# Patient Record
Sex: FEMALE | Race: WHITE | NOT HISPANIC OR LATINO | Employment: OTHER | URBAN - METROPOLITAN AREA
[De-identification: names, ages, dates, MRNs, and addresses within clinical notes are randomized per-mention and may not be internally consistent; named-entity substitution may affect disease eponyms.]

---

## 2017-01-05 ENCOUNTER — HOSPITAL ENCOUNTER (OUTPATIENT)
Dept: RADIOLOGY | Age: 77
Discharge: HOME | End: 2017-01-05
Payer: MEDICARE

## 2017-01-05 DIAGNOSIS — Z13.820 ENCOUNTER FOR SCREENING FOR OSTEOPOROSIS: ICD-10-CM

## 2017-01-05 PROCEDURE — 77080 DXA BONE DENSITY AXIAL: CPT

## 2017-01-23 ENCOUNTER — ALLSCRIPTS OFFICE VISIT (OUTPATIENT)
Dept: OTHER | Facility: OTHER | Age: 77
End: 2017-01-23

## 2017-03-23 ENCOUNTER — ALLSCRIPTS OFFICE VISIT (OUTPATIENT)
Dept: OTHER | Facility: OTHER | Age: 77
End: 2017-03-23

## 2017-08-09 ENCOUNTER — ALLSCRIPTS OFFICE VISIT (OUTPATIENT)
Dept: OTHER | Facility: OTHER | Age: 77
End: 2017-08-09

## 2017-08-09 DIAGNOSIS — Z12.31 ENCOUNTER FOR SCREENING MAMMOGRAM FOR MALIGNANT NEOPLASM OF BREAST: ICD-10-CM

## 2017-09-05 ENCOUNTER — GENERIC CONVERSION - ENCOUNTER (OUTPATIENT)
Dept: OTHER | Facility: OTHER | Age: 77
End: 2017-09-05

## 2017-09-28 ENCOUNTER — ALLSCRIPTS OFFICE VISIT (OUTPATIENT)
Dept: OTHER | Facility: OTHER | Age: 77
End: 2017-09-28

## 2017-10-27 NOTE — PROGRESS NOTES
Assessment  1  Actinic keratosis (702 0) (L57 0)   2  Screening for skin condition (V82 0) (Z13 89)   3  Seborrheic keratosis (702 19) (L82 1)   4  H/O nonmelanoma skin cancer (V10 83) (Z85 828)    Plan   · Wound care as instructed ; Status:Complete;   Done: 40EJI9913   · Use a sun block product with an SPF of 15 or more ; Status:Complete;   Done:  57NSW9489   · Follow-up visit in 6 months Evaluation and Treatment  Follow-up  Status: Complete   Done: 19Dzk4009    Discussion/Summary  Discussion Summary- [facility] Derm:   Assessment #1: Seborrheic keratosis  Care Plan:   Patient reassured these are normal growths we acquire with age no treatment needed  Assessment #2: History of skin cancer  Care Plan:   No recurrence nothing else atypical sunblock recommended followup in 6 months  Assessment #3: Screening for dermatologic disorders  Care Plan:   Nothing else of concern noted on complete exam follow-up in 6 months  Assessment #4: Actinic keratosis  Care Plan:   Patient advised lesions are precancers should resolve with cryosurgery if not to let us know sunblock recommended  Chief Complaint  Chief Complaint Free Text Note Form: 6 month check up and spot on her lower left side of her back  History of Present Illness  HPI: 59-year-old female presents for overall skin check concerns regarding skin cancer with previous history  Concern regarding a spot on lower back      Review of Systems  Complete Female Dermatology [facility] Patient:   Constitutional: Denies constitutional symptoms  Eyes: Denies eye symptoms  ENT:  denies ear symptoms, nasal symptoms, mouth or throat symptoms  Cardiovascular: Denies cardiovascular symptoms  Respiratory: Denies respiratory symptoms  Gastrointestinal: Denies gastrointestinal symptoms  Musculoskeletal: Denies musculoskeletal symptoms  Integumentary: Denies skin, hair and nail symptoms  Neurological: Denies neurologic symptoms     Psychiatric: Denies psychiatric symptoms  Endocrine: Denies endocrine symptoms  Hematologic/Lymphatic: Denies hematologic symptoms  Active Problems  1  Actinic keratosis (702 0) (L57 0)   2  Arthritis (716 90) (M19 90)   3  Basal cell carcinoma of left upper arm (173 61) (C44 619)   4  Changing skin lesion (709 9) (L98 9)   5  Colon cancer screening (V76 51) (Z12 11)   6  Encounter for screening mammogram for breast cancer (V76 12) (Z12 31)   7  Follicular cyst of skin (706 2) (L72 9)   8  Hematuria (599 70) (R31 9)   9  Hyperlipidemia (272 4) (E78 5)   10  Hypertension (401 9) (I10)   11  Milia (706 2) (L72 0)   12  Need for pneumococcal vaccine (V03 82) (Z23)   13  Obesity (278 00) (E66 9)   14  Otitis external (380 10) (H60 90)   15  Screening for breast cancer (V76 10) (Z12 31)   16  Screening for osteoporosis (V82 81) (Z13 820)   17  Screening for skin condition (V82 0) (Z13 89)   18  Seborrheic keratosis (702 19) (L82 1)   19  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)   20  Vertigo (780 4) (R42)    Past Medical History  1  History of Encounter for vitamin deficiency screening (V77 99) (Z13 21)   2  H/O nonmelanoma skin cancer (V10 83) (Z83 169)   3  History of Immunization, varicella (V05 4) (Z23)   4  History of Need for 23-polyvalent pneumococcal polysaccharide vaccine (V03 82) (Z23)   5  History of Need for lipid screening (V77 91) (Z13 220)   6  History of Screening for thyroid disorder (V77 0) (Z13 29)  Past Medical History Reviewed- Derm:   The past medical history was reviewed  Surgical History  1  History of Appendectomy   2  History of Bladder Surgery   3  History of Cholecystectomy   4  History of Colonoscopy   5  History of Dilation And Curettage Of Cervical Stump   6  History of Hernia Repair   7  History of Partial Colectomy   8  History of Tonsillectomy  Surgical History Reviewed [address] [place] [address] 14- Derm:   Surgical History reviewed      Family History  Mother    1   Family history of Arthritis   2  Family history of osteoporosis (V17 81) (Z82 62)   3  Family history of Stroke  Father    4  Family history of Breast cancer   5  Family history of Heart disease   6  Family history of Osteoporosis  Grandmother    7  Family history of Breast cancer   8  Family history of Osteoporosis  Family History Reviewed- Derm:   Family History was reviewed      Social History   · Alcohol use   · Caffeine use (V49 89) (F15 90)   · Former smoker (C88 25) (P48 930)  Social History Reviewed [facility]- Derm: The social history was reviewed      Current Meds   1  BL Calcium Citrate + D TABS; Take as directed Recorded   2  EQL Vitamin D3 1000 UNIT TABS Recorded   3  Glucosamine Chondroitin Joint Oral Tablet Recorded   4  HM Vitamin B12 TABS; Therapy: (Recorded:02Cve9508) to Recorded   5  Metoprolol Succinate ER 50 MG Oral Tablet Extended Release 24 Hour; Take 1 tablet   daily; Therapy: 40LME6778 to (Last Rx:22Onq9210)  Requested for: 32Rte6071 Ordered   6  Ocuvite-Lutein CAPS; Take as directed Recorded   7  Omega 3 CAPS; Take as directed Recorded   8  Triamcinolone Acetonide 0 1 % External Cream; APPLY BID TO AFFECTED AREA X ONE   WEEK DURING FLARE UPS; Therapy: 78HUU4521 to (Last Rx:98Aym7284)  Requested for: 55Zub9070 Ordered  Medication List Reviewed: The medication list was reviewed and updated today  Allergies  1  morphine    Physical Exam    Constitutional   General appearance: Appears healthy and well developed  Lymphatic   No visible disturbance  Musculoskeletal   Digits and nails: No clubbing, cyanosis or edema  Cutaneous and nail exam normal     Skin   Scalp skin texture and hair distribution: Normal skin texture on scalp, normal hair distribution  Head: Abnormal     Neck: Normal turgor, no rashes, no lesions  Chest: Normal turgor, no rashes, no lesions  Abdomen: Normal turgor, no rashes, no lesions  Back: Normal turgor, no rashes, no lesions      Right upper extremity: Normal turgor, no rashes, no lesions  Left upper extremity: Normal turgor, no rashes, no lesions  Right lower extremity: Normal turgor, no rashes, no lesions  Left lower extremity: Normal turgor, no rashes, no lesions  Examination for skin lesions: Abnormal   Skin Lesions: Actinic Keratosis: on area number 4 on the diagram      Neuro/Psych   Alert and oriented x 3  Displays comfort and cooperation during encounterl  Affect is normal     Finding Scaling erythematous areas noted above normal keratotic papules with greasy stuck on appearance previous sites of skin cancer well-healed without recurrence nothing else atypical noted on complete exam       Procedure    Procedure: destruction of lesion  Indications for the procedure include actinic keratosis  Risks, benefits, alternatives, infection risk, bleeding risk, risk of allergic reaction and the risk of scarring were discussed with the patient--   verbal consent was obtained prior to the procedure  Procedure Note:   The lesion location: See Map  Destruction Technique: cryotherapy with liquid nitrogen application-- and-- 16-10 seconds via cryospray--   Destruction of 4 lesions  Post-Procedure:   Patient Status: the patient tolerated the procedure well  Complications: there were no complications  Future Appointments    Date/Time Provider Specialty Site   02/12/2018 01:00 PM UMER Thomson  [address] [facility]   03/29/2018 01:20 PM UMER Mott   Dermatology [facility]     Signatures   Electronically signed by : UMER Hope ; Sep 28 2017  4:01PM EST                       (Author)

## 2018-01-01 DIAGNOSIS — E78.5 HYPERLIPIDEMIA: ICD-10-CM

## 2018-01-01 DIAGNOSIS — I10 ESSENTIAL (PRIMARY) HYPERTENSION: ICD-10-CM

## 2018-01-10 NOTE — CONSULTS
Chief Complaint  6 month check up and spot on her lower left side of her back  History of Present Illness  HPI: 79-year-old female presents for overall skin check concerns regarding skin cancer with previous history  Concern regarding a spot on lower back      Review of Systems    Constitutional: Denies constitutional symptoms  Eyes: Denies eye symptoms  ENT:  denies ear symptoms, nasal symptoms, mouth or throat symptoms  Cardiovascular: Denies cardiovascular symptoms  Respiratory: Denies respiratory symptoms  Gastrointestinal: Denies gastrointestinal symptoms  Musculoskeletal: Denies musculoskeletal symptoms  Integumentary: Denies skin, hair and nail symptoms  Neurological: Denies neurologic symptoms  Psychiatric: Denies psychiatric symptoms  Endocrine: Denies endocrine symptoms  Hematologic/Lymphatic: Denies hematologic symptoms  Active Problems    1  Actinic keratosis (702 0) (L57 0)   2  Arthritis (716 90) (M19 90)   3  Basal cell carcinoma of left upper arm (173 61) (C44 619)   4  Changing skin lesion (709 9) (L98 9)   5  Colon cancer screening (V76 51) (Z12 11)   6  Encounter for screening mammogram for breast cancer (V76 12) (Z12 31)   7  Follicular cyst of skin (706 2) (L72 9)   8  Hematuria (599 70) (R31 9)   9  Hyperlipidemia (272 4) (E78 5)   10  Hypertension (401 9) (I10)   11  Milia (706 2) (L72 0)   12  Need for pneumococcal vaccine (V03 82) (Z23)   13  Obesity (278 00) (E66 9)   14  Otitis external (380 10) (H60 90)   15  Screening for breast cancer (V76 10) (Z12 31)   16  Screening for osteoporosis (V82 81) (Z13 820)   17  Screening for skin condition (V82 0) (Z13 89)   18  Seborrheic keratosis (702 19) (L82 1)   19  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)   20   Vertigo (780 4) (R42)    Past Medical History    · History of Encounter for vitamin deficiency screening (V77 99) (Z13 21)   · H/O nonmelanoma skin cancer (V10 83) (Q71 620)   · History of Immunization, varicella (V05 4) (Z23)   · History of Need for 23-polyvalent pneumococcal polysaccharide vaccine (V03 82) (Z23)   · History of Need for lipid screening (V77 91) (Z13 220)   · History of Screening for thyroid disorder (V77 0) (Z13 29)    The past medical history was reviewed  Surgical History    · History of Appendectomy   · History of Bladder Surgery   · History of Cholecystectomy   · History of Colonoscopy   · History of Dilation And Curettage Of Cervical Stump   · History of Hernia Repair   · History of Partial Colectomy   · History of Tonsillectomy    Surgical History reviewed      Family History    · Family history of Arthritis   · Family history of osteoporosis (V17 81) (Z82 62)   · Family history of Stroke    · Family history of Breast cancer   · Family history of Heart disease   · Family history of Osteoporosis    · Family history of Breast cancer   · Family history of Osteoporosis    Family History was reviewed      Social History    · Alcohol use   · Caffeine use (V49 89) (F15 90)   · Former smoker (P00 97) (I69 245)   · [place]  The social history was reviewed      Current Meds   1  BL Calcium Citrate + D TABS; Take as directed Recorded   2  EQL Vitamin D3 1000 UNIT TABS Recorded   3  Glucosamine Chondroitin Joint Oral Tablet Recorded   4  HM Vitamin B12 TABS; Therapy: (Recorded:37Vhl5144) to Recorded   5  Metoprolol Succinate ER 50 MG Oral Tablet Extended Release 24 Hour; Take 1 tablet   daily; Therapy: 04FFB4525 to (Last Rx:50Asz2880)  Requested for: 00Oxk9177 Ordered   6  Ocuvite-Lutein CAPS; Take as directed Recorded   7  Omega 3 CAPS; Take as directed Recorded   8  Triamcinolone Acetonide 0 1 % External Cream; APPLY BID TO AFFECTED AREA X ONE   WEEK DURING FLARE UPS; Therapy: 60ZFI9359 to (Last Rx:37Deq2876)  Requested for: 08Dfc0704 Ordered    The medication list was reviewed and updated today        Allergies    1  morphine    Physical Exam    Constitutional General appearance: Appears healthy and well developed  Lymphatic   No visible disturbance  Musculoskeletal   Digits and nails: No clubbing, cyanosis or edema  Cutaneous and nail exam normal     Skin   Scalp skin texture and hair distribution: Normal skin texture on scalp, normal hair distribution  Head: Abnormal     Neck: Normal turgor, no rashes, no lesions  Chest: Normal turgor, no rashes, no lesions  Abdomen: Normal turgor, no rashes, no lesions  Back: Normal turgor, no rashes, no lesions  Right upper extremity: Normal turgor, no rashes, no lesions  Left upper extremity: Normal turgor, no rashes, no lesions  Right lower extremity: Normal turgor, no rashes, no lesions  Left lower extremity: Normal turgor, no rashes, no lesions  Examination for skin lesions: Abnormal   Skin Lesions: Actinic Keratosis: on area number 4 on the diagram      Neuro/Psych   Alert and oriented x 3  Displays comfort and cooperation during encounterl  Affect is normal     Finding Scaling erythematous areas noted above normal keratotic papules with greasy stuck on appearance previous sites of skin cancer well-healed without recurrence nothing else atypical noted on complete exam       Procedure    Procedure: destruction of lesion  Indications for the procedure include actinic keratosis  Risks, benefits, alternatives, infection risk, bleeding risk, risk of allergic reaction and the risk of scarring were discussed with the patient   verbal consent was obtained prior to the procedure  Procedure Note:   The lesion location: See Map  Destruction Technique: cryotherapy with liquid nitrogen application and 99-46 seconds via cryospray   Destruction of 4 lesions  Post-Procedure:   Patient Status: the patient tolerated the procedure well  Complications: there were no complications  Assessment    1  Actinic keratosis (702 0) (L57 0)   2  Screening for skin condition (V82 0) (Z13 89)   3   Seborrheic keratosis (702 19) (L82 1)   4  H/O nonmelanoma skin cancer (V10 83) (Z85 828)    Plan  Actinic keratosis    · Wound care as instructed ; Status:Complete;   Done: 97IOB1852  PMH: H/O nonmelanoma skin cancer    · Use a sun block product with an SPF of 15 or more ; Status:Complete;   Done:  10VXN7298   · Follow-up visit in 6 months Evaluation and Treatment  Follow-up  Status: Complete   Done: 85OMS4287    Discussion/Summary    Assessment #1: Seborrheic keratosis  Care Plan:   Patient reassured these are normal growths we acquire with age no treatment needed  Assessment #2: History of skin cancer  Care Plan:   No recurrence nothing else atypical sunblock recommended followup in 6 months  Assessment #3: Screening for dermatologic disorders  Care Plan:   Nothing else of concern noted on complete exam follow-up in 6 months  Assessment #4: Actinic keratosis  Care Plan:   Patient advised lesions are precancers should resolve with cryosurgery if not to let us know sunblock recommended        Signatures   Electronically signed by : UMER Browne ; Sep 28 2017  4:01PM EST                       (Author)

## 2018-01-11 NOTE — PROGRESS NOTES
Assessment    1  Hypertension (401 9) (I10)   2  Hyperlipemia (272 4) (E78 5)    Plan  Health Maintenance    · *VB - Fall Risk Assessment  (Dx V80 09 Screen for Neurologic Disorder);  Status:Complete;   Done: 77AVH9242 12:53AM  Hyperlipemia    · (1) LIPID PANEL, FASTING; Status:Active - Retrospective By Protocol Authorization; Requested for:91Ibn4890;      Recheck 6 months     Discussion/Summary    HM-advised Prevnar  PreHTN-needs watch; encouraged further weight loss  Hyperlipidemia-reviewed low fat diet  History of Present Illness  [place] says she lost some weight  She is down around 10 lbs  [place] says she is exercising on treadmill 4- 5 days a week  Diet she is watching portions  Says she is using olive oil  Ice cream occ  Eats red meat limited  Anabella Moncada  Review of Systems    Preventive Quality 65 and Older: Falls Risk: The patient fell 0 times in the past 12 months  Active Problems    1  Actinic keratosis (702 0) (L57 0)   2  Arthritis (716 90) (M19 90)   3  Basal cell carcinoma of left upper arm (173 61) (C44 619)   4  Changing skin lesion (709 9) (L98 9)   5  Hematuria (599 70) (R31 9)   6  Hypertension (401 9) (I10)   7  Otitis external (380 10) (H60 90)   8  Screening for skin condition (V82 0) (Z13 89)   9  Seborrheic keratosis (702 19) (L82 1)   10  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) (Q16 403)    Past Medical History    1  History of Encounter for vitamin deficiency screening (V77 99) (Z13 21)   2  H/O nonmelanoma skin cancer (V10 83) (X17 140)   3  History of Immunization, varicella (V05 4) (Z23)   4  History of Need for 23-polyvalent pneumococcal polysaccharide vaccine (V03 82) (Z23)   5  History of Need for lipid screening (V77 91) (Z13 220)   6  History of Screening for thyroid disorder (V77 0) (Z13 29)    Surgical History    1  History of Appendectomy   2  History of Bladder Surgery   3  History of Cholecystectomy   4   History of Dilation And Curettage Of Cervical Stump   5  History of Hernia Repair   6  History of Partial Colectomy   7  History of Tonsillectomy    Family History    1  Family history of Arthritis   2  Family history of osteoporosis (V17 81) (Z82 62)   3  Family history of Stroke    4  Family history of Breast cancer   5  Family history of Heart disease   6  Family history of Osteoporosis    7  Family history of Breast cancer   8  Family history of Osteoporosis    Social History    · Alcohol use   · Caffeine use (V49 89) (F15 90)   · Former smoker (E64 50) (J93 273)    Current Meds   1  BL Calcium Citrate + D TABS; Take as directed Recorded   2  EQL Vitamin D3 1000 UNIT Oral Tablet Recorded   3  Glucosamine Chondroitin Joint Oral Tablet Recorded   4  HM Vitamin B12 TABS; Therapy: (Recorded:37Izu0777) to Recorded   5  Metoprolol Succinate ER 50 MG Oral Tablet Extended Release 24 Hour; Take 1 tablet   daily; Therapy: 01MRY4484 to (Last Rx:13Wvn1765)  Requested for: 48DHN2656 Ordered   6  Ocuvite-Lutein CAPS; Take as directed Recorded   7  Omega 3 CAPS; Take as directed Recorded   8  Triamcinolone Acetonide 0 1 % External Cream; APPLY BID TO AFFECTED AREA X ONE   WEEK DURING FLARE UPS; Therapy: 88OHI2989 to (Last Rx:90Leg7607)  Requested for: 80Owb8210 Ordered   9  Zostavax 10994 UNT/0 65ML Subcutaneous Solution Reconstituted; adm  one dose as   directed; Therapy: 80JXI2376 to (Last Rx:28Oct2014) Ordered    Allergies    1  morphine    Vitals  Vital Signs [Data Includes: Current Encounter]    Recorded: 23GQS1817 03:41PM   Heart Rate 64   Respiration 18   Systolic 517   Diastolic 78   Height 5 ft 4 in   Weight 217 lb 8 0 oz   BMI Calculated 37 33   BSA Calculated 2 04     Physical Exam    Constitutional   General appearance: No acute distress, well appearing and well nourished  Neck   Neck: Supple, symmetric, trachea midline, no masses  Thyroid: Normal, no thyromegaly      Pulmonary   Respiratory effort: No increased work of breathing or signs of respiratory distress  Auscultation of lungs: Clear to auscultation  Cardiovascular   Auscultation of heart: Normal rate and rhythm, normal S1 and S2, no murmurs  Carotid pulses: 2+ bilaterally  Abdominal aorta: Normal     Femoral pulses: 2+ bilaterally  Pedal pulses: 2+ bilaterally  Peripheral vascular exam: Normal     Examination of extremities for edema and/or varicosities: Normal     Abdomen   Abdomen: Non-tender, no masses  Liver and spleen: No hepatomegaly or splenomegaly  Lymphatic   Palpation of lymph nodes in neck: No lymphadenopathy  Palpation of lymph nodes in groin: No lymphadenopathy  Future Appointments    Date/Time Provider Specialty Site   07/21/2016 02:00 PM UMER Weaver  [address]   03/15/2016 10:45 AM UMER Norton   [facility] [place]     Signatures   Electronically signed by : UMER Cortez ; Jan 21 2016 12:53AM EST                       (Author)

## 2018-01-13 NOTE — MISCELLANEOUS
Future Appointments    Signatures   Electronically signed by : Jillene Siemens, M D ; Sep 28 2017  4:01PM EST                       (Author)

## 2018-01-15 VITALS
HEART RATE: 64 BPM | SYSTOLIC BLOOD PRESSURE: 134 MMHG | WEIGHT: 218.38 LBS | BODY MASS INDEX: 37.48 KG/M2 | DIASTOLIC BLOOD PRESSURE: 82 MMHG | RESPIRATION RATE: 18 BRPM

## 2018-01-15 VITALS
SYSTOLIC BLOOD PRESSURE: 124 MMHG | WEIGHT: 217.4 LBS | BODY MASS INDEX: 37.32 KG/M2 | DIASTOLIC BLOOD PRESSURE: 68 MMHG | HEART RATE: 64 BPM | RESPIRATION RATE: 16 BRPM

## 2018-02-12 ENCOUNTER — OFFICE VISIT (OUTPATIENT)
Dept: FAMILY MEDICINE CLINIC | Facility: MEDICAL CENTER | Age: 78
End: 2018-02-12
Payer: MEDICARE

## 2018-02-12 VITALS
SYSTOLIC BLOOD PRESSURE: 148 MMHG | BODY MASS INDEX: 38 KG/M2 | WEIGHT: 221.4 LBS | DIASTOLIC BLOOD PRESSURE: 86 MMHG | RESPIRATION RATE: 18 BRPM | HEART RATE: 70 BPM

## 2018-02-12 DIAGNOSIS — E66.9 CLASS 2 OBESITY WITHOUT SERIOUS COMORBIDITY WITH BODY MASS INDEX (BMI) OF 35.0 TO 35.9 IN ADULT, UNSPECIFIED OBESITY TYPE: ICD-10-CM

## 2018-02-12 DIAGNOSIS — M75.21 TENDONITIS OF UPPER BICEPS TENDON OF RIGHT SHOULDER: Primary | ICD-10-CM

## 2018-02-12 DIAGNOSIS — R25.1 TREMOR OF LEFT HAND: ICD-10-CM

## 2018-02-12 DIAGNOSIS — I10 ESSENTIAL HYPERTENSION: ICD-10-CM

## 2018-02-12 DIAGNOSIS — Z01.419 VISIT FOR GYNECOLOGIC EXAMINATION: ICD-10-CM

## 2018-02-12 DIAGNOSIS — Z00.00 ROUTINE ADULT HEALTH MAINTENANCE: ICD-10-CM

## 2018-02-12 DIAGNOSIS — M75.22 TENDONITIS OF UPPER BICEPS TENDON OF LEFT SHOULDER: ICD-10-CM

## 2018-02-12 PROCEDURE — 99215 OFFICE O/P EST HI 40 MIN: CPT | Performed by: FAMILY MEDICINE

## 2018-02-12 RX ORDER — METOPROLOL SUCCINATE 50 MG/1
1 TABLET, EXTENDED RELEASE ORAL DAILY
COMMUNITY
Start: 2014-10-28 | End: 2018-03-14 | Stop reason: SDUPTHER

## 2018-02-12 RX ORDER — LANOLIN ALCOHOL/MO/W.PET/CERES
CREAM (GRAM) TOPICAL
COMMUNITY
End: 2018-09-06 | Stop reason: CLARIF

## 2018-02-12 RX ORDER — GLUCOSAM/MSM/CHOND/HYALURON AC 750-60-150
TABLET ORAL DAILY
COMMUNITY

## 2018-02-12 NOTE — PROGRESS NOTES
S: She is here for CPX  HH: Eats fruits and vegetables, whole grains  Protein daily  Dietary calcium 1-2 daily  Exercise does yoga  Could be better with treadmill  Occ caffeine   Nonsmoker  No chewing tobacco  Alcohol socially  FH: Mother had stroke  Father with heart attack  Breast cancer paternal GM  No  Diabetes  Positive obesity  Maternal GM with osteoporosis   SH: Lives with    Reverend ko  Gyn     Vaginal delivery  Had postmenopausal  Bleeding   D and C   No bleeding since  No vaginal symptoms  No incontinence  Does Kegel exercises  No breast problems  She complains of her left  hand shaking for a long time  Aggravated by holding cup and saucer  No known FH   She says both her arms hurt for a long time  Worse with knitting, chair yoga  Review of Systems   Constitutional: Negative for activity change  Respiratory: Negative for chest tightness and shortness of breath  Cardiovascular: Negative for chest pain and leg swelling  Genitourinary: Positive for frequency  Negative for decreased urine volume, dysuria, pelvic pain, urgency, vaginal bleeding, vaginal discharge and vaginal pain  Skin: Negative for rash  Neurological: Negative for dizziness and headaches  O: /86 (BP Location: Left arm, Patient Position: Sitting, Cuff Size: Large)   Pulse 70   Resp 18   Wt 100 kg (221 lb 6 4 oz)   BMI 38 00 kg/m²   BP by me 130/80  Neck-without adenopathy thyromegaly bruits  Chest clear  Cardiac regular rate and rhythm without murmur  Abdomen benign  Breast without masses or discharge  Pelvic external genitalia normal  Cervix normal  Uterus normal size shape and consistency  Adnexae not palpable  Rectal  no masses stool negative  Ext she shows an intention tremor of the left hand which also was at rest   No pill rolling tremors noted  Some cogwheel rigidity is noted on the right upper extremity but mostly with repetition no facial tremor    Her gait is normal     Colonoscopy 2010  Mammogram 2017  DEXA scan 2017  BW 1/29/18 reviewed CBC/CMP/LP/TSh  Vit D WNL    Assessment  1  Health maintenance-up-to-date including immunizations  2  HTN-controlled  3  Obesity -again advised weight loss  4   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

## 2018-03-14 DIAGNOSIS — I10 ESSENTIAL HYPERTENSION: Primary | ICD-10-CM

## 2018-03-16 RX ORDER — METOPROLOL SUCCINATE 50 MG/1
TABLET, EXTENDED RELEASE ORAL
Qty: 90 TABLET | Refills: 0 | Status: SHIPPED | OUTPATIENT
Start: 2018-03-16 | End: 2018-06-13 | Stop reason: SDUPTHER

## 2018-03-29 ENCOUNTER — OFFICE VISIT (OUTPATIENT)
Dept: DERMATOLOGY | Facility: CLINIC | Age: 78
End: 2018-03-29
Payer: MEDICARE

## 2018-03-29 DIAGNOSIS — Z85.828 HISTORY OF SKIN CANCER: ICD-10-CM

## 2018-03-29 DIAGNOSIS — Z13.89 SCREENING FOR SKIN CONDITION: ICD-10-CM

## 2018-03-29 DIAGNOSIS — L82.1 SEBORRHEIC KERATOSIS: Primary | ICD-10-CM

## 2018-03-29 PROCEDURE — 99213 OFFICE O/P EST LOW 20 MIN: CPT | Performed by: DERMATOLOGY

## 2018-03-29 NOTE — PATIENT INSTRUCTIONS
Seborrheic keratosis patient reassured these are normal growths we acquire with age no treatment needed  History of skin cancer in no recurrence nothing else atypical sunblock recommended follow-up in 1 year  Screening for dermatologic disorders nothing else of concern noted on complete exam follow-up in 1 year

## 2018-06-13 DIAGNOSIS — I10 ESSENTIAL HYPERTENSION: ICD-10-CM

## 2018-06-14 RX ORDER — METOPROLOL SUCCINATE 50 MG/1
TABLET, EXTENDED RELEASE ORAL
Qty: 90 TABLET | Refills: 0 | Status: SHIPPED | OUTPATIENT
Start: 2018-06-14 | End: 2018-09-12 | Stop reason: SDUPTHER

## 2018-08-15 ENCOUNTER — APPOINTMENT (OUTPATIENT)
Dept: RADIOLOGY | Facility: MEDICAL CENTER | Age: 78
End: 2018-08-15
Payer: MEDICARE

## 2018-08-15 ENCOUNTER — OFFICE VISIT (OUTPATIENT)
Dept: FAMILY MEDICINE CLINIC | Facility: MEDICAL CENTER | Age: 78
End: 2018-08-15
Payer: MEDICARE

## 2018-08-15 VITALS
BODY MASS INDEX: 37.98 KG/M2 | HEART RATE: 80 BPM | DIASTOLIC BLOOD PRESSURE: 80 MMHG | RESPIRATION RATE: 16 BRPM | WEIGHT: 221.25 LBS | SYSTOLIC BLOOD PRESSURE: 132 MMHG

## 2018-08-15 DIAGNOSIS — E66.3 OVERWEIGHT: ICD-10-CM

## 2018-08-15 DIAGNOSIS — M25.562 ACUTE PAIN OF LEFT KNEE: ICD-10-CM

## 2018-08-15 DIAGNOSIS — I10 ESSENTIAL HYPERTENSION: ICD-10-CM

## 2018-08-15 DIAGNOSIS — Z12.39 SCREENING FOR BREAST CANCER: ICD-10-CM

## 2018-08-15 DIAGNOSIS — M25.562 ACUTE PAIN OF LEFT KNEE: Primary | ICD-10-CM

## 2018-08-15 DIAGNOSIS — E78.5 HYPERLIPIDEMIA, UNSPECIFIED HYPERLIPIDEMIA TYPE: ICD-10-CM

## 2018-08-15 PROCEDURE — 99214 OFFICE O/P EST MOD 30 MIN: CPT | Performed by: FAMILY MEDICINE

## 2018-08-15 PROCEDURE — 73562 X-RAY EXAM OF KNEE 3: CPT

## 2018-08-15 RX ORDER — OMEGA-3 FATTY ACIDS CAP DELAYED RELEASE 1000 MG 1000 MG
CAPSULE DELAYED RELEASE ORAL
COMMUNITY

## 2018-08-15 NOTE — PROGRESS NOTES
[place] is here for f/u  She complains of a lump in her left knee  She gets pain in that area which goes slightly downward  Pain intermittent  Can be bad with wallking or climbing stairs prolonged sitting  Stiffnes resolves after getting up  Started about a month ago  No trauma  Using Biofreeze and an Ace wrap which helped  She also notices a nonpainful lump outside her left ankle  Se notices an itchy rash between toes of her left foot  She tried triamcinalone  She tried an antifungal cream for the other toes  She needs an order for her mammogram    She does chair and water aerobics  Shaking in her left hand  no worse  O: /80   Pulse 80   Resp 16   Wt 100 kg (221 lb 4 oz)   BMI 37 98 kg/m²    Physical Exam   Constitutional: She appears well-developed and well-nourished  No distress  Neck: Carotid bruit is not present  No thyromegaly present  Cardiovascular: Normal rate, regular rhythm, normal heart sounds and intact distal pulses  Pulmonary/Chest: Effort normal and breath sounds normal    Abdominal: Soft  Bowel sounds are normal  She exhibits no mass  There is no hepatomegaly  There is no tenderness  Musculoskeletal: She exhibits no edema  Lymphadenopathy:     She has no cervical adenopathy  Left knee no crepitus  FROM  Tenderness medial knee but not over meniscus  Ligament stable  Slight swelling noted over inner left knee soft tissue  Prominent veins  NO lower extremety tenderness  Skin-maceration and erythema betweeen first 3 toes left foot  Assessment  1  Hypertension-controlled  2  Left knee pain-will check an x-ray  However this may be a superficial phlebitis  3  Tinea pedis    Plan  Check x-ray left knee  Mammogram   Blood work and recheck 6 months

## 2018-08-16 ENCOUNTER — TELEPHONE (OUTPATIENT)
Dept: FAMILY MEDICINE CLINIC | Facility: MEDICAL CENTER | Age: 78
End: 2018-08-16

## 2018-08-16 DIAGNOSIS — B35.3 TINEA PEDIS OF RIGHT FOOT: Primary | ICD-10-CM

## 2018-08-16 NOTE — TELEPHONE ENCOUNTER
I do not see the antifungal cream in med list-or printed  Please advise  Patient called again  [facility]

## 2018-08-16 NOTE — TELEPHONE ENCOUNTER
Pt was seen yesterday an antifungal cream was supposed to be sent  Can you please send? We need to call patient when done

## 2018-08-17 RX ORDER — KETOCONAZOLE 20 MG/G
CREAM TOPICAL DAILY
Qty: 30 G | Refills: 0 | OUTPATIENT
Start: 2018-08-17 | End: 2019-08-19 | Stop reason: ALTCHOICE

## 2018-08-21 ENCOUNTER — TELEPHONE (OUTPATIENT)
Dept: FAMILY MEDICINE CLINIC | Facility: MEDICAL CENTER | Age: 78
End: 2018-08-21

## 2018-08-24 DIAGNOSIS — M19.90 ARTHRITIS: Primary | ICD-10-CM

## 2018-08-24 DIAGNOSIS — M11.20 CHONDROCALCINOSIS: ICD-10-CM

## 2018-08-24 NOTE — TELEPHONE ENCOUNTER
Pt aware- referral placed  Patient going out of town for a week   Will call and set up something herself  Several names given

## 2018-08-24 NOTE — TELEPHONE ENCOUNTER
X-ray shows some mild are degenerative are changes but also shows some chondrocalcinosis  This is a form of arthritis    If this still bothersome to her would refer to Rheumatology for this

## 2018-09-06 ENCOUNTER — OFFICE VISIT (OUTPATIENT)
Dept: RHEUMATOLOGY | Facility: CLINIC | Age: 78
End: 2018-09-06
Payer: MEDICARE

## 2018-09-06 VITALS
BODY MASS INDEX: 38.28 KG/M2 | WEIGHT: 224.2 LBS | HEART RATE: 65 BPM | HEIGHT: 64 IN | SYSTOLIC BLOOD PRESSURE: 155 MMHG | DIASTOLIC BLOOD PRESSURE: 80 MMHG

## 2018-09-06 DIAGNOSIS — E55.9 VITAMIN D DEFICIENCY: ICD-10-CM

## 2018-09-06 DIAGNOSIS — M11.20 CHONDROCALCINOSIS: ICD-10-CM

## 2018-09-06 DIAGNOSIS — M19.90 ARTHRITIS: ICD-10-CM

## 2018-09-06 DIAGNOSIS — M70.50 ANSERINE BURSITIS: Primary | ICD-10-CM

## 2018-09-06 PROCEDURE — 99204 OFFICE O/P NEW MOD 45 MIN: CPT | Performed by: INTERNAL MEDICINE

## 2018-09-06 PROCEDURE — 20610 DRAIN/INJ JOINT/BURSA W/O US: CPT | Performed by: INTERNAL MEDICINE

## 2018-09-06 RX ORDER — BUPIVACAINE HYDROCHLORIDE 2.5 MG/ML
3 INJECTION, SOLUTION INFILTRATION; PERINEURAL ONCE
Status: COMPLETED | OUTPATIENT
Start: 2018-09-06 | End: 2018-09-06

## 2018-09-06 RX ORDER — METHYLPREDNISOLONE ACETATE 40 MG/ML
40 INJECTION, SUSPENSION INTRA-ARTICULAR; INTRALESIONAL; INTRAMUSCULAR; SOFT TISSUE ONCE
Status: COMPLETED | OUTPATIENT
Start: 2018-09-06 | End: 2018-09-06

## 2018-09-06 RX ADMIN — METHYLPREDNISOLONE ACETATE 40 MG: 40 INJECTION, SUSPENSION INTRA-ARTICULAR; INTRALESIONAL; INTRAMUSCULAR; SOFT TISSUE at 15:31

## 2018-09-06 RX ADMIN — BUPIVACAINE HYDROCHLORIDE 3 ML: 2.5 INJECTION, SOLUTION INFILTRATION; PERINEURAL at 15:30

## 2018-09-06 NOTE — PROGRESS NOTES
Assessment and Plan:   Ms Camilla Laguna is a 15-year-old  female with history significant for osteoarthritis who presents for further evaluation of left knee pain and findings of chondrocalcinosis on imaging  # Left knee pain, secondary to pes anserine bursitis  - Her symptoms in addition to exam findings is likely suggestive of pes anserine bursitis as the cause of her left knee pain  I offered her multiple options including PT, NSAID's, or local steroid injection  As she has been experiencing the pain for a few weeks now, she would like to proceed with bursa injection  Risks of injection were reviewed with patient, including pain, infection, bleeding or injury to surrounding structures  Procedure was well tolerated, with improvement in pain noted immediately after the injection   - I advised her to ice the area if she experiences a flare up in pain tonight  # Chondrocalcinosis  - This is likely an incidental finding noted on imaging, and I do not think she has symptoms secondary to pseudogout  - Will obtain metabolic work up to ensure there is no secondary cause for chondrocalcinosis, but I suspect this is likely idiopathic in nature and age related  Procedure notes:  After consent was obtained, the area (left anserine bursa) was prepared in sterile fashion with chlorhexidine and ETOH pads  Ethylchloride was used for numbing purposes  The area was injected with Depo-Medrol 40 mg and 3 mL of Bupivacaine 0 25%  The patient tolerated the procedure well with no complications  Patient was informed about possible complications like swelling, pain, fever and bleeding  Patient was told to use Tylenol as needed and apply ice locally as needed  Advised to go to ER if the mentioned measures do not help  Avoid strenuous exercise for 48 hours        Plan:  Diagnoses and all orders for this visit:    Anserine bursitis  -     Small joint arthrocentesis  -     bupivacaine (MARCAINE) 0 25 % injection 3 mL; 3 mL by Infiltration route once   -     methylPREDNISolone acetate (DEPO-MEDROL) injection 40 mg; Inject 1 mL (40 mg total) into the joint once     Chondrocalcinosis  -     Ambulatory referral to Rheumatology  -     CBC and differential; Future  -     Comprehensive metabolic panel; Future  -     Cyclic citrul peptide antibody, IgG; Future  -     RF Screen w/ Reflex to Titer; Future  -     Sedimentation rate, automated; Future  -     Uric acid; Future  -     Vitamin D 25 hydroxy; Future  -     Magnesium; Future  -     Phosphorus; Future  -     PTH, intact; Future  -     C-reactive protein; Future      Activities as tolerated    Fall precautions emphasized    Diet: low carb/low fat, more greens/vegetables, adequate hydration  Exercise: try to maintain a low impact exercise regimen as much as possible  Walk for 30 minutes a day for at least 3 days a week    Encouraged to maintain good sleep hygiene  Continue other medications as prescribed by PCP and other specialists        RTC in 3 months  HPI  Ms Menard is a 70-year-old  female with history significant for osteoarthritis who presents for further evaluation of left knee pain and findings of chondrocalcinosis on imaging  She reports onset of left knee pain about 6 weeks ago and it has been constant, but varying in intensity since then  The pain is aggravated with prolonged periods of sitting and activities such as walking and climbing stairs  Relieving factors include Tylenol and local application of tiger balm  She also does chair yoga with certain movements aggravating the knee pain  Water aerobics overall helps her, but as summer is ending she does not have access to indoor pools at this time  The knee pain is associated with a lump sensation on the medial aspect of her knee  She has morning stiffness that lasts a few minutes   She denies any other significant joint pains, but does have pain at the base of her thumbs for which she sees a chiropractor and has no limitations in terms of activities  No other joint swelling noted  She denies fevers, chills, skin rash, photosensitivity or mouth/nose ulcers  She also denies any acute flare ups of joint pain, warmth, swelling or redness involving the left knee  No other complaints at this time  The following portions of the patient's history were reviewed and updated as appropriate: allergies, current medications, past family history, past medical history, past social history, past surgical history and problem list       Review of Systems  Constitutional: Negative for weight change, fevers, chills, night sweats, fatigue  ENT/Mouth: Negative for hearing changes, ear pain, nasal congestion, sinus pain, hoarseness, sore throat, rhinorrhea, swallowing difficulty  Eyes: Negative for pain, redness, discharge, vision changes  Cardiovascular: Negative for chest pain, SOB, palpitations  Respiratory: Negative for cough, sputum, wheezing, dyspnea  Gastrointestinal: Negative for nausea, vomiting, diarrhea, constipation, pain, heartburn  Genitourinary: Negative for dysuria, urinary frequency, hematuria  Musculoskeletal: As per HPI  Skin: Negative for skin rash, color changes  Neuro: Negative for weakness, numbness, tingling, loss of consciousness  Psych: Negative for anxiety, depression  Heme/Lymph: Negative for easy bruising, bleeding, lymphadenopathy          Past Medical History:   Diagnosis Date    H/O nonmelanoma skin cancer     last assessed 9/28/17       Past Surgical History:   Procedure Laterality Date    APPENDECTOMY  2010    BLADDER SURGERY  1997    CERVIX SURGERY      dilation and curettage of cervical stump 2005, 1998, [address]      partial - last assessed 10/28/14    COLONOSCOPY      last assessed 8/9/17    HERNIA REPAIR  2011    TONSILLECTOMY  1971       Social History     Social History    Marital status: /Civil Union     Spouse name: N/A    Number of children: N/A    Years of education: N/A     Occupational History    Not on file       Social History Main Topics    Smoking status: Former Smoker     Quit date: 1979    Smokeless tobacco: Never Used    Alcohol use Yes      Comment: occasionally wine    Drug use: No    Sexual activity: Not on file     Other Topics Concern    Not on file     Social History Narrative    Caffeine use       Family History   Problem Relation Age of Onset    Arthritis Mother     Osteoporosis Mother     Stroke Mother     Breast cancer Father     Heart disease Father     Osteoporosis Father     Breast cancer Family     Osteoporosis Family        Allergies   Allergen Reactions    Morphine Vomiting       Current Outpatient Prescriptions:     Calcium-Magnesium-Vitamin D (CALCIUM 500 PO), Take by mouth, Disp: , Rfl:     Cholecalciferol (EQL VITAMIN D3) 1000 units capsule, Take by mouth, Disp: , Rfl:     cyanocobalamin (CVS VITAMIN B-12) 1000 MCG tablet, Take by mouth, Disp: , Rfl:     Glucos-Chondroit-Hyaluron-MSM (GLUCOSAMINE CHONDROITIN JOINT) TABS, Take by mouth, Disp: , Rfl:     ketoconazole (NIZORAL) 2 % cream, Apply topically daily, Disp: 30 g, Rfl: 0    metoprolol succinate (TOPROL-XL) 50 mg 24 hr tablet, TAKE 1 TABLET DAILY, Disp: 90 tablet, Rfl: 0    Multiple Vitamins-Minerals (EYE VITAMINS PO), Take by mouth, Disp: , Rfl:     Omega-3 Fatty Acids (FISH OIL) 1000 MG CPDR, Take by mouth, Disp: , Rfl:     Current Facility-Administered Medications:     bupivacaine (MARCAINE) 0 25 % injection 3 mL, 3 mL, Infiltration, Once, Kwesi Carranza MD    methylPREDNISolone acetate (DEPO-MEDROL) injection 40 mg, 40 mg, Intra-articular, Once, Kwesi Carrazna MD      Objective:    Vitals:    09/06/18 1408   BP: 155/80   BP Location: Right arm   Patient Position: Sitting   Pulse: 65   Weight: 102 kg (224 lb 3 2 oz)   Height: 5' 4" (1 626 m)       Physical Exam  General: Well appearing, well nourished, in no distress  Oriented x 3, normal mood and affect   Ambulating without difficulty  Obese  Skin: Good turgor, no rash, unusual bruising or prominent lesions  Hair: Normal texture and distribution  Nails: Normal color, no deformities  HEENT:  Head: Normocephalic, atraumatic  Eyes: Conjunctiva clear, sclera non-icteric, EOM intact, PERRL  Nose: No external lesions, mucosa non-inflamed  Mouth: Mucous membranes moist, no mucosal lesions  Neck: Supple, thyroid non-enlarged and non-tender  Heart: Regular rate and rhythm, no murmur or gallop  Lungs: Clear to auscultation, no crackles or wheezing  Extremities: No amputations or deformities, cyanosis, edema  Musculoskeletal: No joint STS noted, but she does have OA changes at her PIP's, DIP's and bilateral CMC's  No other joint tenderness noted  Left knee without any tenderness, swelling, warmth or erythema  Mild crepitus appreciated, but full ROM in bilateral knees  She has significant tenderness at left pes anserine bursa  Neurologic: Alert and oriented  No focal neurological deficits appreciated  Psychiatric: Normal mood and affect  Mendel Spark, M D    Adult Rheumatology

## 2018-09-12 DIAGNOSIS — I10 ESSENTIAL HYPERTENSION: ICD-10-CM

## 2018-09-12 RX ORDER — METOPROLOL SUCCINATE 50 MG/1
TABLET, EXTENDED RELEASE ORAL
Qty: 90 TABLET | Refills: 0 | Status: SHIPPED | OUTPATIENT
Start: 2018-09-12 | End: 2018-12-11 | Stop reason: SDUPTHER

## 2018-10-04 ENCOUNTER — OFFICE VISIT (OUTPATIENT)
Dept: FAMILY MEDICINE CLINIC | Facility: MEDICAL CENTER | Age: 78
End: 2018-10-04
Payer: MEDICARE

## 2018-10-04 ENCOUNTER — TELEPHONE (OUTPATIENT)
Dept: FAMILY MEDICINE CLINIC | Facility: MEDICAL CENTER | Age: 78
End: 2018-10-04

## 2018-10-04 VITALS
OXYGEN SATURATION: 98 % | HEART RATE: 65 BPM | DIASTOLIC BLOOD PRESSURE: 80 MMHG | WEIGHT: 221 LBS | BODY MASS INDEX: 37.93 KG/M2 | TEMPERATURE: 98.3 F | SYSTOLIC BLOOD PRESSURE: 138 MMHG

## 2018-10-04 DIAGNOSIS — J45.901 ASTHMATIC BRONCHITIS WITH ACUTE EXACERBATION, UNSPECIFIED ASTHMA SEVERITY, UNSPECIFIED WHETHER PERSISTENT: Primary | ICD-10-CM

## 2018-10-04 DIAGNOSIS — R05.9 COUGH: Primary | ICD-10-CM

## 2018-10-04 PROCEDURE — 99213 OFFICE O/P EST LOW 20 MIN: CPT | Performed by: FAMILY MEDICINE

## 2018-10-04 RX ORDER — METHYLPREDNISOLONE 4 MG/1
TABLET ORAL
Qty: 21 TABLET | Refills: 0 | Status: SHIPPED | OUTPATIENT
Start: 2018-10-04 | End: 2018-10-09 | Stop reason: HOSPADM

## 2018-10-04 RX ORDER — AZITHROMYCIN 250 MG/1
TABLET, FILM COATED ORAL
Qty: 6 TABLET | Refills: 0 | Status: SHIPPED | OUTPATIENT
Start: 2018-10-04 | End: 2018-10-09 | Stop reason: HOSPADM

## 2018-10-04 RX ORDER — BENZONATATE 200 MG/1
200 CAPSULE ORAL 3 TIMES DAILY PRN
Qty: 30 CAPSULE | Refills: 0 | Status: CANCELLED | OUTPATIENT
Start: 2018-10-04

## 2018-10-04 RX ORDER — ALBUTEROL SULFATE 90 UG/1
2 AEROSOL, METERED RESPIRATORY (INHALATION) EVERY 6 HOURS PRN
Qty: 1 INHALER | Refills: 0 | Status: SHIPPED | OUTPATIENT
Start: 2018-10-04 | End: 2019-03-14 | Stop reason: ALTCHOICE

## 2018-10-04 NOTE — PROGRESS NOTES
[place] started with a cough about 10 days ago  Some nasal congestion  No sore throat or earache  Cough is bad  Productive   Hears wheezing  NOt SOB  Cough is bad at night  No fever or chills  Took Nyquil, Dayquil, Ricola  O: /80 (BP Location: Left arm, Patient Position: Sitting, Cuff Size: Adult)   Pulse 65   Temp 98 3 °F (36 8 °C)   Wt 100 kg (221 lb)   SpO2 98%   BMI 37 93 kg/m²   No respiratory distress  ENT-TM's normal  Pharynx without erythema  Neck no adenopathy  Chest-scattered rhonchi and wheezes  Assessment  Asthmatic bronchitis    Plan  Rx for albuterol inhaler, steroids, Zithromax  Hydration  Call if no better; may need chest x-ray

## 2018-10-04 NOTE — TELEPHONE ENCOUNTER
Patient stated during her visit today with Dr Michael Mae she was suppose to  a precscription for a cough? Patient states it is a yellow pill  Routed to Dr Michael Mae to advise

## 2018-10-05 ENCOUNTER — APPOINTMENT (EMERGENCY)
Dept: CT IMAGING | Facility: HOSPITAL | Age: 78
DRG: 392 | End: 2018-10-05
Payer: MEDICARE

## 2018-10-05 ENCOUNTER — HOSPITAL ENCOUNTER (INPATIENT)
Facility: HOSPITAL | Age: 78
LOS: 3 days | Discharge: HOME HEALTH | DRG: 392 | End: 2018-10-09
Attending: EMERGENCY MEDICINE | Admitting: FAMILY MEDICINE
Payer: MEDICARE

## 2018-10-05 DIAGNOSIS — E87.2 LACTIC ACIDOSIS: ICD-10-CM

## 2018-10-05 DIAGNOSIS — R11.2 INTRACTABLE NAUSEA AND VOMITING: Primary | ICD-10-CM

## 2018-10-05 DIAGNOSIS — E87.6 HYPOKALEMIA: ICD-10-CM

## 2018-10-05 DIAGNOSIS — R53.1 GENERALIZED WEAKNESS: ICD-10-CM

## 2018-10-05 DIAGNOSIS — J40 BRONCHITIS: ICD-10-CM

## 2018-10-05 PROBLEM — R42 DIZZINESSES: Status: ACTIVE | Noted: 2018-10-05

## 2018-10-05 LAB
ALBUMIN SERPL BCP-MCNC: 3.8 G/DL (ref 3.5–5)
ALP SERPL-CCNC: 65 U/L (ref 46–116)
ALT SERPL W P-5'-P-CCNC: 35 U/L (ref 12–78)
ANION GAP SERPL CALCULATED.3IONS-SCNC: 13 MMOL/L (ref 4–13)
APTT PPP: 30 SECONDS (ref 24–36)
AST SERPL W P-5'-P-CCNC: 17 U/L (ref 5–45)
BACTERIA UR QL AUTO: ABNORMAL /HPF
BASE EX.OXY STD BLDV CALC-SCNC: 84.6 % (ref 60–80)
BASE EXCESS BLDV CALC-SCNC: -7 MMOL/L
BASOPHILS # BLD AUTO: 0.03 THOUSANDS/ΜL (ref 0–0.1)
BASOPHILS NFR BLD AUTO: 0 % (ref 0–1)
BILIRUB SERPL-MCNC: 0.4 MG/DL (ref 0.2–1)
BILIRUB UR QL STRIP: NEGATIVE
BUN SERPL-MCNC: 19 MG/DL (ref 5–25)
CALCIUM SERPL-MCNC: 9.4 MG/DL (ref 8.3–10.1)
CHLORIDE SERPL-SCNC: 104 MMOL/L (ref 100–108)
CLARITY UR: CLEAR
CO2 SERPL-SCNC: 25 MMOL/L (ref 21–32)
COLOR UR: YELLOW
CREAT SERPL-MCNC: 1.16 MG/DL (ref 0.6–1.3)
EOSINOPHIL # BLD AUTO: 0.02 THOUSAND/ΜL (ref 0–0.61)
EOSINOPHIL NFR BLD AUTO: 0 % (ref 0–6)
ERYTHROCYTE [DISTWIDTH] IN BLOOD BY AUTOMATED COUNT: 12.6 % (ref 11.6–15.1)
GFR SERPL CREATININE-BSD FRML MDRD: 46 ML/MIN/1.73SQ M
GLUCOSE SERPL-MCNC: 209 MG/DL (ref 65–140)
GLUCOSE UR STRIP-MCNC: NEGATIVE MG/DL
HCO3 BLDV-SCNC: 18.2 MMOL/L (ref 24–30)
HCT VFR BLD AUTO: 48.6 % (ref 34.8–46.1)
HGB BLD-MCNC: 16.8 G/DL (ref 11.5–15.4)
HGB UR QL STRIP.AUTO: ABNORMAL
HYALINE CASTS #/AREA URNS LPF: ABNORMAL /LPF
IMM GRANULOCYTES # BLD AUTO: 0.13 THOUSAND/UL (ref 0–0.2)
IMM GRANULOCYTES NFR BLD AUTO: 1 % (ref 0–2)
INR PPP: 1.05 (ref 0.86–1.17)
KETONES UR STRIP-MCNC: ABNORMAL MG/DL
LACTATE SERPL-SCNC: 2.9 MMOL/L (ref 0.5–2)
LACTATE SERPL-SCNC: 4 MMOL/L (ref 0.5–2)
LEUKOCYTE ESTERASE UR QL STRIP: NEGATIVE
LIPASE SERPL-CCNC: 160 U/L (ref 73–393)
LYMPHOCYTES # BLD AUTO: 1.29 THOUSANDS/ΜL (ref 0.6–4.47)
LYMPHOCYTES NFR BLD AUTO: 11 % (ref 14–44)
MAGNESIUM SERPL-MCNC: 2.1 MG/DL (ref 1.6–2.6)
MCH RBC QN AUTO: 31.8 PG (ref 26.8–34.3)
MCHC RBC AUTO-ENTMCNC: 34.6 G/DL (ref 31.4–37.4)
MCV RBC AUTO: 92 FL (ref 82–98)
MONOCYTES # BLD AUTO: 0.83 THOUSAND/ΜL (ref 0.17–1.22)
MONOCYTES NFR BLD AUTO: 7 % (ref 4–12)
NEUTROPHILS # BLD AUTO: 9.07 THOUSANDS/ΜL (ref 1.85–7.62)
NEUTS SEG NFR BLD AUTO: 81 % (ref 43–75)
NITRITE UR QL STRIP: NEGATIVE
NON-SQ EPI CELLS URNS QL MICRO: ABNORMAL /HPF
NRBC BLD AUTO-RTO: 0 /100 WBCS
O2 CT BLDV-SCNC: 18.4 ML/DL
PCO2 BLDV: 36.2 MM HG (ref 42–50)
PH BLDV: 7.32 [PH] (ref 7.3–7.4)
PH UR STRIP.AUTO: 5.5 [PH] (ref 4.5–8)
PLATELET # BLD AUTO: 183 THOUSANDS/UL (ref 149–390)
PLATELET # BLD AUTO: 247 THOUSANDS/UL (ref 149–390)
PMV BLD AUTO: 8.3 FL (ref 8.9–12.7)
PMV BLD AUTO: 8.4 FL (ref 8.9–12.7)
PO2 BLDV: 53.2 MM HG (ref 35–45)
POTASSIUM SERPL-SCNC: 3.2 MMOL/L (ref 3.5–5.3)
PROT SERPL-MCNC: 7.5 G/DL (ref 6.4–8.2)
PROT UR STRIP-MCNC: NEGATIVE MG/DL
PROTHROMBIN TIME: 13.6 SECONDS (ref 11.8–14.2)
RBC # BLD AUTO: 5.29 MILLION/UL (ref 3.81–5.12)
RBC #/AREA URNS AUTO: ABNORMAL /HPF
SODIUM SERPL-SCNC: 142 MMOL/L (ref 136–145)
SP GR UR STRIP.AUTO: 1.02 (ref 1–1.03)
TROPONIN I SERPL-MCNC: <0.02 NG/ML
UROBILINOGEN UR QL STRIP.AUTO: 0.2 E.U./DL
WBC # BLD AUTO: 11.37 THOUSAND/UL (ref 4.31–10.16)
WBC #/AREA URNS AUTO: ABNORMAL /HPF

## 2018-10-05 PROCEDURE — 83735 ASSAY OF MAGNESIUM: CPT | Performed by: EMERGENCY MEDICINE

## 2018-10-05 PROCEDURE — 83690 ASSAY OF LIPASE: CPT | Performed by: EMERGENCY MEDICINE

## 2018-10-05 PROCEDURE — 85049 AUTOMATED PLATELET COUNT: CPT | Performed by: HOSPITALIST

## 2018-10-05 PROCEDURE — 87040 BLOOD CULTURE FOR BACTERIA: CPT | Performed by: EMERGENCY MEDICINE

## 2018-10-05 PROCEDURE — 96361 HYDRATE IV INFUSION ADD-ON: CPT

## 2018-10-05 PROCEDURE — 99285 EMERGENCY DEPT VISIT HI MDM: CPT

## 2018-10-05 PROCEDURE — 84484 ASSAY OF TROPONIN QUANT: CPT | Performed by: EMERGENCY MEDICINE

## 2018-10-05 PROCEDURE — 83605 ASSAY OF LACTIC ACID: CPT | Performed by: EMERGENCY MEDICINE

## 2018-10-05 PROCEDURE — 96365 THER/PROPH/DIAG IV INF INIT: CPT

## 2018-10-05 PROCEDURE — 83605 ASSAY OF LACTIC ACID: CPT | Performed by: HOSPITALIST

## 2018-10-05 PROCEDURE — 85610 PROTHROMBIN TIME: CPT | Performed by: EMERGENCY MEDICINE

## 2018-10-05 PROCEDURE — 96367 TX/PROPH/DG ADDL SEQ IV INF: CPT

## 2018-10-05 PROCEDURE — 82805 BLOOD GASES W/O2 SATURATION: CPT | Performed by: EMERGENCY MEDICINE

## 2018-10-05 PROCEDURE — 99220 PR INITIAL OBSERVATION CARE/DAY 70 MINUTES: CPT | Performed by: HOSPITALIST

## 2018-10-05 PROCEDURE — 85025 COMPLETE CBC W/AUTO DIFF WBC: CPT | Performed by: EMERGENCY MEDICINE

## 2018-10-05 PROCEDURE — 80053 COMPREHEN METABOLIC PANEL: CPT | Performed by: EMERGENCY MEDICINE

## 2018-10-05 PROCEDURE — 36415 COLL VENOUS BLD VENIPUNCTURE: CPT | Performed by: EMERGENCY MEDICINE

## 2018-10-05 PROCEDURE — 96374 THER/PROPH/DIAG INJ IV PUSH: CPT

## 2018-10-05 PROCEDURE — 70450 CT HEAD/BRAIN W/O DYE: CPT

## 2018-10-05 PROCEDURE — 96375 TX/PRO/DX INJ NEW DRUG ADDON: CPT

## 2018-10-05 PROCEDURE — 74177 CT ABD & PELVIS W/CONTRAST: CPT

## 2018-10-05 PROCEDURE — 93005 ELECTROCARDIOGRAM TRACING: CPT

## 2018-10-05 PROCEDURE — 84484 ASSAY OF TROPONIN QUANT: CPT | Performed by: HOSPITALIST

## 2018-10-05 PROCEDURE — 81001 URINALYSIS AUTO W/SCOPE: CPT | Performed by: EMERGENCY MEDICINE

## 2018-10-05 PROCEDURE — 71260 CT THORAX DX C+: CPT

## 2018-10-05 PROCEDURE — 85730 THROMBOPLASTIN TIME PARTIAL: CPT | Performed by: EMERGENCY MEDICINE

## 2018-10-05 RX ORDER — ONDANSETRON 2 MG/ML
4 INJECTION INTRAMUSCULAR; INTRAVENOUS ONCE
Status: COMPLETED | OUTPATIENT
Start: 2018-10-05 | End: 2018-10-05

## 2018-10-05 RX ORDER — SODIUM CHLORIDE 9 MG/ML
125 INJECTION, SOLUTION INTRAVENOUS CONTINUOUS
Status: DISCONTINUED | OUTPATIENT
Start: 2018-10-05 | End: 2018-10-07

## 2018-10-05 RX ORDER — ALBUTEROL SULFATE 90 UG/1
2 AEROSOL, METERED RESPIRATORY (INHALATION) EVERY 6 HOURS PRN
Status: DISCONTINUED | OUTPATIENT
Start: 2018-10-05 | End: 2018-10-09 | Stop reason: HOSPADM

## 2018-10-05 RX ORDER — ONDANSETRON 2 MG/ML
4 INJECTION INTRAMUSCULAR; INTRAVENOUS EVERY 6 HOURS PRN
Status: DISCONTINUED | OUTPATIENT
Start: 2018-10-05 | End: 2018-10-09 | Stop reason: HOSPADM

## 2018-10-05 RX ORDER — METOCLOPRAMIDE HYDROCHLORIDE 5 MG/ML
10 INJECTION INTRAMUSCULAR; INTRAVENOUS ONCE
Status: COMPLETED | OUTPATIENT
Start: 2018-10-05 | End: 2018-10-05

## 2018-10-05 RX ORDER — METOPROLOL SUCCINATE 50 MG/1
50 TABLET, EXTENDED RELEASE ORAL DAILY
Status: DISCONTINUED | OUTPATIENT
Start: 2018-10-06 | End: 2018-10-09 | Stop reason: HOSPADM

## 2018-10-05 RX ORDER — LIDOCAINE 40 MG/G
CREAM TOPICAL ONCE
Status: COMPLETED | OUTPATIENT
Start: 2018-10-05 | End: 2018-10-05

## 2018-10-05 RX ORDER — DIPHENHYDRAMINE HYDROCHLORIDE 50 MG/ML
12.5 INJECTION INTRAMUSCULAR; INTRAVENOUS ONCE
Status: COMPLETED | OUTPATIENT
Start: 2018-10-05 | End: 2018-10-05

## 2018-10-05 RX ORDER — POTASSIUM CHLORIDE 20 MEQ/1
40 TABLET, EXTENDED RELEASE ORAL ONCE
Status: DISCONTINUED | OUTPATIENT
Start: 2018-10-05 | End: 2018-10-09 | Stop reason: HOSPADM

## 2018-10-05 RX ORDER — HYDRALAZINE HYDROCHLORIDE 20 MG/ML
10 INJECTION INTRAMUSCULAR; INTRAVENOUS EVERY 4 HOURS PRN
Status: DISCONTINUED | OUTPATIENT
Start: 2018-10-05 | End: 2018-10-09 | Stop reason: HOSPADM

## 2018-10-05 RX ORDER — LORAZEPAM 2 MG/ML
0.5 INJECTION INTRAMUSCULAR ONCE
Status: COMPLETED | OUTPATIENT
Start: 2018-10-05 | End: 2018-10-05

## 2018-10-05 RX ORDER — POTASSIUM CHLORIDE 14.9 MG/ML
20 INJECTION INTRAVENOUS ONCE
Status: COMPLETED | OUTPATIENT
Start: 2018-10-05 | End: 2018-10-05

## 2018-10-05 RX ADMIN — SODIUM CHLORIDE 1000 ML: 0.9 INJECTION, SOLUTION INTRAVENOUS at 18:47

## 2018-10-05 RX ADMIN — POTASSIUM CHLORIDE 20 MEQ: 200 INJECTION, SOLUTION INTRAVENOUS at 20:46

## 2018-10-05 RX ADMIN — CEFEPIME HYDROCHLORIDE 2000 MG: 2 INJECTION, POWDER, FOR SOLUTION INTRAVENOUS at 20:19

## 2018-10-05 RX ADMIN — SODIUM CHLORIDE 125 ML/HR: 0.9 INJECTION, SOLUTION INTRAVENOUS at 23:25

## 2018-10-05 RX ADMIN — IOHEXOL 100 ML: 350 INJECTION, SOLUTION INTRAVENOUS at 19:24

## 2018-10-05 RX ADMIN — METOCLOPRAMIDE 10 MG: 5 INJECTION, SOLUTION INTRAMUSCULAR; INTRAVENOUS at 20:17

## 2018-10-05 RX ADMIN — LIDOCAINE 1 APPLICATION: 40 CREAM TOPICAL at 22:44

## 2018-10-05 RX ADMIN — SODIUM CHLORIDE 1000 ML: 0.9 INJECTION, SOLUTION INTRAVENOUS at 19:54

## 2018-10-05 RX ADMIN — DIPHENHYDRAMINE HYDROCHLORIDE 12.5 MG: 50 INJECTION, SOLUTION INTRAMUSCULAR; INTRAVENOUS at 20:17

## 2018-10-05 RX ADMIN — ONDANSETRON 4 MG: 2 INJECTION INTRAMUSCULAR; INTRAVENOUS at 18:42

## 2018-10-05 RX ADMIN — LORAZEPAM 0.5 MG: 2 INJECTION INTRAMUSCULAR; INTRAVENOUS at 23:17

## 2018-10-05 NOTE — ED PROVIDER NOTES
History  No chief complaint on file  HPI    Prior to Admission Medications   Prescriptions Last Dose Informant Patient Reported? Taking? Calcium-Magnesium-Vitamin D (CALCIUM 500 PO)  Self Yes No   Sig: Take by mouth   Cholecalciferol (EQL VITAMIN D3) 1000 units capsule   Yes No   Sig: Take by mouth   Glucos-Chondroit-Hyaluron-MSM (GLUCOSAMINE CHONDROITIN JOINT) TABS   Yes No   Sig: Take by mouth   Methylprednisolone 4 MG TBPK   No No   Sig: Use as directed on package   Multiple Vitamins-Minerals (OCUVITE EXTRA PO)   Yes No   Sig: Take by mouth   Omega-3 Fatty Acids (FISH OIL) 1000 MG CPDR   Yes No   Sig: Take by mouth   albuterol (PROAIR HFA) 90 mcg/act inhaler   No No   Sig: Inhale 2 puffs every 6 (six) hours as needed for wheezing   azithromycin (ZITHROMAX) 250 mg tablet   No No   Sig: Take 2 tablets today then 1 tablet daily x 4 days   cyanocobalamin (CVS VITAMIN B-12) 1000 MCG tablet   Yes No   Sig: Take by mouth   ketoconazole (NIZORAL) 2 % cream   No No   Sig: Apply topically daily   metoprolol succinate (TOPROL-XL) 50 mg 24 hr tablet   No No   Sig: TAKE 1 TABLET DAILY      Facility-Administered Medications: None       Past Medical History:   Diagnosis Date    H/O nonmelanoma skin cancer     last assessed 9/28/17       Past Surgical History:   Procedure Laterality Date    APPENDECTOMY  2010    BLADDER SURGERY  1997    CERVIX SURGERY      dilation and curettage of cervical stump 2005, 1998, [address]      partial - last assessed 10/28/14    COLONOSCOPY      last assessed 8/9/17    HERNIA REPAIR  2011    TONSILLECTOMY  1971       Family History   Problem Relation Age of Onset    Arthritis Mother     Osteoporosis Mother     Stroke Mother     Breast cancer Father     Heart disease Father     Osteoporosis Father     Breast cancer Family     Osteoporosis Family      I have reviewed and agree with the history as documented      Social History   Substance Use Topics    Smoking status: Former Smoker     Quit date: 1979    Smokeless tobacco: Never Used    Alcohol use Yes      Comment: occasionally wine        Review of Systems    Physical Exam  Physical Exam    Vital Signs  ED Triage Vitals   Temp Pulse Resp BP SpO2   -- -- -- -- --      Temp src Heart Rate Source Patient Position - Orthostatic VS BP Location FiO2 (%)   -- -- -- -- --      Pain Score       --           There were no vitals filed for this visit  Visual Acuity      ED Medications  Medications - No data to display    Diagnostic Studies  Results Reviewed     None                 No orders to display              Procedures  Procedures       Phone Contacts  ED Phone Contact    ED Course                               [facility]  CritCare Time    Disposition  Final diagnoses:   None     ED Disposition     None      Follow-up Information    None         Patient's Medications   Discharge Prescriptions    No medications on file     No discharge procedures on file      ED Provider  Electronically Signed by

## 2018-10-05 NOTE — ED PROVIDER NOTES
History  Chief Complaint   Patient presents with    Vomiting     pt c/o URI x10 days and vomiting since today  zpack, prednisone, and albuterol for dx of bronchitis  Patient is a 26-year-old female with past medical and surgical history of hypertension, appendectomy, cholecystectomy, partial bowel resection, hernia repair, presents to the emergency department by ambulance for acute nausea and vomiting  According to EMS, patient has been having cold-like symptoms for the past 10 days  Per patient she has been coughing with runny nose and congestion for 1-2 weeks  She finally saw a physician yesterday who diagnosed her with bronchitis and started her on azithromycin (Z-tresa), albuterol inhaler and steroids  This afternoon, patient felt nauseated and started having vomiting  She reports she vomited for about 0 5 hour and now has only been dry heaving  Vomitus is nonbloody and nonbilious  EMS provided patient with Zofran pre-hospital however she continues to retch and dry heaves  Patient also complains of feeling very dizzy which she describes as a spinning sensation  She also reports having a headache since this afternoon, which came on gradually and progressively worsened  While she was vomiting at her house, she felt the urge to have a bowel movement but did not  EMS also reports when they saw her she was very diaphoretic  She denies any known fever, shaking chills, lightheadedness, change in vision or hearing, tinnitus, sore throat, neck pain or stiffness, pain, palpitations, dyspnea or wheezing, hemoptysis, abdominal pain, hematuria emesis, diarrhea, constipation, blood per rectum or melena, dysuria, change in urinary frequency, hematuria, flank pain, skin rash or color change, extremity swelling or pain, extremity weakness or paresthesia or other focal neurologic deficits  She denies any known sick contacts  Denies any recent travel outside the country          History provided by:  Patient and EMS personnel   used: No        Prior to Admission Medications   Prescriptions Last Dose Informant Patient Reported? Taking?    Calcium-Magnesium-Vitamin D (CALCIUM 500 PO)  Self Yes Yes   Sig: Take by mouth   Cholecalciferol (EQL VITAMIN D3) 1000 units capsule   Yes Yes   Sig: Take by mouth daily     Glucos-Chondroit-Hyaluron-MSM (GLUCOSAMINE CHONDROITIN JOINT) TABS   Yes Yes   Sig: Take by mouth daily     Methylprednisolone 4 MG TBPK   No Yes   Sig: Use as directed on package   Multiple Vitamins-Minerals (OCUVITE PO)   Yes Yes   Sig: Take by mouth daily   Omega-3 Fatty Acids (FISH OIL) 1000 MG CPDR   Yes Yes   Sig: Take by mouth   albuterol (PROAIR HFA) 90 mcg/act inhaler   No Yes   Sig: Inhale 2 puffs every 6 (six) hours as needed for wheezing   azithromycin (ZITHROMAX) 250 mg tablet   No Yes   Sig: Take 2 tablets today then 1 tablet daily x 4 days   cyanocobalamin ([employer] VITAMIN B-12) 1000 MCG tablet   Yes Yes   Sig: Take by mouth daily     ketoconazole (NIZORAL) 2 % cream   No Yes   Sig: Apply topically daily   metoprolol succinate (TOPROL-XL) 50 mg 24 hr tablet   No Yes   Sig: TAKE 1 TABLET DAILY      Facility-Administered Medications: None       Past Medical History:   Diagnosis Date    H/O nonmelanoma skin cancer     last assessed 9/28/17    Hypertension        Past Surgical History:   Procedure Laterality Date    APPENDECTOMY  2010    BLADDER SURGERY  1997    CERVIX SURGERY      dilation and curettage of cervical stump 2005, 1998, [facility] [address] [address]      partial - last assessed 10/28/14    COLONOSCOPY      last assessed 8/9/17    HERNIA REPAIR  2011    TONSILLECTOMY  1971       Family History   Problem Relation Age of Onset    Arthritis Mother     Osteoporosis Mother     Stroke Mother     Breast cancer Father     Heart disease Father     Osteoporosis Father     Breast cancer Family     Osteoporosis Family      I have reviewed and agree with the history as documented  Social History   Substance Use Topics    Smoking status: Former Smoker     Quit date: 1979    Smokeless tobacco: Never Used    Alcohol use Yes      Comment: occasionally wine        Review of Systems   Constitutional: Positive for diaphoresis  Negative for chills and fever  HENT: Positive for congestion and rhinorrhea  Negative for ear pain and sore throat  Eyes: Negative for photophobia, pain and visual disturbance  Respiratory: Positive for cough  Negative for chest tightness, shortness of breath and wheezing  Cardiovascular: Negative for chest pain, palpitations and leg swelling  Gastrointestinal: Positive for nausea and vomiting  Negative for abdominal distention, abdominal pain, blood in stool, constipation and diarrhea  Genitourinary: Negative for dysuria, flank pain, frequency and hematuria  Musculoskeletal: Negative for back pain, neck pain and neck stiffness  Skin: Negative for color change, pallor and rash  Allergic/Immunologic: Negative for immunocompromised state  Neurological: Positive for dizziness and headaches  Negative for syncope, facial asymmetry, speech difficulty, weakness, light-headedness and numbness  Hematological: Negative for adenopathy  Psychiatric/Behavioral: Negative for confusion and decreased concentration  All other systems reviewed and are negative  Physical Exam  Physical Exam   Constitutional: She is oriented to person, place, and time  She appears well-developed and well-nourished  She appears distressed  Patient appears to be in mild distress secondary to dry heaving  HENT:   Head: Normocephalic and atraumatic  Right Ear: External ear normal    Left Ear: External ear normal    Mouth/Throat: Oropharynx is clear and moist  No oropharyngeal exudate  Eyes: Pupils are equal, round, and reactive to light  Conjunctivae and EOM are normal    Neck: Normal range of motion  Neck supple  No JVD present     Cardiovascular: Normal rate, regular rhythm, normal heart sounds and intact distal pulses  Exam reveals no gallop and no friction rub  No murmur heard  Pulmonary/Chest: Effort normal and breath sounds normal  No respiratory distress  She has no wheezes  She has no rales  She exhibits no tenderness  Abdominal: Soft  Bowel sounds are normal  She exhibits no distension  There is tenderness  There is no rebound and no guarding  Patient dry heaving  She has epigastric and left lower quadrant abdominal tenderness  Musculoskeletal: Normal range of motion  She exhibits no edema or tenderness  Lymphadenopathy:     She has no cervical adenopathy  Neurological: She is alert and oriented to person, place, and time  No cranial nerve deficit  No gross motor or sensory deficits  Skin: Skin is warm and dry  No rash noted  She is not diaphoretic  No erythema  No pallor  Psychiatric: She has a normal mood and affect  Her behavior is normal    Nursing note and vitals reviewed        Vital Signs  ED Triage Vitals   Temperature Pulse Respirations Blood Pressure SpO2   10/05/18 1939 10/05/18 1836 10/05/18 1836 10/05/18 1836 10/05/18 1836   (!) 96 5 °F (35 8 °C) 79 22 (!) 184/87 96 %      Temp Source Heart Rate Source Patient Position - Orthostatic VS BP Location FiO2 (%)   10/05/18 1939 10/05/18 2012 10/05/18 2012 10/05/18 2012 --   Rectal Monitor Lying Right arm       Pain Score       10/05/18 2012       No Pain         Vitals:    10/05/18 2012 10/05/18 2049 10/05/18 2145 10/05/18 2329   BP: 153/73 156/71 165/77 129/59   BP Location: Right arm Right arm Left arm Left arm   Pulse: 88 89 97 89   Resp: 20 20 20 18   Temp:  (!) 96 5 °F (35 8 °C)     TempSrc:  Rectal     SpO2: 97% 95% 95% 93%     Visual Acuity      ED Medications  Medications   potassium chloride (K-DUR,KLOR-CON) CR tablet 40 mEq (40 mEq Oral Not Given 10/5/18 2009)   albuterol (PROVENTIL HFA,VENTOLIN HFA) inhaler 2 puff (not administered)   metoprolol succinate (TOPROL-XL) 24 hr tablet 50 mg (not administered)   sodium chloride 0 9 % infusion (125 mL/hr Intravenous New Bag 10/5/18 2325)   enoxaparin (LOVENOX) subcutaneous injection 40 mg (not administered)   ondansetron (ZOFRAN) injection 4 mg (not administered)   hydrALAZINE (APRESOLINE) injection 10 mg (not administered)   ondansetron (ZOFRAN) injection 4 mg (4 mg Intravenous Given 10/5/18 1842)    EMS REPLENISHMENT MED ( Does not apply Given to EMS 10/5/18 1847)   sodium chloride 0 9 % bolus 1,000 mL (0 mL Intravenous Stopped 10/5/18 2046)   sodium chloride 0 9 % bolus 1,000 mL (0 mL Intravenous Stopped 10/5/18 2210)   iohexol (OMNIPAQUE) 350 MG/ML injection (MULTI-DOSE) 100 mL (100 mL Intravenous Given 10/5/18 1924)   metoclopramide (REGLAN) injection 10 mg (10 mg Intravenous Given 10/5/18 2017)   potassium chloride 20 mEq IVPB (premix) (20 mEq Intravenous New Bag 10/5/18 2046)   diphenhydrAMINE (BENADRYL) injection 12 5 mg (12 5 mg Intravenous Given 10/5/18 2017)   cefepime (MAXIPIME) 2,000 mg in dextrose 5 % 50 mL IVPB (0 mg Intravenous Stopped 10/5/18 2045)   lidocaine (LMX) 4 % cream (1 application Topical Given 10/5/18 2244)   LORazepam (ATIVAN) 2 mg/mL injection 0 5 mg (0 5 mg Intravenous Given 10/5/18 2317)       Diagnostic Studies  Results Reviewed     Procedure Component Value Units Date/Time    Lactic acid, plasma [[id number]]     Lab Status:  No result Specimen:  Blood     Blood gas, venous [[id number]]  (Abnormal) Collected:  10/05/18 2142    Lab Status:  Final result Specimen:  Blood from Arm, Right Updated:  10/05/18 2148     pH, Zain 7 320     pCO2, Zain 36 2 (L) mm Hg      pO2, Zain 53 2 (H) mm Hg      HCO3, Zain 18 2 (L) mmol/L      Base Excess, Zain -7 0 mmol/L      O2 Content, Zain 18 4 ml/dL      O2 HGB, VENOUS 84 6 (H) %     Lactic acid, plasma [[id number]]  (Abnormal) Collected:  10/05/18 2057    Lab Status:  Final result Specimen:  Blood from Arm, Left Updated:  10/05/18 2126     LACTIC ACID 2 9 (HH) mmol/L Narrative:         Result may be elevated if tourniquet was used during collection  Urine Microscopic [[id number]]  (Abnormal) Collected:  10/05/18 2017    Lab Status:  Final result Specimen:  Urine from Urine, Clean Catch Updated:  10/05/18 2033     RBC, UA 0-1 (A) /hpf      WBC, UA 0-1 (A) /hpf      Epithelial Cells None Seen /hpf      Bacteria, UA None Seen /hpf      Hyaline Casts, UA 0-1 (A) /lpf     UA w Reflex to Microscopic [[id number]]  (Abnormal) Collected:  10/05/18 2017    Lab Status:  Final result Specimen:  Urine from Urine, Clean Catch Updated:  10/05/18 2026     Color, UA Yellow     Clarity, UA Clear     Specific Gravity, UA 1 025     pH, UA 5 5     Leukocytes, UA Negative     Nitrite, UA Negative     Protein, UA Negative mg/dl      Glucose, UA Negative mg/dl      Ketones, UA Trace (A) mg/dl      Urobilinogen, UA 0 2 E U /dl      Bilirubin, UA Negative     Blood, UA Trace-Intact (A)    Blood culture #2 [[id number]] Collected:  10/05/18 1944    Lab Status: In process Specimen:  Blood from Arm, Left Updated:  10/05/18 1959    Blood culture #1 [[id number]] Collected:  10/05/18 1954    Lab Status: In process Specimen:  Blood from Arm, Right Updated:  10/05/18 1959    Lactic acid, plasma [[id number]]  (Abnormal) Collected:  10/05/18 1843    Lab Status:  Final result Specimen:  Blood from Arm, Left Updated:  10/05/18 1918     LACTIC ACID 4 0 (HH) mmol/L     Narrative:         Result may be elevated if tourniquet was used during collection      Troponin I [[id number]]  (Normal) Collected:  10/05/18 1843    Lab Status:  Final result Specimen:  Blood from Arm, Left Updated:  10/05/18 1916     Troponin I <0 02 ng/mL     Comprehensive metabolic panel [[id number]]  (Abnormal) Collected:  10/05/18 1843    Lab Status:  Final result Specimen:  Blood from Arm, Left Updated:  10/05/18 1911     Sodium 142 mmol/L      Potassium 3 2 (L) mmol/L      Chloride 104 mmol/L      CO2 25 mmol/L      ANION GAP 13 mmol/L      BUN 19 mg/dL      Creatinine 1 16 mg/dL      Glucose 209 (H) mg/dL      Calcium 9 4 mg/dL      AST 17 U/L      ALT 35 U/L      Alkaline Phosphatase 65 U/L      Total Protein 7 5 g/dL      Albumin 3 8 g/dL      Total Bilirubin 0 40 mg/dL      eGFR 46 ml/min/1 73sq m     Narrative:         National Kidney Disease Education Program recommendations are as follows:  GFR calculation is accurate only with a steady state creatinine  Chronic Kidney disease less than 60 ml/min/1 73 sq  meters  Kidney failure less than 15 ml/min/1 73 sq  meters      Magnesium [[id number]]  (Normal) Collected:  10/05/18 1843    Lab Status:  Final result Specimen:  Blood from Arm, Left Updated:  10/05/18 1911     Magnesium 2 1 mg/dL     Lipase [[id number]]  (Normal) Collected:  10/05/18 1843    Lab Status:  Final result Specimen:  Blood from Arm, Left Updated:  10/05/18 1911     Lipase 160 u/L     Protime-INR [[id number]]  (Normal) Collected:  10/05/18 1843    Lab Status:  Final result Specimen:  Blood from Arm, Left Updated:  10/05/18 1905     Protime 13 6 seconds      INR 1 05    APTT [[id number]]  (Normal) Collected:  10/05/18 1843    Lab Status:  Final result Specimen:  Blood from Arm, Left Updated:  10/05/18 1905     PTT 30 seconds     CBC and differential [[id number]]  (Abnormal) Collected:  10/05/18 1843    Lab Status:  Final result Specimen:  Blood from Arm, Left Updated:  10/05/18 1853     WBC 11 37 (H) Thousand/uL      RBC 5 29 (H) Million/uL      Hemoglobin 16 8 (H) g/dL      Hematocrit 48 6 (H) %      MCV 92 fL      MCH 31 8 pg      MCHC 34 6 g/dL      RDW 12 6 %      MPV 8 4 (L) fL      Platelets 608 Thousands/uL      nRBC 0 /100 WBCs      Neutrophils Relative 81 (H) %      Immat GRANS % 1 %      Lymphocytes Relative 11 (L) %      Monocytes Relative 7 %      Eosinophils Relative 0 %      Basophils Relative 0 %      Neutrophils Absolute 9 07 (H) Thousands/µL      Immature Grans Absolute 0 13 Thousand/uL      Lymphocytes Absolute 1 29 Thousands/µL Monocytes Absolute 0 83 Thousand/µL      Eosinophils Absolute 0 02 Thousand/µL      Basophils Absolute 0 03 Thousands/µL                  CT chest abdomen pelvis w contrast   Final Result by Eric Guzman MD (10/05 2006)   1  No acute findings in the chest, abdomen or pelvis  2   0 5 cm right lower lobe pulmonary nodule  Based on current Fleischner Society 2017 Guidelines on incidental pulmonary nodule, no routine follow-up is needed if the patient is considered low risk for lung cancer  If the patient is considered high    risk for lung cancer, 12 month follow-up non-contrast chest CT is recommended  3   Colonic diverticulosis without diverticulitis  4   Hepatic steatosis  Workstation performed: [id number]         CT head without contrast   Final Result by Eric Guzmna MD (10/05 1947)      No acute intracranial abnormality  Workstation performed: [id number]                    Procedures  ECG 12 Lead Documentation  Date/Time: 10/5/2018 7:06 PM  Performed by: Jacob Peoria by: Maria Luisa Curry     ECG reviewed by me, the ED Provider: yes    Patient location:  ED  Previous ECG:     Previous ECG:  Compared to current    Comparison ECG info:  10-; QT prolongation new  Left axis deviation is new  Rate:     ECG rate:  88    ECG rate assessment: normal    Rhythm:     Rhythm: sinus rhythm    Ectopy:     Ectopy: none    QRS:     QRS axis:  Left    QRS intervals:  Normal  Conduction:     Conduction: normal    ST segments:     ST segments:  Normal  T waves:     T waves: normal    Other findings:     Other findings: CHELE    Comments:      Prolonged QT  Phone Contacts  ED Phone Contact    ED Course  ED Course as of Oct 06 0154   Fri Oct 05, 2018   1913 No prior history of diabetes  Glucose: (!) 209   1920 LACTIC ACID: (!!) 4 0   1921 Will add 2nd L of IV fluids  I do feel lactic acid is significantly elevated likely from dehydration    Sepsis also in differential but patient has been having significant vomiting today  Will repeat lactic acid in 2 hours  CT scans pending  Will await reads before starting antibiotics  Patient hemodynamically stable  2001 Patient continues to have nausea and dry heaving  QT mildly prolonged however will try Reglan and Benadryl for further relief  Patient on cardiac monitor so will continue to monitor her QT  Bare hugger started for mild hypothermia  2058 No obvious source of infection  Updated patient about abnormal lactic acid  She is still nauseous  Will admit for further observation  2150 Improved from 4 0  LACTIC ACID: (!!) 2 9           Identification of Seniors at Risk      Most Recent Value   (ISAR) Identification of Seniors at Risk   Before the illness or injury that brought you to the Emergency, did you need someone to help you on a regular basis? 0 Filed at: 10/05/2018 2441   In the last 24 hours, have you needed more help than usual?  0 Filed at: 10/05/2018 4266   Have you been hospitalized for one or more nights during the past 6 months? 0 Filed at: 10/05/2018 1838   In general, do you see well?  0 Filed at: 10/05/2018 1838   In general, do you have serious problems with your memory? 0 Filed at: 10/05/2018 6327   Do you take more than three different medications every day?  0 Filed at: 10/05/2018 1838   ISAR Score  0 Filed at: 10/05/2018 6623                          MDM  Number of Diagnoses or Management Options  Diagnosis management comments: 51-year-old female presents to the ED with acute nausea, vomiting, dizziness and headache  Patient recently has been having cold-like symptoms consistent with bronchitis and was justStarted on azithromycin, steroids and an inhaler  Differential includes acute gastritis or gastroenteritis, UTI/pyelo, pneumonia, vertigo, intracranial hemorrhage or cerebellar stroke, bowel obstruction    Will workup with cardiac and abdominal labs, lactic acid, urinalysis,    CritCare Time    Disposition  Final diagnoses:   Intractable nausea and vomiting   Bronchitis   Hypokalemia   Lactic acidosis     Time reflects when diagnosis was documented in both MDM as applicable and the Disposition within this note     Time User Action Codes Description Comment    10/5/2018  9:09 PM Ne KOEHLER Add [R11 2] Intractable nausea and vomiting     10/5/2018  9:09 PM Ne KOEHLER Add [J40] Bronchitis     10/5/2018  9:09 PM Ne KOEHLER Add [E87 6] Hypokalemia     10/5/2018  9:09 PM Ne KOEHLER Add [E87 2] Lactic acidosis       ED Disposition     ED Disposition Condition Comment    Admit  Case was discussed with ANAMARIA and the patient's admission status was agreed to be Admission Status: observation status to the service of Dr Dragan Pierre    None         Current Discharge Medication List      CONTINUE these medications which have NOT CHANGED    Details   albuterol (PROAIR HFA) 90 mcg/act inhaler Inhale 2 puffs every 6 (six) hours as needed for wheezing  Qty: 1 Inhaler, Refills: 0    Associated Diagnoses: Asthmatic bronchitis with acute exacerbation, unspecified asthma severity, unspecified whether persistent      azithromycin (ZITHROMAX) 250 mg tablet Take 2 tablets today then 1 tablet daily x 4 days  Qty: 6 tablet, Refills: 0    Associated Diagnoses: Asthmatic bronchitis with acute exacerbation, unspecified asthma severity, unspecified whether persistent      Calcium-Magnesium-Vitamin D (CALCIUM 500 PO) Take by mouth      Cholecalciferol (EQL VITAMIN D3) 1000 units capsule Take by mouth daily        cyanocobalamin ([employer] VITAMIN B-12) 1000 MCG tablet Take by mouth daily        Glucos-Chondroit-Hyaluron-MSM (GLUCOSAMINE CHONDROITIN JOINT) TABS Take by mouth daily        ketoconazole (NIZORAL) 2 % cream Apply topically daily  Qty: 30 g, Refills: 0    Associated Diagnoses: Tinea pedis of right foot      Methylprednisolone 4 MG TBPK Use as directed on package  Qty: 21 tablet, Refills: 0    Associated Diagnoses: Asthmatic bronchitis with acute exacerbation, unspecified asthma severity, unspecified whether persistent      metoprolol succinate (TOPROL-XL) 50 mg 24 hr tablet TAKE 1 TABLET DAILY  Qty: 90 tablet, Refills: 0    Associated Diagnoses: Essential hypertension      Multiple Vitamins-Minerals (OCUVITE PO) Take by mouth daily      Omega-3 Fatty Acids (FISH OIL) 1000 MG CPDR Take by mouth           No discharge procedures on file      ED Provider  Electronically Signed by           Jasson Payne DO  10/06/18 8702

## 2018-10-05 NOTE — Clinical Note
Case was discussed with ANAMARIA and the patient's admission status was agreed to be Admission Status: observation status to the service of Dr Devan Bass

## 2018-10-06 LAB
ANION GAP SERPL CALCULATED.3IONS-SCNC: 5 MMOL/L (ref 4–13)
BUN SERPL-MCNC: 13 MG/DL (ref 5–25)
CALCIUM SERPL-MCNC: 8 MG/DL (ref 8.3–10.1)
CHLORIDE SERPL-SCNC: 108 MMOL/L (ref 100–108)
CO2 SERPL-SCNC: 28 MMOL/L (ref 21–32)
CREAT SERPL-MCNC: 0.98 MG/DL (ref 0.6–1.3)
ERYTHROCYTE [DISTWIDTH] IN BLOOD BY AUTOMATED COUNT: 12.9 % (ref 11.6–15.1)
GFR SERPL CREATININE-BSD FRML MDRD: 56 ML/MIN/1.73SQ M
GLUCOSE P FAST SERPL-MCNC: 137 MG/DL (ref 65–99)
GLUCOSE SERPL-MCNC: 137 MG/DL (ref 65–140)
HCT VFR BLD AUTO: 42.8 % (ref 34.8–46.1)
HGB BLD-MCNC: 14.5 G/DL (ref 11.5–15.4)
LACTATE SERPL-SCNC: 1 MMOL/L (ref 0.5–2)
LACTATE SERPL-SCNC: 2.3 MMOL/L (ref 0.5–2)
LACTATE SERPL-SCNC: 2.8 MMOL/L (ref 0.5–2)
MAGNESIUM SERPL-MCNC: 1.8 MG/DL (ref 1.6–2.6)
MCH RBC QN AUTO: 31.3 PG (ref 26.8–34.3)
MCHC RBC AUTO-ENTMCNC: 33.9 G/DL (ref 31.4–37.4)
MCV RBC AUTO: 92 FL (ref 82–98)
PHOSPHATE SERPL-MCNC: 3 MG/DL (ref 2.3–4.1)
PLATELET # BLD AUTO: 200 THOUSANDS/UL (ref 149–390)
PMV BLD AUTO: 8.4 FL (ref 8.9–12.7)
POTASSIUM SERPL-SCNC: 5 MMOL/L (ref 3.5–5.3)
PROCALCITONIN SERPL-MCNC: 0.06 NG/ML
RBC # BLD AUTO: 4.63 MILLION/UL (ref 3.81–5.12)
SODIUM SERPL-SCNC: 141 MMOL/L (ref 136–145)
TROPONIN I SERPL-MCNC: 0.03 NG/ML
TROPONIN I SERPL-MCNC: <0.02 NG/ML
WBC # BLD AUTO: 11.38 THOUSAND/UL (ref 4.31–10.16)

## 2018-10-06 PROCEDURE — 80048 BASIC METABOLIC PNL TOTAL CA: CPT | Performed by: HOSPITALIST

## 2018-10-06 PROCEDURE — 84100 ASSAY OF PHOSPHORUS: CPT | Performed by: HOSPITALIST

## 2018-10-06 PROCEDURE — 84484 ASSAY OF TROPONIN QUANT: CPT | Performed by: HOSPITALIST

## 2018-10-06 PROCEDURE — 85027 COMPLETE CBC AUTOMATED: CPT | Performed by: HOSPITALIST

## 2018-10-06 PROCEDURE — 84145 PROCALCITONIN (PCT): CPT | Performed by: PHYSICIAN ASSISTANT

## 2018-10-06 PROCEDURE — 83735 ASSAY OF MAGNESIUM: CPT | Performed by: HOSPITALIST

## 2018-10-06 PROCEDURE — 83605 ASSAY OF LACTIC ACID: CPT | Performed by: HOSPITALIST

## 2018-10-06 PROCEDURE — 83605 ASSAY OF LACTIC ACID: CPT | Performed by: PHYSICIAN ASSISTANT

## 2018-10-06 PROCEDURE — 99232 SBSQ HOSP IP/OBS MODERATE 35: CPT | Performed by: PHYSICIAN ASSISTANT

## 2018-10-06 RX ORDER — MECLIZINE HYDROCHLORIDE 25 MG/1
25 TABLET ORAL EVERY 8 HOURS PRN
Status: DISCONTINUED | OUTPATIENT
Start: 2018-10-06 | End: 2018-10-09 | Stop reason: HOSPADM

## 2018-10-06 RX ORDER — GUAIFENESIN 600 MG
600 TABLET, EXTENDED RELEASE 12 HR ORAL EVERY 12 HOURS SCHEDULED
Status: DISCONTINUED | OUTPATIENT
Start: 2018-10-06 | End: 2018-10-09 | Stop reason: HOSPADM

## 2018-10-06 RX ORDER — BENZONATATE 100 MG/1
100 CAPSULE ORAL 3 TIMES DAILY PRN
Status: DISCONTINUED | OUTPATIENT
Start: 2018-10-06 | End: 2018-10-09 | Stop reason: HOSPADM

## 2018-10-06 RX ADMIN — SODIUM CHLORIDE 125 ML/HR: 0.9 INJECTION, SOLUTION INTRAVENOUS at 23:42

## 2018-10-06 RX ADMIN — MECLIZINE HYDROCHLORIDE 25 MG: 25 TABLET ORAL at 09:12

## 2018-10-06 RX ADMIN — METOPROLOL SUCCINATE 50 MG: 50 TABLET, EXTENDED RELEASE ORAL at 08:12

## 2018-10-06 RX ADMIN — MECLIZINE HYDROCHLORIDE 25 MG: 25 TABLET ORAL at 17:01

## 2018-10-06 RX ADMIN — GUAIFENESIN 600 MG: 600 TABLET, EXTENDED RELEASE ORAL at 21:44

## 2018-10-06 RX ADMIN — ENOXAPARIN SODIUM 40 MG: 40 INJECTION SUBCUTANEOUS at 08:12

## 2018-10-06 RX ADMIN — BENZONATATE 100 MG: 100 CAPSULE ORAL at 21:44

## 2018-10-06 RX ADMIN — ONDANSETRON 4 MG: 2 INJECTION INTRAMUSCULAR; INTRAVENOUS at 07:27

## 2018-10-06 NOTE — CASE MANAGEMENT
Initial Clinical Review    Admission: Date/Time/Statement   10/05/18 2110    Orders Placed This Encounter   Procedures    Place in Observation (expected length of stay for this patient is less than two midnights)     Standing Status:   Standing     Number of Occurrences:   1     Order Specific Question:   Admitting Physician     Answer:   Mukesh Bustos     Order Specific Question:   Level of Care     Answer:   Med Surg [16]     Order Specific Question:   Bed request comments     Answer:   tele         ED: Date/Time/Mode of Arrival:   ED Arrival Information     Expected Arrival [address] of Arrival Escorted By Service Admission Type    - 10/5/2018 18:33 Urgent Ambulance [facility] Reg Hospitalist Urgent    Arrival Complaint    vomiting          Chief Complaint:   Chief Complaint   Patient presents with    Vomiting     pt c/o URI x10 days and vomiting since today  zpack, prednisone, and albuterol for dx of bronchitis  History of Illness:     Russell Pederson is a 68 y o  female with past medical history of hypertension who presented to the ER with complaint of nausea/vomiting started today  Patient started have cough 10 days ago which she saw her primary care yesterday and started on Z-Wong, prednisone for bronchitis  Today the patient around 3:00 p m  Started to have nausea/vomiting associated with vertigo     Patient was on [facility] and IV hydration           ED Vital Signs:   10/05/18 1939 10/05/18 1836 10/05/18 1836 10/05/18 1836 10/05/18 1836   (!) 96 5 °F (35 8 °C) 79 22 (!) 184/87 96 %      No Pain       10/04/18 100 kg (221 lb)       Vital Signs (abnormal):     10/05/18 2049   96 5 °F (35 8 °C)  89  20  156/71  95 %  None (Room air)  Lyi           Abnormal Labs/Diagnostic Test Results:     WBC 11 38 (H)      LACTIC ACID 4 0 (HH)       Ct  Abdomen  No acute finding  Ct  Brain  No acute finding          ED Treatment:   Medication Administration from 10/05/2018 1833 to 10/05/2018 2305       Date/Time Order Dose Route     10/05/2018 1842 ondansetron (ZOFRAN) injection 4 mg 4 mg Intravenous     10/05/2018 1847 sodium chloride 0 9 % bolus 1,000 mL 1,000 mL Intravenous     10/05/2018 1954 sodium chloride 0 9 % bolus 1,000 mL 1,000 mL Intravenous     10/05/2018 2017 metoclopramide (REGLAN) injection 10 mg 10 mg Intravenous     10/05/2018 2046 potassium chloride 20 mEq IVPB (premix) 20 mEq Intravenous     10/05/2018 2017 diphenhydrAMINE (BENADRYL) injection 12 5 mg 12 5 mg Intravenous     10/05/2018 2019 cefepime (MAXIPIME) 2,000 mg in dextrose 5 % 50 mL IVPB 2,000 mg Intravenous     10/05/2018 2244 lidocaine (LMX) 4 % cream 1 application Topical          Past Medical/Surgical History:    Hyperlipidemia 01/20/2016    Obesity 01/23/2017    Essential hypertension 12/04/2013    Seborrheic keratosis 01/19/2015    History of skin cancer 03/29/2018    Screening for skin condition 03/29/2018           Admitting Diagnosis: Hypokalemia [E87 6]  Lactic acidosis [E87 2]  Vomiting [R11 10]  Bronchitis [J40]  Intractable nausea and vomiting [R11 2]    Age/Sex: 68 y o  female  Admitted with nausea and vomiting, dizziness, hypokalemia  She is being treated with antibiotic and steroid for bronchitis  spo2 is  93%  On 2lo2nc     Assessment/Plan:    Intractable vomiting with nausea   Assessment & Plan     Patient was treated for bronchitis with Zithromax and prednisone  Patient started having nausea/vomiting today around 3:00 p m  Patient also has hypothermia, elevated lactic acid which is treated with  IV hydration       Will start the patient on IV hydration with normal saline at 125 cc/hour  Will start patient on Zofran p r n   For nausea or vomiting  Repeat lactic acid  Will collect cultures  Will repeat Cardiac markers      Dizzinesses   Assessment & Plan     - Negative CT head  - Related to N/V   - IV hydration, meclizine for vertigo       Hypokalemia   Assessment & Plan     - Replace and recheck         Admission Orders:    Npo  Telemetry          Scheduled Meds:     Medication Administration - last 24 hours from 10/05/2018 1031 to 10/06/2018 1031       Date/Time Order Dose Route     10/05/2018 1842 ondansetron (ZOFRAN) injection 4 mg 4 mg Intravenous     10/05/2018 2017 metoclopramide (REGLAN) injection 10 mg 10 mg Intravenous     10/06/2018 0727 ondansetron (ZOFRAN) injection 4 mg 4 mg Intravenous       albuterol 2 puff Inhalation Q6H PRN   enoxaparin 40 mg Subcutaneous Daily   hydrALAZINE 10 mg Intravenous Q4H PRN   meclizine 25 mg Oral Q8H PRN  x1  10-6     metoprolol succinate 50 mg Oral Daily   ondansetron 4 mg Intravenous Q6H PRN  x1  10-6   potassium chloride 40 mEq Oral Once   sodium chloride 125 mL/hr Intravenous Continuous

## 2018-10-06 NOTE — H&P
H&P- [place] Day 1940, 68 y o  female MRN: [id number]    Unit/Bed#: ED 08 Encounter: [id number]    Primary Care Provider: Georges Melo MD   Date and time admitted to hospital: 10/5/2018  6:33 PM        * Intractable vomiting with nausea   Assessment & Plan    Patient was treated for bronchitis with Zithromax and prednisone  Patient started having nausea/vomiting today around 3:00 p m  Patient also has hypothermia, elevated lactic acid which responded to IV hydration  EKG and cardiac markers are negative  Patient workup including CT chest/abdomen and pelvis were negative for obstruction or infection  Urinalysis is normal  Lipase levels are normal  Lactic acid elevated at the time admission which is improved  Most patient has gastritis  Will admit patient under observation  Will start the patient on IV hydration with normal saline at 125 cc/hour  Will start patient on Zofran p r n  For nausea or vomiting  Repeat lactic acid  Will collect cultures  Will repeat Cardiac markers        Dizzinesses   Assessment & Plan    - Negative CT head  - Related to N/V   - IV hydration, meclizine for vertigo      Hypokalemia   Assessment & Plan    - Replace and recheck     Essential hypertension   Assessment & Plan    - Resume home medication once able to take p  O  Medication  - Will start the patient on hydralazine IV 10 mg q 4 hours as needed for systolic blood pressure more than 160          VTE Prophylaxis: Enoxaparin (Lovenox)     Code Status:  Full code  Discussion with family:  No family present    Anticipated Length of Stay:  Patient will be admitted on an Observation basis with an anticipated length of stay of 2 midnights  Justification for Hospital Stay:  Intractable nausea and vomiting    Total Time for Visit, including Counseling / Coordination of Care: 45 minutes  Greater than 50% of this total time spent on direct patient counseling and coordination of care      Chief Complaint:   Nausea/vomiting started today at 3:00 p m  History of Present Illness:    Yamila Gay is a 68 y o  female with past medical history of hypertension who presented to the ER with complaint of nausea/vomiting started today  Patient started have cough 10 days ago which she saw her primary care yesterday and started on Z-Wong, prednisone for bronchitis  Today the patient around 3:00 p m  Started to have nausea/vomiting associated with vertigo  Patient denied any chest pain, shortness breath, abdominal pain or diarrhea  Workup was done down in the ER which shows elevated lactic acid, and hypothermia  Patient was on [facility] and IV hydration  Hospital Medicine was consulted for admission  Review of Systems:    Review of Systems   Constitutional: Positive for appetite change and diaphoresis  Negative for chills  HENT: Negative  Eyes: Negative  Respiratory: Positive for cough  Negative for chest tightness  Cardiovascular: Negative  Gastrointestinal: Positive for nausea and vomiting  Negative for abdominal pain and diarrhea  Genitourinary: Negative  Musculoskeletal: Negative  Skin: Negative  Neurological: Positive for dizziness (Vertigo)  Negative for headaches  Psychiatric/Behavioral: Negative  Past Medical and Surgical History:     Past Medical History:   Diagnosis Date    H/O nonmelanoma skin cancer     last assessed 9/28/17    Hypertension        Past Surgical History:   Procedure Laterality Date    APPENDECTOMY  2010    BLADDER SURGERY  1997    CERVIX [facility]      [facility] [place] [facility]      [facility] - last assessed 10/28/14    COLONOSCOPY      last assessed 8/9/17    HERNIA REPAIR  2011    TONSILLECTOMY  1971       Meds/Allergies:    Prior to Admission medications    Medication Sig Start Date End Date Taking?  Authorizing Provider   albuterol (PROAIR HFA) 90 mcg/act inhaler Inhale 2 puffs every 6 (six) hours as needed for wheezing 10/4/18  Yes Kelby Rasheed MD   azithromycin [facility]) 250 mg tablet Take 2 tablets today then 1 tablet daily x 4 days 10/4/18 10/8/18 Yes Kelby Rasheed MD   Calcium-Magnesium-Vitamin D (CALCIUM 500 PO) Take by mouth   Yes Historical Provider, MD   Cholecalciferol (EQL VITAMIN D3) 1000 units capsule Take by mouth daily     Yes Historical Provider, MD   cyanocobalamin ([employer] VITAMIN B-12) 1000 MCG tablet Take by mouth daily     Yes Historical Provider, MD   Glucos-Chondroit-Hyaluron-MSM (GLUCOSAMINE CHONDROITIN JOINT) TABS Take by mouth daily     Yes Historical Provider, MD   ketoconazole (NIZORAL) 2 % cream Apply topically daily 8/17/18  Yes Kelby Rasheed MD   Methylprednisolone 4 MG TBPK Use as directed on package 10/4/18  Yes Kelby Rasheed MD   metoprolol succinate (TOPROL-XL) 50 mg 24 hr tablet TAKE 1 TABLET DAILY 9/12/18  Yes Kelby Rasheed MD   Multiple Vitamins-Minerals (OCUVITE PO) Take by mouth daily   Yes Historical Provider, MD   Omega-3 Fatty Acids (FISH OIL) 1000 MG CPDR Take by mouth   Yes Historical Provider, MD   Multiple Vitamins-Minerals (OCUVITE EXTRA PO) Take by mouth  10/5/18 Yes Historical Provider, MD     I have reviewed home medications using allscripts  Allergies:    Allergies   Allergen Reactions    Morphine Vomiting       Social History:     Marital Status: /Civil Union     Substance Use History:   History   Alcohol Use    Yes     Comment: occasionally wine     History   Smoking Status    Former Smoker    Quit date: 1979   Smokeless Tobacco    Never Used     History   Drug Use No       Family History:    Family History   Problem Relation Age of Onset    Arthritis Mother     Osteoporosis Mother     Stroke Mother     Breast cancer Father     Heart disease Father     Osteoporosis Father     Breast cancer Family     Osteoporosis Family        Physical Exam:     Vitals:   Blood Pressure: 165/77 (10/05/18 2145)  Pulse: 97 (10/05/18 2145)  Temperature: (!) 96 5 °F (35 8 °C) (10/05/18 2049)  Temp Source: Rectal (10/05/18 2049)  Respirations: 20 (10/05/18 2145)  SpO2: 95 % (10/05/18 2145)    Physical Exam   Constitutional: She is oriented to person, place, and time  She appears well-developed and well-nourished  HENT:   Head: Normocephalic and atraumatic  Eyes: Pupils are equal, round, and reactive to light  EOM are normal    Neck: Normal range of motion  Neck supple  Cardiovascular: Normal rate and regular rhythm  Pulmonary/Chest: She has no wheezes  She exhibits no tenderness  Abdominal: Soft  Bowel sounds are normal  She exhibits no distension  There is no guarding  Neurological: She is alert and oriented to person, place, and time  Skin: Skin is warm and dry  Psychiatric: She has a normal mood and affect  Additional Data:     Lab Results: I have personally reviewed pertinent reports  Results from last 7 days  Lab Units 10/05/18  1843   WBC Thousand/uL 11 37*   HEMOGLOBIN g/dL 16 8*   HEMATOCRIT % 48 6*   PLATELETS Thousands/uL 247   NEUTROS ABS Thousands/µL 9 07*   NEUTROS PCT % 81*   LYMPHS PCT % 11*   MONOS PCT % 7   EOS PCT % 0       Results from last 7 days  Lab Units 10/05/18  1843   SODIUM mmol/L 142   POTASSIUM mmol/L 3 2*   CHLORIDE mmol/L 104   CO2 mmol/L 25   BUN mg/dL 19   CREATININE mg/dL 1 16   ANION GAP mmol/L 13   CALCIUM mg/dL 9 4   ALBUMIN g/dL 3 8   TOTAL BILIRUBIN mg/dL 0 40   ALK PHOS U/L 65   ALT U/L 35   AST U/L 17       Results from last 7 days  Lab Units 10/05/18  1843   INR  1 05               Results from last 7 days  Lab Units 10/05/18  2057 10/05/18  1843   LACTIC ACID mmol/L 2 9* 4 0*       Imaging: I have personally reviewed pertinent reports  CT chest abdomen pelvis w contrast   Final Result by Desmond Carlson MD (10/05 2006)   1  No acute findings in the chest, abdomen or pelvis  2   0 5 cm right lower lobe pulmonary nodule   Based on current Fleischner Society 2017 Guidelines on incidental pulmonary nodule, no routine follow-up is needed if the patient is considered low risk for lung cancer  If the patient is considered high    risk for lung cancer, 12 month follow-up non-contrast chest CT is recommended  3   Colonic diverticulosis without diverticulitis  4   Hepatic steatosis  Workstation performed: [id number]         CT head without contrast   Final Result by Ila Castro MD (10/05 1947)      No acute intracranial abnormality  Workstation performed: [id number]            EKG: NSR     Allscripts / Epic Records Reviewed: Yes     ** Please Note: This note has been constructed using a voice recognition system   **

## 2018-10-06 NOTE — ASSESSMENT & PLAN NOTE
Patient was treated for bronchitis with Zithromax and prednisone  Patient started having nausea/vomiting today around 3:00 p m  Patient also has hypothermia, elevated lactic acid which responded to IV hydration  EKG and cardiac markers are negative  Patient workup including CT chest/abdomen and pelvis were negative for obstruction or infection  Urinalysis is normal  Lipase levels are normal  Lactic acid elevated at the time admission which is improved  Most patient has gastritis  Will admit patient under observation  Will start the patient on IV hydration with normal saline at 125 cc/hour  Will start patient on Zofran p r n   For nausea or vomiting  Repeat lactic acid  Will collect cultures  Will repeat Cardiac markers

## 2018-10-06 NOTE — ASSESSMENT & PLAN NOTE
- Resume home medication once able to take p  O  Medication  - Will start the patient on hydralazine IV 10 mg q 4 hours as needed for systolic blood pressure more than 160

## 2018-10-06 NOTE — PROGRESS NOTES
Progress Note - [place] 1940, 68 y o  female MRN: [id number]    Unit/Bed#: -01 Encounter: [id number]    Primary Care Provider: Mari Pichardo MD   Date and time admitted to hospital: 10/5/2018  6:33 PM    Incidental finding on CT chest 0 5 cm right LL nodule - will need to discuss risks with patient, consider repeat imaging in 12 months if (+) risk factors  * Intractable nausea and vomiting   Assessment & Plan    Patient was treated for bronchitis with Zithromax and prednisone - suspect GI irritation related to medications, presented with dehydration, N/V, hypokalemia, hypothermia  · Trop normal  · Lactic acidosis improving with IVF, continue to trend to normal  · Procalcitonin pending  · CT chest/abdomen and pelvis were negative for obstruction or infection  · Urinalysis is normal  · Lipase levels are normal  · Continue observation status pending further improvement of symptoms  · Continue IVF hydration  · Zofran or reglan as needed  · Meclizine for vertigo, h/o same     Hypokalemia   Assessment & Plan    Repleted, monitor     Dizzinesses   Assessment & Plan    Negative CT head, has a history of vertigo and uses meclizine at home, restart     Essential hypertension   Assessment & Plan    Monitor given inability to tolerate PO yesterday, currently stable        VTE Pharmacologic Prophylaxis:   Pharmacologic: Enoxaparin (Lovenox)  Mechanical VTE Prophylaxis in Place: No    Patient Centered Rounds: I have performed bedside rounds with nursing staff today  Discussions with Specialists or Other Care Team Provider:     Education and Discussions with Family / Patient: d/w patient     Time Spent for Care: 30 minutes  More than 50% of total time spent on counseling and coordination of care as described above      Current Length of Stay: 0 day(s)    Current Patient Status: Observation   Certification Statement: observation status, monitor today for improvement of symptoms; possible d/c later today, however if still intolerable to PO and significant vertigo, will require additional midnight stay    Discharge Plan: pending improvement    Code Status: Level 1 - Full Code      Subjective:   Patient seen lying in bed, she has a cold, wet rag on her forehead  Reports improvement of the nausea this morning, however persistent vertigo  Denies chest pain or palpitations  No abdominal pain  Objective:     Vitals:   Temp (24hrs), Av 5 °F (36 4 °C), Min:96 5 °F (35 8 °C), Max:98 6 °F (37 °C)    HR:  [79-97] 83  Resp:  [18-22] 18  BP: (129-184)/(59-87) 153/69  SpO2:  [93 %-97 %] 97 %  There is no height or weight on file to calculate BMI  Input and Output Summary (last 24 hours): Intake/Output Summary (Last 24 hours) at 10/06/18 1021  Last data filed at 10/06/18 0734   Gross per 24 hour   Intake             2050 ml   Output              300 ml   Net             1750 ml       Physical Exam:     Physical Exam   Constitutional: She is oriented to person, place, and time  She appears well-developed  She is sleeping  No distress  Cardiovascular: Normal rate, regular rhythm, S1 normal, S2 normal, normal heart sounds and intact distal pulses  No murmur heard  Pulmonary/Chest: Effort normal and breath sounds normal  No respiratory distress  She has no wheezes  She has no rales  Abdominal: Soft  Bowel sounds are normal  She exhibits no distension  There is no tenderness  Musculoskeletal: She exhibits no edema  Neurological: She is alert and oriented to person, place, and time  Nursing note and vitals reviewed      Additional Data:     Labs:      Results from last 7 days  Lab Units 10/06/18  0459  10/05/18  1843   WBC Thousand/uL 11 38*  --  11 37*   HEMOGLOBIN g/dL 14 5  --  16 8*   HEMATOCRIT % 42 8  --  48 6*   PLATELETS Thousands/uL 200  < > 247   NEUTROS PCT %  --   --  81*   LYMPHS PCT %  --   --  11*   MONOS PCT %  --   --  7   EOS PCT %  --   --  0   < > = values in this interval not displayed  Results from last 7 days  Lab Units 10/06/18  0459 10/05/18  1843   SODIUM mmol/L 141 142   POTASSIUM mmol/L 5 0 3 2*   CHLORIDE mmol/L 108 104   CO2 mmol/L 28 25   BUN mg/dL 13 19   CREATININE mg/dL 0 98 1 16   ANION GAP mmol/L 5 13   CALCIUM mg/dL 8 0* 9 4   ALBUMIN g/dL  --  3 8   TOTAL BILIRUBIN mg/dL  --  0 40   ALK PHOS U/L  --  65   ALT U/L  --  35   AST U/L  --  17       Results from last 7 days  Lab Units 10/05/18  1843   INR  1 05               Results from last 7 days  Lab Units 10/06/18  0223 10/05/18  2339 10/05/18  2057 10/05/18  1843   LACTIC ACID mmol/L 2 3* 2 8* 2 9* 4 0*           * I Have Reviewed All Lab Data Listed Above  * Additional Pertinent Lab Tests Reviewed: [facility] Admission Reviewed    Imaging:    Imaging Reports Reviewed Today Include:  CT head, CT chest/abdomen/pelvis  Imaging Personally Reviewed by Myself Includes:  None    Recent Cultures (last 7 days):           Last 24 Hours Medication List:     Current Facility-Administered Medications:  albuterol 2 puff Inhalation Q6H PRN Lindsay Pastor MD    enoxaparin 40 mg Subcutaneous Daily Lindsay Pastor MD    hydrALAZINE 10 mg Intravenous Q4H PRN Lindsay Pastor MD    meclizine 25 mg Oral Q8H PRN Marcy Bass PA-C    metoprolol succinate 50 mg Oral Daily Lindsay Pastor MD    ondansetron 4 mg Intravenous Q6H PRN Lindsay Pastor MD    potassium chloride 40 mEq Oral Once Mita Slade DO    sodium chloride 125 mL/hr Intravenous Continuous Lindsay Pastor MD Last Rate: 125 mL/hr (10/05/18 0026)        Today, Patient Was Seen By: Marcy Bass PA-C    ** Please Note: Dictation voice to text software may have been used in the creation of this document   **

## 2018-10-06 NOTE — ED NOTES
RN spoke to Dr Wes Bautista and instructed to continue bear hugger warmer for patient and do routine rechecks of temperature   Will continue to monitor         Serapio Koyanagi, RN  10/05/18 0881

## 2018-10-06 NOTE — PLAN OF CARE
CARDIOVASCULAR - ADULT     Maintains optimal cardiac output and hemodynamic stability Progressing     Absence of cardiac dysrhythmias or at baseline rhythm Progressing        DISCHARGE PLANNING     Discharge to home or other facility with appropriate resources Progressing        HEMATOLOGIC - ADULT     Maintains hematologic stability Progressing        Knowledge Deficit     Patient/family/caregiver demonstrates understanding of disease process, treatment plan, medications, and discharge instructions Progressing        METABOLIC, FLUID AND ELECTROLYTES - ADULT     Electrolytes maintained within normal limits Progressing        MUSCULOSKELETAL - ADULT     Maintain or return mobility to safest level of function Progressing     Maintain proper alignment of affected body part Progressing        PAIN - ADULT     Verbalizes/displays adequate comfort level or baseline comfort level Progressing        Potential for Falls     Patient will remain free of falls Progressing        RESPIRATORY - ADULT     Achieves optimal ventilation and oxygenation Progressing        SAFETY ADULT     Patient will remain free of falls Progressing

## 2018-10-06 NOTE — ASSESSMENT & PLAN NOTE
Patient was treated for bronchitis with Zithromax and prednisone - suspect GI irritation related to medications, presented with dehydration, N/V, hypokalemia, hypothermia  · Trop normal  · Lactic acidosis improving with IVF, continue to trend to normal  · Procalcitonin pending  · CT chest/abdomen and pelvis were negative for obstruction or infection  · Urinalysis is normal  · Lipase levels are normal  · Continue observation status pending further improvement of symptoms  · Continue IVF hydration  · Zofran or reglan as needed  · Meclizine for vertigo, h/o same

## 2018-10-06 NOTE — PLAN OF CARE
CARDIOVASCULAR - ADULT     Maintains optimal cardiac output and hemodynamic stability Progressing     Absence of cardiac dysrhythmias or at baseline rhythm Progressing        DISCHARGE PLANNING     Discharge to home or other facility with appropriate resources Progressing        HEMATOLOGIC - ADULT     Maintains hematologic stability Progressing        Knowledge Deficit     Patient/family/caregiver demonstrates understanding of disease process, treatment plan, medications, and discharge instructions Progressing        METABOLIC, FLUID AND ELECTROLYTES - ADULT     Electrolytes maintained within normal limits Progressing        MUSCULOSKELETAL - ADULT     Maintain or return mobility to safest level of function Progressing     Maintain proper alignment of affected body part Progressing        PAIN - ADULT     Verbalizes/displays adequate comfort level or baseline comfort level Progressing        Potential for Falls     Patient will remain free of falls Progressing        Prexisting or High Potential for Compromised Skin Integrity     Skin integrity is maintained or improved Progressing        RESPIRATORY - ADULT     Achieves optimal ventilation and oxygenation Progressing        SAFETY ADULT     Patient will remain free of falls Progressing

## 2018-10-07 PROBLEM — E87.6 HYPOKALEMIA: Status: RESOLVED | Noted: 2018-10-05 | Resolved: 2018-10-07

## 2018-10-07 PROBLEM — J40 BRONCHITIS: Status: ACTIVE | Noted: 2018-10-07

## 2018-10-07 LAB
ALBUMIN SERPL BCP-MCNC: 2.9 G/DL (ref 3.5–5)
ALP SERPL-CCNC: 52 U/L (ref 46–116)
ALT SERPL W P-5'-P-CCNC: 37 U/L (ref 12–78)
ANION GAP SERPL CALCULATED.3IONS-SCNC: 5 MMOL/L (ref 4–13)
AST SERPL W P-5'-P-CCNC: 21 U/L (ref 5–45)
BASOPHILS # BLD AUTO: 0.04 THOUSANDS/ΜL (ref 0–0.1)
BASOPHILS NFR BLD AUTO: 1 % (ref 0–1)
BILIRUB SERPL-MCNC: 0.6 MG/DL (ref 0.2–1)
BUN SERPL-MCNC: 9 MG/DL (ref 5–25)
CALCIUM SERPL-MCNC: 8 MG/DL (ref 8.3–10.1)
CHLORIDE SERPL-SCNC: 110 MMOL/L (ref 100–108)
CO2 SERPL-SCNC: 28 MMOL/L (ref 21–32)
CREAT SERPL-MCNC: 0.88 MG/DL (ref 0.6–1.3)
EOSINOPHIL # BLD AUTO: 0.08 THOUSAND/ΜL (ref 0–0.61)
EOSINOPHIL NFR BLD AUTO: 1 % (ref 0–6)
ERYTHROCYTE [DISTWIDTH] IN BLOOD BY AUTOMATED COUNT: 13.1 % (ref 11.6–15.1)
GFR SERPL CREATININE-BSD FRML MDRD: 64 ML/MIN/1.73SQ M
GLUCOSE SERPL-MCNC: 88 MG/DL (ref 65–140)
HCT VFR BLD AUTO: 41.8 % (ref 34.8–46.1)
HGB BLD-MCNC: 14 G/DL (ref 11.5–15.4)
IMM GRANULOCYTES # BLD AUTO: 0.04 THOUSAND/UL (ref 0–0.2)
IMM GRANULOCYTES NFR BLD AUTO: 1 % (ref 0–2)
LYMPHOCYTES # BLD AUTO: 1.15 THOUSANDS/ΜL (ref 0.6–4.47)
LYMPHOCYTES NFR BLD AUTO: 14 % (ref 14–44)
MCH RBC QN AUTO: 31.5 PG (ref 26.8–34.3)
MCHC RBC AUTO-ENTMCNC: 33.5 G/DL (ref 31.4–37.4)
MCV RBC AUTO: 94 FL (ref 82–98)
MONOCYTES # BLD AUTO: 0.77 THOUSAND/ΜL (ref 0.17–1.22)
MONOCYTES NFR BLD AUTO: 9 % (ref 4–12)
NEUTROPHILS # BLD AUTO: 6.13 THOUSANDS/ΜL (ref 1.85–7.62)
NEUTS SEG NFR BLD AUTO: 74 % (ref 43–75)
NRBC BLD AUTO-RTO: 0 /100 WBCS
PLATELET # BLD AUTO: 163 THOUSANDS/UL (ref 149–390)
PMV BLD AUTO: 8.3 FL (ref 8.9–12.7)
POTASSIUM SERPL-SCNC: 3.6 MMOL/L (ref 3.5–5.3)
PROT SERPL-MCNC: 6 G/DL (ref 6.4–8.2)
RBC # BLD AUTO: 4.45 MILLION/UL (ref 3.81–5.12)
SODIUM SERPL-SCNC: 143 MMOL/L (ref 136–145)
WBC # BLD AUTO: 8.21 THOUSAND/UL (ref 4.31–10.16)

## 2018-10-07 PROCEDURE — 85025 COMPLETE CBC W/AUTO DIFF WBC: CPT | Performed by: PHYSICIAN ASSISTANT

## 2018-10-07 PROCEDURE — 80053 COMPREHEN METABOLIC PANEL: CPT | Performed by: PHYSICIAN ASSISTANT

## 2018-10-07 PROCEDURE — 94664 DEMO&/EVAL PT USE INHALER: CPT

## 2018-10-07 PROCEDURE — 94668 MNPJ CHEST WALL SBSQ: CPT

## 2018-10-07 PROCEDURE — 99232 SBSQ HOSP IP/OBS MODERATE 35: CPT | Performed by: PHYSICIAN ASSISTANT

## 2018-10-07 PROCEDURE — 94760 N-INVAS EAR/PLS OXIMETRY 1: CPT

## 2018-10-07 RX ORDER — ACETAMINOPHEN 325 MG/1
650 TABLET ORAL EVERY 6 HOURS PRN
Status: DISCONTINUED | OUTPATIENT
Start: 2018-10-07 | End: 2018-10-09 | Stop reason: HOSPADM

## 2018-10-07 RX ADMIN — GUAIFENESIN 600 MG: 600 TABLET, EXTENDED RELEASE ORAL at 08:35

## 2018-10-07 RX ADMIN — METOPROLOL SUCCINATE 50 MG: 50 TABLET, EXTENDED RELEASE ORAL at 08:36

## 2018-10-07 RX ADMIN — BENZONATATE 100 MG: 100 CAPSULE ORAL at 06:33

## 2018-10-07 RX ADMIN — GUAIFENESIN 600 MG: 600 TABLET, EXTENDED RELEASE ORAL at 20:12

## 2018-10-07 RX ADMIN — BENZONATATE 100 MG: 100 CAPSULE ORAL at 20:14

## 2018-10-07 RX ADMIN — ACETAMINOPHEN 650 MG: 325 TABLET, FILM COATED ORAL at 13:53

## 2018-10-07 RX ADMIN — SODIUM CHLORIDE 125 ML/HR: 0.9 INJECTION, SOLUTION INTRAVENOUS at 06:33

## 2018-10-07 RX ADMIN — SODIUM CHLORIDE 125 ML/HR: 0.9 INJECTION, SOLUTION INTRAVENOUS at 14:22

## 2018-10-07 RX ADMIN — ENOXAPARIN SODIUM 40 MG: 40 INJECTION SUBCUTANEOUS at 08:35

## 2018-10-07 NOTE — RESPIRATORY THERAPY NOTE
RT Protocol Note  [place] 68 y o  female MRN: [id number]  Unit/Bed#: -01 Encounter: [id number]    Assessment    Principal Problem:    Intractable nausea and vomiting  Active Problems:    Essential hypertension    Hypokalemia    Dizzinesses      Home Pulmonary Medications:  Albuterol MDI is listed  Patient states she used twice and finds no relief  Past Medical History:   Diagnosis Date    H/O nonmelanoma skin cancer     last assessed 9/28/17    Hypertension      Social History     Social History    Marital status: /Civil Union     Spouse name: N/A    Number of children: N/A    Years of education: N/A     Social History Main Topics    Smoking status: Former Smoker     Quit date: 1979    Smokeless tobacco: Never Used    Alcohol use Yes      Comment: occasionally wine    Drug use: No    Sexual activity: Not Asked     Other Topics Concern    None     Social History Narrative    Caffeine use       Subjective         Objective    Physical Exam:   Assessment Type: Assess only  General Appearance: Alert, Awake  Respiratory Pattern: Normal  Chest Assessment: Chest expansion symmetrical  Bilateral Breath Sounds: Diminished, Coarse  Cough: Non-productive, Strong    Vitals:  Blood pressure 170/76, pulse (!) 4, temperature 98 7 °F (37 1 °C), temperature source Oral, resp  rate 18, height 5' 4" (1 626 m), weight 97 5 kg (215 lb), SpO2 96 %  Imaging and other studies: I have personally reviewed pertinent reports  Plan        Respiratory assessment performed at this time  Patient is awake and alert  Admitted for nausea, vomit and bronchitis  Breath sounds are coarse bilaterally  Has a strong cough, but is not coughing up much  She states she quit smoking in 1979 but has had a cigarette every now and again  Patient was given a alcapella and instructed in use  States she will use when she feels she needs it  No further modalities unless patient condition changes

## 2018-10-07 NOTE — ASSESSMENT & PLAN NOTE
Negative CT head, has a history of vertigo and uses meclizine at home, symptoms are currently resolved with use of meclizine as needed

## 2018-10-07 NOTE — ASSESSMENT & PLAN NOTE
Patient was being treated for bronchitis outpatient, continue with Tessalon Perles, Mucinex, albuterol as needed  Initiate respiratory protocol, continue incentive spirometer and start flutter valve    Normal procalcitonin, no oxygen requirement, no indication for antibiotics at this time    Will continue supportive care and monitor

## 2018-10-07 NOTE — PHYSICIAN ADVISOR
Current patient class: Observation  The patient is currently on Hospital Day: 2 at [address]      The patient was admitted to the hospital at N/A on N/A for the following diagnosis:  Hypokalemia [E87 6]  Lactic acidosis [E87 2]  Vomiting [R11 10]  Bronchitis [J40]  Intractable nausea and vomiting [R11 2]       There is documentation in the medical record of an expected length of stay of at least 2 midnights  The patient is therefore expected to satisfy the 2 midnight benchmark and given the 2 midnight presumption is appropriate for INPATIENT ADMISSION  Given this expectation of a satisfying stay, CMS instructs us that the patient is most often appropriate for inpatient admission under part A provided medical necessity is documented in the chart  After review of the relevant documentation, labs, vital signs and test results, the patient is appropriate for INPATIENT ADMISSION  Admission to the hospital as an inpatient is a complex decision making process which requires the practitioner to consider the patients presenting complaint, history and physical examination and all relevant testing  With this in mind, in this case, the patient was deemed appropriate for INPATIENT ADMISSION  After review of the documentation and testing available at the time of the admission I concur with this clinical determination of medical necessity  Rationale is as follows: The patient is a 68 yrs old Female who presented to the ED at 10/5/2018  6:33 PM with a chief complaint of Vomiting (pt c/o URI x10 days and vomiting since today  zpack, prednisone, and albuterol for dx of bronchitis  )     Given the need for further hospitalization, and along with the documentation of medical necessity present in the chart, the patient is appropriate for inpatient admission  The patient is expected to satisfy the 2 midnight benchmark, and will require further acute medical care   The patient does have comorbid conditions which increases the risk for significant adverse outcome  Given this the patient is appropriate for inpatient admission        The patients vitals on arrival were ED Triage Vitals   Temperature Pulse Respirations Blood Pressure SpO2   10/05/18 1939 10/05/18 1836 10/05/18 1836 10/05/18 1836 10/05/18 1836   (!) 96 5 °F (35 8 °C) 79 22 (!) 184/87 96 %      Temp Source Heart Rate Source Patient Position - Orthostatic VS BP Location FiO2 (%)   10/05/18 1939 10/05/18 2012 10/05/18 2012 10/05/18 2012 --   Rectal Monitor Lying Right arm       Pain Score       10/05/18 2012       No Pain           Past Medical History:   Diagnosis Date    H/O nonmelanoma skin cancer     last assessed 9/28/17    Hypertension      Past Surgical History:   Procedure Laterality Date    APPENDECTOMY  2010    BLADDER SURGERY  1997    CERVIX SURGERY      dilation and curettage of cervical stump 2005, 1998, [address]      partial - last assessed 10/28/14    COLONOSCOPY      last assessed 8/9/17    HERNIA REPAIR  2011    TONSILLECTOMY  1971           Consults have been placed to:   None    Vitals:    10/06/18 0700 10/06/18 1100 10/06/18 1500 10/06/18 1602   BP: 153/69 130/80 (!) 180/80 148/72   BP Location: Left arm Right arm Left arm Left arm   Pulse: 83 70 74    Resp: 18 18 18    Temp: 98 2 °F (36 8 °C) 98 2 °F (36 8 °C) 97 9 °F (36 6 °C)    TempSrc: Oral Oral Oral    SpO2: 97% 95% 96%        Most recent labs:    Recent Labs      10/05/18   1843   10/06/18   0223  10/06/18   0459   WBC  11 37*   --    --   11 38*   HGB  16 8*   --    --   14 5   HCT  48 6*   --    --   42 8   PLT  247   < >   --   200   K  3 2*   --    --   5 0   NA  142   --    --   141   CALCIUM  9 4   --    --   8 0*   BUN  19   --    --   13   CREATININE  1 16   --    --   0 98   LIPASE  160   --    --    --    INR  1 05   --    --    --    TROPONINI  <0 02   < >  0 03   --    AST  17   --    --    --    ALT  35   --    -- --    ALKPHOS  65   --    --    --     < > = values in this interval not displayed         Scheduled Meds:  Current Facility-Administered Medications:  albuterol 2 puff Inhalation Q6H PRN Giacomo Hillman MD    benzonatate 100 mg Oral TID PRN Giacomo Hillman, MD    enoxaparin 40 mg Subcutaneous Daily Giacomo Hillman, MD    guaiFENesin 600 mg Oral Q12H [address] Giacomo Hillman MD    hydrALAZINE 10 mg Intravenous Q4H PRN Giacomo Hillman, MD    meclizine 25 mg Oral Q8H PRN Reji West PA-C    metoprolol succinate 50 mg Oral Daily Giacomo Hillman MD    ondansetron 4 mg Intravenous Q6H PRN Giacomo Hillman MD    potassium chloride 40 mEq Oral Once Rosemarie Booty, DO    sodium chloride 125 mL/hr Intravenous Continuous Giacomo Hillman MD Last Rate: 125 mL/hr (10/05/18 2325)     Continuous Infusions:  sodium chloride 125 mL/hr Last Rate: 125 mL/hr (10/05/18 2325)     PRN Meds:   albuterol    benzonatate    hydrALAZINE    meclizine    ondansetron    Surgical procedures (if appropriate):

## 2018-10-07 NOTE — PROGRESS NOTES
Progress Note - [place] R Day 1940, 68 y o  female MRN: [id number]    Unit/Bed#: -01 Encounter: [id number]    Primary Care Provider: Noe George MD   Date and time admitted to hospital: 10/5/2018  6:33 PM    * Intractable nausea and vomiting   Assessment & Plan    Patient was treated for bronchitis with Zithromax and prednisone - suspect GI irritation related to medications, presented with dehydration, N/V, hypokalemia, hypothermia  · Trop normal, lactic acidosis resolved, leukocytosis resolved  · Procalcitonin NORMAL: CT chest/abdomen and pelvis were negative for obstruction or infection, Urinalysis is normal  · Lipase levels are normal  · Zofran or reglan as needed  · Meclizine for vertigo, h/o same  · Advance diet as tolerated, anticipate discharge in next 24 hours     Hypokalemia-resolved as of 10/7/2018   Assessment & Plan    Repleted, monitor     Dizzinesses   Assessment & Plan    Negative CT head, has a history of vertigo and uses meclizine at home, symptoms are currently resolved with use of meclizine as needed     Essential hypertension   Assessment & Plan    mildly elevated, will monitor     Bronchitis   Assessment & Plan    Patient was being treated for bronchitis outpatient, continue with Tessalon Perles, Mucinex, albuterol as needed  Initiate respiratory protocol, continue incentive spirometer and start flutter valve    Normal procalcitonin, no oxygen requirement, no indication for antibiotics at this time  Will continue supportive care and monitor       VTE Pharmacologic Prophylaxis:   Pharmacologic: Enoxaparin (Lovenox)  Mechanical VTE Prophylaxis in Place: Yes    Patient Centered Rounds: I have performed bedside rounds with nursing staff today  Discussions with Specialists or Other Care Team Provider:     Education and Discussions with Family / Patient: d/w patient, no family present     Time Spent for Care: 20 minutes    More than 50% of total time spent on counseling and coordination of care as described above  Current Length of Stay: 1 day(s)    Current Patient Status: Inpatient   Certification Statement: The patient will continue to require additional inpatient hospital stay due to patient feeling weak, declines PT evaluation, would like to advance diet and monitor for tolerance    Discharge Plan: anticipate d/c tomorrow     Code Status: Level 1 - Full Code      Subjective:   Patient seen examined, feeling better since admission, though still not feeling 100% well  Her main concern is that she has a headache this morning and has a strong cough  She feels coarse and a little wheezy  No further episodes of nausea or vomiting  Vertigo has resolved at present time  Objective:     Vitals:   Temp (24hrs), Av 3 °F (36 8 °C), Min:97 9 °F (36 6 °C), Max:98 7 °F (37 1 °C)    HR:  [4-74] 4  Resp:  [18] 18  BP: (148-180)/(72-84) 170/76  SpO2:  [94 %-96 %] 96 %  Body mass index is 36 9 kg/m²  Input and Output Summary (last 24 hours): Intake/Output Summary (Last 24 hours) at 10/07/18 1426  Last data filed at 10/07/18 1422   Gross per 24 hour   Intake             1000 ml   Output             1600 ml   Net             -600 ml       Physical Exam:     Physical Exam   Constitutional: She is oriented to person, place, and time  She appears well-developed and well-nourished  Non-toxic appearance  No distress  HENT:   Mouth/Throat: Oropharynx is clear and moist    Cardiovascular: Normal rate, regular rhythm, S1 normal, S2 normal, normal heart sounds and intact distal pulses  No murmur heard  Pulmonary/Chest: Effort normal and breath sounds normal  No respiratory distress  She has no wheezes  She has no rales  Abdominal: Soft  Bowel sounds are normal  She exhibits no distension  There is no tenderness  Musculoskeletal: She exhibits no edema  Neurological: She is alert and oriented to person, place, and time  No cranial nerve deficit     Nursing note and vitals reviewed  Additional Data:     Labs:      Results from last 7 days  Lab Units 10/07/18  1000   WBC Thousand/uL 8 21   HEMOGLOBIN g/dL 14 0   HEMATOCRIT % 41 8   PLATELETS Thousands/uL 163   NEUTROS PCT % 74   LYMPHS PCT % 14   MONOS PCT % 9   EOS PCT % 1       Results from last 7 days  Lab Units 10/07/18  1000   SODIUM mmol/L 143   POTASSIUM mmol/L 3 6   CHLORIDE mmol/L 110*   CO2 mmol/L 28   BUN mg/dL 9   CREATININE mg/dL 0 88   ANION GAP mmol/L 5   CALCIUM mg/dL 8 0*   ALBUMIN g/dL 2 9*   TOTAL BILIRUBIN mg/dL 0 60   ALK PHOS U/L 52   ALT U/L 37   AST U/L 21       Results from last 7 days  Lab Units 10/05/18  1843   INR  1 05               Results from last 7 days  Lab Units 10/06/18  0949 10/06/18  0948 10/06/18  0223 10/05/18  2339 10/05/18  2057   LACTIC ACID mmol/L  --  1 0 2 3* 2 8* 2 9*   PROCALCITONIN ng/ml 0 06  --   --   --   --            * I Have Reviewed All Lab Data Listed Above  * Additional Pertinent Lab Tests Reviewed: All Labs Within Last 24 Hours Reviewed    Imaging:    Imaging Reports Reviewed Today Include: none  Imaging Personally Reviewed by Myself Includes:  none    Recent Cultures (last 7 days):       Results from last 7 days  Lab Units 10/05/18  1954 10/05/18  1944   BLOOD CULTURE  No Growth at 24 hrs  No Growth at 24 hrs         Last 24 Hours Medication List:     Current Facility-Administered Medications:  acetaminophen 650 mg Oral Q6H PRN Misael Jefferson PA-C    albuterol 2 puff Inhalation Q6H PRN Cornelio Staton MD    benzonatate 100 mg Oral TID PRN Cornelio Staton MD    enoxaparin 40 mg Subcutaneous Daily Cornelio Staton MD    guaiFENesin 600 mg Oral Q12H [address] Cornelio Staton MD    hydrALAZINE 10 mg Intravenous Q4H PRN Cornelio Staton MD    meclizine 25 mg Oral Q8H PRN Laurent Huff PA-C    metoprolol succinate 50 mg Oral Daily Cornelio Staton MD    ondansetron 4 mg Intravenous Q6H PRN Cornelio Staton MD    potassium chloride 40 mEq Oral Once Shon Mathis DO    sodium chloride 125 mL/hr Intravenous Continuous Kamran Al MD Last Rate: 125 mL/hr (10/07/18 6892)        Today, Patient Was Seen By: Henna Fernández PA-C    ** Please Note: Dictation voice to text software may have been used in the creation of this document   **

## 2018-10-07 NOTE — ASSESSMENT & PLAN NOTE
Patient was treated for bronchitis with Zithromax and prednisone - suspect GI irritation related to medications, presented with dehydration, N/V, hypokalemia, hypothermia  · Trop normal, lactic acidosis resolved, leukocytosis resolved  · Procalcitonin NORMAL: CT chest/abdomen and pelvis were negative for obstruction or infection, Urinalysis is normal  · Lipase levels are normal  · Zofran or reglan as needed  · Meclizine for vertigo, h/o same  · Advance diet as tolerated, anticipate discharge in next 24 hours

## 2018-10-08 ENCOUNTER — TELEPHONE (OUTPATIENT)
Dept: FAMILY MEDICINE CLINIC | Facility: MEDICAL CENTER | Age: 78
End: 2018-10-08

## 2018-10-08 PROCEDURE — 97116 GAIT TRAINING THERAPY: CPT

## 2018-10-08 PROCEDURE — 94640 AIRWAY INHALATION TREATMENT: CPT

## 2018-10-08 PROCEDURE — 97163 PT EVAL HIGH COMPLEX 45 MIN: CPT

## 2018-10-08 PROCEDURE — 94760 N-INVAS EAR/PLS OXIMETRY 1: CPT

## 2018-10-08 PROCEDURE — G8978 MOBILITY CURRENT STATUS: HCPCS

## 2018-10-08 PROCEDURE — 99232 SBSQ HOSP IP/OBS MODERATE 35: CPT | Performed by: PHYSICIAN ASSISTANT

## 2018-10-08 PROCEDURE — G8979 MOBILITY GOAL STATUS: HCPCS

## 2018-10-08 RX ORDER — SODIUM CHLORIDE FOR INHALATION 0.9 %
3 VIAL, NEBULIZER (ML) INHALATION
Status: DISCONTINUED | OUTPATIENT
Start: 2018-10-09 | End: 2018-10-09 | Stop reason: HOSPADM

## 2018-10-08 RX ORDER — ALBUTEROL SULFATE 2.5 MG/3ML
2.5 SOLUTION RESPIRATORY (INHALATION)
Status: DISCONTINUED | OUTPATIENT
Start: 2018-10-08 | End: 2018-10-08

## 2018-10-08 RX ORDER — LEVALBUTEROL 1.25 MG/.5ML
1.25 SOLUTION, CONCENTRATE RESPIRATORY (INHALATION)
Status: DISCONTINUED | OUTPATIENT
Start: 2018-10-09 | End: 2018-10-09 | Stop reason: HOSPADM

## 2018-10-08 RX ORDER — HYDRALAZINE HYDROCHLORIDE 10 MG/1
10 TABLET, FILM COATED ORAL ONCE
Status: COMPLETED | OUTPATIENT
Start: 2018-10-08 | End: 2018-10-08

## 2018-10-08 RX ORDER — SODIUM CHLORIDE FOR INHALATION 0.9 %
3 VIAL, NEBULIZER (ML) INHALATION
Status: DISCONTINUED | OUTPATIENT
Start: 2018-10-08 | End: 2018-10-08

## 2018-10-08 RX ORDER — PREDNISONE 20 MG/1
40 TABLET ORAL DAILY
Status: DISCONTINUED | OUTPATIENT
Start: 2018-10-08 | End: 2018-10-09 | Stop reason: HOSPADM

## 2018-10-08 RX ADMIN — ALBUTEROL SULFATE 2.5 MG: 2.5 SOLUTION RESPIRATORY (INHALATION) at 13:31

## 2018-10-08 RX ADMIN — BENZONATATE 100 MG: 100 CAPSULE ORAL at 19:09

## 2018-10-08 RX ADMIN — BENZONATATE 100 MG: 100 CAPSULE ORAL at 04:45

## 2018-10-08 RX ADMIN — ENOXAPARIN SODIUM 40 MG: 40 INJECTION SUBCUTANEOUS at 08:39

## 2018-10-08 RX ADMIN — ALBUTEROL SULFATE 2.5 MG: 2.5 SOLUTION RESPIRATORY (INHALATION) at 20:09

## 2018-10-08 RX ADMIN — HYDRALAZINE HYDROCHLORIDE 10 MG: 10 TABLET, FILM COATED ORAL at 11:11

## 2018-10-08 RX ADMIN — METOPROLOL SUCCINATE 50 MG: 50 TABLET, EXTENDED RELEASE ORAL at 08:39

## 2018-10-08 RX ADMIN — GUAIFENESIN 600 MG: 600 TABLET, EXTENDED RELEASE ORAL at 08:39

## 2018-10-08 RX ADMIN — GUAIFENESIN 600 MG: 600 TABLET, EXTENDED RELEASE ORAL at 21:25

## 2018-10-08 RX ADMIN — PREDNISONE 40 MG: 20 TABLET ORAL at 10:04

## 2018-10-08 NOTE — PLAN OF CARE
Problem: PHYSICAL THERAPY ADULT  Goal: Performs mobility at highest level of function for planned discharge setting  See evaluation for individualized goals  Treatment/Interventions: Functional transfer training, LE strengthening/ROM, Therapeutic exercise, Endurance training, Patient/family training, Equipment eval/education, Bed mobility, Gait training, Spoke to nursing, Elevations, Spoke to case management, Family  Equipment Recommended:  (continue trials with RW)       See flowsheet documentation for full assessment, interventions and recommendations  Prognosis: Good  Problem List: Decreased strength, Decreased endurance, Impaired balance, Decreased mobility  Assessment: Pt is 66 y o  female seen for PT evaluation on 10/8/2018 s/p admit to [facility] on 10/5/2018 w/ Intractable nausea and vomiting  Pt presents with N/V, recently saw PCP and dx with bronchitis  PT consulted to assess pt's functional mobility and d/c needs  Order placed for PT eval and tx, w/ up and OOB as tolerated order  Performed at least 2 patient identifiers during session: Name and wristband  Comorbidities affecting pt's physical performance at time of assessment include: h/o nonmelanoma skin CA, HTN  PTA, pt was independent w/ all functional mobility w/ no AD/DME, ambulates unrestricted distances and all terrain, has 2 WHITNEY, lives w/ spouse in 2 level home (1st floor set up) and retired  Personal factors affecting pt at time of IE include: inaccessible home environment, ambulating w/ assistive device, stairs to enter home, decreased initiation and engagement and unable to perform physical activity   Please find objective findings from PT assessment regarding body systems outlined above with impairments and limitations including weakness, impaired balance, decreased endurance, gait deviations, decreased activity tolerance and fall risk, as well as mobility assessment (need for SBA assist w/ all phases of mobility when usually ambulating independently and need for cueing for mobility technique)  The following objective measures performed on IE also reveal limitations: Barthel Index: 60/100 and Modified Fantasma: 3 (moderate disability)  Pt's clinical presentation is currently unstable/unpredictable seen in pt's presentation of need for input for task focus and mobility technique and ongoing medical assessment  Pt to benefit from continued PT tx to address deficits as defined above and maximize level of functional independent mobility and consistency  From PT/mobility standpoint, recommendation at time of d/c would be TBD pending progress and further mobility assessment in order to facilitate return to PLOF  Barriers to Discharge: Inaccessible home environment     Recommendation:  (TBD pending further mobility assessment)     PT - OK to Discharge: No    See flowsheet documentation for full assessment

## 2018-10-08 NOTE — PHYSICAL THERAPY NOTE
Physical Therapy Evaluation     Patient's Name: [place] R Day    Admitting Diagnosis  Hypokalemia [E87 6]  Lactic acidosis [E87 2]  Vomiting [R11 10]  Bronchitis [J40]  Intractable nausea and vomiting [R11 2]    Problem List  Patient Active Problem List   Diagnosis    Hyperlipidemia    Obesity    Essential hypertension    Seborrheic keratosis    History of skin cancer    Screening for skin condition    Intractable nausea and vomiting    Dizzinesses    Bronchitis       Past Medical History  Past Medical History:   Diagnosis Date    H/O nonmelanoma skin cancer     last assessed 9/28/17    Hypertension        Past Surgical History  Past Surgical History:   Procedure Laterality Date    APPENDECTOMY  2010    BLADDER SURGERY  1997    CERVIX SURGERY      dilation and curettage of cervical stump 2005, 1998, 1994    CHOLECYSTECTOMY  1997    COLECTOMY      partial - last assessed 10/28/14    COLONOSCOPY      last assessed 8/9/17   [address]  2011    TONSILLECTOMY  1971        10/08/18 1020   Note Type   Note type Eval/Treat   Pain Assessment   Pain Assessment No/denies pain   Pain Score No Pain   Home Living   Type of [facility] Two level;Performs ADLs on one level; Able to live on main level with bedroom/bathroom;Stairs to enter with rails  (1st floor set up, 2+1 WHITNEY)   Bathroom Shower/Tub Walk-in shower  (with small step up)   Bathroom Toilet Standard   Bathroom Equipment (may possibly have shower chair- spouse to check)   [address]  (was using SPC 2/2 bursitis previously, wasn't using PTA)   Prior Function   Level of [place] Independent with ADLs and functional mobility   Lives With Spouse   Receives Help From Family   ADL Assistance Independent   IADLs Independent   Falls in the last 6 months 0  ((-) fall history)   Vocational Retired   Comments pt reports both her and spouse are retired  (+) driving   Pt reports she has participated in chair yoga classes 2x/week recently, hasn't been since feeling ill   Restrictions/Precautions   Weight Bearing Precautions Per Order No   Braces or Orthoses (none per pt)   Other Precautions Fall Risk   General   Family/Caregiver Present Yes  ()   Cognition   Overall Cognitive Status WFL   Arousal/Participation Alert   Orientation Level Oriented X4   Memory Within functional limits   Following Commands Follows all commands and directions without difficulty   Comments pt agreeable to PT evaluation   RUE Assessment   RUE Assessment WFL  (AROM WFL)   LUE Assessment   LUE Assessment WFL  (AROM WFL)   RLE Assessment   RLE Assessment WFL  (grossly 4/5)   LLE Assessment   LLE Assessment WFL  (grossly 4/5)   Coordination   Movements are Fluid and Coordinated 1  (B finger opposition intact)   Sensation WFL  (pt denying any numbness/tingling to B LEs and UEs)   Light Touch   RLE Light Touch Grossly intact   LLE Light Touch Grossly intact   Bed Mobility   Additional Comments pt received seated OOB in recliner upon arrival  vitals pre mobility: 72bpm, 162/90mmHg manually 169/80mmHg electronic   Transfers   Sit to Stand 5  Supervision   Additional items Assist x 1; Increased time required;Verbal cues   Stand to Sit 5  Supervision   Additional items Assist x 1; Increased time required;Verbal cues   Additional Comments pt reports she has been ambulatory to/from BR since admission   Ambulation/Elevation   Gait pattern Decreased foot clearance; Short stride; Step to   Gait Assistance 5  Supervision   Additional items Assist x 1;Verbal cues  (for RW management)   Assistive Device Rolling walker   Distance 10', further distance deferred 2/2 BP per RN request   Stair Management Assistance Not tested   Balance   Static Sitting Good   Dynamic Sitting Fair +   Static Standing Fair   Dynamic Standing Fair   Ambulatory Fair   Endurance Deficit   Endurance Deficit Yes   Activity Tolerance   Activity Tolerance Patient limited by fatigue Medical Staff Made Aware spoke to Kai Herrera regarding PT recs   Nurse Made Aware NILDA Olguin Cely verbalized pt appropriate to see, made aware of session/ outcome recs   Assessment   Prognosis Good   Problem List Decreased strength;Decreased endurance; Impaired balance;Decreased mobility   Assessment Pt is 66 y o  female seen for PT evaluation on 10/8/2018 s/p admit to [facility] on 10/5/2018 w/ Intractable nausea and vomiting  Pt presents with N/V, recently saw PCP and dx with bronchitis  PT consulted to assess pt's functional mobility and d/c needs  Order placed for PT eval and tx, w/ up and OOB as tolerated order  Performed at least 2 patient identifiers during session: Name and wristband  Comorbidities affecting pt's physical performance at time of assessment include: h/o nonmelanoma skin CA, HTN  PTA, pt was independent w/ all functional mobility w/ no AD/DME, ambulates unrestricted distances and all terrain, has 2 WHITNEY, lives w/ spouse in 2 level home (1st floor set up) and retired  Personal factors affecting pt at time of IE include: inaccessible home environment, ambulating w/ assistive device, stairs to enter home, decreased initiation and engagement and unable to perform physical activity  Please find objective findings from PT assessment regarding body systems outlined above with impairments and limitations including weakness, impaired balance, decreased endurance, gait deviations, decreased activity tolerance and fall risk, as well as mobility assessment (need for SBA assist w/ all phases of mobility when usually ambulating independently and need for cueing for mobility technique)  The following objective measures performed on IE also reveal limitations: Barthel Index: 60/100 and Modified Lizella: 3 (moderate disability)  Pt's clinical presentation is currently unstable/unpredictable seen in pt's presentation of need for input for task focus and mobility technique and ongoing medical assessment   Pt to benefit from continued PT tx to address deficits as defined above and maximize level of functional independent mobility and consistency  From PT/mobility standpoint, recommendation at time of d/c would be TBD pending progress and further mobility assessment in order to facilitate return to PLOF  Barriers to Discharge Inaccessible home environment   Goals   Patient Goals to return home   STG Expiration Date 10/18/18   Short Term Goal #1 In 7-10 days: Increase bilateral LE strength 1/2 grade to facilitate independent mobility, Perform all bed mobility tasks modified independent to decrease caregiver burden, Perform all transfers modified independent to improve independence, Ambulate > 150 ft  with least restrictive assistive device modified independent w/o LOB and w/ normalized gait pattern 100% of the time, Navigate 3 stairs modified independent with unilateral handrail to facilitate return to previous living environment, Tolerate 4 hr OOB to faciliate upright tolerance, Improve Barthel Index score to 75 or greater to facilitate independence and PT provider will perform functional balance assessment to determine fall risk   Treatment Day 0   Plan   Treatment/Interventions Functional transfer training;LE strengthening/ROM; Therapeutic exercise; Endurance training;Patient/family training;Equipment eval/education; Bed mobility;Gait training;Spoke to nursing;Elevations; Spoke to case management; Family   PT Frequency (3-5x/wk)   Recommendation   Recommendation (TBD pending further mobility assessment)   Equipment Recommended (continue trials with RW)   PT - OK to Discharge No   Modified Erie Scale   Modified Erie Scale 3   Barthel Index   Feeding 10   Bathing 5   Grooming Score 5   Dressing Score 5   Bladder Score 10   Bowels Score 10   Toilet Use Score 5   Transfers (Bed/Chair) Score 10   Mobility (Level Surface) Score 0   Stairs Score 0   Barthel Index Score 60         Diya Khan, PT, DPT

## 2018-10-08 NOTE — PROGRESS NOTES
[facility] 1940, 66 y [facility]  [facility] MRN: [id number]    Unit/Bed#: -01 Encounter: [id number]    Primary Care Provider: Jarrod Garcia MD   Date and time admitted to hospital: 10/5/2018  6:33 PM    * Intractable nausea and vomiting   Assessment & Plan    RESOLVED  Patient was treated for bronchitis with Zithromax and prednisone - suspect GI irritation related to medications (likely antibiotics)  She presented with dehydration, N/V, hypokalemia, hypothermia  · Trop normal, lactic acidosis resolved, leukocytosis resolved  · Procalcitonin NORMAL: CT chest/abdomen and pelvis were negative for obstruction or infection, Urinalysis is normal  · Lipase levels are normal  · Antiemetics as needed - hasn't utilized >24 hrs  · Meclizine for vertigo, h/o same  · Advance diet as tolerated - doing well  · Weakness - likely related to illness and decreased oral intake - she is agreeable to PT consult today, had refused yesterday      Dizzinesses   Assessment & Plan    Negative CT head, has a history of vertigo and uses meclizine at home, symptoms are currently resolved with use of meclizine as needed     Essential hypertension   Assessment & Plan    Elevated - lost IV access and will give 1x dose oral hydralazine     Bronchitis   Assessment & Plan    Patient was being treated for bronchitis outpatient - continue with Tessalon Perles, Mucinex, albuterol as needed  Respiratory protocol, continue incentive spirometer and flutter valve, start nebs today and give prednisone oral for persistent bronchospasm    Normal procalcitonin, no oxygen requirement, no indication for antibiotics at this time  Will continue supportive care and monitor       VTE Pharmacologic Prophylaxis:   Pharmacologic: Enoxaparin (Lovenox)  Mechanical VTE Prophylaxis in Place: No    Patient Centered Rounds: I have performed bedside rounds with nursing staff today      Discussions with Specialists or Other Care Team Provider: d/w bradley and pt    Education and Discussions with Family / Patient:  Discussed with patient,  present at the bedside    Time Spent for Care: 30 minutes  More than 50% of total time spent on counseling and coordination of care as described above  Current Length of Stay: 2 day(s)    Current Patient Status: Inpatient   Certification Statement: The patient will continue to require additional inpatient hospital stay due to Patient will be evaluated by physical therapy today for needs on discharge, patient is medically stable, and once discharge plans have been secured, patient is stable for discharge    Discharge Plan:  Patient is medically stable for discharge, pending safe discharge plan from a therapy standpoint    Code Status: Level 1 - Full Code      Subjective:   Patient seen examined today  She reports continued weakness, but advised that this is expected given her clinical course  She does not want to go home yet  Denies any dizziness, no further nausea or vomiting  Has been tolerating regular diet without difficulty  She feels generally weak, but not any worse  She thinks that she has improved since admission  Yesterday she was refusing physical therapy evaluation, today she is agreeable  Objective:     Vitals:   Temp (24hrs), Av 9 °F (37 2 °C), Min:98 9 °F (37 2 °C), Max:98 9 °F (37 2 °C)    HR:  [57-70] 70  Resp:  [18] 18  BP: (160-174)/(80-90) 162/90  SpO2:  [92 %-93 %] 93 %  Body mass index is 36 9 kg/m²  Input and Output Summary (last 24 hours): Intake/Output Summary (Last 24 hours) at 10/08/18 1229  Last data filed at 10/08/18 1101   Gross per 24 hour   Intake             1840 ml   Output             1850 ml   Net              -10 ml       Physical Exam:     Physical Exam   Constitutional: She is oriented to person, place, and time  She appears well-developed and well-nourished  No distress     HENT:   Mouth/Throat: Oropharynx is clear and moist    Cardiovascular: Normal rate, regular rhythm, S1 normal, S2 normal and normal heart sounds  No murmur heard  Pulmonary/Chest: Effort normal  No respiratory distress  She has no wheezes  She has rhonchi (few, scattered)  She has no rales  Dry cough, exacerbated with deep inspiration    Abdominal: Soft  Bowel sounds are normal  She exhibits no distension  There is no tenderness  Musculoskeletal: She exhibits no edema  Neurological: She is alert and oriented to person, place, and time  No cranial nerve deficit  Nursing note and vitals reviewed  Additional Data:     Labs:      Results from last 7 days  Lab Units 10/07/18  1000   WBC Thousand/uL 8 21   HEMOGLOBIN g/dL 14 0   HEMATOCRIT % 41 8   PLATELETS Thousands/uL 163   NEUTROS PCT % 74   LYMPHS PCT % 14   MONOS PCT % 9   EOS PCT % 1       Results from last 7 days  Lab Units 10/07/18  1000   SODIUM mmol/L 143   POTASSIUM mmol/L 3 6   CHLORIDE mmol/L 110*   CO2 mmol/L 28   BUN mg/dL 9   CREATININE mg/dL 0 88   ANION GAP mmol/L 5   CALCIUM mg/dL 8 0*   ALBUMIN g/dL 2 9*   TOTAL BILIRUBIN mg/dL 0 60   ALK PHOS U/L 52   ALT U/L 37   AST U/L 21       Results from last 7 days  Lab Units 10/05/18  1843   INR  1 05               Results from last 7 days  Lab Units 10/06/18  0949 10/06/18  0948 10/06/18  0223 10/05/18  2339 10/05/18  2057   LACTIC ACID mmol/L  --  1 0 2 3* 2 8* 2 9*   PROCALCITONIN ng/ml 0 06  --   --   --   --            * I Have Reviewed All Lab Data Listed Above  * Additional Pertinent Lab Tests Reviewed: No New Labs Available For Today    Imaging:    Imaging Reports Reviewed Today Include:  None  Imaging Personally Reviewed by Myself Includes:  None    Recent Cultures (last 7 days):       Results from last 7 days  Lab Units 10/05/18  1954 10/05/18  1944   BLOOD CULTURE  No Growth at 48 hrs  No Growth at 48 hrs         Last 24 Hours Medication List:     Current Facility-Administered Medications:  acetaminophen 650 mg Oral Q6H PRN Misael Jefferson PA-C   albuterol 2 puff Inhalation Q6H PRN Berry Kruger MD   albuterol 2 5 mg Nebulization TID Eva Huff PA-C   benzonatate 100 mg Oral TID PRN Berry Kruger MD   enoxaparin 40 mg Subcutaneous Daily Berry Kruger MD   guaiFENesin 600 mg Oral Q12H [address] Berry Kruger MD   hydrALAZINE 10 mg Intravenous Q4H PRN Berry Kruger MD   meclizine 25 mg Oral Q8H PRN Harsh Wheeler PA-C   metoprolol succinate 50 mg Oral Daily Berry Kruger MD   ondansetron 4 mg Intravenous Q6H PRN Berry Kruger MD   potassium chloride 40 mEq Oral Once Leopoldo Razor, DO   predniSONE 40 mg Oral Daily Eva Huff PA-C   sodium chloride 3 mL Nebulization TID Harsh Wheeler PA-C        Today, Patient Was Seen By: Harsh Wheeler PA-C    ** Please Note: Dictation voice to text software may have been used in the creation of this document   **

## 2018-10-08 NOTE — ASSESSMENT & PLAN NOTE
Patient was being treated for bronchitis outpatient - continue with Tessalon Perles, Mucinex, albuterol as needed  Respiratory protocol, continue incentive spirometer and flutter valve, start nebs today and give prednisone oral for persistent bronchospasm    Normal procalcitonin, no oxygen requirement, no indication for antibiotics at this time    Will continue supportive care and monitor

## 2018-10-08 NOTE — PLAN OF CARE
Problem: PHYSICAL THERAPY ADULT  Goal: Performs mobility at highest level of function for planned discharge setting  See evaluation for individualized goals  Treatment/Interventions: Functional transfer training, LE strengthening/ROM, Therapeutic exercise, Endurance training, Patient/family training, Equipment eval/education, Bed mobility, Gait training, Spoke to nursing, Elevations, Spoke to case management, Family  Equipment Recommended:  (continue trials with RW)       See flowsheet documentation for full assessment, interventions and recommendations  Outcome: Progressing  Prognosis: Good  Problem List: Decreased strength, Decreased endurance, Impaired balance, Decreased mobility  Assessment: Pt seen for PT treatment session this date with interventions consisting of gait training w/ emphasis on improving pt's ability to ambulate level surfaces x 40' x2 with SBA provided by therapist with RW  Pt agreeable to PT treatment session upon arrival, pt found seated OOB in recliner, in no apparent distress, A&O x 4, responsive and motivated to participate  VSS per RN Chris Horta, cleared pt for mobility  In comparison to previous session, pt with improvements in level surface gait training tolerance, vc for RW management < 25% of the time  Post session: pt returned back to recliner, chair alarm engaged, all needs in reach and RN notified of session findings/recommendations  Continue to recommend Home PT with family support at time of d/c in order to maximize pt's functional independence and safety w/ mobility  Pt continues to be functioning below baseline level, and remains limited 2* factors listed above  PT will continue to see pt while here in order to address the deficits listed above and provide interventions consistent w/ POC in effort to achieve STGs    Barriers to Discharge: Inaccessible home environment     Recommendation: Home PT, Home with family support (pending progress)     PT - OK to Discharge: No    See flowsheet documentation for full assessment

## 2018-10-08 NOTE — ASSESSMENT & PLAN NOTE
RESOLVED  Patient was treated for bronchitis with Zithromax and prednisone - suspect GI irritation related to medications (likely antibiotics)    She presented with dehydration, N/V, hypokalemia, hypothermia  · Trop normal, lactic acidosis resolved, leukocytosis resolved  · Procalcitonin NORMAL: CT chest/abdomen and pelvis were negative for obstruction or infection, Urinalysis is normal  · Lipase levels are normal  · Antiemetics as needed - hasn't utilized >24 hrs  · Meclizine for vertigo, h/o same  · Advance diet as tolerated - doing well  · Weakness - likely related to illness and decreased oral intake - she is agreeable to PT consult today, had refused yesterday

## 2018-10-08 NOTE — SOCIAL WORK
CM met with pt at bedside  Pt lives with her  Moris Hung in a 2 story house, but her bedroom is on the first floor  There are 2 WHITNEY  Pt has no problem navigating steps and is independent with ADL's  She has a cane, but does not use it  She has never been in rehab or used [facility] services  Dr Evelyn Sow is her PCP  Denies substance abuse or mental health issues  She quit smoking in 1979  She uses [employer] in [employer] and has no problem with he co-pays  She has an Advanced Directive and her POA is her   She is retired, but still drives  Her  will transport home when medically cleared  CM discussed d/c needs and pt is open to [facility] for nursing and an aide  CM will follow through hospitalization  CM reviewed discharge planning process including the following: identifying help at home, patient preference for discharge planning needs, pharmacy preference, and availability of treatment team to discuss questions or concerns patient and/or family may have regarding understanding medications and recognizing signs and symptoms once discharged  CM also encouraged patient to follow up with all recommended appointments after discharge  Patient advised of importance for patient and family to participate in managing patients medical well being  CM name and role reviewed  Discharge Checklist reviewed and CM will continue to monitor for progress toward discharge goals in nursing and provider rounds

## 2018-10-08 NOTE — PHYSICAL THERAPY NOTE
Physical Therapy Treatment Note     10/08/18 1425   Pain Assessment   Pain Assessment No/denies pain   Pain Score No Pain   Restrictions/Precautions   Weight Bearing Precautions Per Order No   Braces or Orthoses (none per pt)   Other Precautions Chair Alarm; Fall Risk   General   Chart Reviewed Yes   Additional Pertinent History time of session: 0169-6749   Response to Previous Treatment Patient with no complaints from previous session  Family/Caregiver Present No   Cognition   Overall Cognitive Status WFL   Arousal/Participation Alert; Cooperative   Attention Within functional limits   Orientation Level Oriented X4   Memory Within functional limits   Following Commands Follows all commands and directions without difficulty   Comments pt agreeable to PT session   Subjective   Subjective "I feel good, I want to walk"   Bed Mobility   Additional Comments pt received seated OOB in recliner upon arrival  VSS per RN: 105bpm, 160/63mmHg, 96% SpO2 on RA   Transfers   Sit to Stand 5  Supervision   Additional items Assist x 1; Increased time required;Verbal cues   Stand to Sit 5  Supervision   Additional items Assist x 1; Increased time required;Verbal cues   Ambulation/Elevation   Gait pattern Decreased foot clearance; Short stride; Step to   Gait Assistance 5  Supervision   Additional items Assist x 1;Verbal cues  (for RW management)   Assistive Device Rolling walker   Distance 40' x2   Balance   Static Sitting Good   Dynamic Sitting Fair +   Static Standing Fair   Dynamic Standing Fair   Ambulatory Fair   Endurance Deficit   Endurance Deficit Yes   Activity Tolerance   Activity Tolerance Patient tolerated treatment well   Nurse Made Aware RN Laurie Larsen verbalized pt appropriate to see, made aware of session/ outcome recs   Assessment   Prognosis Good   Problem List Decreased strength;Decreased endurance; Impaired balance;Decreased mobility   Assessment Pt seen for PT treatment session this date with interventions consisting of gait training w/ emphasis on improving pt's ability to ambulate level surfaces x 40' x2 with SBA provided by therapist with RW  Pt agreeable to PT treatment session upon arrival, pt found seated OOB in recliner, in no apparent distress, A&O x 4, responsive and motivated to participate  VSS per RN Ebb Sender, cleared pt for mobility  In comparison to previous session, pt with improvements in level surface gait training tolerance, vc for RW management < 25% of the time  Post session: pt returned back to recliner, chair alarm engaged, all needs in reach and RN notified of session findings/recommendations  Continue to recommend Home PT with family support at time of d/c in order to maximize pt's functional independence and safety w/ mobility  Pt continues to be functioning below baseline level, and remains limited 2* factors listed above  PT will continue to see pt while here in order to address the deficits listed above and provide interventions consistent w/ POC in effort to achieve STGs  Barriers to Discharge Inaccessible home environment   Goals   Patient Goals to return home   STG Expiration Date 10/18/18   Treatment Day 1   Plan   Treatment/Interventions Functional transfer training;LE strengthening/ROM; Therapeutic exercise; Endurance training;Patient/family training;Equipment eval/education; Bed mobility;Gait training;Spoke to nursing;Elevations; Spoke to case management; Family   Progress Progressing toward goals   PT Frequency (3-5x/wk)   Recommendation   Recommendation Home PT; Home with family support  (pending progress)   Equipment Recommended (continue trials with RW)         Rakel Murphy PT, DPT    Time of PT treatment session: [id number]

## 2018-10-08 NOTE — TELEPHONE ENCOUNTER
Pt called from [address] where she is currently in patient  Pt was taken there via ambulance on Friday due to vomiting, vertigo and her temp was 94  She just wanted you to know

## 2018-10-09 ENCOUNTER — TRANSITIONAL CARE MANAGEMENT (OUTPATIENT)
Dept: FAMILY MEDICINE CLINIC | Facility: MEDICAL CENTER | Age: 78
End: 2018-10-09

## 2018-10-09 VITALS
WEIGHT: 215 LBS | BODY MASS INDEX: 36.7 KG/M2 | SYSTOLIC BLOOD PRESSURE: 170 MMHG | HEIGHT: 64 IN | RESPIRATION RATE: 18 BRPM | HEART RATE: 63 BPM | DIASTOLIC BLOOD PRESSURE: 78 MMHG | OXYGEN SATURATION: 95 % | TEMPERATURE: 98.1 F

## 2018-10-09 PROBLEM — R42 DIZZINESSES: Status: RESOLVED | Noted: 2018-10-05 | Resolved: 2018-10-09

## 2018-10-09 PROBLEM — R11.2 INTRACTABLE NAUSEA AND VOMITING: Status: RESOLVED | Noted: 2018-10-05 | Resolved: 2018-10-09

## 2018-10-09 LAB
ATRIAL RATE: 88 BPM
P AXIS: 64 DEGREES
PR INTERVAL: 194 MS
QRS AXIS: -33 DEGREES
QRSD INTERVAL: 92 MS
QT INTERVAL: 416 MS
QTC INTERVAL: 503 MS
T WAVE AXIS: 33 DEGREES
VENTRICULAR RATE: 88 BPM

## 2018-10-09 PROCEDURE — 94760 N-INVAS EAR/PLS OXIMETRY 1: CPT

## 2018-10-09 PROCEDURE — 99239 HOSP IP/OBS DSCHRG MGMT >30: CPT | Performed by: PHYSICIAN ASSISTANT

## 2018-10-09 PROCEDURE — 97116 GAIT TRAINING THERAPY: CPT

## 2018-10-09 PROCEDURE — 94640 AIRWAY INHALATION TREATMENT: CPT

## 2018-10-09 PROCEDURE — 93010 ELECTROCARDIOGRAM REPORT: CPT | Performed by: INTERNAL MEDICINE

## 2018-10-09 RX ORDER — BENZONATATE 100 MG/1
100 CAPSULE ORAL 3 TIMES DAILY PRN
Qty: 20 CAPSULE | Refills: 0 | Status: SHIPPED | OUTPATIENT
Start: 2018-10-09 | End: 2019-03-14 | Stop reason: ALTCHOICE

## 2018-10-09 RX ORDER — GUAIFENESIN 600 MG
600 TABLET, EXTENDED RELEASE 12 HR ORAL EVERY 12 HOURS SCHEDULED
Refills: 0
Start: 2018-10-09 | End: 2019-03-14 | Stop reason: ALTCHOICE

## 2018-10-09 RX ORDER — ALBUTEROL SULFATE 2.5 MG/3ML
2.5 SOLUTION RESPIRATORY (INHALATION) EVERY 6 HOURS PRN
Qty: 30 VIAL | Refills: 0 | Status: SHIPPED | OUTPATIENT
Start: 2018-10-09 | End: 2019-03-14 | Stop reason: ALTCHOICE

## 2018-10-09 RX ORDER — PREDNISONE 20 MG/1
40 TABLET ORAL DAILY
Qty: 6 TABLET | Refills: 0 | Status: SHIPPED | OUTPATIENT
Start: 2018-10-10 | End: 2018-10-13

## 2018-10-09 RX ADMIN — ENOXAPARIN SODIUM 40 MG: 40 INJECTION SUBCUTANEOUS at 08:15

## 2018-10-09 RX ADMIN — METOPROLOL SUCCINATE 50 MG: 50 TABLET, EXTENDED RELEASE ORAL at 08:15

## 2018-10-09 RX ADMIN — GUAIFENESIN 600 MG: 600 TABLET, EXTENDED RELEASE ORAL at 08:15

## 2018-10-09 RX ADMIN — LEVALBUTEROL HYDROCHLORIDE 1.25 MG: 1.25 SOLUTION, CONCENTRATE RESPIRATORY (INHALATION) at 07:39

## 2018-10-09 RX ADMIN — BENZONATATE 100 MG: 100 CAPSULE ORAL at 01:29

## 2018-10-09 RX ADMIN — PREDNISONE 40 MG: 20 TABLET ORAL at 08:15

## 2018-10-09 RX ADMIN — ISODIUM CHLORIDE 3 ML: 0.03 SOLUTION RESPIRATORY (INHALATION) at 07:39

## 2018-10-09 NOTE — PLAN OF CARE
Problem: Potential for Falls  Goal: Patient will remain free of falls  INTERVENTIONS:  - Assess patient frequently for physical needs  -  Identify cognitive and physical deficits and behaviors that affect risk of falls  -  [place] fall precautions as indicated by assessment   - Educate patient/family on patient safety including physical limitations  - Instruct patient to call for assistance with activity based on assessment  - Modify environment to reduce risk of injury  - Consider OT/PT consult to assist with strengthening/mobility    Outcome: Progressing      Problem: PAIN - ADULT  Goal: Verbalizes/displays adequate comfort level or baseline comfort level  Interventions:  - Encourage patient to monitor pain and request assistance  - Assess pain using appropriate pain scale  - Administer analgesics based on type and severity of pain and evaluate response  - Implement non-pharmacological measures as appropriate and evaluate response  - Consider cultural and social influences on pain and pain management  - Notify physician/advanced practitioner if interventions unsuccessful or patient reports new pain   Outcome: Progressing      Problem: SAFETY ADULT  Goal: Patient will remain free of falls  INTERVENTIONS:  - Assess patient frequently for physical needs  -  Identify cognitive and physical deficits and behaviors that affect risk of falls    -  [place] fall precautions as indicated by assessment   - Educate patient/family on patient safety including physical limitations  - Instruct patient to call for assistance with activity based on assessment  - Modify environment to reduce risk of injury  - Consider OT/PT consult to assist with strengthening/mobility    Outcome: Progressing      Problem: DISCHARGE PLANNING  Goal: Discharge to home or other facility with appropriate resources  INTERVENTIONS:  - Identify barriers to discharge w/patient and caregiver  - Arrange for needed discharge resources and transportation as appropriate  - Identify discharge learning needs (meds, wound care, etc )  - Arrange for interpretive services to assist at discharge as needed  - Refer to Case Management Department for coordinating discharge planning if the patient needs post-hospital services based on physician/advanced practitioner order or complex needs related to functional status, cognitive ability, or social support system   Outcome: Progressing      Problem: Knowledge Deficit  Goal: Patient/family/caregiver demonstrates understanding of disease process, treatment plan, medications, and discharge instructions  Complete learning assessment and assess knowledge base  Interventions:  - Provide teaching at level of understanding  - Provide teaching via preferred learning methods   Outcome: Progressing      Problem: CARDIOVASCULAR - ADULT  Goal: Maintains optimal cardiac output and hemodynamic stability  INTERVENTIONS:  - Monitor I/O, vital signs and rhythm  - Monitor for S/S and trends of decreased cardiac output i e  bleeding, hypotension  - Administer and titrate ordered vasoactive medications to optimize hemodynamic stability  - Assess quality of pulses, skin color and temperature  - Assess for signs of decreased coronary artery perfusion - ex   Angina  - Instruct patient to report change in severity of symptoms   Outcome: Progressing    Goal: Absence of cardiac dysrhythmias or at baseline rhythm  INTERVENTIONS:  - Continuous cardiac monitoring, monitor vital signs, obtain 12 lead EKG if indicated  - Administer antiarrhythmic and heart rate control medications as ordered  - Monitor electrolytes and administer replacement therapy as ordered   Outcome: Progressing      Problem: RESPIRATORY - ADULT  Goal: Achieves optimal ventilation and oxygenation  INTERVENTIONS:  - Assess for changes in respiratory status  - Assess for changes in mentation and behavior  - Position to facilitate oxygenation and minimize respiratory effort  - Oxygen administration by appropriate delivery method based on oxygen saturation (per order) or ABGs  - Initiate smoking cessation education as indicated  - Encourage broncho-pulmonary hygiene including cough, deep breathe, Incentive Spirometry  - Assess the need for suctioning and aspirate as needed  - Assess and instruct to report SOB or any respiratory difficulty  - Respiratory Therapy support as indicated   Outcome: Progressing      Problem: METABOLIC, FLUID AND ELECTROLYTES - ADULT  Goal: Electrolytes maintained within normal limits  INTERVENTIONS:  - Monitor labs and assess patient for signs and symptoms of electrolyte imbalances  - Administer electrolyte replacement as ordered  - Monitor response to electrolyte replacements, including repeat lab results as appropriate  - Instruct patient on fluid and nutrition as appropriate   Outcome: Progressing      Problem: HEMATOLOGIC - ADULT  Goal: Maintains hematologic stability  INTERVENTIONS  - Assess for signs and symptoms of bleeding or hemorrhage  - Monitor labs  - Administer supportive blood products/factors as ordered and appropriate   Outcome: Progressing      Problem: MUSCULOSKELETAL - ADULT  Goal: Maintain or return mobility to safest level of function  INTERVENTIONS:  - Assess patient's ability to carry out ADLs; assess patient's baseline for ADL function and identify physical deficits which impact ability to perform ADLs (bathing, care of mouth/teeth, toileting, grooming, dressing, etc )  - Assess/evaluate cause of self-care deficits   - Assess range of motion  - Assess patient's mobility; develop plan if impaired  - Assess patient's need for assistive devices and provide as appropriate  - Encourage maximum independence but intervene and supervise when necessary  - Involve family in performance of ADLs  - Assess for home care needs following discharge   - Request OT consult to assist with ADL evaluation and planning for discharge  - Provide patient education as appropriate   Outcome: Progressing    Goal: Maintain proper alignment of affected body part  INTERVENTIONS:  - Support, maintain and protect limb and body alignment  - Provide pt/fam with appropriate education   Outcome: Progressing      Problem: Prexisting or High Potential for Compromised Skin Integrity  Goal: Skin integrity is maintained or improved  INTERVENTIONS:  - Identify patients at risk for skin breakdown  - Assess and monitor skin integrity  - Assess and monitor nutrition and hydration status  - Monitor labs (i e  albumin)  - Assess for incontinence   - Turn and reposition patient  - Assist with mobility/ambulation  - Relieve pressure over bony prominences  - Avoid friction and shearing  - Provide appropriate hygiene as needed including keeping skin clean and dry  - Evaluate need for skin moisturizer/barrier cream  - Collaborate with interdisciplinary team (i e  Nutrition, Rehabilitation, etc )   - Patient/family teaching   Outcome: Progressing

## 2018-10-09 NOTE — ASSESSMENT & PLAN NOTE
Patient was being treated for bronchitis outpatient - continue with Tessalon Perles, Mucinex, albuterol as needed  Continue incentive spirometer and flutter valve on discharge, Rx for nebulizer to use at home  Complete 3 more days of oral prednisone for bronchospasm/bronchitis     Normal procalcitonin, no oxygen requirement, no indication for antibiotics at this time    CT imaging without infiltrate

## 2018-10-09 NOTE — DISCHARGE SUMMARY
Discharge- [place] R Day 1940, 66 y o  female MRN: [id number]    Unit/Bed#: -01 Encounter: [id number]    Primary Care Provider: Zohaib Mcdaniel MD   Date and time admitted to hospital: 10/5/2018  6:33 PM        * Intractable nausea and vomiting-resolved as of 10/9/2018   Assessment & Plan    RESOLVED  Patient was treated for bronchitis with Zithromax and prednisone - suspect GI irritation related to medications (likely antibiotics)  She presented with dehydration, N/V, hypokalemia, hypothermia  · Trop normal, lactic acidosis resolved, leukocytosis resolved  · Procalcitonin NORMAL: CT chest/abdomen and pelvis were negative for obstruction or infection, Urinalysis is normal  · Lipase levels normal  · Antiemetics as needed - hasn't utilized >24 hrs  · Meclizine for vertigo, h/o same  · Advance diet as tolerated - doing well  · Weakness - likely related to illness and decreased oral intake - she is agreeable to home PT  · Face-to-face completed for nursing, aide, and PT     Dizzinesses-resolved as of 10/9/2018   Assessment & Plan    Negative CT head, has a history of vertigo and uses meclizine at home, symptoms are currently resolved with use of meclizine as needed     Essential hypertension   Assessment & Plan    Intermittently quite elevated, which she states is "normal" - recommend reassess outside of hospital and adjust medications at follow up PCP appointment      Bronchitis   Assessment & Plan    Patient was being treated for bronchitis outpatient - continue with Tessalon Perles, Mucinex, albuterol as needed  Continue incentive spirometer and flutter valve on discharge, Rx for nebulizer to use at home  Complete 3 more days of oral prednisone for bronchospasm/bronchitis     Normal procalcitonin, no oxygen requirement, no indication for antibiotics at this time    CT imaging without infiltrate         Discharging Physician / Practitioner: Brianna Rosas PA-C  PCP: Zohaib Mcdaniel MD  Admission Date:   Admission Orders     Ordered        10/06/18 2335  Inpatient Admission  Once         10/05/18 2110  Place in Observation (expected length of stay for this patient is less than two midnights)  Once             Discharge Date: 10/09/18    Resolved Problems  Date Reviewed: 10/9/2018          Resolved    * (Principal)Intractable nausea and vomiting 10/9/2018     Resolved by  Andra Lilly PA-C    Hypokalemia 10/7/2018     Resolved by  Andra Lilly PA-C    Overview Signed 10/5/2018 10:20 PM by Luh Willams MD     - Replace and recheck           Dizzinesses 10/9/2018     Resolved by  Andra Lilly PA-C    Overview Deleted 10/5/2018 10:21 PM by Luh Willams MD                  Consultations During Hospital Stay:  · PT     Procedures Performed:     · CT c/a/p: no acute chest/abdomen/pelvis findings  Diverticulosis without acute infection  Hepatic steatosis  Incidental finding of 0 5 cm RLL nodule  Significant Findings / Test Results:     · Most recent labs show resolved leukocytosis, lactic acidosis, and hypokalemia  · procalcitonin 0 6  · UA not compelling    Incidental Findings:   · RLL lung nodule, repeat imaging in 12 months recommended     Test Results Pending at Discharge (will require follow up): · Blood cultures (NGTD @ 72 hrs)     Outpatient Tests Requested:  · Follow up PCP within 3-5 days    Complications:  Weakness     Reason for Admission: intractable n/v    Hospital Course:     Padmaja Menard is a 66 y o  female patient who originally presented to the hospital on 10/5/2018 due to nausea and vomiting, hypothermia  Recently diagnosed with bronchitis outpatient and started on several medications, likely source of symptoms  Admitted for hydration and replenishment of electrolytes as well as temperature monitoring  Course complicated by patient weakness, requiring PT evaluation  Recommending home therapy and face-to-face completed for nursing, PT, and aide   She is in stable condition, recommend follow up in 3-5 days with PCP  No further antibiotics recommended at this time, but will complete course of steroids and albuterol nebulizer use  Please see above list of diagnoses and related plan for additional information  Condition at Discharge: fair     Discharge Day Visit / Exam:     Subjective:  Patient seen Beltran Castaneda, feels better overall, still weak but this is improving slightly daily  Denies further fevers, no chills  Cough is improving but still reactive with deep inspiration  Denies chest pain  Vitals: Blood Pressure: 170/78 (10/09/18 0700)  Pulse: 63 (10/09/18 0700)  Temperature: 98 1 °F (36 7 °C) (10/09/18 0700)  Temp Source: Oral (10/09/18 0700)  Respirations: 18 (10/09/18 0700)  Height: 5' 4" (162 6 cm) (10/06/18 2300)  Weight - Scale: 97 5 kg (215 lb) (10/06/18 2300)  SpO2: 95 % (10/09/18 0739)  Exam:   Physical Exam   Constitutional: She is oriented to person, place, and time  She appears well-developed and well-nourished  Non-toxic appearance  No distress  Cardiovascular: Normal rate, regular rhythm, S1 normal, S2 normal and normal heart sounds  No murmur heard  Pulmonary/Chest: Effort normal  No respiratory distress  She has decreased breath sounds  She has no wheezes  She has no rales  Dry cough, no adventitious breath sounds  No O2 requirements    Abdominal: Soft  Bowel sounds are normal    Musculoskeletal: She exhibits no edema  Neurological: She is alert and oriented to person, place, and time  Nursing note and vitals reviewed  Discussion with Family: d/w patient, no family present, but patient comfortable with discharge plan and all questions answered     Discharge instructions/Information to patient and family:   See after visit summary for information provided to patient and family  Provisions for Follow-Up Care:  See after visit summary for information related to follow-up care and any pertinent home health orders        Disposition:     Home with home care     Planned Readmission: none     Discharge Statement:  I spent approximately 40 minutes discharging the patient  This time was spent on the day of discharge  I had direct contact with the patient on the day of discharge  Greater than 50% of the total time was spent examining patient, answering all patient questions, arranging and discussing plan of care with patient as well as directly providing post-discharge instructions  Additional time then spent on discharge activities  Discharge Medications:  See after visit summary for reconciled discharge medications provided to patient and family        ** Please Note: This note has been constructed using a voice recognition system **

## 2018-10-09 NOTE — PLAN OF CARE
Problem: PHYSICAL THERAPY ADULT  Goal: Performs mobility at highest level of function for planned discharge setting  See evaluation for individualized goals  Treatment/Interventions: Functional transfer training, LE strengthening/ROM, Therapeutic exercise, Endurance training, Patient/family training, Equipment eval/education, Bed mobility, Gait training, Spoke to nursing, Elevations, Spoke to case management, Family  Equipment Recommended:  (continue trials with RW)       See flowsheet documentation for full assessment, interventions and recommendations  Outcome: Progressing  Prognosis: Good  Problem List: Decreased strength, Decreased endurance, Impaired balance, Decreased mobility  Assessment: Pt seen for PT treatment session this date with interventions consisting of gait training w/ emphasis on improving pt's ability to ambulate level surfaces x 75' x2 with SBA provided by therapist with RW  Pt agreeable to PT treatment session upon arrival, pt found seated OOB in recliner, in no apparent distress, A&O x 4, responsive and motivated to participate  VSS per NILDA Muro, cleared pt for mobility  In comparison to previous session, pt with improvements in level surface gait training tolerance, vc for RW management < 10% of the time  Post session: pt returned back to recliner, chair alarm engaged, all needs in reach and RN notified of session findings/recommendations  Continue to recommend Home PT with family support at time of d/c in order to maximize pt's functional independence and safety w/ mobility  Pt continues to be functioning below baseline level, and remains limited 2* factors listed above  PT will continue to see pt while here in order to address the deficits listed above and provide interventions consistent w/ POC in effort to achieve STGs  Pt declining stair trial, reports she does not want a RW upon return home    Barriers to Discharge: Inaccessible home environment     Recommendation: Home PT, Home with family support (pending progress)     PT - OK to Discharge: Yes (when medically cleared)    See flowsheet documentation for full assessment

## 2018-10-09 NOTE — ASSESSMENT & PLAN NOTE
Intermittently quite elevated, which she states is "normal" - recommend reassess outside of hospital and adjust medications at follow up PCP appointment

## 2018-10-09 NOTE — SOCIAL WORK
Pt needs nebulizer  Dme RX sent to [facility]  dme covered and they will deliver to pt room today  Pt agreeable  IMM provided   MD appt scheduled and details placed in AVS

## 2018-10-09 NOTE — PHYSICAL THERAPY NOTE
Physical Therapy Treatment Note       10/09/18 0923   Pain Assessment   Pain Assessment No/denies pain   Pain Score No Pain   Restrictions/Precautions   Weight Bearing Precautions Per Order No   Braces or Orthoses (none per pt)   Other Precautions Chair Alarm; Fall Risk   General   Chart Reviewed Yes   Response to Previous Treatment Patient with no complaints from previous session  Family/Caregiver Present No   Cognition   Overall Cognitive Status WFL   Arousal/Participation Alert; Cooperative   Attention Within functional limits   Orientation Level Oriented X4   Memory Within functional limits   Following Commands Follows all commands and directions without difficulty   Comments pt agreeable to PT session, motivated to participate   Subjective   Subjective "I would love to walk" "I'm going home later today"   Bed Mobility   Additional Comments pt received seated OOB in recliner upon arrival    Transfers   Sit to Stand 5  Supervision   Additional items Assist x 1; Increased time required;Verbal cues   Stand to Sit 5  Supervision   Additional items Assist x 1; Increased time required;Verbal cues   Additional Comments pt reports she has been ambulatory to/from BR since admission   Ambulation/Elevation   Gait pattern Decreased foot clearance; Short stride; Step to  (improved ryan)   Gait Assistance 5  Supervision   Additional items Assist x 1;Verbal cues  (for RW management)   Assistive Device Rolling walker   Distance 75' x2   Balance   Static Sitting Good   Dynamic Sitting Fair +   Static Standing Fair   Dynamic Standing Fair   Ambulatory Fair   Endurance Deficit   Endurance Deficit Yes   Activity Tolerance   Activity Tolerance Patient tolerated treatment well   Medical Staff Made Aware spoke to [address] regarding PT recs   Nurse Jazlyn 7 verbalized pt appropriate to see, made aware of session/ outcome recs   Assessment   Prognosis Good   Problem List Decreased strength;Decreased endurance; Impaired balance;Decreased mobility   Assessment Pt seen for PT treatment session this date with interventions consisting of gait training w/ emphasis on improving pt's ability to ambulate level surfaces x 75' x2 with SBA provided by therapist with RW  Pt agreeable to PT treatment session upon arrival, pt found seated OOB in recliner, in no apparent distress, A&O x 4, responsive and motivated to participate  VSS per RN Jina, cleared pt for mobility  In comparison to previous session, pt with improvements in level surface gait training tolerance, vc for RW management < 10% of the time  Post session: pt returned back to recliner, chair alarm engaged, all needs in reach and RN notified of session findings/recommendations  Continue to recommend Home PT with family support at time of d/c in order to maximize pt's functional independence and safety w/ mobility  Pt continues to be functioning below baseline level, and remains limited 2* factors listed above  PT will continue to see pt while here in order to address the deficits listed above and provide interventions consistent w/ POC in effort to achieve STGs  Pt declining stair trial, reports she does not want a RW upon return home  Barriers to Discharge Inaccessible home environment   Goals   Patient Goals to return home   STG Expiration Date 10/18/18   Treatment Day 2   Plan   Treatment/Interventions Functional transfer training;LE strengthening/ROM; Therapeutic exercise; Endurance training;Patient/family training;Equipment eval/education; Bed mobility;Gait training;Spoke to nursing;Elevations; Spoke to case management; Family   Progress Progressing toward goals   PT Frequency (3-5x/wk)   Recommendation   Recommendation Home PT; Home with family support  (pending progress)   Equipment Recommended Walker  (continue trials with RW)   PT - OK to Discharge Yes  (when medically cleared)   Additional Comments pt refusing stair trial at this time       Noé Ponce, PT, DPT    Time of PT treatment session: [phone]

## 2018-10-09 NOTE — PLAN OF CARE
CARDIOVASCULAR - ADULT     Maintains optimal cardiac output and hemodynamic stability Progressing     Absence of cardiac dysrhythmias or at baseline rhythm Progressing        DISCHARGE PLANNING     Discharge to home or other facility with appropriate resources Progressing        DISCHARGE PLANNING - CARE MANAGEMENT     Discharge to post-acute care or home with appropriate resources Progressing        HEMATOLOGIC - ADULT     Maintains hematologic stability Progressing        Knowledge Deficit     Patient/family/caregiver demonstrates understanding of disease process, treatment plan, medications, and discharge instructions Progressing        METABOLIC, FLUID AND ELECTROLYTES - ADULT     Electrolytes maintained within normal limits Progressing        MUSCULOSKELETAL - ADULT     Maintain or return mobility to safest level of function Progressing     Maintain proper alignment of affected body part Progressing        PAIN - ADULT     Verbalizes/displays adequate comfort level or baseline comfort level Progressing        Potential for Falls     Patient will remain free of falls Progressing        Prexisting or High Potential for Compromised Skin Integrity     Skin integrity is maintained or improved Progressing        RESPIRATORY - ADULT     Achieves optimal ventilation and oxygenation Progressing        SAFETY ADULT     Patient will remain free of falls Progressing

## 2018-10-11 ENCOUNTER — OFFICE VISIT (OUTPATIENT)
Dept: FAMILY MEDICINE CLINIC | Facility: MEDICAL CENTER | Age: 78
End: 2018-10-11
Payer: MEDICARE

## 2018-10-11 VITALS
SYSTOLIC BLOOD PRESSURE: 164 MMHG | HEART RATE: 76 BPM | WEIGHT: 220.2 LBS | BODY MASS INDEX: 37.8 KG/M2 | RESPIRATION RATE: 16 BRPM | DIASTOLIC BLOOD PRESSURE: 84 MMHG

## 2018-10-11 DIAGNOSIS — J45.901 ASTHMATIC BRONCHITIS WITH ACUTE EXACERBATION, UNSPECIFIED ASTHMA SEVERITY, UNSPECIFIED WHETHER PERSISTENT: ICD-10-CM

## 2018-10-11 DIAGNOSIS — R05.9 COUGH: Primary | ICD-10-CM

## 2018-10-11 DIAGNOSIS — R11.2 INTRACTABLE VOMITING WITH NAUSEA, UNSPECIFIED VOMITING TYPE: ICD-10-CM

## 2018-10-11 DIAGNOSIS — I10 ESSENTIAL HYPERTENSION: ICD-10-CM

## 2018-10-11 LAB
BACTERIA BLD CULT: NORMAL
BACTERIA BLD CULT: NORMAL

## 2018-10-11 PROCEDURE — 99496 TRANSJ CARE MGMT HIGH F2F 7D: CPT | Performed by: FAMILY MEDICINE

## 2018-10-11 NOTE — PROGRESS NOTES
[place] is here for WAY[place]   F/u hospitalization  She was hospitalized for vomiting  Thought to have reaction to Zithromax which was started at her visit here 10/4 for bronchitis  She also developed vertigo which she has had before was much more severe  She did well in the hospital with discontinuation of the Zithromax, IV hydration, and continued prednisone  She says her cough is much better  She is using her nebulizer which she thinks is better than the inhaler  Appetite  Is better  No shortness of breath  No further vertigo    O: /84 (Cuff Size: Large)   Pulse 76   Resp 16   Wt 99 9 kg (220 lb 3 2 oz)   BMI 37 80 kg/m²   BP by me 160/80  Looks well  No respiratory distress  Neck-no adenopathy  Chest is clear with good breath sounds  Slight cough  Cardiac regular rate rhythm without murmur    Assessment  1  Intractable nausea and vomiting with vertigo -likely secondary to Zithromax reaction  2  Bronchitis-improved  3  Elevated blood pressure-may be secondary to prednisone  She will have visiting nurses take again tomorrow if still elevated she is going to revisit for nurse visit to reassess      Plan  AS above

## 2018-12-11 DIAGNOSIS — I10 ESSENTIAL HYPERTENSION: ICD-10-CM

## 2018-12-11 RX ORDER — METOPROLOL SUCCINATE 50 MG/1
TABLET, EXTENDED RELEASE ORAL
Qty: 90 TABLET | Refills: 0 | Status: SHIPPED | OUTPATIENT
Start: 2018-12-11 | End: 2019-03-11 | Stop reason: SDUPTHER

## 2018-12-20 ENCOUNTER — TELEPHONE (OUTPATIENT)
Dept: FAMILY MEDICINE CLINIC | Facility: MEDICAL CENTER | Age: 78
End: 2018-12-20

## 2018-12-20 NOTE — TELEPHONE ENCOUNTER
Pt aware and does not want to lab testing for blood type-states it is not an urgent issue she was just curious

## 2019-01-07 ENCOUNTER — HOSPITAL ENCOUNTER (OUTPATIENT)
Dept: MAMMOGRAPHY | Facility: CLINIC | Age: 79
Discharge: HOME | End: 2019-01-07
Payer: MEDICARE

## 2019-01-07 VITALS — HEIGHT: 64 IN | BODY MASS INDEX: 36.7 KG/M2 | WEIGHT: 215 LBS

## 2019-01-07 DIAGNOSIS — E78.5 HYPERLIPIDEMIA, UNSPECIFIED HYPERLIPIDEMIA TYPE: ICD-10-CM

## 2019-01-07 DIAGNOSIS — E66.3 OVERWEIGHT: ICD-10-CM

## 2019-01-07 DIAGNOSIS — Z12.39 SCREENING FOR BREAST CANCER: ICD-10-CM

## 2019-01-07 DIAGNOSIS — I10 ESSENTIAL HYPERTENSION: ICD-10-CM

## 2019-01-07 DIAGNOSIS — M25.562 ACUTE PAIN OF LEFT KNEE: ICD-10-CM

## 2019-01-07 PROCEDURE — 77067 SCR MAMMO BI INCL CAD: CPT

## 2019-01-10 ENCOUNTER — TELEPHONE (OUTPATIENT)
Dept: FAMILY MEDICINE CLINIC | Facility: MEDICAL CENTER | Age: 79
End: 2019-01-10

## 2019-01-10 NOTE — TELEPHONE ENCOUNTER
----- Message from Nahun Zayas MD sent at 1/10/2019  9:38 AM EST -----  Notify mammogram was normal   Repeat 1 year  How is  she doing?  ( had passed away a few months ago)

## 2019-01-10 NOTE — TELEPHONE ENCOUNTER
Aware, she says she is doing OK  House is very quiet without Julio C, but she is adapting to the change and thanks you for your concern

## 2019-03-11 DIAGNOSIS — I10 ESSENTIAL HYPERTENSION: ICD-10-CM

## 2019-03-13 RX ORDER — METOPROLOL SUCCINATE 50 MG/1
TABLET, EXTENDED RELEASE ORAL
Qty: 90 TABLET | Refills: 0 | Status: SHIPPED | OUTPATIENT
Start: 2019-03-13 | End: 2019-06-09 | Stop reason: SDUPTHER

## 2019-03-14 ENCOUNTER — OFFICE VISIT (OUTPATIENT)
Dept: FAMILY MEDICINE CLINIC | Facility: MEDICAL CENTER | Age: 79
End: 2019-03-14
Payer: MEDICARE

## 2019-03-14 VITALS
HEART RATE: 70 BPM | RESPIRATION RATE: 16 BRPM | WEIGHT: 221.13 LBS | SYSTOLIC BLOOD PRESSURE: 134 MMHG | BODY MASS INDEX: 37.75 KG/M2 | HEIGHT: 64 IN | DIASTOLIC BLOOD PRESSURE: 82 MMHG

## 2019-03-14 DIAGNOSIS — Z12.11 SCREENING FOR COLON CANCER: Primary | ICD-10-CM

## 2019-03-14 DIAGNOSIS — E78.5 HYPERLIPIDEMIA, UNSPECIFIED HYPERLIPIDEMIA TYPE: ICD-10-CM

## 2019-03-14 DIAGNOSIS — I10 ESSENTIAL HYPERTENSION: ICD-10-CM

## 2019-03-14 DIAGNOSIS — E66.9 CLASS 2 OBESITY WITHOUT SERIOUS COMORBIDITY WITH BODY MASS INDEX (BMI) OF 35.0 TO 35.9 IN ADULT, UNSPECIFIED OBESITY TYPE: ICD-10-CM

## 2019-03-14 PROCEDURE — 99214 OFFICE O/P EST MOD 30 MIN: CPT | Performed by: FAMILY MEDICINE

## 2019-03-14 NOTE — PROGRESS NOTES
[place] is her for 6 month f/u  She has  been doing ok since her  passed away suddenly  She is still trying  to attend yoga and socialize with friends as well as  at [employer]  She says she has no physical complaints  Still struggles with her weight  Gets a massage   Takes Tylenol and Alleve occ  O: /82 (BP Location: Left arm, Patient Position: Sitting, Cuff Size: Large)   Pulse 70   Resp 16   Ht 5' 4" (1 626 m)   Wt 100 kg (221 lb 2 oz)   BMI 37 96 kg/m²   Physical Exam   Constitutional: She appears well-developed and well-nourished  No distress  Neck: Carotid bruit is not present  No thyromegaly present  Cardiovascular: Normal rate, regular rhythm, normal heart sounds and intact distal pulses  Pulmonary/Chest: Effort normal and breath sounds normal    Abdominal: Soft  Bowel sounds are normal  She exhibits no mass  There is no hepatomegaly  There is no tenderness  Musculoskeletal: She exhibits no edema  Lymphadenopathy:     She has no cervical adenopathy  BW 1/25/19   CBC CMP TSH lipid profile  Hemoglobin and hematocrit 16 3/47  3   cholesterol 218    Assessment  1  Hypertension-controlled  2  Hyperlipidemia-cholesterol is higher than it was  She is going to watch diet lose weight  3  Elevated hemoglobin hematocrit-relatively new  Will repeat  4  Recent -discussed  5  Health maintenance-immunizations up-to-date including Zostavax in 2017  DiscussedShingrix in the future  Due for colonoscopy  She has seen Dr Pfeiffer  Plan  Repeat CBC  Otherwise blood work and recheck in 6 months

## 2019-03-15 ENCOUNTER — TELEPHONE (OUTPATIENT)
Dept: FAMILY MEDICINE CLINIC | Facility: MEDICAL CENTER | Age: 79
End: 2019-03-15

## 2019-03-15 NOTE — TELEPHONE ENCOUNTER
Patient aware, she said she does not take any iron supplements, do you want her to start? Also, she has never had a b12 deficiency but took it because she read it can help with energy  Will stop taking it

## 2019-03-15 NOTE — TELEPHONE ENCOUNTER
----- Message from Whitley Harris MD sent at 3/14/2019 11:18 PM EDT -----  Forgot to tell patient at her visit today that her hemoglobin was higher than in the past     Questioning whether she is taking any additional iron  Also  I see that she is taking B12    Unless she is taking this for a known B12 deficiency anemia I would stop it and repeat the CBC in about 6 weeks

## 2019-03-18 DIAGNOSIS — D58.2 ELEVATED HEMOGLOBIN (HCC): Primary | ICD-10-CM

## 2019-03-18 NOTE — TELEPHONE ENCOUNTER
No   She does not need to take any iron supplements  Her hemoglobin is is high ---the opposite of anemia--and just want her to stop the B12  Repeat CBC as above

## 2019-03-22 ENCOUNTER — TELEPHONE (OUTPATIENT)
Dept: FAMILY MEDICINE CLINIC | Facility: MEDICAL CENTER | Age: 79
End: 2019-03-22

## 2019-03-22 NOTE — TELEPHONE ENCOUNTER
Patient called the office to let Dr Solomon Saldana aware she called Dr Solis Sullivan office patient completed a colonoscopy 12/2010 and is not due till 12/2020  FYI  To Maria Elena Maurer and Mariia Laurent

## 2019-04-04 ENCOUNTER — OFFICE VISIT (OUTPATIENT)
Dept: DERMATOLOGY | Facility: CLINIC | Age: 79
End: 2019-04-04
Payer: MEDICARE

## 2019-04-04 DIAGNOSIS — L57.0 ACTINIC KERATOSIS: Primary | ICD-10-CM

## 2019-04-04 DIAGNOSIS — L82.1 SEBORRHEIC KERATOSIS: ICD-10-CM

## 2019-04-04 DIAGNOSIS — Z13.89 SCREENING FOR SKIN CONDITION: ICD-10-CM

## 2019-04-04 DIAGNOSIS — Z85.828 HISTORY OF SKIN CANCER: ICD-10-CM

## 2019-04-04 PROCEDURE — 17003 DESTRUCT PREMALG LES 2-14: CPT | Performed by: DERMATOLOGY

## 2019-04-04 PROCEDURE — 99213 OFFICE O/P EST LOW 20 MIN: CPT | Performed by: DERMATOLOGY

## 2019-04-04 PROCEDURE — 17000 DESTRUCT PREMALG LESION: CPT | Performed by: DERMATOLOGY

## 2019-04-22 ENCOUNTER — TELEPHONE (OUTPATIENT)
Dept: FAMILY MEDICINE CLINIC | Facility: MEDICAL CENTER | Age: 79
End: 2019-04-22

## 2019-04-22 DIAGNOSIS — D58.2 ELEVATED HEMOGLOBIN (HCC): Primary | ICD-10-CM

## 2019-06-09 DIAGNOSIS — I10 ESSENTIAL HYPERTENSION: ICD-10-CM

## 2019-06-09 RX ORDER — METOPROLOL SUCCINATE 50 MG/1
TABLET, EXTENDED RELEASE ORAL
Qty: 90 TABLET | Refills: 0 | Status: SHIPPED | OUTPATIENT
Start: 2019-06-09 | End: 2019-09-07 | Stop reason: SDUPTHER

## 2019-06-17 ENCOUNTER — TELEPHONE (OUTPATIENT)
Dept: FAMILY MEDICINE CLINIC | Facility: MEDICAL CENTER | Age: 79
End: 2019-06-17

## 2019-06-18 ENCOUNTER — CONSULT (OUTPATIENT)
Dept: HEMATOLOGY ONCOLOGY | Facility: CLINIC | Age: 79
End: 2019-06-18
Payer: MEDICARE

## 2019-06-18 VITALS
SYSTOLIC BLOOD PRESSURE: 160 MMHG | WEIGHT: 221 LBS | HEIGHT: 64 IN | HEART RATE: 71 BPM | TEMPERATURE: 97.9 F | RESPIRATION RATE: 16 BRPM | DIASTOLIC BLOOD PRESSURE: 102 MMHG | OXYGEN SATURATION: 98 % | BODY MASS INDEX: 37.73 KG/M2

## 2019-06-18 DIAGNOSIS — D58.2 ELEVATED HEMOGLOBIN (HCC): ICD-10-CM

## 2019-06-18 DIAGNOSIS — D47.1 MYELOPROLIFERATIVE DISORDER (HCC): Primary | ICD-10-CM

## 2019-06-18 PROCEDURE — 99205 OFFICE O/P NEW HI 60 MIN: CPT | Performed by: INTERNAL MEDICINE

## 2019-07-12 ENCOUNTER — TELEPHONE (OUTPATIENT)
Dept: HEMATOLOGY ONCOLOGY | Facility: CLINIC | Age: 79
End: 2019-07-12

## 2019-07-12 NOTE — TELEPHONE ENCOUNTER
[facility] and [facility] for pt to be seen on 8/6 at 1230 with Dr Kianna Cortes

## 2019-07-12 NOTE — TELEPHONE ENCOUNTER
Patient called to rescheduled her 8/16 appt with Jonny Leon due to her being away  Being that [place] is no longer Dr Karen Natarajan PA I am unsure if she can be scheduled with Moriah Lopez being that Dr Karen Natarajan has nothing available  She stated that 8/6 at [facility] would be perfect   Best call back [phone]

## 2019-07-22 ENCOUNTER — OFFICE VISIT (OUTPATIENT)
Dept: DERMATOLOGY | Facility: CLINIC | Age: 79
End: 2019-07-22
Payer: MEDICARE

## 2019-07-22 DIAGNOSIS — B35.3 TINEA PEDIS OF BOTH FEET: ICD-10-CM

## 2019-07-22 DIAGNOSIS — L98.9 UNKNOWN SKIN LESION: Primary | ICD-10-CM

## 2019-07-22 PROCEDURE — 88305 TISSUE EXAM BY PATHOLOGIST: CPT | Performed by: PATHOLOGY

## 2019-07-22 PROCEDURE — 1124F ACP DISCUSS-NO DSCNMKR DOCD: CPT | Performed by: DERMATOLOGY

## 2019-07-22 PROCEDURE — 11102 TANGNTL BX SKIN SINGLE LES: CPT | Performed by: DERMATOLOGY

## 2019-07-22 PROCEDURE — 99213 OFFICE O/P EST LOW 20 MIN: CPT | Performed by: DERMATOLOGY

## 2019-07-22 NOTE — PATIENT INSTRUCTIONS
Skin lesion probable squamous cell carcinoma will plan on further treatment pending results may be amenable to curettage  Tinea pedis advised patient that terbinafine/Lamisil cream over-the-counter is a much better topical antifungal than ketoconazole would suggest use of this twice daily for 3 weeks it should resolve if not to let us know

## 2019-07-22 NOTE — PROGRESS NOTES
[address]  [address] [facility] [address]  [phone]  [phone]     MRN: [id number] : 1940  Encounter: [id number]  Patient Information: Padmaja Menard    Subjective:     59-year-old female who made an appointment for concerns regarding a rash on her lower legs that has subsequently resolved however she is also concerned regarding a rash on her feet for which she is been putting ketoconazole cream on  In addition she notes a new growth on her right clavicle     Objective: There were no vitals taken for this visit  Physical Exam:    General Appearance:    Alert, cooperative, no distress   Skin:   Scaling arciform area noted on the feet with pustules KOH prep was performed positive dome-shaped 5 mm keratotic papule noted on the right clavicle nothing else remarkable      Shave Biopsy Procedure Note    Pre-operative Diagnosis:  Keratoacanthoma/squamous cell carcinoma    Plan:  1  Instructed to keep the wound dry and covered for 24 and clean thereafter  2  Warning signs of infection were reviewed  3  Recommended that the patient use OTC acetaminophen as needed for pain  4  Return  Pending results of biopsy(ies)    Locations:  Right clavicle    Indications:  Suspicious lesion    Anesthesia: Lidocaine 1% with epinephrine without added sodium bicarbonate    Procedure Details     Patient informed of the risks (including bleeding and infection) and benefits of the   procedure and Verbal informed consent obtained  The lesion and surrounding area were given a sterile prep using alcohol and draped in the usual sterile fashion  A Blue blade razor was used to obtain a specimen  Hemostasis achieved with aluminum chloride  Petrolatum and a sterile dressing applied  The specimen was sent for pathologic examination  The patient tolerated the procedure(s) well  Complications:  none  Assessment:     1  Unknown skin lesion     2   Tinea pedis of both feet           Plan:   Skin lesion probable squamous cell carcinoma will plan on further treatment pending results may be amenable to curettage  Tinea pedis advised patient that terbinafine/Lamisil cream over-the-counter is a much better topical antifungal than ketoconazole would suggest use of this twice daily for 3 weeks it should resolve if not to let us know      Prior to Admission medications    Medication Sig Start Date End Date Taking? Authorizing Provider   Calcium-Magnesium-Vitamin D (CALCIUM 500 PO) Take by mouth   Yes Historical Provider, MD   Cholecalciferol (EQL VITAMIN D3) 1000 units capsule Take by mouth daily     Yes Historical Provider, MD   Glucos-Chondroit-Hyaluron-MSM (GLUCOSAMINE CHONDROITIN JOINT) TABS Take by mouth daily     Yes Historical Provider, MD   ketoconazole (NIZORAL) 2 % cream Apply topically daily 8/17/18  Yes Tim Rodriguez MD   metoprolol succinate (TOPROL-XL) 50 mg 24 hr tablet TAKE 1 TABLET DAILY 6/9/19  Yes Tim Rodriguez MD   Multiple Vitamins-Minerals (OCUVITE PO) Take by mouth daily   Yes Historical Provider, MD   Omega-3 Fatty Acids (FISH OIL) 1000 MG CPDR Take by mouth   Yes Historical Provider, MD   cyanocobalamin (CVS VITAMIN B-12) 1000 MCG tablet Take by mouth daily      Historical Provider, MD     Allergies   Allergen Reactions    Azithromycin Dizziness    Morphine Vomiting       Izzy Zurita MD  7/22/2019,10:15 AM    Portions of the record may have been created with voice recognition software   Occasional wrong word or "sound a like" substitutions may have occurred due to the inherent limitations of voice recognition software   Read the chart carefully and recognize, using context, where substitutions have occurred

## 2019-07-23 ENCOUNTER — TELEPHONE (OUTPATIENT)
Dept: HEMATOLOGY ONCOLOGY | Facility: CLINIC | Age: 79
End: 2019-07-23

## 2019-07-23 ENCOUNTER — TELEPHONE (OUTPATIENT)
Dept: FAMILY MEDICINE CLINIC | Facility: MEDICAL CENTER | Age: 79
End: 2019-07-23

## 2019-07-23 NOTE — TELEPHONE ENCOUNTER
Lizzy callded from [facility] lab and stated doctor ordered Rah #2 but was not specific on which mutation the doctor wanted

## 2019-07-23 NOTE — TELEPHONE ENCOUNTER
Patient called Medicare and was advised the Hemoglobin A1C Lab order is not covered  Medicare stated it could be coded incorrectly  Patient is requesting if this can be fixed and she will be picking up order

## 2019-07-23 NOTE — TELEPHONE ENCOUNTER
emmett-  S/p patient  Aware this may not be covered with the dx she has   She is going to hold off having it drawn, has an appt in August , will discuss it then with Dr Linwood Xiao

## 2019-07-24 ENCOUNTER — TELEPHONE (OUTPATIENT)
Dept: HEMATOLOGY ONCOLOGY | Facility: CLINIC | Age: 79
End: 2019-07-24

## 2019-07-24 NOTE — TELEPHONE ENCOUNTER
Called stating she needs to know what the doctor is looking for  He order a LEO-2  Her question is which one does he want  At her location they do the Adin-2 [id number]  Please contact Nancy Choi and advise

## 2019-07-24 NOTE — TELEPHONE ENCOUNTER
Call transferred from [address] [place] [address] Liza Ball from [facility] 41 molecular department called to see what LEO 2 lab Dr Shy Del Real would like to be done  Call warm transferred to [facility] at Dr Jimenez Nora office for further clarification

## 2019-07-31 ENCOUNTER — TELEPHONE (OUTPATIENT)
Dept: DERMATOLOGY | Facility: CLINIC | Age: 79
End: 2019-07-31

## 2019-07-31 NOTE — TELEPHONE ENCOUNTER
----- Message from Teri Velazquez MD sent at 7/31/2019  1:44 PM EDT -----  Regarding: RE: sutures  Yes    ----- Message -----  From: Alesha Rivero  Sent: 7/31/2019  10:39 AM EDT  To: Teri Velazquez MD  Subject: sutures                                          Patient is having surgery for scc on her chest  She will be away from 8/26/2019-10/01/2019  She is traveling with a nurse and wants to know if the nurse can remove the sutures and patient will follow-up with you when she returns

## 2019-08-06 ENCOUNTER — OFFICE VISIT (OUTPATIENT)
Dept: HEMATOLOGY ONCOLOGY | Facility: CLINIC | Age: 79
End: 2019-08-06
Payer: MEDICARE

## 2019-08-06 VITALS
RESPIRATION RATE: 16 BRPM | SYSTOLIC BLOOD PRESSURE: 174 MMHG | HEART RATE: 69 BPM | BODY MASS INDEX: 37.56 KG/M2 | WEIGHT: 220 LBS | TEMPERATURE: 97.8 F | OXYGEN SATURATION: 96 % | HEIGHT: 64 IN | DIASTOLIC BLOOD PRESSURE: 88 MMHG

## 2019-08-06 DIAGNOSIS — D58.2 ELEVATED HEMOGLOBIN (HCC): Primary | ICD-10-CM

## 2019-08-06 PROCEDURE — 99214 OFFICE O/P EST MOD 30 MIN: CPT | Performed by: INTERNAL MEDICINE

## 2019-08-06 NOTE — PROGRESS NOTES
Hematology/Oncology Outpatient Follow- up Note  Padmaja Menard 66 y o  female MRN: @ Encounter: [id number]        Date:  8/6/2019    Presenting Complaint/Diagnosis : Elevated hemoglobin since January 2019    HPI:    Padmaja Menard is seen for initial consultation 6/18/2019 regarding An elevated hemoglobin  In January 2019 she had blood work which revealed a hemoglobin of 16 3 with a white count of 5 8 and platelet count of 381  Hematocrit was 47 3  She had this blood work repeated in April 2019 Which revealed hemoglobin was 15 9 with a hematocrit of 47 5  White count was normal at 5 4 with a platelet count was normal at 205  Differential at that time Was also normal     Previous Hematologic/ Oncologic History:      Workup  Current Hematologic/ Oncologic Treatment:    Workup    Interval History:    The patient returns for follow-up visit  Her hemoglobin is running in the 15 range  She herself is asymptomatic  Her myeloproliferative workup was negative  The patient denies any nausea denies any vomiting denies any diarrhea  The rest of her 14 point review of systems today was negative  Test Results:    Imaging: No results found  Labs:   Lab Results   Component Value Date    WBC 8 21 10/07/2018    HGB 14 0 10/07/2018    HCT 41 8 10/07/2018    MCV 94 10/07/2018     10/07/2018     Lab Results   Component Value Date    K 3 6 10/07/2018     (H) 10/07/2018    CO2 28 10/07/2018    BUN 9 10/07/2018    CREATININE 0 88 10/07/2018    GLUF 137 (H) 10/06/2018    CALCIUM 8 0 (L) 10/07/2018    AST 21 10/07/2018    ALT 37 10/07/2018    ALKPHOS 52 10/07/2018    EGFR 64 10/07/2018         ROS: As stated in the history of present illness otherwise his 14 point review of systems today was negative        Active Problems:   Patient Active Problem List   Diagnosis    Hyperlipidemia    Obesity    Essential hypertension    Seborrheic keratosis    History of skin cancer    Screening for skin condition    Bronchitis       Past Medical History:   Past Medical History:   Diagnosis Date    H/O nonmelanoma skin cancer     last assessed 17    Hypertension        Surgical History:   Past Surgical History:   Procedure Laterality Date    APPENDECTOMY  2010    BLADDER SURGERY  1997    BREAST BIOPSY Left     1995    CERVIX SURGERY      dilation and curettage of cervical stump , , Naveed Sherwood [address]      partial - last assessed 10/28/14    COLONOSCOPY      last assessed 17    HERNIA REPAIR  2011    TONSILLECTOMY  1971       Family History:    Family History   Problem Relation Age of Onset    Arthritis Mother     Osteoporosis Mother     Stroke Mother     Heart disease Father     Osteoporosis Father     Breast cancer Family     Osteoporosis Family     Breast cancer Maternal Grandfather 28       Cancer-related family history includes Breast cancer in her family; Breast cancer (age of onset: 28) in her maternal grandfather      Social History:   Social History     Socioeconomic History    Marital status: /Civil Union     Spouse name: Not on file    Number of children: Not on file    Years of education: Not on file    Highest education level: Not on file   Occupational History    Not on file   Social Needs    Financial resource strain: Not on file    Food insecurity:     Worry: Not on file     Inability: Not on file    Transportation needs:     Medical: Not on file     Non-medical: Not on file   Tobacco Use    Smoking status: Former Smoker     Last attempt to quit:      Years since quittin 6    Smokeless tobacco: Never Used   Substance and Sexual Activity    Alcohol use: Yes     Comment: occasionally wine    Drug use: No    Sexual activity: Not on file   Lifestyle    Physical activity:     Days per week: Not on file     Minutes per session: Not on file    Stress: Not on file   Relationships    Social connections:     Talks on phone: Not on file     Gets together: Not on file     Attends Denominational service: Not on file     Active member of club or organization: Not on file     Attends meetings of clubs or organizations: Not on file     Relationship status: Not on file    Intimate partner violence:     Fear of current or ex partner: Not on file     Emotionally abused: Not on file     Physically abused: Not on file     Forced sexual activity: Not on file   Other Topics Concern    Not on file   Social History Narrative    Caffeine use       Current Medications:   Current Outpatient Medications   Medication Sig Dispense Refill    Calcium-Magnesium-Vitamin D (CALCIUM 500 PO) Take by mouth      Cholecalciferol (EQL VITAMIN D3) 1000 units capsule Take by mouth daily        cyanocobalamin (CVS VITAMIN B-12) 1000 MCG tablet Take by mouth daily        Glucos-Chondroit-Hyaluron-MSM (GLUCOSAMINE CHONDROITIN JOINT) TABS Take by mouth daily        ketoconazole (NIZORAL) 2 % cream Apply topically daily 30 g 0    metoprolol succinate (TOPROL-XL) 50 mg 24 hr tablet TAKE 1 TABLET DAILY 90 tablet 0    Multiple Vitamins-Minerals (OCUVITE PO) Take by mouth daily      Omega-3 Fatty Acids (FISH OIL) 1000 MG CPDR Take by mouth       No current facility-administered medications for this visit  Allergies: Allergies   Allergen Reactions    Azithromycin Dizziness    Morphine Vomiting       Physical Exam:    Body surface area is 2 04 meters squared      Wt Readings from Last 3 Encounters:   08/06/19 99 8 kg (220 lb)   06/18/19 100 kg (221 lb)   03/14/19 100 kg (221 lb 2 oz)        Temp Readings from Last 3 Encounters:   08/06/19 97 8 °F (36 6 °C) (Oral)   06/18/19 97 9 °F (36 6 °C) (Oral)   10/09/18 98 1 °F (36 7 °C) (Oral)        BP Readings from Last 3 Encounters:   08/06/19 (!) 174/88   06/18/19 (!) 160/102   03/14/19 134/82         Pulse Readings from Last 3 Encounters:   08/06/19 69   06/18/19 71   03/14/19 70        Physical Exam     Constitutional General appearance: No acute distress, well appearing and well nourished  Eyes   Conjunctiva and lids: No swelling, erythema or discharge  Pupils and irises: Equal, round and reactive to light  Ears, Nose, Mouth, and Throat   External inspection of ears and nose: Normal     Nasal mucosa, septum, and turbinates: Normal without edema or erythema  Oropharynx: Normal with no erythema, edema, exudate or lesions  Pulmonary   Respiratory effort: No increased work of breathing or signs of respiratory distress  Auscultation of lungs: Clear to auscultation  Cardiovascular   Palpation of heart: Normal PMI, no thrills  Auscultation of heart: Normal rate and rhythm, normal S1 and S2, without murmurs  Examination of extremities for edema and/or varicosities: Normal     Carotid pulses: Normal     Abdomen   Abdomen: Non-tender, no masses  Liver and spleen: No hepatomegaly or splenomegaly  Lymphatic   Palpation of lymph nodes in neck: No lymphadenopathy  Musculoskeletal   Gait and station: Normal     Digits and nails: Normal without clubbing or cyanosis  Inspection/palpation of joints, bones, and muscles: Normal     Skin   Skin and subcutaneous tissue: Normal without rashes or lesions  Neurologic   Cranial nerves: Cranial nerves 2-12 intact  Sensation: No sensory loss  Psychiatric   Orientation to person, place, and time: Normal     Mood and affect: Normal         Assessment / Plan: This is a pleasant 51-year-old female with a remote history of smoking which she quit more than 2-3 decades ago who was referred to see us for a slightly elevated hemoglobin  I doubt this represents a bone marrow disorder And suspect this is more secondary benign polycythemia but we did the testing for myeloproliferative disorder to confirm this  Testing actually revealed no evidence of myeloproliferative disorder  Hemoglobin is in the 15 range  We will continue her on observation   I advised her to donate blood every few months as this should keep her hemoglobin lower  The patient agrees  I'll see her back in 4 months  I advised her to Continue to take a baby aspirin daily  Goals and Barriers:  Current Goal:  Prolong Survival from Elevated hemoglobin  Barriers: None  Patient's Capacity to Self Care:  Patient  able to self care  Portions of the record may have been created with voice recognition software   Occasional wrong word or "sound a like" substitutions may have occurred due to the inherent limitations of voice recognition software   Read the chart carefully and recognize, using context, where substitutions have occurred

## 2019-08-19 ENCOUNTER — APPOINTMENT (OUTPATIENT)
Dept: RADIOLOGY | Facility: MEDICAL CENTER | Age: 79
End: 2019-08-19
Payer: MEDICARE

## 2019-08-19 ENCOUNTER — OFFICE VISIT (OUTPATIENT)
Dept: FAMILY MEDICINE CLINIC | Facility: MEDICAL CENTER | Age: 79
End: 2019-08-19
Payer: MEDICARE

## 2019-08-19 VITALS
HEART RATE: 70 BPM | SYSTOLIC BLOOD PRESSURE: 140 MMHG | DIASTOLIC BLOOD PRESSURE: 86 MMHG | HEIGHT: 64 IN | BODY MASS INDEX: 37.94 KG/M2 | WEIGHT: 222.25 LBS | RESPIRATION RATE: 16 BRPM

## 2019-08-19 DIAGNOSIS — Z13.1 SCREENING FOR DIABETES MELLITUS: Primary | ICD-10-CM

## 2019-08-19 DIAGNOSIS — M79.672 PAIN OF LEFT HEEL: ICD-10-CM

## 2019-08-19 DIAGNOSIS — I10 ESSENTIAL HYPERTENSION: ICD-10-CM

## 2019-08-19 DIAGNOSIS — E78.5 HYPERLIPIDEMIA, UNSPECIFIED HYPERLIPIDEMIA TYPE: ICD-10-CM

## 2019-08-19 DIAGNOSIS — M72.2 PLANTAR FASCIITIS OF LEFT FOOT: ICD-10-CM

## 2019-08-19 DIAGNOSIS — E66.9 CLASS 2 OBESITY WITHOUT SERIOUS COMORBIDITY WITH BODY MASS INDEX (BMI) OF 35.0 TO 35.9 IN ADULT, UNSPECIFIED OBESITY TYPE: ICD-10-CM

## 2019-08-19 LAB — SL AMB POCT HEMOGLOBIN AIC: 5.2 (ref ?–6.5)

## 2019-08-19 PROCEDURE — 83036 HEMOGLOBIN GLYCOSYLATED A1C: CPT | Performed by: FAMILY MEDICINE

## 2019-08-19 PROCEDURE — 73630 X-RAY EXAM OF FOOT: CPT

## 2019-08-19 PROCEDURE — 73610 X-RAY EXAM OF ANKLE: CPT

## 2019-08-19 PROCEDURE — 99214 OFFICE O/P EST MOD 30 MIN: CPT | Performed by: FAMILY MEDICINE

## 2019-08-19 NOTE — PROGRESS NOTES
[place] is here for followup  She says she saw dermatologist Dr Apple Guard  She complains of pain in her left heel for the past several weeks  She has also had some swelling in her left ankle  Using calendula cream   She says her pain was aggravated by standing  Wears sandals and barefoot at home  Wears flips flops and water shoes  Did not take anything for it  Does not wake from sleep  She had a keratoacanthoma on her right chest  Bx shows SCC  Goes for further excision tomorrow  She saw Hematology for her polycythemia  No treatment is recommended except continued follow-up in 4 months and baby aspirin  Felt to be benign polycythemia  Does chair yoga and water aerobics 4 days a week  She is very excited about her upcoming trip to the [facility]    O: /86   Pulse 70   Resp 16   Ht 5' 3 5" (1 613 m)   Wt 101 kg (222 lb 4 oz)   BMI 38 75 kg/m²   Neck no adenopathy thyromegaly bruits  Chest clear with good breath sounds  Cardiac regular rate without murmur  Extremities left foot has slight erythema and scaling plantar surface and lateral surface  Slight tenderness is noted over the left heel and just below the left lateral malleolus  Full range of motion  No swelling  Under A1c office today 5 2  Lipid profile 7/23  cholesterol 196    Assessment  1  Hypertension-controlled with Toprol  2  Benign polycythemia-follow up with Hematology  3  Squamous cell skin cancer of the chest-for excision with Dermatology tomorrow  4  Tinea pedis-left foot-seems to be resolving  5  Left heel pain-suspect plantar fasciitis  Discussed icing, water bottle, for sizes  Discussed appropriate shoes  Do not go barefoot  Heel lift  Refer Podiatry if no better  Plan  As above  Recheck 6 months

## 2019-08-20 ENCOUNTER — PROCEDURE VISIT (OUTPATIENT)
Dept: DERMATOLOGY | Facility: CLINIC | Age: 79
End: 2019-08-20
Payer: MEDICARE

## 2019-08-20 DIAGNOSIS — L98.9 UNKNOWN SKIN LESION: ICD-10-CM

## 2019-08-20 DIAGNOSIS — C44.529 SQUAMOUS CELL SKIN CANCER, CLAVICLE: Primary | ICD-10-CM

## 2019-08-20 PROCEDURE — 88305 TISSUE EXAM BY PATHOLOGIST: CPT | Performed by: PATHOLOGY

## 2019-08-20 PROCEDURE — 11102 TANGNTL BX SKIN SINGLE LES: CPT | Performed by: DERMATOLOGY

## 2019-08-20 PROCEDURE — 17260 DSTRJ MAL LES T/A/L 0.5 CM/<: CPT | Performed by: DERMATOLOGY

## 2019-08-20 NOTE — PROGRESS NOTES
[facility]  [address] [place] [address] [id number] [phone]     MRN: [id number] : 1940  Encounter: [id number]  Patient Information: Padmaja CALDERON Sailaja    Subjective:     27-year-old female presents for follow-up for previously biopsied keratoacanthoma/squamous cell carcinoma right clavicle  Since biopsy was obtained lesion has not really appear to regrow also complaining of a lesion on her right wrist     Objective: There were no vitals taken for this visit  Physical Exam:    General Appearance:    Alert, cooperative, no distress   Skin:   Previous site of squamous cell carcinoma without induration without any sign of any growing lesion 4 mm keratotic area noted on the right wrist    Procedure: Curettage & Electrodessication of squamous cell carcinoma/keratoacanthoma  The reasons for the procedure were explained to the patient  The benefits and risks of the procedure were explained to the patient, including bleeding, infection, incomplete removal, prolonged anesthesia (weeks to months) and rarely nerve damage  The patient is aware that a scar will result from the procedure  The consent for the procedure was obtained verbally and in writing  Lesion Site:  Right clavicle Curetted Area Size (mm): 4  Using a sterile curette, the appropriate area was curetted  The area was curetted and electrodesiccated x3  Final defect size: 5mm    The wound was left to heal by secondary intention  The wound was cleansed then covered with a dressing  Wound care instructions were verbally given and in writing  I performed the entire procedure  Patient tolerated procedure well  Shave Biopsy Procedure Note    Pre-operative Diagnosis:  Squamous cell carcinoma    Plan:  1  Instructed to keep the wound dry and covered for 24 and clean thereafter  2  Warning signs of infection were reviewed      3  Recommended that the patient use OTC acetaminophen as needed for pain  4  Return  Pending results of biopsy(ies)    Locations:  Right wrist    Indications:  Irritated growth    Anesthesia: Lidocaine 1% with epinephrine without added sodium bicarbonate    Procedure Details     Patient informed of the risks (including bleeding and infection) and benefits of the   procedure and Verbal informed consent obtained  The lesion and surrounding area were given a sterile prep using alcohol and draped in the usual sterile fashion  A Blue blade razor was used to obtain a specimen  Hemostasis achieved with aluminum chloride  Petrolatum and a sterile dressing applied  The specimen was sent for pathologic examination  The patient tolerated the procedure(s) well  Complications:  none  Assessment:     1  Squamous cell skin cancer, clavicle     2  Unknown skin lesion           Plan:   Squamous cell carcinoma right clavicle since the area appears not to be growing back result just go ahead and destroyed with maybe left hopefully this will take care of this process  Skin lesion possible squamous cell carcinoma await results of biopsy if further treatment needed patient will return follow-up when she returns from her trip from [facility] for  5 weeks      Prior to Admission medications    Medication Sig Start Date End Date Taking?  Authorizing Provider   Calcium-Magnesium-Vitamin D (CALCIUM 500 PO) Take by mouth   Yes Historical Provider, MD   Cholecalciferol (EQL VITAMIN D3) 1000 units capsule Take by mouth daily     Yes Historical Provider, MD   Glucos-Chondroit-Hyaluron-MSM (GLUCOSAMINE CHONDROITIN JOINT) TABS Take by mouth daily     Yes Historical Provider, MD   metoprolol succinate (TOPROL-XL) 50 mg 24 hr tablet TAKE 1 TABLET DAILY 6/9/19  Yes Kamilah Garza MD   Multiple Vitamins-Minerals (OCUVITE PO) Take by mouth daily   Yes Historical Provider, MD   Omega-3 Fatty Acids (FISH OIL) 1000 MG CPDR Take by mouth   Yes Historical Provider, MD     Allergies   Allergen Reactions    Azithromycin Dizziness    Morphine Vomiting       Marycruz Kaplan MD  8/20/2019,2:36 PM    Portions of the record may have been created with voice recognition software   Occasional wrong word or "sound a like" substitutions may have occurred due to the inherent limitations of voice recognition software   Read the chart carefully and recognize, using context, where substitutions have occurred

## 2019-08-20 NOTE — PATIENT INSTRUCTIONS
Squamous cell carcinoma right clavicle since the area appears not to be growing back result just go ahead and destroyed with maybe left hopefully this will take care of this process  Skin lesion possible squamous cell carcinoma await results of biopsy if further treatment needed patient will return follow-up when she returns from her trip from [place] for  5 weeks

## 2019-09-07 DIAGNOSIS — I10 ESSENTIAL HYPERTENSION: ICD-10-CM

## 2019-09-09 RX ORDER — METOPROLOL SUCCINATE 50 MG/1
TABLET, EXTENDED RELEASE ORAL
Qty: 90 TABLET | Refills: 1 | Status: SHIPPED | OUTPATIENT
Start: 2019-09-09 | End: 2020-03-06

## 2019-10-07 ENCOUNTER — PROCEDURE VISIT (OUTPATIENT)
Dept: DERMATOLOGY | Facility: CLINIC | Age: 79
End: 2019-10-07

## 2019-10-07 DIAGNOSIS — D04.61 SQUAMOUS CELL CARCINOMA IN SITU (SCCIS) OF SKIN OF RIGHT WRIST: Primary | ICD-10-CM

## 2019-10-07 PROCEDURE — 99024 POSTOP FOLLOW-UP VISIT: CPT | Performed by: DERMATOLOGY

## 2019-10-07 NOTE — PROGRESS NOTES
[facility] DERMATOLOGY  [address] [place] [id number]  [phone]  [phone]     MRN: [id number] : 1940  Encounter: [id number]  Patient Information:     Subjective:   72-year-old female presents for follow-up and planned removal of atypical squamous neoplasm noted on the right wrist previously area has completely resolved       Objective: There were no vitals taken for this visit  Physical Exam:    General Appearance:    Alert, cooperative, no distress   Skin:  Previous site of biopsy barely notable no signs of any induration or any specific lesion at this time     Assessment:     1  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  Authorizing Provider   Calcium-Magnesium-Vitamin D (CALCIUM 500 PO) Take by mouth    Historical Provider, MD   Cholecalciferol (EQL VITAMIN D3) 1000 units capsule Take by mouth daily      Historical Provider, MD   Glucos-Chondroit-Hyaluron-MSM (GLUCOSAMINE CHONDROITIN JOINT) TABS Take by mouth daily      Historical Provider, MD   metoprolol succinate (TOPROL-XL) 50 mg 24 hr tablet TAKE 1 TABLET DAILY 19   Consuelo Arredondo MD   Multiple Vitamins-Minerals (OCUVITE PO) Take by mouth daily    Historical Provider, MD   Omega-3 Fatty Acids (FISH OIL) 1000 MG CPDR Take by mouth    Historical Provider, MD     Allergies   Allergen Reactions    Azithromycin Dizziness    Morphine Vomiting       Christelle Baca MD  10/7/2019,10:11 AM    Portions of the record may have been created with voice recognition software   Occasional wrong word or "sound a like" substitutions may have occurred due to the inherent limitations of voice recognition software   Read the chart carefully and recognize, using context, where substitutions have occurred

## 2019-11-14 ENCOUNTER — OFFICE VISIT (OUTPATIENT)
Dept: PODIATRY | Facility: CLINIC | Age: 79
End: 2019-11-14
Payer: MEDICARE

## 2019-11-14 VITALS
HEART RATE: 64 BPM | SYSTOLIC BLOOD PRESSURE: 164 MMHG | WEIGHT: 221 LBS | BODY MASS INDEX: 37.73 KG/M2 | HEIGHT: 64 IN | DIASTOLIC BLOOD PRESSURE: 91 MMHG

## 2019-11-14 DIAGNOSIS — M76.62 ACHILLES TENDINITIS OF LEFT LOWER EXTREMITY: Primary | ICD-10-CM

## 2019-11-14 DIAGNOSIS — M77.32 CALCANEAL SPUR OF LEFT FOOT: ICD-10-CM

## 2019-11-14 PROCEDURE — 99213 OFFICE O/P EST LOW 20 MIN: CPT | Performed by: PODIATRIST

## 2019-11-14 NOTE — PROGRESS NOTES
Assessment/Plan:    Reviewed x-rays taken at [facility]  They were positive for both an inferior heel spur as well as calcification at the Achilles insertion  Explained to patient that her symptoms are due to calcific Achilles tendinitis  Discussed treatment options  Patient was advised to purchase shoes with a low heel  Flat shoes will increase stress at the Achilles tendon  She was placed on Voltaren gel for t i d  Application  She was advised utilize stretching exercises  She will be rescheduled in 4 weeks  Physical therapy is a treatment option if pain persists  Surgical intervention for removal of spurs is not advised  No problem-specific Assessment & Plan notes found for this encounter  Diagnoses and all orders for this visit:    Achilles tendinitis of left lower extremity  -     diclofenac sodium (VOLTAREN) 1 %; Apply 2 g topically 3 (three) times a day    Calcaneal spur of left foot          Subjective:      Patient ID: Yusef Ulloa is a 78 y o  female  HPI     Patient, a 77-year-old female in apparent good health presents with retrocalcaneal left heel pain  Patient states that she has been in pain for approximately 5 months  There has been no recalled trauma  Patient describes the pain as being intermittent and more annoying than severe  Patient states that she tends to wear shoes that are flat and she has gotten rid of most shoes with a heel  Patient had recent x-rays of her left heel which were positive for spurs  Patient has a known history of arthritis  The following portions of the patient's history were reviewed and updated as appropriate: allergies, current medications, past family history, past medical history, past social history, past surgical history and problem list     Review of Systems   Constitutional: Negative  Cardiovascular:        Hypertension   Musculoskeletal: Positive for arthralgias  Neurological: Negative  Hematological: Negative  Objective:      /91   Pulse 64   Ht 5' 4" (1 626 m)   Wt 100 kg (221 lb)   BMI 37 93 kg/m²          Physical Exam   Constitutional: She is oriented to person, place, and time  Cardiovascular: Regular rhythm and intact distal pulses  Musculoskeletal: She exhibits tenderness and deformity  Pain with palpation left heel at Achilles tendon insertion into calcaneus  Muscle strength of Achilles is within normal limits  No pain with forced dorsiflexion  Neurological: She is alert and oriented to person, place, and time  No sensory deficit  She exhibits normal muscle tone  Skin: Skin is warm  Capillary refill takes 2 to 3 seconds  No rash noted  No erythema

## 2019-12-09 ENCOUNTER — TELEPHONE (OUTPATIENT)
Dept: HEMATOLOGY ONCOLOGY | Facility: HOSPITAL | Age: 79
End: 2019-12-09

## 2019-12-09 NOTE — TELEPHONE ENCOUNTER
Called and Ilya Mix for pt to have labs drawn prior to her appt with Dr Harmony Moy tomorrow 12/10 or we will have to reschedule

## 2019-12-10 ENCOUNTER — OFFICE VISIT (OUTPATIENT)
Dept: HEMATOLOGY ONCOLOGY | Facility: CLINIC | Age: 79
End: 2019-12-10
Payer: MEDICARE

## 2019-12-10 VITALS
TEMPERATURE: 98.3 F | RESPIRATION RATE: 16 BRPM | HEIGHT: 64 IN | BODY MASS INDEX: 37.9 KG/M2 | OXYGEN SATURATION: 97 % | WEIGHT: 222 LBS | DIASTOLIC BLOOD PRESSURE: 86 MMHG | SYSTOLIC BLOOD PRESSURE: 156 MMHG | HEART RATE: 75 BPM

## 2019-12-10 DIAGNOSIS — D47.1 MYELOPROLIFERATIVE DISORDER (HCC): ICD-10-CM

## 2019-12-10 DIAGNOSIS — D58.2 ELEVATED HEMOGLOBIN (HCC): Primary | ICD-10-CM

## 2019-12-10 PROCEDURE — 99214 OFFICE O/P EST MOD 30 MIN: CPT | Performed by: INTERNAL MEDICINE

## 2019-12-10 NOTE — TELEPHONE ENCOUNTER
Patient called back to let you know she had labs done at [facility] I called them and they provided me with the number for you to receive lab results from the lab, they can best be reached @[phone]

## 2019-12-10 NOTE — TELEPHONE ENCOUNTER
Spoke with the lab they are faxing results over once I receive them I will give results to dagoberto via email

## 2019-12-10 NOTE — PROGRESS NOTES
Hematology/Oncology Outpatient Follow- up Note  Padmaja Menard 78 y o  female MRN: @ Encounter: [id number]        Date:  12/10/2019    Presenting Complaint/Diagnosis :     Elevated hemoglobin since January 2019  HPI:      [place] seen for initial consultation 6/18/2019 regarding An elevated hemoglobin  In January 2019 she had blood work which revealed a hemoglobin of 16 3 with a white count of 5 8 and platelet count of 293  Hematocrit was 47 3  She had this blood work repeated in April 2019 Which revealed hemoglobin was 15 9 with a hematocrit of 47 5  White count was normal at 5 4 with a platelet count was normal at 205  Differential at that time Was also normal     Previous Hematologic/ Oncologic History:      Workup    Current Hematologic/ Oncologic Treatment:      Workup    Interval History:    The patient returns for follow-up visit  Her hemoglobin is now in the 16 range  Again it has fluctuated  She has not been able to donate any blood as she states she delivered 1 cm dizzy  She does have a history of vertigo  Denies any nausea denies any vomiting and diarrhea  He is otherwise at baseline  I did advise her to take a baby aspirin daily which she states she will now start doing  She will do this with food in the morning  The rest of her 14 point review of systems today was negative  Test Results:    Imaging: No results found      Labs:   Lab Results   Component Value Date    WBC 8 21 10/07/2018    HGB 14 0 10/07/2018    HCT 41 8 10/07/2018    MCV 94 10/07/2018     10/07/2018     Lab Results   Component Value Date    K 3 6 10/07/2018     (H) 10/07/2018    CO2 28 10/07/2018    BUN 9 10/07/2018    CREATININE 0 88 10/07/2018    GLUF 137 (H) 10/06/2018    CALCIUM 8 0 (L) 10/07/2018    AST 21 10/07/2018    ALT 37 10/07/2018    ALKPHOS 52 10/07/2018    EGFR 64 10/07/2018     ROS: As stated in the history of present illness otherwise his 14 point review of systems today was negative  Active Problems:   Patient Active Problem List   Diagnosis    Hyperlipidemia    Obesity    Essential hypertension    Seborrheic keratosis    History of skin cancer    Screening for skin condition    Bronchitis       Past Medical History:   Past Medical History:   Diagnosis Date    H/O nonmelanoma skin cancer     last assessed 17    Hypertension        Surgical History:   Past Surgical History:   Procedure Laterality Date    APPENDECTOMY  2010    BLADDER SURGERY  1997    BREAST BIOPSY Left     1995    CERVIX SURGERY      dilation and curettage of cervical stump , ,     CHOLECYSTECTOMY      COLECTOMY      partial - last assessed 10/28/14    COLONOSCOPY      last assessed 17    HERNIA REPAIR  2011    TONSILLECTOMY  1971       Family History:    Family History   Problem Relation Age of Onset    Arthritis Mother     Osteoporosis Mother     Stroke Mother     Heart disease Father     Osteoporosis Father     Breast cancer Family     Osteoporosis Family     Breast cancer Maternal Grandfather 28       Cancer-related family history includes Breast cancer in her family; Breast cancer (age of onset: 28) in her maternal grandfather      Social History:   Social History     Socioeconomic History    Marital status: /Civil Union     Spouse name: Not on file    Number of children: Not on file    Years of education: Not on file    Highest education level: Not on file   Occupational History    Not on file   Social Needs    Financial resource strain: Not on file    Food insecurity:     Worry: Not on file     Inability: Not on file    Transportation needs:     Medical: Not on file     Non-medical: Not on file   Tobacco Use    Smoking status: Former Smoker     Last attempt to quit: 1979     Years since quittin 9    Smokeless tobacco: Never Used   Substance and Sexual Activity    Alcohol use: Yes     Comment: occasionally wine    Drug use: No    Sexual activity: Not on file   Lifestyle    Physical activity:     Days per week: Not on file     Minutes per session: Not on file    Stress: Not on file   Relationships    Social connections:     Talks on phone: Not on file     Gets together: Not on file     Attends [employer] service: Not on file     Active member of club or organization: Not on file     Attends meetings of clubs or organizations: Not on file     Relationship status: Not on file    Intimate partner violence:     Fear of current or ex partner: Not on file     Emotionally abused: Not on file     Physically abused: Not on file     Forced sexual activity: Not on file   Other Topics Concern    Not on file   Social History Narrative    Caffeine use       Current Medications:   Current Outpatient Medications   Medication Sig Dispense Refill    Calcium-Magnesium-Vitamin D (CALCIUM 500 PO) Take by mouth      Cholecalciferol (EQL VITAMIN D3) 1000 units capsule Take by mouth daily        diclofenac sodium (VOLTAREN) 1 % Apply 2 g topically 3 (three) times a day 180 g 2    Glucos-Chondroit-Hyaluron-MSM (GLUCOSAMINE CHONDROITIN JOINT) TABS Take by mouth daily        metoprolol succinate (TOPROL-XL) 50 mg 24 hr tablet TAKE 1 TABLET DAILY 90 tablet 1    Multiple Vitamins-Minerals (OCUVITE PO) Take by mouth daily      Omega-3 Fatty Acids (FISH OIL) 1000 MG CPDR Take by mouth       No current facility-administered medications for this visit  Allergies: Allergies   Allergen Reactions    Azithromycin Dizziness    Morphine Vomiting       Physical Exam:    Body surface area is 2 05 meters squared      Wt Readings from Last 3 Encounters:   12/10/19 101 kg (222 lb)   11/14/19 100 kg (221 lb)   08/19/19 101 kg (222 lb 4 oz)        Temp Readings from Last 3 Encounters:   12/10/19 98 3 °F (36 8 °C) (Oral)   08/06/19 97 8 °F (36 6 °C) (Oral)   06/18/19 97 9 °F (36 6 °C) (Oral)        BP Readings from Last 3 Encounters:   12/10/19 156/86   11/14/19 164/91   08/19/19 140/86         Pulse Readings from Last 3 Encounters:   12/10/19 75   11/14/19 64   08/19/19 70         Physical Exam     Constitutional   General appearance: No acute distress, well appearing and well nourished  Eyes   Conjunctiva and lids: No swelling, erythema or discharge  Pupils and irises: Equal, round and reactive to light  Ears, Nose, Mouth, and Throat   External inspection of ears and nose: Normal     Nasal mucosa, septum, and turbinates: Normal without edema or erythema  Oropharynx: Normal with no erythema, edema, exudate or lesions  Pulmonary   Respiratory effort: No increased work of breathing or signs of respiratory distress  Auscultation of lungs: Clear to auscultation  Cardiovascular   Palpation of heart: Normal PMI, no thrills  Auscultation of heart: Normal rate and rhythm, normal S1 and S2, without murmurs  Examination of extremities for edema and/or varicosities: Normal     Carotid pulses: Normal     Abdomen   Abdomen: Non-tender, no masses  Liver and spleen: No hepatomegaly or splenomegaly  Lymphatic   Palpation of lymph nodes in neck: No lymphadenopathy  Musculoskeletal   Gait and station: Normal     Digits and nails: Normal without clubbing or cyanosis  Inspection/palpation of joints, bones, and muscles: Normal     Skin   Skin and subcutaneous tissue: Normal without rashes or lesions  Neurologic   Cranial nerves: Cranial nerves 2-12 intact  Sensation: No sensory loss  Psychiatric   Orientation to person, place, and time: Normal     Mood and affect: Normal         Assessment / Plan: This is a pleasant 79-year-old female with a remote history of smoking which she quit more than 2-3 decades ago who was referred to see us for a slightly elevated hemoglobin  I doubt this represents a bone marrow disorder And suspect this is more secondary benign polycythemia but we did the testing for myeloproliferative disorder to confirm this Testing actually revealed no evidence of myeloproliferative disorder  Hemoglobin is in the 16 range  We will continue her on observation  I advised her to donate blood every few months as this should keep her hemoglobin lower  She is hesitant but states she will try  The patient agrees  I'll see her back in 4 months  I advised her to Continue to take a baby aspirin daily  Goals and Barriers:  Current Goal:  Prolong Survival from Elevated hemoglobin  Barriers: None  Patient's Capacity to Self Care:  Patient  able to self care  Portions of the record may have been created with voice recognition software   Occasional wrong word or "sound a like" substitutions may have occurred due to the inherent limitations of voice recognition software   Read the chart carefully and recognize, using context, where substitutions have occurred

## 2019-12-13 ENCOUNTER — OFFICE VISIT (OUTPATIENT)
Dept: PODIATRY | Facility: CLINIC | Age: 79
End: 2019-12-13
Payer: MEDICARE

## 2019-12-13 VITALS
DIASTOLIC BLOOD PRESSURE: 86 MMHG | HEART RATE: 60 BPM | WEIGHT: 222.3 LBS | HEIGHT: 64 IN | SYSTOLIC BLOOD PRESSURE: 149 MMHG | BODY MASS INDEX: 37.95 KG/M2

## 2019-12-13 DIAGNOSIS — M76.62 ACHILLES TENDINITIS OF LEFT LOWER EXTREMITY: Primary | ICD-10-CM

## 2019-12-13 DIAGNOSIS — G62.9 PERIPHERAL NERVE DISORDER: ICD-10-CM

## 2019-12-13 PROCEDURE — 99213 OFFICE O/P EST LOW 20 MIN: CPT | Performed by: PODIATRIST

## 2019-12-13 RX ORDER — ASPIRIN 81 MG/1
TABLET ORAL
COMMUNITY
Start: 2019-12-11 | End: 2021-11-16

## 2019-12-13 NOTE — PROGRESS NOTES
Assessment/Plan:    Patient advised to continue with Voltaren gel b i d  And then may wean off  She should continue to wear shoes with a low heel  Explained to patient that the peculiar sensation in her feet is most consistent with peripheral neuropathy of unknown origin  Patient does not think that current symptoms requires a medications such as gabapentin  Reappoint p r n  No problem-specific Assessment & Plan notes found for this encounter  Diagnoses and all orders for this visit:    Achilles tendinitis of left lower extremity    Peripheral nerve disorder    Other orders  -     aspirin (ECOTRIN LOW STRENGTH) 81 mg EC tablet          Subjective:      Patient ID: Hank Hankins is a 78 y o  female  HPI     Patient presents for follow-up of left Achilles tendinitis pain  Patient was placed on Voltaren gel and advised to utilize stretching exercises and shoes with a low heel  She is using the Voltaren gel b i d  And has no heel pain at this time  A 2nd concern that patient notes today is a tight feeling in the soles of her feet  She notices it more when nonweightbearing  It is more trip heel to her than painful  On questioning, patient denies diabetes, back issues; alcohol abuse; injury and /or history of chemotherapy  The following portions of the patient's history were reviewed and updated as appropriate: allergies, current medications, past family history, past medical history, past social history, past surgical history and problem list     Review of Systems   Cardiovascular: Negative  Gastrointestinal:        History of gastric disorder   Musculoskeletal: Negative for back pain  Neurological: Negative for numbness  Hematological: Negative  Psychiatric/Behavioral: Negative  Objective:      /86   Pulse 60   Ht 5' 4" (1 626 m)   Wt 101 kg (222 lb 4 8 oz)   BMI 38 16 kg/m²          Physical Exam   Constitutional: She is oriented to person, place, and time  Cardiovascular: Regular rhythm and intact distal pulses  Musculoskeletal: She exhibits deformity  She exhibits no tenderness  Retrocalcaneal spurs left heel  No pain with palpation at Achilles insertion   Neurological: She is alert and oriented to person, place, and time  No sensory deficit  She exhibits normal muscle tone  Skin: Skin is warm  No erythema

## 2019-12-16 NOTE — TELEPHONE ENCOUNTER
----- Message from Padmaja Menard sent at 12/20/2018 11:44 AM EST -----  Regarding: Non-Urgent Medical Question  Contact: [phone]  Maximiliano augustin    I need to know the names of the medications I have an adverse reaction to so that I can make note of them to have with me  I already know about Morphine, but my hospitalization in October led to adding three others  Also, what is my blood type?     Many thanks    [place] YUMIKO Menard Patient unable to complete

## 2020-02-24 ENCOUNTER — OFFICE VISIT (OUTPATIENT)
Dept: FAMILY MEDICINE CLINIC | Facility: MEDICAL CENTER | Age: 80
End: 2020-02-24
Payer: MEDICARE

## 2020-02-24 VITALS
RESPIRATION RATE: 16 BRPM | BODY MASS INDEX: 38.11 KG/M2 | WEIGHT: 223.25 LBS | HEIGHT: 64 IN | HEART RATE: 60 BPM | SYSTOLIC BLOOD PRESSURE: 136 MMHG | DIASTOLIC BLOOD PRESSURE: 86 MMHG

## 2020-02-24 DIAGNOSIS — Z12.39 SCREENING FOR BREAST CANCER: ICD-10-CM

## 2020-02-24 DIAGNOSIS — E78.5 HYPERLIPIDEMIA, UNSPECIFIED HYPERLIPIDEMIA TYPE: ICD-10-CM

## 2020-02-24 DIAGNOSIS — E66.9 CLASS 2 OBESITY WITHOUT SERIOUS COMORBIDITY WITH BODY MASS INDEX (BMI) OF 35.0 TO 35.9 IN ADULT, UNSPECIFIED OBESITY TYPE: ICD-10-CM

## 2020-02-24 DIAGNOSIS — M76.60 ACHILLES TENDINITIS, UNSPECIFIED LATERALITY: ICD-10-CM

## 2020-02-24 DIAGNOSIS — Z13.820 SCREENING FOR OSTEOPOROSIS: ICD-10-CM

## 2020-02-24 DIAGNOSIS — Z13.29 SCREENING FOR THYROID DISORDER: ICD-10-CM

## 2020-02-24 DIAGNOSIS — I10 ESSENTIAL HYPERTENSION: Primary | ICD-10-CM

## 2020-02-24 PROCEDURE — 1036F TOBACCO NON-USER: CPT | Performed by: FAMILY MEDICINE

## 2020-02-24 PROCEDURE — 3079F DIAST BP 80-89 MM HG: CPT | Performed by: FAMILY MEDICINE

## 2020-02-24 PROCEDURE — 4040F PNEUMOC VAC/ADMIN/RCVD: CPT | Performed by: FAMILY MEDICINE

## 2020-02-24 PROCEDURE — 3075F SYST BP GE 130 - 139MM HG: CPT | Performed by: FAMILY MEDICINE

## 2020-02-24 PROCEDURE — 1160F RVW MEDS BY RX/DR IN RCRD: CPT | Performed by: FAMILY MEDICINE

## 2020-02-24 PROCEDURE — 99214 OFFICE O/P EST MOD 30 MIN: CPT | Performed by: FAMILY MEDICINE

## 2020-02-24 PROCEDURE — 3008F BODY MASS INDEX DOCD: CPT | Performed by: FAMILY MEDICINE

## 2020-02-24 NOTE — PROGRESS NOTES
[place] is here for 6 month checkup  She saw podiatry Dr Justino Magdaleno  Diagnosed with Achilles tendonitis  She saw hematology Dr Felix Claire for her polycythemia  Advised baby aspirin and does not need blood donation  She was taking B12 and was advised to stop when she went to see hematology  She asks if she should restarted  She does not have any known B12 deficiency anemia  Review of Systems   Genitourinary: Negative  Negative for difficulty urinating  No urinary incontinence   Musculoskeletal: Positive for arthralgias  she stopped her glucosamine and her joint pain got worse  Better with since she restarted  O: /86 (Cuff Size: Large)   Pulse 60   Resp 16   Ht 5' 4" (1 626 m)   Wt 101 kg (223 lb 4 oz)   BMI 38 32 kg/m²   BP by me 138/80 left arm standard cuff  136/80 large cuff  Neck no adenopathy thyromegaly bruits  Chest clear  Good breath sounds  Cardiac regular rate without murmur    Assessment  1  Hypertension-well controlled with metoprolol  2  Achilles tendinitis-improved  Review Podiatry recommendations with regard to footwear icing etcetera  3  Polycythemia- mild- Hematology consult  She will take baby aspirin  She will consider blood donation  4  Maintenance due for mammogram and blood work  Discussed routine gynecologic care  Plan  Mammogram, BW  Recheck 6 months

## 2020-03-05 DIAGNOSIS — I10 ESSENTIAL HYPERTENSION: ICD-10-CM

## 2020-03-06 RX ORDER — METOPROLOL SUCCINATE 50 MG/1
TABLET, EXTENDED RELEASE ORAL
Qty: 90 TABLET | Refills: 4 | Status: SHIPPED | OUTPATIENT
Start: 2020-03-06 | End: 2021-03-26

## 2020-03-17 ENCOUNTER — TELEPHONE (OUTPATIENT)
Dept: FAMILY MEDICINE CLINIC | Facility: MEDICAL CENTER | Age: 80
End: 2020-03-17

## 2020-03-17 NOTE — TELEPHONE ENCOUNTER
----- Message from Roger Valentin MD sent at 3/16/2020 10:44 PM EDT -----  Notify patient her blood work is all good  She does have mildly reduced renal function which would be appropriate for age  Should maintain hydration and avoid use of NSAIDs when possible and use Tylenol instead    Should have a BMP in 6 months

## 2020-05-19 ENCOUNTER — HOSPITAL ENCOUNTER (OUTPATIENT)
Dept: RADIOLOGY | Age: 80
Discharge: HOME | End: 2020-05-19
Payer: MEDICARE

## 2020-05-19 VITALS — BODY MASS INDEX: 37.9 KG/M2 | HEIGHT: 64 IN | WEIGHT: 222 LBS

## 2020-05-19 DIAGNOSIS — Z12.39 SCREENING FOR BREAST CANCER: ICD-10-CM

## 2020-05-19 DIAGNOSIS — Z13.820 SCREENING FOR OSTEOPOROSIS: ICD-10-CM

## 2020-05-19 PROCEDURE — 77067 SCR MAMMO BI INCL CAD: CPT

## 2020-05-19 PROCEDURE — 77063 BREAST TOMOSYNTHESIS BI: CPT

## 2020-05-19 PROCEDURE — 77080 DXA BONE DENSITY AXIAL: CPT

## 2020-05-21 ENCOUNTER — TELEPHONE (OUTPATIENT)
Dept: FAMILY MEDICINE CLINIC | Facility: MEDICAL CENTER | Age: 80
End: 2020-05-21

## 2020-09-09 ENCOUNTER — TELEPHONE (OUTPATIENT)
Dept: HEMATOLOGY ONCOLOGY | Facility: HOSPITAL | Age: 80
End: 2020-09-09

## 2020-09-09 NOTE — TELEPHONE ENCOUNTER
Left message for patient regarding the need to reschedule her appointment on 9/15  I also sent an email to the address on record regarding this  She can either see Dr Stefanie Haywood at [facility] or be scheduled with another provider that goes to [place]   Requested a return call to discuss

## 2020-11-23 ENCOUNTER — OFFICE VISIT (OUTPATIENT)
Dept: FAMILY MEDICINE CLINIC | Facility: MEDICAL CENTER | Age: 80
End: 2020-11-23
Payer: MEDICARE

## 2020-11-23 VITALS
HEART RATE: 72 BPM | TEMPERATURE: 98.4 F | BODY MASS INDEX: 38.6 KG/M2 | DIASTOLIC BLOOD PRESSURE: 86 MMHG | SYSTOLIC BLOOD PRESSURE: 144 MMHG | WEIGHT: 221.4 LBS

## 2020-11-23 DIAGNOSIS — E66.3 OVERWEIGHT: ICD-10-CM

## 2020-11-23 DIAGNOSIS — E78.5 HYPERLIPIDEMIA, UNSPECIFIED HYPERLIPIDEMIA TYPE: ICD-10-CM

## 2020-11-23 DIAGNOSIS — I10 ESSENTIAL HYPERTENSION: Primary | ICD-10-CM

## 2020-11-23 DIAGNOSIS — Z00.00 MEDICARE ANNUAL WELLNESS VISIT, SUBSEQUENT: ICD-10-CM

## 2020-11-23 DIAGNOSIS — D58.2 ELEVATED HEMOGLOBIN (HCC): ICD-10-CM

## 2020-11-23 PROCEDURE — G0439 PPPS, SUBSEQ VISIT: HCPCS | Performed by: FAMILY MEDICINE

## 2020-11-23 PROCEDURE — 99214 OFFICE O/P EST MOD 30 MIN: CPT | Performed by: FAMILY MEDICINE

## 2020-11-23 RX ORDER — ASCORBIC ACID 500 MG
500 TABLET ORAL DAILY
COMMUNITY
End: 2021-03-31 | Stop reason: HOSPADM

## 2020-11-23 RX ORDER — AMLODIPINE BESYLATE 2.5 MG/1
2.5 TABLET ORAL DAILY
Qty: 90 TABLET | Refills: 1 | Status: SHIPPED | OUTPATIENT
Start: 2020-11-23 | End: 2021-01-18

## 2020-11-24 ENCOUNTER — TELEPHONE (OUTPATIENT)
Dept: FAMILY MEDICINE CLINIC | Facility: MEDICAL CENTER | Age: 80
End: 2020-11-24

## 2020-11-24 DIAGNOSIS — I10 ESSENTIAL HYPERTENSION: Primary | ICD-10-CM

## 2020-11-24 DIAGNOSIS — Z12.31 OTHER SCREENING MAMMOGRAM: ICD-10-CM

## 2021-01-05 ENCOUNTER — OFFICE VISIT (OUTPATIENT)
Dept: FAMILY MEDICINE CLINIC | Facility: MEDICAL CENTER | Age: 81
End: 2021-01-05
Payer: MEDICARE

## 2021-01-05 VITALS
SYSTOLIC BLOOD PRESSURE: 148 MMHG | TEMPERATURE: 98.5 F | BODY MASS INDEX: 37.97 KG/M2 | DIASTOLIC BLOOD PRESSURE: 90 MMHG | OXYGEN SATURATION: 96 % | HEART RATE: 63 BPM | HEIGHT: 64 IN | WEIGHT: 222.4 LBS

## 2021-01-05 DIAGNOSIS — I10 ESSENTIAL HYPERTENSION: Primary | ICD-10-CM

## 2021-01-05 PROCEDURE — 99213 OFFICE O/P EST LOW 20 MIN: CPT | Performed by: FAMILY MEDICINE

## 2021-01-06 NOTE — PROGRESS NOTES
Ruddy Mott is here for BP followup  See notes last visit  She was started on 2 5 mg of amlodipine  No side effects  She has lesion on her scalp resolved  O: /90 (BP Location: Left arm, Patient Position: Sitting, Cuff Size: Large)   Pulse 63   Temp 98 5 °F (36 9 °C)   Ht 5' 3 5" (1 613 m)   Wt 101 kg (222 lb 6 4 oz)   SpO2 96%   BMI 38 78 kg/m²      BP by me 158/82 left arm  Scalp no lesions noted    Assessment  Hypertension-still suboptimal control    Plan  Increase amlodipine to 5 mg daily  Recheck 3-4 weeks

## 2021-01-18 ENCOUNTER — TELEPHONE (OUTPATIENT)
Dept: FAMILY MEDICINE CLINIC | Facility: MEDICAL CENTER | Age: 81
End: 2021-01-18

## 2021-01-18 DIAGNOSIS — I10 ESSENTIAL HYPERTENSION: ICD-10-CM

## 2021-01-18 RX ORDER — AMLODIPINE BESYLATE 5 MG/1
5 TABLET ORAL DAILY
Qty: 90 TABLET | Refills: 3 | Status: SHIPPED | OUTPATIENT
Start: 2021-01-18 | End: 2021-03-28

## 2021-01-18 NOTE — TELEPHONE ENCOUNTER
Pt said when she was in you increased her amlodipine from 2 5 mg to 5mg, and [facility]  Can you please send over

## 2021-01-20 DIAGNOSIS — Z23 ENCOUNTER FOR IMMUNIZATION: ICD-10-CM

## 2021-01-27 ENCOUNTER — OFFICE VISIT (OUTPATIENT)
Dept: FAMILY MEDICINE CLINIC | Facility: MEDICAL CENTER | Age: 81
End: 2021-01-27
Payer: MEDICARE

## 2021-01-27 VITALS
BODY MASS INDEX: 39.13 KG/M2 | SYSTOLIC BLOOD PRESSURE: 180 MMHG | TEMPERATURE: 98.3 F | RESPIRATION RATE: 16 BRPM | WEIGHT: 224.4 LBS | DIASTOLIC BLOOD PRESSURE: 104 MMHG | HEART RATE: 67 BPM

## 2021-01-27 DIAGNOSIS — R31.9 HEMATURIA, UNSPECIFIED TYPE: ICD-10-CM

## 2021-01-27 DIAGNOSIS — R82.998 DARK URINE: ICD-10-CM

## 2021-01-27 DIAGNOSIS — I10 ESSENTIAL HYPERTENSION: Primary | ICD-10-CM

## 2021-01-27 DIAGNOSIS — M25.512 ACUTE PAIN OF LEFT SHOULDER: ICD-10-CM

## 2021-01-27 LAB
BACTERIA UR QL AUTO: ABNORMAL /HPF
BILIRUB UR QL STRIP: NEGATIVE
CLARITY UR: CLEAR
COLOR UR: YELLOW
GLUCOSE UR STRIP-MCNC: NEGATIVE MG/DL
HGB UR QL STRIP.AUTO: ABNORMAL
HYALINE CASTS #/AREA URNS LPF: ABNORMAL /LPF
KETONES UR STRIP-MCNC: NEGATIVE MG/DL
LEUKOCYTE ESTERASE UR QL STRIP: ABNORMAL
NITRITE UR QL STRIP: NEGATIVE
NON-SQ EPI CELLS URNS QL MICRO: ABNORMAL /HPF
PH UR STRIP.AUTO: 6 [PH]
PROT UR STRIP-MCNC: NEGATIVE MG/DL
RBC #/AREA URNS AUTO: ABNORMAL /HPF
SL AMB  POCT GLUCOSE, UA: ABNORMAL
SL AMB LEUKOCYTE ESTERASE,UA: ABNORMAL
SL AMB POCT BILIRUBIN,UA: ABNORMAL
SL AMB POCT BLOOD,UA: ABNORMAL
SL AMB POCT KETONES,UA: ABNORMAL
SL AMB POCT NITRITE,UA: ABNORMAL
SL AMB POCT PH,UA: 6
SL AMB POCT SPECIFIC GRAVITY,UA: 1.02
SL AMB POCT URINE PROTEIN: ABNORMAL
SL AMB POCT UROBILINOGEN: 0.2
SP GR UR STRIP.AUTO: 1.02 (ref 1–1.03)
UROBILINOGEN UR QL STRIP.AUTO: 0.2 E.U./DL
WBC #/AREA URNS AUTO: ABNORMAL /HPF

## 2021-01-27 PROCEDURE — 81002 URINALYSIS NONAUTO W/O SCOPE: CPT | Performed by: FAMILY MEDICINE

## 2021-01-27 PROCEDURE — 99214 OFFICE O/P EST MOD 30 MIN: CPT | Performed by: FAMILY MEDICINE

## 2021-01-27 PROCEDURE — 81001 URINALYSIS AUTO W/SCOPE: CPT | Performed by: FAMILY MEDICINE

## 2021-01-27 PROCEDURE — 87086 URINE CULTURE/COLONY COUNT: CPT | Performed by: FAMILY MEDICINE

## 2021-01-27 NOTE — PROGRESS NOTES
Salena Mancini is here for BP followup  Her amlodipine was increased to 5 mg daily in addition to her metoprolol at last visit  No swelling or side effects  She brings her cuff along with her  Omron cuff  She had a dark urine last week for a few days   No burning  Nocturia and frequency unchanged  She drank a lot more fluid and it resolved  She has pain in her left shoulder  Pain radiates to the back of her neck  She saw a massage therapist and it is much better  She has had this in the past   The last few days she has also had some  She was to get COVID vaccine yesterday however due to the weather had to reschedule  Pain under the left arm  O: BP (!) 180/104 (Cuff Size: Standard) Comment: patient's cuff  Pulse 67   Temp 98 3 °F (36 8 °C)   Resp 16   Wt 102 kg (224 lb 6 4 oz)   BMI 39 13 kg/m²    Right  arm patient cuff 152/89 office cuff 144/80    Left arm    patient cuff 170/92 office cuff 158/86    Assessment  1  Hypertension-slightly improved control with increased dose of amlodipine  Will increase the amlodipine to 10 mg daily  Her blood pressure cuff appears to be reasonably accurate  She will monitor at home  She will call with readings in about 3 weeks  2  Urinary symptoms-; likely with dehydration will recheck a urinalysis and culture  3  Left shoulder pain; -improved with massage therapy will continue as needed  Notify  Me if any persistent pain    Plan  Check UA and urine culture  As above  Routine follow-up

## 2021-01-29 LAB — BACTERIA UR CULT: NORMAL

## 2021-02-03 ENCOUNTER — TELEPHONE (OUTPATIENT)
Dept: FAMILY MEDICINE CLINIC | Facility: MEDICAL CENTER | Age: 81
End: 2021-02-03

## 2021-02-03 ENCOUNTER — IMMUNIZATIONS (OUTPATIENT)
Dept: FAMILY MEDICINE CLINIC | Facility: HOSPITAL | Age: 81
End: 2021-02-03

## 2021-02-03 DIAGNOSIS — Z23 ENCOUNTER FOR IMMUNIZATION: Primary | ICD-10-CM

## 2021-02-03 DIAGNOSIS — R31.9 HEMATURIA, UNSPECIFIED TYPE: Primary | ICD-10-CM

## 2021-02-03 PROCEDURE — 91301 SARS-COV-2 / COVID-19 MRNA VACCINE (MODERNA) 100 MCG: CPT

## 2021-02-03 PROCEDURE — 0011A SARS-COV-2 / COVID-19 MRNA VACCINE (MODERNA) 100 MCG: CPT

## 2021-02-03 NOTE — TELEPHONE ENCOUNTER
----- Message from Jennifer Berry MD sent at 2/1/2021 10:20 PM EST -----  Notify urinalysis showed slight blood but urine culture no infection  Needs a repeat urinalysis with microscopic

## 2021-02-11 ENCOUNTER — TELEPHONE (OUTPATIENT)
Dept: FAMILY MEDICINE CLINIC | Facility: MEDICAL CENTER | Age: 81
End: 2021-02-11

## 2021-02-11 NOTE — TELEPHONE ENCOUNTER
Pt LMOM to make Dr Angelita Tomlin aware that she did decrease her medication as Dr Angelita Tomlin suggested, but she has been having swelling in her ankles so she went back to the 5 mg      Routed to Dr Angelita Tomlin

## 2021-02-16 ENCOUNTER — TELEPHONE (OUTPATIENT)
Dept: FAMILY MEDICINE CLINIC | Facility: MEDICAL CENTER | Age: 81
End: 2021-02-16

## 2021-02-16 NOTE — TELEPHONE ENCOUNTER
Patient called this morning    Reading today was 145/81, she has not checked her pressure  Until today

## 2021-02-19 ENCOUNTER — TELEPHONE (OUTPATIENT)
Dept: FAMILY MEDICINE CLINIC | Facility: MEDICAL CENTER | Age: 81
End: 2021-02-19

## 2021-02-19 DIAGNOSIS — R31.9 HEMATURIA, UNSPECIFIED TYPE: Primary | ICD-10-CM

## 2021-02-19 NOTE — TELEPHONE ENCOUNTER
----- Message from Marli Aviles MD sent at 2/18/2021  8:59 PM EST -----  Notify urinalysis still shows slight blood  Probably benign but she does need to see  Urology     Let me know

## 2021-02-23 ENCOUNTER — TELEPHONE (OUTPATIENT)
Dept: FAMILY MEDICINE CLINIC | Facility: MEDICAL CENTER | Age: 81
End: 2021-02-23

## 2021-02-23 NOTE — TELEPHONE ENCOUNTER
Pt said she called to make an appointment with urology and could not get in until May 25th  Pt is on a cancellation list  Please, advise if okay for patient to wait until May  Thank you

## 2021-03-01 ENCOUNTER — IMMUNIZATIONS (EMERGENCY)
Dept: FAMILY MEDICINE CLINIC | Facility: HOSPITAL | Age: 81
End: 2021-03-01
Payer: MEDICARE

## 2021-03-01 ENCOUNTER — HOSPITAL ENCOUNTER (EMERGENCY)
Facility: HOSPITAL | Age: 81
Discharge: HOME | End: 2021-03-01
Attending: EMERGENCY MEDICINE
Payer: MEDICARE

## 2021-03-01 ENCOUNTER — TELEPHONE (OUTPATIENT)
Dept: OTHER | Facility: HOSPITAL | Age: 81
End: 2021-03-01

## 2021-03-01 ENCOUNTER — APPOINTMENT (EMERGENCY)
Dept: CT IMAGING | Facility: HOSPITAL | Age: 81
End: 2021-03-01
Payer: MEDICARE

## 2021-03-01 VITALS
TEMPERATURE: 97.5 F | HEART RATE: 73 BPM | RESPIRATION RATE: 16 BRPM | DIASTOLIC BLOOD PRESSURE: 73 MMHG | OXYGEN SATURATION: 96 % | SYSTOLIC BLOOD PRESSURE: 125 MMHG

## 2021-03-01 DIAGNOSIS — Z23 ENCOUNTER FOR IMMUNIZATION: Primary | ICD-10-CM

## 2021-03-01 DIAGNOSIS — N20.1 URETEROLITHIASIS: Primary | ICD-10-CM

## 2021-03-01 LAB
ALBUMIN SERPL BCP-MCNC: 3.3 G/DL (ref 3.5–5)
ALP SERPL-CCNC: 55 U/L (ref 46–116)
ALT SERPL W P-5'-P-CCNC: 27 U/L (ref 12–78)
ANION GAP SERPL CALCULATED.3IONS-SCNC: 7 MMOL/L (ref 4–13)
AST SERPL W P-5'-P-CCNC: 17 U/L (ref 5–45)
BACTERIA UR QL AUTO: ABNORMAL /HPF
BASOPHILS # BLD AUTO: 0.03 THOUSANDS/ΜL (ref 0–0.1)
BASOPHILS NFR BLD AUTO: 0 % (ref 0–1)
BILIRUB SERPL-MCNC: 0.4 MG/DL (ref 0.2–1)
BILIRUB UR QL STRIP: NEGATIVE
BUN SERPL-MCNC: 17 MG/DL (ref 5–25)
CALCIUM ALBUM COR SERPL-MCNC: 9.4 MG/DL (ref 8.3–10.1)
CALCIUM SERPL-MCNC: 8.8 MG/DL (ref 8.3–10.1)
CHLORIDE SERPL-SCNC: 107 MMOL/L (ref 100–108)
CLARITY UR: CLEAR
CO2 SERPL-SCNC: 30 MMOL/L (ref 21–32)
COLOR UR: ABNORMAL
CREAT SERPL-MCNC: 1.26 MG/DL (ref 0.6–1.3)
EOSINOPHIL # BLD AUTO: 0.02 THOUSAND/ΜL (ref 0–0.61)
EOSINOPHIL NFR BLD AUTO: 0 % (ref 0–6)
ERYTHROCYTE [DISTWIDTH] IN BLOOD BY AUTOMATED COUNT: 12.5 % (ref 11.6–15.1)
GFR SERPL CREATININE-BSD FRML MDRD: 40 ML/MIN/1.73SQ M
GLUCOSE SERPL-MCNC: 133 MG/DL (ref 65–140)
GLUCOSE UR STRIP-MCNC: NEGATIVE MG/DL
HCT VFR BLD AUTO: 44 % (ref 34.8–46.1)
HGB BLD-MCNC: 14.8 G/DL (ref 11.5–15.4)
HGB UR QL STRIP.AUTO: ABNORMAL
IMM GRANULOCYTES # BLD AUTO: 0.04 THOUSAND/UL (ref 0–0.2)
IMM GRANULOCYTES NFR BLD AUTO: 1 % (ref 0–2)
KETONES UR STRIP-MCNC: NEGATIVE MG/DL
LEUKOCYTE ESTERASE UR QL STRIP: NEGATIVE
LIPASE SERPL-CCNC: 132 U/L (ref 73–393)
LYMPHOCYTES # BLD AUTO: 0.6 THOUSANDS/ΜL (ref 0.6–4.47)
LYMPHOCYTES NFR BLD AUTO: 7 % (ref 14–44)
MCH RBC QN AUTO: 31 PG (ref 26.8–34.3)
MCHC RBC AUTO-ENTMCNC: 33.6 G/DL (ref 31.4–37.4)
MCV RBC AUTO: 92 FL (ref 82–98)
MONOCYTES # BLD AUTO: 0.3 THOUSAND/ΜL (ref 0.17–1.22)
MONOCYTES NFR BLD AUTO: 4 % (ref 4–12)
NEUTROPHILS # BLD AUTO: 7.43 THOUSANDS/ΜL (ref 1.85–7.62)
NEUTS SEG NFR BLD AUTO: 88 % (ref 43–75)
NITRITE UR QL STRIP: NEGATIVE
NON-SQ EPI CELLS URNS QL MICRO: ABNORMAL /HPF
NRBC BLD AUTO-RTO: 0 /100 WBCS
PH UR STRIP.AUTO: 7 [PH]
PLATELET # BLD AUTO: 191 THOUSANDS/UL (ref 149–390)
PMV BLD AUTO: 8.4 FL (ref 8.9–12.7)
POTASSIUM SERPL-SCNC: 3.6 MMOL/L (ref 3.5–5.3)
PROT SERPL-MCNC: 6.5 G/DL (ref 6.4–8.2)
PROT UR STRIP-MCNC: NEGATIVE MG/DL
RBC # BLD AUTO: 4.78 MILLION/UL (ref 3.81–5.12)
RBC #/AREA URNS AUTO: ABNORMAL /HPF
SODIUM SERPL-SCNC: 144 MMOL/L (ref 136–145)
SP GR UR STRIP.AUTO: 1.01 (ref 1–1.03)
UROBILINOGEN UR QL STRIP.AUTO: 0.2 E.U./DL
WBC # BLD AUTO: 8.42 THOUSAND/UL (ref 4.31–10.16)
WBC #/AREA URNS AUTO: ABNORMAL /HPF

## 2021-03-01 PROCEDURE — 83690 ASSAY OF LIPASE: CPT | Performed by: EMERGENCY MEDICINE

## 2021-03-01 PROCEDURE — 91301 SARS-COV-2 / COVID-19 MRNA VACCINE (MODERNA) 100 MCG: CPT

## 2021-03-01 PROCEDURE — 85025 COMPLETE CBC W/AUTO DIFF WBC: CPT | Performed by: EMERGENCY MEDICINE

## 2021-03-01 PROCEDURE — 99285 EMERGENCY DEPT VISIT HI MDM: CPT

## 2021-03-01 PROCEDURE — 36415 COLL VENOUS BLD VENIPUNCTURE: CPT | Performed by: EMERGENCY MEDICINE

## 2021-03-01 PROCEDURE — 96361 HYDRATE IV INFUSION ADD-ON: CPT

## 2021-03-01 PROCEDURE — 74177 CT ABD & PELVIS W/CONTRAST: CPT

## 2021-03-01 PROCEDURE — G1004 CDSM NDSC: HCPCS

## 2021-03-01 PROCEDURE — 96375 TX/PRO/DX INJ NEW DRUG ADDON: CPT

## 2021-03-01 PROCEDURE — 81001 URINALYSIS AUTO W/SCOPE: CPT | Performed by: EMERGENCY MEDICINE

## 2021-03-01 PROCEDURE — 96374 THER/PROPH/DIAG INJ IV PUSH: CPT

## 2021-03-01 PROCEDURE — 0012A SARS-COV-2 / COVID-19 MRNA VACCINE (MODERNA) 100 MCG: CPT

## 2021-03-01 PROCEDURE — 80053 COMPREHEN METABOLIC PANEL: CPT | Performed by: EMERGENCY MEDICINE

## 2021-03-01 PROCEDURE — 99285 EMERGENCY DEPT VISIT HI MDM: CPT | Performed by: EMERGENCY MEDICINE

## 2021-03-01 RX ORDER — HYDROMORPHONE HCL/PF 1 MG/ML
0.5 SYRINGE (ML) INJECTION ONCE
Status: COMPLETED | OUTPATIENT
Start: 2021-03-01 | End: 2021-03-01

## 2021-03-01 RX ORDER — HYDROMORPHONE HCL/PF 1 MG/ML
0.2 SYRINGE (ML) INJECTION ONCE
Status: DISCONTINUED | OUTPATIENT
Start: 2021-03-01 | End: 2021-03-01 | Stop reason: HOSPADM

## 2021-03-01 RX ORDER — ONDANSETRON 2 MG/ML
1 INJECTION INTRAMUSCULAR; INTRAVENOUS ONCE
Status: COMPLETED | OUTPATIENT
Start: 2021-03-01 | End: 2021-03-01

## 2021-03-01 RX ORDER — ONDANSETRON 4 MG/1
4 TABLET, ORALLY DISINTEGRATING ORAL EVERY 6 HOURS PRN
Qty: 20 TABLET | Refills: 0 | Status: SHIPPED | OUTPATIENT
Start: 2021-03-01 | End: 2021-05-24 | Stop reason: ALTCHOICE

## 2021-03-01 RX ORDER — ONDANSETRON 2 MG/ML
4 INJECTION INTRAMUSCULAR; INTRAVENOUS ONCE
Status: COMPLETED | OUTPATIENT
Start: 2021-03-01 | End: 2021-03-01

## 2021-03-01 RX ORDER — OXYCODONE HYDROCHLORIDE 5 MG/1
5 TABLET ORAL EVERY 4 HOURS PRN
Qty: 12 TABLET | Refills: 0 | Status: SHIPPED | OUTPATIENT
Start: 2021-03-01 | End: 2021-03-31 | Stop reason: HOSPADM

## 2021-03-01 RX ADMIN — SODIUM CHLORIDE 1000 ML: 0.9 INJECTION, SOLUTION INTRAVENOUS at 10:03

## 2021-03-01 RX ADMIN — HYDROMORPHONE HYDROCHLORIDE 0.5 MG: 1 INJECTION, SOLUTION INTRAMUSCULAR; INTRAVENOUS; SUBCUTANEOUS at 10:03

## 2021-03-01 RX ADMIN — IOHEXOL 100 ML: 350 INJECTION, SOLUTION INTRAVENOUS at 11:08

## 2021-03-01 RX ADMIN — ONDANSETRON 4 MG: 2 INJECTION INTRAMUSCULAR; INTRAVENOUS at 10:03

## 2021-03-01 NOTE — TELEPHONE ENCOUNTER
Albino Menard is an 27-year-old female presenting to [facility] with left flank pain secondary to obstructing ureteral calculus  Patient was afebrile, hemodynamically stable without infection or acute kidney injury  Patient deferred admission  Please contact patient for as soon as possible follow-up and possible-track for intervention  Thank you

## 2021-03-01 NOTE — TELEPHONE ENCOUNTER
Patient scheduled for obstructing ureteral stone and needs fast track stone intervention with [facility] 8:15 AM - pt notified

## 2021-03-01 NOTE — DISCHARGE INSTRUCTIONS
Ureteral Stones   AMBULATORY CARE:   A ureteral stone  forms in the kidney and moves down the ureter and gets stuck there  The ureter is the tube that takes urine from the kidney to the bladder  Stones can form in the urinary system when your urine has high levels of minerals and salts  Urinary stones can be made of uric acid, calcium, phosphate, or oxalate crystals  Common signs and symptoms:   · Severe pain on your lower abdomen or groin    · Nausea and vomiting    · Urge to urinate often, or not being able to urinate easily    · Burning feeling when you urinate, or pink or red urine    Seek care immediately if:   · You have severe pain that does not improve, even after you take medicine  · You have vomiting that is not relieved by medicine  Contact your healthcare provider if:   · You develop a fever  · You have questions or concerns about your condition or care  Treatment  may include any of the following:  · NSAIDs , such as ibuprofen, help decrease swelling, pain, and fever  This medicine is available with or without a doctor's order  NSAIDs can cause stomach bleeding or kidney problems in certain people  If you take blood thinner medicine, always ask your healthcare provider if NSAIDs are safe for you  Always read the medicine label and follow directions  · Prescription pain medicine  may be given  Ask your healthcare provider how to take this medicine safely  Some prescription pain medicines contain acetaminophen  Do not take other medicines that contain acetaminophen without talking to your healthcare provider  Too much acetaminophen may cause liver damage  Prescription pain medicine may cause constipation  Ask your healthcare provider how to prevent or treat constipation  · Nausea medicine  may help calm your stomach and prevent vomiting  · A procedure or surgery  to remove the ureteral stone may be needed if it does not pass on its own      What you can do to manage uretal stones:   · Drink more liquids  Your healthcare provider may tell you to drink at least 8 to 12 (eight-ounce) cups of liquid each day  This helps flush out the stones when you urinate  Water is the best liquid to drink  Your urine will be clear (not yellow) if you are drinking enough liquid  · Strain your urine every time you go to the bathroom  Urinate through a strainer or a piece of thin cloth to catch the stone  Take the stone to your healthcare provider so it can be sent to a lab for tests  This will help your healthcare providers plan the best treatment for you  · Ask your healthcare provider about any nutrition changes you need to make  You may need to limit certain foods, such as foods high in sodium (salt) or protein  You may need to limit leafy green vegetables, carbonated drinks, or beer  These changes will depend on the kind of stones you have  · Stay active  Your stones may pass more easily if you stay active  Exercise can also help you manage your weight  Ask about the best activities for you  After you pass the ureteral stone: Your healthcare provider may  order a 24-hour urine test  Results from a 24-hour urine test will help your healthcare provider plan ways to prevent more stones from forming  Your healthcare provider will give you more instructions  Follow up with your healthcare provider as directed: You may need to return for more tests  Write down your questions so you remember to ask them during your visits  © Copyright [address] Information is for End User's use only and may not be sold, redistributed or otherwise used for commercial purposes  All illustrations and images included in CareNotes® are the copyrighted property of [employer]  or [address] [place], [place] [address]jessie   The above information is an  only  It is not intended as medical advice for individual conditions or treatments   Talk to your doctor, nurse or pharmacist before following any medical regimen to see if it is safe and effective for you

## 2021-03-01 NOTE — ED PROVIDER NOTES
History  Chief Complaint   Patient presents with    Flank Pain     pt c/o left flank pain radiating to the abdomen since this morrsridhar     Patient is an 40-year-old female past medical history of hyperlipidemia, hypertension presenting with abdominal and flank pain  Patient notes left lower quadrant/left flank pain which began at 5:00 a m  This morning, roughly 4-1/2 hours ago woke her from sleep and states that she has been continually dry heaving since that time but has not been able to vomit  She notes nausea but denies diarrhea constipation states she has not been able to eat as of yet  Denies any chest pain, shortness of breath, fevers, dizziness, urinary symptoms but states that she was found to have hematuria 1 urinalysis by her primary care and was told to follow-up with urology which has not been unable to do as of yet  She states that the pain is constant, shooting in nature and the nothing makes it better but is worse with pressure  Has not taken any medication for it has never had before  Prior to Admission Medications   Prescriptions Last Dose Informant Patient Reported? Taking?    Calcium-Magnesium-Vitamin D (CALCIUM 500 PO)  Self Yes No   Sig: Take by mouth   Cholecalciferol (EQL VITAMIN D3) 1000 units capsule  Self Yes No   Sig: Take by mouth daily     Glucos-Chondroit-Hyaluron-MSM (GLUCOSAMINE CHONDROITIN JOINT) TABS  Self Yes No   Sig: Take by mouth daily     Multiple Vitamins-Minerals (OCUVITE PO)  Self Yes No   Sig: Take by mouth daily   Omega-3 Fatty Acids (FISH OIL) 1000 MG CPDR  Self Yes No   Sig: Take by mouth   amLODIPine (NORVASC) 5 mg tablet   No No   Sig: Take 1 tablet (5 mg total) by mouth daily   Patient taking differently: Take 10 mg by mouth daily    ascorbic acid (VITAMIN C) 500 mg tablet   Yes No   Sig: Take 500 mg by mouth daily   aspirin (ECOTRIN LOW STRENGTH) 81 mg EC tablet   Yes No   diclofenac sodium (VOLTAREN) 1 %   No No   Sig: Apply 2 g topically 3 (three) times a day   metoprolol succinate (TOPROL-XL) 50 mg 24 hr tablet   No No   Sig: TAKE 1 TABLET DAILY      Facility-Administered Medications: None       Past Medical History:   Diagnosis Date    H/O nonmelanoma skin cancer     last assessed 17    Hypertension        Past Surgical History:   Procedure Laterality Date    APPENDECTOMY  2010    BLADDER SURGERY  1997    BREAST EXCISIONAL BIOPSY Left 1995    benign    no visible scar    CERVIX SURGERY      dilation and curettage of cervical stump , , [address]      partial - last assessed 10/28/14    COLONOSCOPY      last assessed 17    HERNIA REPAIR  2011    TONSILLECTOMY  1971       Family History   Problem Relation Age of Onset    Arthritis Mother     Osteoporosis Mother     Stroke Mother     Heart disease Father     Osteoporosis Father     Breast cancer Family     Osteoporosis Family     Breast cancer additional onset Family     No Known Problems Sister     No Known Problems Daughter     No Known Problems Maternal Grandmother     No Known Problems Maternal Grandfather     Breast cancer Paternal Grandmother     No Known Problems Paternal Grandfather     No Known Problems Sister      I have reviewed and agree with the history as documented  E-Cigarette/Vaping     E-Cigarette/Vaping Substances     Social History     Tobacco Use    Smoking status: Former Smoker     Quit date:      Years since quittin 1    Smokeless tobacco: Never Used   Substance Use Topics    Alcohol use: Yes     Comment: occasionally wine    Drug use: No       Review of Systems   All other systems reviewed and are negative  Physical Exam  Physical Exam  Vitals signs reviewed  Constitutional:       General: She is not in acute distress  Appearance: Normal appearance  She is not ill-appearing     HENT:      Mouth/Throat:      Mouth: Mucous membranes are moist    Eyes:      Conjunctiva/sclera: Conjunctivae normal    Neck:      Musculoskeletal: Neck supple  Cardiovascular:      Rate and Rhythm: Normal rate and regular rhythm  Heart sounds: Normal heart sounds  Pulmonary:      Effort: Pulmonary effort is normal       Breath sounds: Normal breath sounds  Abdominal:      General: Abdomen is flat  Palpations: Abdomen is soft  Tenderness: There is abdominal tenderness  There is right CVA tenderness and left CVA tenderness  There is no guarding  Comments: Left lower quadrant tenderness without guarding   Musculoskeletal: Normal range of motion  General: No swelling  Skin:     General: Skin is warm and dry  Neurological:      General: No focal deficit present  Mental Status: She is alert     Psychiatric:         Mood and Affect: Mood normal          Vital Signs  ED Triage Vitals [03/01/21 0927]   Temperature Pulse Respirations Blood Pressure SpO2   97 5 °F (36 4 °C) 68 16 140/71 98 %      Temp Source Heart Rate Source Patient Position - Orthostatic VS BP Location FiO2 (%)   Oral Monitor Lying Right arm --      Pain Score       9           Vitals:    03/01/21 1030 03/01/21 1215 03/01/21 1230 03/01/21 1300   BP: 111/60 128/75 144/82 125/73   Pulse: 66 79 79 73   Patient Position - Orthostatic VS:             Visual Acuity      ED Medications  Medications   HYDROmorphone (DILAUDID) injection 0 2 mg (0 2 mg Intravenous Not Given 3/1/21 1359)   ondansetron (FOR EMS ONLY) (ZOFRAN) 4 mg/2 mL injection 4 mg (0 mg Does not apply Given to EMS 3/1/21 0931)   ondansetron (ZOFRAN) injection 4 mg (4 mg Intravenous Given 3/1/21 1003)   HYDROmorphone (DILAUDID) injection 0 5 mg (0 5 mg Intravenous Given 3/1/21 1003)   sodium chloride 0 9 % bolus 1,000 mL (0 mL Intravenous Stopped 3/1/21 1205)   iohexol (OMNIPAQUE) 350 MG/ML injection (MULTI-DOSE) 100 mL (100 mL Intravenous Given 3/1/21 1108)       Diagnostic Studies  Results Reviewed     Procedure Component Value Units Date/Time    Urine Microscopic [[id number]]  (Abnormal) Collected: 03/01/21 1213    Lab Status: Final result Specimen: Urine, Clean Catch Updated: 03/01/21 1236     RBC, UA 10-20 /hpf      WBC, UA None Seen /hpf      Epithelial Cells Occasional /hpf      Bacteria, UA None Seen /hpf     UA w Reflex to Microscopic w Reflex to Culture [[id number]]  (Abnormal) Collected: 03/01/21 1213    Lab Status: Final result Specimen: Urine, Clean Catch Updated: 03/01/21 1221     Color, UA Light Yellow     Clarity, UA Clear     Specific Gravity, UA 1 010     pH, UA 7 0     Leukocytes, UA Negative     Nitrite, UA Negative     Protein, UA Negative mg/dl      Glucose, UA Negative mg/dl      Ketones, UA Negative mg/dl      Urobilinogen, UA 0 2 E U /dl      Bilirubin, UA Negative     Blood, UA Large    Comprehensive metabolic panel [[id number]]  (Abnormal) Collected: 03/01/21 1003    Lab Status: Final result Specimen: Blood from Arm, Left Updated: 03/01/21 1049     Sodium 144 mmol/L      Potassium 3 6 mmol/L      Chloride 107 mmol/L      CO2 30 mmol/L      ANION GAP 7 mmol/L      BUN 17 mg/dL      Creatinine 1 26 mg/dL      Glucose 133 mg/dL      Calcium 8 8 mg/dL      Corrected Calcium 9 4 mg/dL      AST 17 U/L      ALT 27 U/L      Alkaline Phosphatase 55 U/L      Total Protein 6 5 g/dL      Albumin 3 3 g/dL      Total Bilirubin 0 40 mg/dL      eGFR 40 ml/min/1 73sq m     Narrative:      [place] guidelines for Chronic Kidney Disease (CKD):     Stage 1 with normal or high GFR (GFR > 90 mL/min/1 73 square meters)    Stage 2 Mild CKD (GFR = 60-89 mL/min/1 73 square meters)    Stage 3A Moderate CKD (GFR = 45-59 mL/min/1 73 square meters)    Stage 3B Moderate CKD (GFR = 30-44 mL/min/1 73 square meters)    Stage 4 Severe CKD (GFR = 15-29 mL/min/1 73 square meters)    Stage 5 End Stage CKD (GFR <15 mL/min/1 73 square meters)  Note: GFR calculation is accurate only with a steady state creatinine    Lipase [[id number]]  (Normal) Collected: 03/01/21 1003    Lab Status: Final result Specimen: Blood from Arm, Left Updated: 03/01/21 1049     Lipase 132 u/L     CBC and differential [[id number]]  (Abnormal) Collected: 03/01/21 1003    Lab Status: Final result Specimen: Blood from Arm, Left Updated: 03/01/21 1028     WBC 8 42 Thousand/uL      RBC 4 78 Million/uL      Hemoglobin 14 8 g/dL      Hematocrit 44 0 %      MCV 92 fL      MCH 31 0 pg      MCHC 33 6 g/dL      RDW 12 5 %      MPV 8 4 fL      Platelets 457 Thousands/uL      nRBC 0 /100 WBCs      Neutrophils Relative 88 %      Immat GRANS % 1 %      Lymphocytes Relative 7 %      Monocytes Relative 4 %      Eosinophils Relative 0 %      Basophils Relative 0 %      Neutrophils Absolute 7 43 Thousands/µL      Immature Grans Absolute 0 04 Thousand/uL      Lymphocytes Absolute 0 60 Thousands/µL      Monocytes Absolute 0 30 Thousand/µL      Eosinophils Absolute 0 02 Thousand/µL      Basophils Absolute 0 03 Thousands/µL                  CT abdomen pelvis with contrast   Final Result by Shan Barrera DO (03/01 1144)      There is a calculus present just distal to the left UP junction measuring 8 mm x 2 mm with mild resulting left-sided hydronephrosis  Extensive colonic diverticulosis involving primarily the left colon and sigmoid colon with no acute inflammation to suggest diverticulitis  Stable mild hepatic steatosis  There is a trace amount of fluid in the endometrial cavity, atypical for a postmenopausal female  Consider pelvic ultrasound follow-up  This examination was marked "immediate notification" in Epic in order to begin the standard process by which the radiology reading room liaison alerts the referring practitioner  Workstation performed: [id number]                    Procedures  Procedures         ED Course  ED Course as of Mar 01 1420   Mon Mar 01, 2021   1233 Patient has a by 2 mm stone with hydronephrosis, no KYRA, no signs of infection    Will discuss with urology disposition of patient  Patient scheduled to receive COVID vaccination today and I have approved had nurse may administer this to her in the ED       1318 Patient has 8 mm stone with left hydronephrosis but no KYRA, no signs of infection on urinalysis  Have discussed with Urology who states that patient can be pain controlled she may be discharged home  Patient states that she is now comfortable, will give short course of narcotic pain medication, have discussed return precautions of uncontrolled pain, vomiting, fevers, lightheadedness or passing out patient states she understands  SBIRT 22yo+      Most Recent Value   SBIRT (22 yo +)   In order to provide better care to our patients, we are screening all of our patients for alcohol and drug use  Would it be okay to ask you these screening questions? Yes Filed at: 03/01/2021 0932   Initial Alcohol Screen: US AUDIT-C    1  How often do you have a drink containing alcohol? 1 Filed at: 03/01/2021 0932   2  How many drinks containing alcohol do you have on a typical day you are drinking? 0 Filed at: 03/01/2021 0932   3b  FEMALE Any Age, or MALE 65+: How often do you have 4 or more drinks on one occassion? 0 Filed at: 03/01/2021 0932   Audit-C Score  1 Filed at: 03/01/2021 8376   JEAN PIERRE: How many times in the past year have you    Used an illegal drug or used a prescription medication for non-medical reasons? Never Filed at: 03/01/2021 0932                    MDM  Number of Diagnoses or Management Options  Diagnosis management comments: Patient is an 43-year-old female past medical history of hyperlipidemia, hypertension presenting with abdominal pain  Patient is well-appearing bedside stable vitals and in no acute distress  She has mild bilateral CVA tenderness and left lower quadrant tenderness which is mild without guarding    Differential includes but is not limited to nephrolithiasis, diverticulitis, peptic ulcer disease, pyelonephritis, small-bowel obstruction, less likely pancreatitis, reproductive pathology, musculoskeletal pain  Will administer pain control, fluids, anti emetic and obtain labs, urinalysis, CT abdomen pelvis  Disposition  Final diagnoses:   Ureterolithiasis     Time reflects when diagnosis was documented in both MDM as applicable and the Disposition within this note     Time User Action Codes Description Comment    3/1/2021  1:18 PM Amarjit Bourne Add [N20 1] Ureterolithiasis       ED Disposition     ED Disposition Condition Date/Time Comment    Discharge Stable Mon Mar 1, 2021  1:18 PM [facility] Day discharge to home/self care  Follow-up Information     Follow up With Specialties Details Why Contact Info Additional [address] [facility] [address] [place] [facility] Urology Schedule an appointment as soon as possible for a visit in 2 week(s) Urology--staff will contact patient/caregiver with hospital follow-up appointment date and time once discharged   [address]  [facility], [address] [place] [address] [address] [facility], [facility] [address], [facility], [place], [address]          Discharge Medication List as of 3/1/2021  1:20 PM      START taking these medications    Details   ondansetron (ZOFRAN-ODT) 4 mg disintegrating tablet Take 1 tablet (4 mg total) by mouth every 6 (six) hours as needed for nausea or vomiting, Starting Mon 3/1/2021, Normal      oxyCODONE (ROXICODONE) 5 mg immediate release tablet Take 1 tablet (5 mg total) by mouth every 4 (four) hours as needed for moderate pain for up to 12 dosesMax Daily Amount: 30 mg, Starting Mon 3/1/2021, Print         CONTINUE these medications which have NOT CHANGED    Details   amLODIPine (NORVASC) 5 mg tablet Take 1 tablet (5 mg total) by mouth daily, Starting Mon 1/18/2021, Normal      ascorbic acid (VITAMIN C) 500 mg tablet Take 500 mg by mouth daily, Historical Med      aspirin (ECOTRIN LOW STRENGTH) 81 mg EC tablet Starting Wed 12/11/2019, Historical Med      Calcium-Magnesium-Vitamin D (CALCIUM 500 PO) Take by mouth, Historical Med      Cholecalciferol (EQL VITAMIN D3) 1000 units capsule Take by mouth daily  , Historical Med      diclofenac sodium (VOLTAREN) 1 % Apply 2 g topically 3 (three) times a day, Starting Thu 11/14/2019, Until Tue 1/5/2021, Normal      Glucos-Chondroit-Hyaluron-MSM (GLUCOSAMINE CHONDROITIN JOINT) TABS Take by mouth daily  , Historical Med      metoprolol succinate (TOPROL-XL) 50 mg 24 hr tablet TAKE 1 TABLET DAILY, Normal      Multiple Vitamins-Minerals (OCUVITE PO) Take by mouth daily, Historical Med      Omega-3 Fatty Acids (FISH OIL) 1000 MG CPDR Take by mouth, Historical Med           No discharge procedures on file      PDMP Review     None          ED Provider  Electronically Signed by           Ayesha Abbasi DO  03/01/21 6900

## 2021-03-02 ENCOUNTER — OFFICE VISIT (OUTPATIENT)
Dept: UROLOGY | Facility: CLINIC | Age: 81
End: 2021-03-02
Payer: MEDICARE

## 2021-03-02 VITALS
DIASTOLIC BLOOD PRESSURE: 80 MMHG | WEIGHT: 224 LBS | HEIGHT: 64 IN | HEART RATE: 64 BPM | BODY MASS INDEX: 38.24 KG/M2 | SYSTOLIC BLOOD PRESSURE: 110 MMHG

## 2021-03-02 DIAGNOSIS — N20.1 URETERAL CALCULUS, LEFT: Primary | ICD-10-CM

## 2021-03-02 LAB
SL AMB  POCT GLUCOSE, UA: NORMAL
SL AMB LEUKOCYTE ESTERASE,UA: NORMAL
SL AMB POCT BILIRUBIN,UA: NORMAL
SL AMB POCT BLOOD,UA: NORMAL
SL AMB POCT CLARITY,UA: CLEAR
SL AMB POCT COLOR,UA: YELLOW
SL AMB POCT KETONES,UA: NORMAL
SL AMB POCT NITRITE,UA: NORMAL
SL AMB POCT PH,UA: 5
SL AMB POCT SPECIFIC GRAVITY,UA: 1.02
SL AMB POCT URINE PROTEIN: NORMAL
SL AMB POCT UROBILINOGEN: NORMAL

## 2021-03-02 PROCEDURE — 87086 URINE CULTURE/COLONY COUNT: CPT | Performed by: UROLOGY

## 2021-03-02 PROCEDURE — 99204 OFFICE O/P NEW MOD 45 MIN: CPT | Performed by: UROLOGY

## 2021-03-02 PROCEDURE — 81002 URINALYSIS NONAUTO W/O SCOPE: CPT | Performed by: UROLOGY

## 2021-03-02 NOTE — PROGRESS NOTES
Referring Physician: Kevin Sicard, MD  A copy of this consultation note was communicated to the referring physician  Diagnoses and all orders for this visit:    Ureteral calculus, left  -     POCT urine dip  -     Urine culture  -     Case request operating room: CYSTOSCOPY URETEROSCOPY WITH LITHOTRIPSY HOLMIUM LASER, RETROGRADE PYELOGRAM AND INSERTION STENT URETERAL; Standing  -     Case request operating room: CYSTOSCOPY URETEROSCOPY WITH LITHOTRIPSY HOLMIUM LASER, RETROGRADE PYELOGRAM AND INSERTION STENT URETERAL    Other orders  -     Diet NPO; Sips with meds; Standing  -     Place sequential compression device; Standing  -     ceFAZolin (ANCEF) 2,000 mg in dextrose 5 % 100 mL IVPB            Assessment and plan:     Padmaja Menard is a [de-identified] y o  with a 8mm left ureteral with obstruction  Discussed options for management of the patient's ureteral stone  We discussed surgical options including ureteroscopy and shock wave lithotripsy  In addition we discussed conservative management with medical expulsive therapy  The patient has elected to undergo ureteroscopy  I discussed with the patients risks and benefits and alternatives to ureteroscopy with laser lithotripsy  The risks include bleeding, infection, injury to the urethra, bladder, ureter or kidney, risk of a staged procedure, risk of stricture, risk of residual fragments, risk of loss of kidney, risks of anesthesia including DVT, PE, MI and death  The patient understands that a ureteral stent will likely be left in place at the time of the procedure  We reviewed the expected postoperative care  although the patient is currently very comfortable and generally asymptomatic and has not required any oral pain or anti emetics since leaving the emergency department, he does prefer to set a date for surgical intervention  I believe this is very reasonable specially given the proximal location and the size of her stone        Patient lives by herself until the [place]  He requests her surgery at the [facility]  He will be scheduled for the next available surgical date  ER precautions were provided  She did not request any additional medications today      The patient understands these risks and has provided informed consent for LEFT ureteroscopy  Shaheen Felton MD      Chief Complaint     Nephrolithiasis      History of Present Illness     Padmaja Menard is a [de-identified] y o  male referred in consultation by Erasmo Michel MD for nephrolithiasis  Patient recently presented with flank pain  They underwent imaging on  April 1st which revealed  A solitary 8 mm left proximal ureteral calculus with hydronephrosis  Contralateral right kidney does have some hydronephrotic changes as well  Patient  has no prior history of nephrolithiasis   Pertinent medical  comorbidities include   Obesity  Patient is retired    She lives on her own into the [place]  SHe is currently asymptomatic and remarkably well-appearing        Detailed Urologic History     - please refer to HPI    Review of Systems       Review of Systems   Constitutional: Negative for activity change and fatigue  HENT: Negative for congestion  Eyes: Negative for visual disturbance  Respiratory: Negative for shortness of breath and wheezing  Cardiovascular: Negative for chest pain and leg swelling  Gastrointestinal: Negative for abdominal pain  Endocrine: Negative for polyuria  Genitourinary: Positive for flank pain  Negative for dysuria, hematuria and urgency  Musculoskeletal: Negative for back pain  Allergic/Immunologic: Negative for immunocompromised state  Neurological: Negative for dizziness and numbness  Psychiatric/Behavioral: Negative for dysphoric mood  All other systems reviewed and are negative            Allergies     Allergies   Allergen Reactions    Azithromycin Dizziness    Morphine Vomiting       Physical Exam       Physical Exam Constitutional: Oriented to person, place, and time  Appears well-developed and well-nourished  No distress  HENT:   Head: Normocephalic and atraumatic  Eyes: EOM are normal    Neck: Normal range of motion  Cardiovascular: Normal rate  Pulmonary/Chest: Effort normal and breath sounds normal    Abdominal: Soft  Genitourinary:   Genitourinary Comments: Mild CVA tenderness   Musculoskeletal: Normal range of motion  Neurological: Aalert and oriented to person, place, and time  Skin: Skin is warm  Psychiatric: Nnormal mood and affect  Behavior is normal        Vital Signs  There were no vitals filed for this visit        Current Medications       Current Outpatient Medications:     amLODIPine (NORVASC) 5 mg tablet, Take 1 tablet (5 mg total) by mouth daily (Patient taking differently: Take 10 mg by mouth daily ), Disp: 90 tablet, Rfl: 3    ascorbic acid (VITAMIN C) 500 mg tablet, Take 500 mg by mouth daily, Disp: , Rfl:     aspirin (ECOTRIN LOW STRENGTH) 81 mg EC tablet, , Disp: , Rfl:     Calcium-Magnesium-Vitamin D (CALCIUM 500 PO), Take by mouth, Disp: , Rfl:     Cholecalciferol (EQL VITAMIN D3) 1000 units capsule, Take by mouth daily  , Disp: , Rfl:     diclofenac sodium (VOLTAREN) 1 %, Apply 2 g topically 3 (three) times a day, Disp: 180 g, Rfl: 2    Glucos-Chondroit-Hyaluron-MSM (GLUCOSAMINE CHONDROITIN JOINT) TABS, Take by mouth daily  , Disp: , Rfl:     metoprolol succinate (TOPROL-XL) 50 mg 24 hr tablet, TAKE 1 TABLET DAILY, Disp: 90 tablet, Rfl: 4    Multiple Vitamins-Minerals (OCUVITE PO), Take by mouth daily, Disp: , Rfl:     Omega-3 Fatty Acids (FISH OIL) 1000 MG CPDR, Take by mouth, Disp: , Rfl:     ondansetron (ZOFRAN-ODT) 4 mg disintegrating tablet, Take 1 tablet (4 mg total) by mouth every 6 (six) hours as needed for nausea or vomiting, Disp: 20 tablet, Rfl: 0    oxyCODONE (ROXICODONE) 5 mg immediate release tablet, Take 1 tablet (5 mg total) by mouth every 4 (four) hours as needed for moderate pain for up to 12 dosesMax Daily Amount: 30 mg, Disp: 12 tablet, Rfl: 0  No current facility-administered medications for this visit         Active Problems     Patient Active Problem List   Diagnosis    Hyperlipidemia    Obesity    Essential hypertension    Seborrheic keratosis    History of skin cancer    Screening for skin condition    Bronchitis    Ureteral calculus, left         Past Medical History     Past Medical History:   Diagnosis Date    H/O nonmelanoma skin cancer     last assessed 17    Hypertension          Surgical History     Past Surgical History:   Procedure Laterality Date    APPEN[address]CTOMY     [address]    BREAST EXCISIONAL BIOPSY Left     benign    no visible scar    CERVIX SURGERY      dilation and curettage of cervical stump , ,     CHOLECYSTECTOMY      COLECTOMY      partial - last assessed 10/28/14    COLONOSCOPY      last assessed 17    HERNIA REPAIR  2011    TONSILLECTOMY  1971         Family History     Family History   Problem Relation Age of Onset    Arthritis Mother     Osteoporosis Mother     Stroke Mother     Heart disease Father     Osteoporosis Father     Breast cancer Family     Osteoporosis Family     Breast cancer additional onset Family     No Known Problems Sister     No Known Problems Daughter     No Known Problems Maternal Grandmother     No Known Problems Maternal Grandfather     Breast cancer Paternal Grandmother     No Known Problems Paternal Grandfather     No Known Problems Sister          Social History     Social History     Social History     Tobacco Use   Smoking Status Former Smoker    Quit date:     Years since quittin 1   Smokeless Tobacco Never Used         Pertinent Lab Values     Lab Results   Component Value Date    CREATININE 1 2021       No results found for: PSA    @RESULTRCNT(1H])@      Pertinent Imaging      - n/a      Portions of the record may have been created with voice recognition software   Occasional wrong word or "sound a like" substitutions may have occurred due to the inherent limitations of voice recognition software   Read the chart carefully and recognize, using context, where substitutions have occurred

## 2021-03-03 LAB — BACTERIA UR CULT: NORMAL

## 2021-03-03 NOTE — TELEPHONE ENCOUNTER
Patient called in to speak with surgery scheduler to set-up procedure   Patient can be reached at [phone]

## 2021-03-04 ENCOUNTER — PREP FOR PROCEDURE (OUTPATIENT)
Dept: UROLOGY | Facility: AMBULATORY SURGERY CENTER | Age: 81
End: 2021-03-04

## 2021-03-04 DIAGNOSIS — Z01.810 PREOPERATIVE CARDIOVASCULAR EXAMINATION: ICD-10-CM

## 2021-03-04 DIAGNOSIS — N20.1 URETERAL CALCULUS, LEFT: Primary | ICD-10-CM

## 2021-03-04 DIAGNOSIS — R39.89 SUSPECTED UTI: ICD-10-CM

## 2021-03-04 DIAGNOSIS — Z01.818 PREOPERATIVE EXAMINATION: ICD-10-CM

## 2021-03-04 NOTE — TELEPHONE ENCOUNTER
I spoke with patient to let her know I am working on her case  I am currently holding a date for 4/8, which works for her  It is with Dr Ramon Carlisle so I will need to confirm date with him, and I will get back to her once Dr Ramon Carlisle confirms  Pt in agreement to plan

## 2021-03-05 NOTE — TELEPHONE ENCOUNTER
I called and left a message to let patient know that surgery date is confirmed with Dr Kimberly Hernandez for 4/8 at [facility]  I mentioned that she is scheduled, orders for her preadmission testing are in, and I advised her to call me back at the office if she has any additional questions

## 2021-03-16 ENCOUNTER — TELEPHONE (OUTPATIENT)
Dept: FAMILY MEDICINE CLINIC | Facility: MEDICAL CENTER | Age: 81
End: 2021-03-16

## 2021-03-16 NOTE — TELEPHONE ENCOUNTER
I returned pt 's call and was able to answer all of her questions to her satisfaction  I also gave pt Sharon Black at [facility] phone number so she can get scheduled for her nurse call prior to surgery  Pt was very thankful and was instructed to call our office back if she had any other questions

## 2021-03-16 NOTE — TELEPHONE ENCOUNTER
Pt wants to let you know that she has an 8 mm kidney stone that will be removed on 4/8  She has taken some bp readings and if you need them she will be happy to provide them  Thank you

## 2021-03-22 ENCOUNTER — TELEPHONE (OUTPATIENT)
Dept: FAMILY MEDICINE CLINIC | Facility: MEDICAL CENTER | Age: 81
End: 2021-03-22

## 2021-03-22 NOTE — TELEPHONE ENCOUNTER
PT lm on VM  She is taking 7 5 mgs of Norvasac   If she is to stay on that dose she will need new RX sent to [facility] for local supply and to [facility] for mail order

## 2021-03-23 ENCOUNTER — APPOINTMENT (OUTPATIENT)
Dept: LAB | Facility: HOSPITAL | Age: 81
End: 2021-03-23
Attending: UROLOGY
Payer: MEDICARE

## 2021-03-23 ENCOUNTER — OFFICE VISIT (OUTPATIENT)
Dept: LAB | Facility: HOSPITAL | Age: 81
End: 2021-03-23
Attending: UROLOGY
Payer: MEDICARE

## 2021-03-23 DIAGNOSIS — N20.1 URETERAL CALCULUS, LEFT: ICD-10-CM

## 2021-03-23 DIAGNOSIS — Z01.810 PREOPERATIVE CARDIOVASCULAR EXAMINATION: ICD-10-CM

## 2021-03-23 DIAGNOSIS — Z01.818 PREOPERATIVE EXAMINATION: ICD-10-CM

## 2021-03-23 DIAGNOSIS — I10 ESSENTIAL HYPERTENSION: Primary | ICD-10-CM

## 2021-03-23 DIAGNOSIS — R39.89 SUSPECTED UTI: ICD-10-CM

## 2021-03-23 LAB
ATRIAL RATE: 57 BPM
P AXIS: 6 DEGREES
PR INTERVAL: 168 MS
QRS AXIS: -24 DEGREES
QRSD INTERVAL: 86 MS
QT INTERVAL: 426 MS
QTC INTERVAL: 414 MS
T WAVE AXIS: 29 DEGREES
VENTRICULAR RATE: 57 BPM

## 2021-03-23 PROCEDURE — 87086 URINE CULTURE/COLONY COUNT: CPT

## 2021-03-23 PROCEDURE — 93005 ELECTROCARDIOGRAM TRACING: CPT

## 2021-03-23 PROCEDURE — 93010 ELECTROCARDIOGRAM REPORT: CPT | Performed by: INTERNAL MEDICINE

## 2021-03-23 RX ORDER — AMLODIPINE BESYLATE 5 MG/1
5 TABLET ORAL DAILY
Qty: 90 TABLET | Refills: 1 | Status: SHIPPED | OUTPATIENT
Start: 2021-03-23 | End: 2021-11-16

## 2021-03-23 RX ORDER — AMLODIPINE BESYLATE 5 MG/1
5 TABLET ORAL DAILY
Qty: 15 TABLET | Refills: 1 | Status: SHIPPED | OUTPATIENT
Start: 2021-03-23 | End: 2021-03-23 | Stop reason: SDUPTHER

## 2021-03-23 RX ORDER — AMLODIPINE BESYLATE 2.5 MG/1
2.5 TABLET ORAL DAILY
Qty: 15 TABLET | Refills: 1 | Status: SHIPPED | OUTPATIENT
Start: 2021-03-23 | End: 2021-03-23 | Stop reason: SDUPTHER

## 2021-03-23 RX ORDER — AMLODIPINE BESYLATE 2.5 MG/1
2.5 TABLET ORAL DAILY
Qty: 90 TABLET | Refills: 1 | Status: SHIPPED | OUTPATIENT
Start: 2021-03-23 | End: 2021-04-22

## 2021-03-23 NOTE — TELEPHONE ENCOUNTER
Thanks for sending blood pressures  They look good and if she has no swelling with 7 5 mg of amlodipine I would discontinue this dose  Rx sent

## 2021-03-23 NOTE — TELEPHONE ENCOUNTER
S/w patient    She will attempt to upload her blood pressure log in a My Chart message, if unsuccessful, she will call back

## 2021-03-23 NOTE — TELEPHONE ENCOUNTER
I looked is some blood pressure readings at urology and they looked okay  She said that she has some recent readings; can she send them to us?

## 2021-03-24 LAB — BACTERIA UR CULT: NORMAL

## 2021-03-25 DIAGNOSIS — I10 ESSENTIAL HYPERTENSION: ICD-10-CM

## 2021-03-26 RX ORDER — METOPROLOL SUCCINATE 50 MG/1
TABLET, EXTENDED RELEASE ORAL
Qty: 90 TABLET | Refills: 3 | Status: SHIPPED | OUTPATIENT
Start: 2021-03-26 | End: 2022-02-28

## 2021-03-28 ENCOUNTER — NURSE TRIAGE (OUTPATIENT)
Dept: OTHER | Facility: OTHER | Age: 81
End: 2021-03-28

## 2021-03-28 ENCOUNTER — HOSPITAL ENCOUNTER (INPATIENT)
Facility: HOSPITAL | Age: 81
LOS: 2 days | Discharge: HOME | DRG: 661 | End: 2021-03-31
Attending: EMERGENCY MEDICINE | Admitting: FAMILY MEDICINE
Payer: MEDICARE

## 2021-03-28 DIAGNOSIS — N20.1 URETERAL STONE: Primary | ICD-10-CM

## 2021-03-28 DIAGNOSIS — N13.9 ACUTE UNILATERAL OBSTRUCTIVE UROPATHY: ICD-10-CM

## 2021-03-28 DIAGNOSIS — N20.1 URETERAL CALCULUS, LEFT: ICD-10-CM

## 2021-03-28 DIAGNOSIS — R10.9 ACUTE LEFT FLANK PAIN: ICD-10-CM

## 2021-03-28 LAB
ANION GAP SERPL CALCULATED.3IONS-SCNC: 7 MMOL/L (ref 4–13)
BACTERIA UR QL AUTO: ABNORMAL /HPF
BASOPHILS # BLD AUTO: 0.04 THOUSANDS/ΜL (ref 0–0.1)
BASOPHILS NFR BLD AUTO: 0 % (ref 0–1)
BILIRUB UR QL STRIP: ABNORMAL
BUN SERPL-MCNC: 11 MG/DL (ref 5–25)
CALCIUM SERPL-MCNC: 9.4 MG/DL (ref 8.3–10.1)
CHLORIDE SERPL-SCNC: 109 MMOL/L (ref 100–108)
CLARITY UR: ABNORMAL
CO2 SERPL-SCNC: 25 MMOL/L (ref 21–32)
COLOR UR: ABNORMAL
COLOR, POC: ABNORMAL
CREAT SERPL-MCNC: 1.07 MG/DL (ref 0.6–1.3)
EOSINOPHIL # BLD AUTO: 0.1 THOUSAND/ΜL (ref 0–0.61)
EOSINOPHIL NFR BLD AUTO: 1 % (ref 0–6)
ERYTHROCYTE [DISTWIDTH] IN BLOOD BY AUTOMATED COUNT: 12.4 % (ref 11.6–15.1)
GFR SERPL CREATININE-BSD FRML MDRD: 49 ML/MIN/1.73SQ M
GLUCOSE SERPL-MCNC: 97 MG/DL (ref 65–140)
GLUCOSE UR STRIP-MCNC: NEGATIVE MG/DL
HCT VFR BLD AUTO: 45.1 % (ref 34.8–46.1)
HGB BLD-MCNC: 15.3 G/DL (ref 11.5–15.4)
HGB UR QL STRIP.AUTO: ABNORMAL
IMM GRANULOCYTES # BLD AUTO: 0.05 THOUSAND/UL (ref 0–0.2)
IMM GRANULOCYTES NFR BLD AUTO: 1 % (ref 0–2)
KETONES UR STRIP-MCNC: ABNORMAL MG/DL
LEUKOCYTE ESTERASE UR QL STRIP: ABNORMAL
LYMPHOCYTES # BLD AUTO: 1.36 THOUSANDS/ΜL (ref 0.6–4.47)
LYMPHOCYTES NFR BLD AUTO: 15 % (ref 14–44)
MCH RBC QN AUTO: 31.2 PG (ref 26.8–34.3)
MCHC RBC AUTO-ENTMCNC: 33.9 G/DL (ref 31.4–37.4)
MCV RBC AUTO: 92 FL (ref 82–98)
MONOCYTES # BLD AUTO: 0.93 THOUSAND/ΜL (ref 0.17–1.22)
MONOCYTES NFR BLD AUTO: 10 % (ref 4–12)
NEUTROPHILS # BLD AUTO: 6.67 THOUSANDS/ΜL (ref 1.85–7.62)
NEUTS SEG NFR BLD AUTO: 73 % (ref 43–75)
NITRITE UR QL STRIP: NEGATIVE
NON-SQ EPI CELLS URNS QL MICRO: ABNORMAL /HPF
NRBC BLD AUTO-RTO: 0 /100 WBCS
PH UR STRIP.AUTO: 5.5 [PH] (ref 4.5–8)
PLATELET # BLD AUTO: 234 THOUSANDS/UL (ref 149–390)
PMV BLD AUTO: 8.4 FL (ref 8.9–12.7)
POTASSIUM SERPL-SCNC: 3.6 MMOL/L (ref 3.5–5.3)
PROT UR STRIP-MCNC: ABNORMAL MG/DL
RBC # BLD AUTO: 4.9 MILLION/UL (ref 3.81–5.12)
RBC #/AREA URNS AUTO: ABNORMAL /HPF
SODIUM SERPL-SCNC: 141 MMOL/L (ref 136–145)
SP GR UR STRIP.AUTO: >=1.03 (ref 1–1.03)
UROBILINOGEN UR QL STRIP.AUTO: 0.2 E.U./DL
WBC # BLD AUTO: 9.15 THOUSAND/UL (ref 4.31–10.16)
WBC #/AREA URNS AUTO: ABNORMAL /HPF

## 2021-03-28 PROCEDURE — 80048 BASIC METABOLIC PNL TOTAL CA: CPT | Performed by: EMERGENCY MEDICINE

## 2021-03-28 PROCEDURE — 85025 COMPLETE CBC W/AUTO DIFF WBC: CPT | Performed by: EMERGENCY MEDICINE

## 2021-03-28 PROCEDURE — 99220 PR INITIAL OBSERVATION CARE/DAY 70 MINUTES: CPT | Performed by: INTERNAL MEDICINE

## 2021-03-28 PROCEDURE — 36415 COLL VENOUS BLD VENIPUNCTURE: CPT | Performed by: EMERGENCY MEDICINE

## 2021-03-28 PROCEDURE — 99285 EMERGENCY DEPT VISIT HI MDM: CPT

## 2021-03-28 PROCEDURE — 87086 URINE CULTURE/COLONY COUNT: CPT | Performed by: EMERGENCY MEDICINE

## 2021-03-28 PROCEDURE — 96365 THER/PROPH/DIAG IV INF INIT: CPT

## 2021-03-28 PROCEDURE — 99285 EMERGENCY DEPT VISIT HI MDM: CPT | Performed by: EMERGENCY MEDICINE

## 2021-03-28 PROCEDURE — 1123F ACP DISCUSS/DSCN MKR DOCD: CPT | Performed by: EMERGENCY MEDICINE

## 2021-03-28 PROCEDURE — 81001 URINALYSIS AUTO W/SCOPE: CPT

## 2021-03-28 PROCEDURE — 96375 TX/PRO/DX INJ NEW DRUG ADDON: CPT

## 2021-03-28 PROCEDURE — 87086 URINE CULTURE/COLONY COUNT: CPT

## 2021-03-28 RX ORDER — ACETAMINOPHEN 325 MG/1
975 TABLET ORAL EVERY 8 HOURS SCHEDULED
Status: DISCONTINUED | OUTPATIENT
Start: 2021-03-28 | End: 2021-03-31 | Stop reason: HOSPADM

## 2021-03-28 RX ORDER — SODIUM CHLORIDE, SODIUM GLUCONATE, SODIUM ACETATE, POTASSIUM CHLORIDE, MAGNESIUM CHLORIDE, SODIUM PHOSPHATE, DIBASIC, AND POTASSIUM PHOSPHATE .53; .5; .37; .037; .03; .012; .00082 G/100ML; G/100ML; G/100ML; G/100ML; G/100ML; G/100ML; G/100ML
500 INJECTION, SOLUTION INTRAVENOUS ONCE
Status: COMPLETED | OUTPATIENT
Start: 2021-03-28 | End: 2021-03-28

## 2021-03-28 RX ORDER — AMLODIPINE BESYLATE 5 MG/1
5 TABLET ORAL DAILY
Status: DISCONTINUED | OUTPATIENT
Start: 2021-03-29 | End: 2021-03-31 | Stop reason: HOSPADM

## 2021-03-28 RX ORDER — ASCORBIC ACID 500 MG
500 TABLET ORAL DAILY
Status: DISCONTINUED | OUTPATIENT
Start: 2021-03-29 | End: 2021-03-29

## 2021-03-28 RX ORDER — ONDANSETRON 2 MG/ML
4 INJECTION INTRAMUSCULAR; INTRAVENOUS EVERY 6 HOURS PRN
Status: DISCONTINUED | OUTPATIENT
Start: 2021-03-28 | End: 2021-03-31 | Stop reason: HOSPADM

## 2021-03-28 RX ORDER — B-COMPLEX WITH VITAMIN C
1 TABLET ORAL
Status: DISCONTINUED | OUTPATIENT
Start: 2021-03-29 | End: 2021-03-31 | Stop reason: HOSPADM

## 2021-03-28 RX ORDER — OXYCODONE HYDROCHLORIDE 5 MG/1
5 TABLET ORAL EVERY 4 HOURS PRN
Status: DISCONTINUED | OUTPATIENT
Start: 2021-03-28 | End: 2021-03-29

## 2021-03-28 RX ORDER — HYDROMORPHONE HCL IN WATER/PF 6 MG/30 ML
0.2 PATIENT CONTROLLED ANALGESIA SYRINGE INTRAVENOUS ONCE
Status: COMPLETED | OUTPATIENT
Start: 2021-03-28 | End: 2021-03-28

## 2021-03-28 RX ORDER — METOPROLOL SUCCINATE 50 MG/1
50 TABLET, EXTENDED RELEASE ORAL DAILY
Status: DISCONTINUED | OUTPATIENT
Start: 2021-03-29 | End: 2021-03-31 | Stop reason: HOSPADM

## 2021-03-28 RX ORDER — TAMSULOSIN HYDROCHLORIDE 0.4 MG/1
0.4 CAPSULE ORAL
Status: DISCONTINUED | OUTPATIENT
Start: 2021-03-28 | End: 2021-03-31 | Stop reason: HOSPADM

## 2021-03-28 RX ORDER — OXYCODONE HYDROCHLORIDE 5 MG/1
2.5 TABLET ORAL EVERY 4 HOURS PRN
Status: DISCONTINUED | OUTPATIENT
Start: 2021-03-28 | End: 2021-03-29

## 2021-03-28 RX ORDER — MELATONIN
1000 DAILY
Status: DISCONTINUED | OUTPATIENT
Start: 2021-03-29 | End: 2021-03-31 | Stop reason: HOSPADM

## 2021-03-28 RX ORDER — HYDROMORPHONE HCL IN WATER/PF 6 MG/30 ML
0.2 PATIENT CONTROLLED ANALGESIA SYRINGE INTRAVENOUS EVERY 4 HOURS PRN
Status: DISCONTINUED | OUTPATIENT
Start: 2021-03-28 | End: 2021-03-31 | Stop reason: HOSPADM

## 2021-03-28 RX ORDER — CHLORAL HYDRATE 500 MG
1000 CAPSULE ORAL DAILY
Status: DISCONTINUED | OUTPATIENT
Start: 2021-03-29 | End: 2021-03-31 | Stop reason: HOSPADM

## 2021-03-28 RX ADMIN — SODIUM CHLORIDE, SODIUM GLUCONATE, SODIUM ACETATE, POTASSIUM CHLORIDE, MAGNESIUM CHLORIDE, SODIUM PHOSPHATE, DIBASIC, AND POTASSIUM PHOSPHATE 500 ML: .53; .5; .37; .037; .03; .012; .00082 INJECTION, SOLUTION INTRAVENOUS at 21:20

## 2021-03-28 RX ADMIN — TAMSULOSIN HYDROCHLORIDE 0.4 MG: 0.4 CAPSULE ORAL at 23:52

## 2021-03-28 RX ADMIN — ACETAMINOPHEN 975 MG: 325 TABLET, FILM COATED ORAL at 23:52

## 2021-03-28 RX ADMIN — HYDROMORPHONE HYDROCHLORIDE 0.2 MG: 0.2 INJECTION, SOLUTION INTRAMUSCULAR; INTRAVENOUS; SUBCUTANEOUS at 21:21

## 2021-03-28 NOTE — TELEPHONE ENCOUNTER
Regarding: Kidney Stone  ----- Message from Scotty Hall sent at 3/28/2021  6:14 PM EDT -----  " I have a Kidney Stone that's Scheduled to be removed April 8, 2021 with Dr Michelle Rock   But I am in so much Pain and discomfort that I need some help now  "

## 2021-03-28 NOTE — TELEPHONE ENCOUNTER
Reason for Disposition   [1] SEVERE pain (e g , excruciating, scale 8-10) AND [2] not improved after pain medicine    Answer Assessment - Initial Assessment Questions  1  LOCATION: "Where does it hurt?" (e g , left, right)      On the left side of her abdomen     2  ONSET: "When did the pain start?"      3/26/2021    3  SEVERITY: "How bad is the pain?" (e g , Scale 1-10; mild, moderate, or severe)    - MILD (1-3): doesn't interfere with normal activities     - MODERATE (4-7): interferes with normal activities or awakens from sleep     - SEVERE (8-10): excruciating pain and patient unable to do normal activities (stays in bed)        7/10    4  PATTERN: "Does the pain come and go, or is it constant?"       Constant     5  CAUSE: "What do you think is causing the pain?"      Kidney stone    6  OTHER SYMPTOMS:  "Do you have any other symptoms?" (e g , fever, abdominal pain, vomiting, leg weakness, burning with urination, blood in urine)      Abdominal pain, nausea, hematuria, "I feel shaky"    7  PREGNANCY:  "Is there any chance you are pregnant?" "When was your last menstrual period?"      Denies    Percocet at 5pm, with minimal relief  Protocols used:  FLANK PAIN-ADULT-

## 2021-03-29 ENCOUNTER — APPOINTMENT (OUTPATIENT)
Dept: RADIOLOGY | Facility: HOSPITAL | Age: 81
DRG: 661 | End: 2021-03-29
Payer: MEDICARE

## 2021-03-29 ENCOUNTER — PREP FOR PROCEDURE (OUTPATIENT)
Dept: UROLOGY | Facility: CLINIC | Age: 81
End: 2021-03-29

## 2021-03-29 ENCOUNTER — ANESTHESIA EVENT (OUTPATIENT)
Dept: PERIOP | Facility: HOSPITAL | Age: 81
DRG: 661 | End: 2021-03-29
Payer: MEDICARE

## 2021-03-29 ENCOUNTER — TELEPHONE (OUTPATIENT)
Dept: UROLOGY | Facility: CLINIC | Age: 81
End: 2021-03-29

## 2021-03-29 ENCOUNTER — ANESTHESIA (OUTPATIENT)
Dept: PERIOP | Facility: HOSPITAL | Age: 81
DRG: 661 | End: 2021-03-29
Payer: MEDICARE

## 2021-03-29 DIAGNOSIS — N20.0 NEPHROLITHIASIS: Primary | ICD-10-CM

## 2021-03-29 DIAGNOSIS — N20.1 URETERAL CALCULUS, LEFT: ICD-10-CM

## 2021-03-29 DIAGNOSIS — Z46.6 ENCOUNTER FOR ADJUSTMENT OF URETERAL STENT: ICD-10-CM

## 2021-03-29 PROCEDURE — C1758 CATHETER, URETERAL: HCPCS | Performed by: UROLOGY

## 2021-03-29 PROCEDURE — 99232 SBSQ HOSP IP/OBS MODERATE 35: CPT | Performed by: NURSE PRACTITIONER

## 2021-03-29 PROCEDURE — 0T778DZ DILATION OF LEFT URETER WITH INTRALUMINAL DEVICE, VIA NATURAL OR ARTIFICIAL OPENING ENDOSCOPIC: ICD-10-PCS | Performed by: UROLOGY

## 2021-03-29 PROCEDURE — 52332 CYSTOSCOPY AND TREATMENT: CPT | Performed by: UROLOGY

## 2021-03-29 PROCEDURE — C1769 GUIDE WIRE: HCPCS | Performed by: UROLOGY

## 2021-03-29 PROCEDURE — 74420 UROGRAPHY RTRGR +-KUB: CPT

## 2021-03-29 PROCEDURE — C2617 STENT, NON-COR, TEM W/O DEL: HCPCS | Performed by: UROLOGY

## 2021-03-29 PROCEDURE — BT1F1ZZ FLUOROSCOPY OF LEFT KIDNEY, URETER AND BLADDER USING LOW OSMOLAR CONTRAST: ICD-10-PCS | Performed by: UROLOGY

## 2021-03-29 PROCEDURE — 99223 1ST HOSP IP/OBS HIGH 75: CPT | Performed by: UROLOGY

## 2021-03-29 DEVICE — INLAY OPTIMA URETERAL STENT W/O GUIDEWIRE
Type: IMPLANTABLE DEVICE | Status: NON-FUNCTIONAL
Brand: BARD® INLAY OPTIMA® URETERAL STENT
Removed: 2021-04-29

## 2021-03-29 RX ORDER — DOCUSATE SODIUM 100 MG/1
100 CAPSULE, LIQUID FILLED ORAL 3 TIMES DAILY
Qty: 21 CAPSULE | Refills: 0 | Status: SHIPPED | OUTPATIENT
Start: 2021-03-29 | End: 2021-03-31 | Stop reason: HOSPADM

## 2021-03-29 RX ORDER — GABAPENTIN 100 MG/1
300 CAPSULE ORAL ONCE
Status: CANCELLED | OUTPATIENT
Start: 2021-03-29 | End: 2021-03-29

## 2021-03-29 RX ORDER — LIDOCAINE HYDROCHLORIDE 10 MG/ML
INJECTION, SOLUTION EPIDURAL; INFILTRATION; INTRACAUDAL; PERINEURAL AS NEEDED
Status: DISCONTINUED | OUTPATIENT
Start: 2021-03-29 | End: 2021-03-29

## 2021-03-29 RX ORDER — SULFAMETHOXAZOLE AND TRIMETHOPRIM 800; 160 MG/1; MG/1
1 TABLET ORAL EVERY 12 HOURS SCHEDULED
Qty: 6 TABLET | Refills: 0 | Status: SHIPPED | OUTPATIENT
Start: 2021-03-29 | End: 2021-04-01 | Stop reason: SDUPTHER

## 2021-03-29 RX ORDER — FENTANYL CITRATE 50 UG/ML
INJECTION, SOLUTION INTRAMUSCULAR; INTRAVENOUS AS NEEDED
Status: DISCONTINUED | OUTPATIENT
Start: 2021-03-29 | End: 2021-03-29

## 2021-03-29 RX ORDER — ONDANSETRON 2 MG/ML
4 INJECTION INTRAMUSCULAR; INTRAVENOUS ONCE AS NEEDED
Status: COMPLETED | OUTPATIENT
Start: 2021-03-29 | End: 2021-03-29

## 2021-03-29 RX ORDER — OXYBUTYNIN CHLORIDE 10 MG/1
10 TABLET, EXTENDED RELEASE ORAL
Qty: 7 TABLET | Refills: 0 | Status: SHIPPED | OUTPATIENT
Start: 2021-03-29 | End: 2021-04-22

## 2021-03-29 RX ORDER — OXYCODONE HYDROCHLORIDE 5 MG/1
5 TABLET ORAL EVERY 4 HOURS PRN
Status: DISCONTINUED | OUTPATIENT
Start: 2021-03-29 | End: 2021-03-31 | Stop reason: HOSPADM

## 2021-03-29 RX ORDER — MAGNESIUM HYDROXIDE 1200 MG/15ML
LIQUID ORAL AS NEEDED
Status: DISCONTINUED | OUTPATIENT
Start: 2021-03-29 | End: 2021-03-29 | Stop reason: HOSPADM

## 2021-03-29 RX ORDER — TAMSULOSIN HYDROCHLORIDE 0.4 MG/1
0.4 CAPSULE ORAL
Qty: 7 CAPSULE | Refills: 0 | Status: SHIPPED | OUTPATIENT
Start: 2021-03-29 | End: 2021-04-22

## 2021-03-29 RX ORDER — SODIUM CHLORIDE, SODIUM LACTATE, POTASSIUM CHLORIDE, CALCIUM CHLORIDE 600; 310; 30; 20 MG/100ML; MG/100ML; MG/100ML; MG/100ML
125 INJECTION, SOLUTION INTRAVENOUS CONTINUOUS
Status: DISCONTINUED | OUTPATIENT
Start: 2021-03-29 | End: 2021-03-31 | Stop reason: HOSPADM

## 2021-03-29 RX ORDER — DEXAMETHASONE SODIUM PHOSPHATE 10 MG/ML
INJECTION, SOLUTION INTRAMUSCULAR; INTRAVENOUS AS NEEDED
Status: DISCONTINUED | OUTPATIENT
Start: 2021-03-29 | End: 2021-03-29

## 2021-03-29 RX ORDER — FENTANYL CITRATE/PF 50 MCG/ML
25 SYRINGE (ML) INJECTION
Status: DISCONTINUED | OUTPATIENT
Start: 2021-03-29 | End: 2021-03-29 | Stop reason: HOSPADM

## 2021-03-29 RX ORDER — ONDANSETRON 2 MG/ML
INJECTION INTRAMUSCULAR; INTRAVENOUS AS NEEDED
Status: DISCONTINUED | OUTPATIENT
Start: 2021-03-29 | End: 2021-03-29

## 2021-03-29 RX ORDER — OXYCODONE HYDROCHLORIDE 5 MG/1
7.5 TABLET ORAL EVERY 4 HOURS PRN
Status: DISCONTINUED | OUTPATIENT
Start: 2021-03-29 | End: 2021-03-31 | Stop reason: HOSPADM

## 2021-03-29 RX ORDER — PROMETHAZINE HYDROCHLORIDE 25 MG/ML
6.25 INJECTION, SOLUTION INTRAMUSCULAR; INTRAVENOUS EVERY 6 HOURS PRN
Status: DISCONTINUED | OUTPATIENT
Start: 2021-03-29 | End: 2021-03-29

## 2021-03-29 RX ORDER — PROPOFOL 10 MG/ML
INJECTION, EMULSION INTRAVENOUS AS NEEDED
Status: DISCONTINUED | OUTPATIENT
Start: 2021-03-29 | End: 2021-03-29

## 2021-03-29 RX ORDER — CEFAZOLIN SODIUM 1 G/3ML
INJECTION, POWDER, FOR SOLUTION INTRAMUSCULAR; INTRAVENOUS AS NEEDED
Status: DISCONTINUED | OUTPATIENT
Start: 2021-03-29 | End: 2021-03-29

## 2021-03-29 RX ORDER — PROMETHAZINE HYDROCHLORIDE 25 MG/ML
6.25 INJECTION, SOLUTION INTRAMUSCULAR; INTRAVENOUS EVERY 6 HOURS PRN
Status: COMPLETED | OUTPATIENT
Start: 2021-03-29 | End: 2021-03-29

## 2021-03-29 RX ORDER — HEPARIN SODIUM 5000 [USP'U]/ML
5000 INJECTION, SOLUTION INTRAVENOUS; SUBCUTANEOUS ONCE
Status: CANCELLED | OUTPATIENT
Start: 2021-03-29 | End: 2021-03-29

## 2021-03-29 RX ORDER — ACETAMINOPHEN 325 MG/1
975 TABLET ORAL ONCE
Status: CANCELLED | OUTPATIENT
Start: 2021-03-29 | End: 2021-03-29

## 2021-03-29 RX ORDER — OXYCODONE HYDROCHLORIDE AND ACETAMINOPHEN 5; 325 MG/1; MG/1
1 TABLET ORAL EVERY 4 HOURS PRN
Qty: 10 TABLET | Refills: 0 | Status: SHIPPED | OUTPATIENT
Start: 2021-03-29 | End: 2021-03-31 | Stop reason: HOSPADM

## 2021-03-29 RX ORDER — EPHEDRINE SULFATE 50 MG/ML
INJECTION INTRAVENOUS AS NEEDED
Status: DISCONTINUED | OUTPATIENT
Start: 2021-03-29 | End: 2021-03-29

## 2021-03-29 RX ADMIN — Medication 1000 UNITS: at 08:14

## 2021-03-29 RX ADMIN — Medication 1 TABLET: at 08:14

## 2021-03-29 RX ADMIN — ONDANSETRON 4 MG: 2 INJECTION INTRAMUSCULAR; INTRAVENOUS at 09:20

## 2021-03-29 RX ADMIN — OXYCODONE HYDROCHLORIDE 2.5 MG: 5 TABLET ORAL at 12:12

## 2021-03-29 RX ADMIN — PHENYLEPHRINE HYDROCHLORIDE 100 MCG: 10 INJECTION INTRAVENOUS at 09:40

## 2021-03-29 RX ADMIN — PHENYLEPHRINE HYDROCHLORIDE 100 MCG: 10 INJECTION INTRAVENOUS at 09:27

## 2021-03-29 RX ADMIN — PHENYLEPHRINE HYDROCHLORIDE 100 MCG: 10 INJECTION INTRAVENOUS at 09:30

## 2021-03-29 RX ADMIN — OXYCODONE HYDROCHLORIDE 7.5 MG: 5 TABLET ORAL at 21:44

## 2021-03-29 RX ADMIN — ONDANSETRON 4 MG: 2 INJECTION INTRAMUSCULAR; INTRAVENOUS at 20:03

## 2021-03-29 RX ADMIN — EPHEDRINE SULFATE 10 MG: 50 INJECTION, SOLUTION INTRAVENOUS at 09:26

## 2021-03-29 RX ADMIN — OXYCODONE HYDROCHLORIDE AND ACETAMINOPHEN 500 MG: 500 TABLET ORAL at 08:14

## 2021-03-29 RX ADMIN — EPHEDRINE SULFATE 10 MG: 50 INJECTION, SOLUTION INTRAVENOUS at 09:23

## 2021-03-29 RX ADMIN — PROPOFOL 50 MG: 10 INJECTION, EMULSION INTRAVENOUS at 09:09

## 2021-03-29 RX ADMIN — HYDROMORPHONE HYDROCHLORIDE 0.2 MG: 0.2 INJECTION, SOLUTION INTRAMUSCULAR; INTRAVENOUS; SUBCUTANEOUS at 19:44

## 2021-03-29 RX ADMIN — OMEGA-3 FATTY ACIDS CAP 1000 MG 1000 MG: 1000 CAP at 08:15

## 2021-03-29 RX ADMIN — PROPOFOL 50 MG: 10 INJECTION, EMULSION INTRAVENOUS at 09:15

## 2021-03-29 RX ADMIN — ONDANSETRON 4 MG: 2 INJECTION INTRAMUSCULAR; INTRAVENOUS at 05:30

## 2021-03-29 RX ADMIN — HYDROMORPHONE HYDROCHLORIDE 0.2 MG: 0.2 INJECTION, SOLUTION INTRAMUSCULAR; INTRAVENOUS; SUBCUTANEOUS at 13:56

## 2021-03-29 RX ADMIN — FENTANYL CITRATE 25 MCG: 50 INJECTION INTRAMUSCULAR; INTRAVENOUS at 09:20

## 2021-03-29 RX ADMIN — DEXAMETHASONE SODIUM PHOSPHATE 4 MG: 10 INJECTION, SOLUTION INTRAMUSCULAR; INTRAVENOUS at 09:20

## 2021-03-29 RX ADMIN — SODIUM CHLORIDE, SODIUM LACTATE, POTASSIUM CHLORIDE, AND CALCIUM CHLORIDE 125 ML/HR: .6; .31; .03; .02 INJECTION, SOLUTION INTRAVENOUS at 08:19

## 2021-03-29 RX ADMIN — SODIUM CHLORIDE, SODIUM LACTATE, POTASSIUM CHLORIDE, AND CALCIUM CHLORIDE 125 ML/HR: .6; .31; .03; .02 INJECTION, SOLUTION INTRAVENOUS at 21:47

## 2021-03-29 RX ADMIN — ACETAMINOPHEN 975 MG: 325 TABLET, FILM COATED ORAL at 05:30

## 2021-03-29 RX ADMIN — PROPOFOL 150 MG: 10 INJECTION, EMULSION INTRAVENOUS at 09:08

## 2021-03-29 RX ADMIN — EPHEDRINE SULFATE 5 MG: 50 INJECTION, SOLUTION INTRAVENOUS at 09:30

## 2021-03-29 RX ADMIN — LIDOCAINE HYDROCHLORIDE 50 MG: 10 INJECTION, SOLUTION EPIDURAL; INFILTRATION; INTRACAUDAL; PERINEURAL at 09:08

## 2021-03-29 RX ADMIN — SODIUM CHLORIDE, SODIUM LACTATE, POTASSIUM CHLORIDE, AND CALCIUM CHLORIDE 125 ML/HR: .6; .31; .03; .02 INJECTION, SOLUTION INTRAVENOUS at 12:15

## 2021-03-29 RX ADMIN — ACETAMINOPHEN 975 MG: 325 TABLET, FILM COATED ORAL at 21:43

## 2021-03-29 RX ADMIN — OXYCODONE HYDROCHLORIDE 5 MG: 5 TABLET ORAL at 17:03

## 2021-03-29 RX ADMIN — PROPOFOL 50 MG: 10 INJECTION, EMULSION INTRAVENOUS at 09:12

## 2021-03-29 RX ADMIN — TAMSULOSIN HYDROCHLORIDE 0.4 MG: 0.4 CAPSULE ORAL at 21:43

## 2021-03-29 RX ADMIN — ONDANSETRON 4 MG: 2 INJECTION INTRAMUSCULAR; INTRAVENOUS at 10:02

## 2021-03-29 RX ADMIN — ACETAMINOPHEN 975 MG: 325 TABLET, FILM COATED ORAL at 13:55

## 2021-03-29 RX ADMIN — FENTANYL CITRATE 25 MCG: 50 INJECTION INTRAMUSCULAR; INTRAVENOUS at 09:23

## 2021-03-29 RX ADMIN — CEFAZOLIN 2000 MG: 1 INJECTION, POWDER, FOR SOLUTION INTRAMUSCULAR; INTRAVENOUS at 09:17

## 2021-03-29 RX ADMIN — PROMETHAZINE HYDROCHLORIDE 6.25 MG: 25 INJECTION INTRAMUSCULAR; INTRAVENOUS at 10:40

## 2021-03-29 NOTE — OP NOTE
OPERATIVE REPORT  PATIENT NAME: Neal Monique Day    :  1940  MRN: [id number]  Pt Location:  CYSTO ROOM 01    SURGERY DATE: 3/29/2021    Surgeon(s) and Role:     * Violette Ramon MD - Primary    Preop Diagnosis:  Acute left flank pain [R10 9]  Ureteral stone [N20 1]    Post-Op Diagnosis Codes:     * Acute left flank pain [R10 9]     * Ureteral stone [N20 1]    Procedure(s) (LRB):  CYSTOSCOPY URETEROSCOPY, RETROGRADE PYELOGRAM AND INSERTION STENT URETERAL (Left)    Specimen(s):  * No specimens in log *    Estimated Blood Loss:   Minimal    Drains:  * No LDAs found *    Anesthesia Type:   General    Operative Indications:  Acute left flank pain [R10 9]  Ureteral stone [N20 1]      Operative Findings:  A stenotic distal left ureter is noted resulting in hydronephrosis with tortuosity of the ureter  Bloody efflux is noted from the ureteral orifice upon wire placement  We are able to scope the entire distal ureter which does not show any stone burden, suspecting that the stone had either passed or floated up to the kidney a flexible scope was passed  This was done after a access sheath was placed easily given the caliber of the ureter noted upon rigid ureteroscopy  This was done to the level of ureteropelvic junction  Once in the kidney, visibility was poor due to inflamed urothelium and bloody urine, despite our attempts to locate the stone, we were unable to do so  As such the decision was made to stage today's case with placement of a 6 [place]ic by 24 centimeter left ureteral stent without a string    The patient will be scheduled for a subsequent ureteroscopic stone intervention in a number of weeks, after the kidney has been allowed to cool down and for bloody urine to drain    Complications:   None    Procedure and Technique:      PROCEDURES PERFORMED:  1) Cystoscopy  2) Left retrograde pyelography with fluoroscopic interpretation  3) Left ureteroscopy (diagnostic)  4) Left ureteral stent placement (6F x 24cm    SURGEON:  Marci Mckeon MD    ASSISTANTS:  None    NOTE:  There were no qualified teaching residents to assist with this case    ANESTHESIA: General     COMPLICATIONS:   None    ANTIBIOTICS:  Ancef    INTRAOPERATIVE THROMBOEMBOLISM PROPHYLAXIS:  Pneumatic compression stockings         FINDINGS:    1  The Left calculus was not radiopaque on plain fluoroscopy  2  Retrograde pyelogram was performed on the Left side using a 5 Fr open ended catheter  Approximately 10 milliliters of contrast was injected    3  The following findings were noted: Moderate hydronephrosis, some tortuosity of the ureter is noted      INDICATIONS FOR PROCEDURE:  Padmaja Menard is an [de-identified] y o  old female with a Left ureteral calculus  After discussing the options for treatment, including medical expulsive therapy, extracorporeal shockwave lithotripsy, and ureteroscopy, the patient elected to undergo ureteroscopy and ureteral stent placement  We discussed the procedure in detail, the alternatives, and the risks, and they signed informed consent to proceed (these are outlined in the surgical consent form)  PROCEDURE IN DETAIL:     The patient was identified by name, date of birth, and MRN  and brought to the OR  Antibiotic prophylaxis and DVT prophylaxis were administered as per the guidelines  They were placed in the dorsal lithotomy position with care to pad all pressure points  They were prepped and draped in the usual sterile fashion  A surgical time out was performed with all in the room in agreement with the correct patient, procedure, indications, and laterality  A 21-Trinidadian rigid cystoscope was used to enter the bladder  The bladder was inspected in its entirety and there were no lesions noted  The ureteral orifices were identified in their normal orthotopic positions  The left ureteral orifice was identified and a 5 Fr open ended catheter was placed into the ureteral orifice    Bloody urine was seen to be exiting from the kidney after placement of a wire  A retrograde pyelogram was performed with the findings as described above  A Solo wire was advanced up to the kidney under fluoroscopic guidance  Leaving this safety wire in place, the bladder was drained  A "7 5 Fr semi-rigid ureteroscope was advanced up the ureter under vision   The ureter is seen to be patulous and redundant due to longstanding obstruction  As the ureter was seen to be free of stones and patent, a access sheath was placed to the level of the ureteropelvic junction over a wire  A flexible scope was then passed, bloody urine was noted within the collecting system, this made visualization difficult  We looked at all visible calices but could see no stone  Given that the patient had pain and had not reported passing her stone decision was made to stage today's procedure in place a 6 [place] by 24 centimeter left ureteral stent with a plan to return to the operating room in some weeks       A 6 [place] by 24 centimeter left JJ stent was then passed up the wire  under fluoroscopic guidance into the kidney with a good curl noted in the kidney and in the bladder  The stent string was removed  The bladder was drained  All instrument counts and sponge counts were correct  The patient was placed back into the supine position, awakened from general anesthesia and brought to recovery room in stable condition  ESTIMATED BLOOD LOSS:  Minimal      DRAINS:      SPECIMENS:   * No orders in the log *     IMPLANTS:   Implant Name Type Inv  Item Serial No   Lot No  LRB No  Used Action   STENT URETERAL 6FR 24CML SOFT PU - [id number]  STENT URETERAL 6FR 24CML SOFT PU  [employer] [id number] Left 1 Implanted        COMPLICATIONS:  None    DISPOSITION: PACU     PLAN:  Patient will be discharged back to the medical floor  If doing well postoperatively can go home later today    We will arrange for subsequent stone surgery for her a number of weeks       I was present for the entire procedure and A qualified resident physician was not available    Patient Disposition:  PACU     SIGNATURE: Peace Maldonado MD  DATE: March 29, 2021  TIME: 9:43 AM

## 2021-03-29 NOTE — ED PROVIDER NOTES
History  Chief Complaint   Patient presents with    Abdominal Pain     Pt reports recent diagnosis of kidney stones scheduled for removal on the  April 8th  The patient is an 22-year-old female with a past medical history of hypertension presents the ED for evaluation of left flank pain  She was diagnosed with calculus at the left UPJ 8 mm x 2 mm with mild hydronephrosis on 03/01/2021  She is given Urology follow-up at that time, and is subsequently scheduled for ureteroscopy with lithotripsy/stent placement for April 8th  She states that on 03/26 while she was visiting her daughter in [place], she developed acute worsening of pain, go to the emergency department at that time  She had repeat CT scan which revealed 6mm x 6mm x 5mm L ureteral stone, mild hydronephrosis; no bacteruria at that time, renal function at baseline  She was ultimately discharged after pain control  She states that she has been taking oxycodone which had been working for her pain however tonight she took an oxycodone prior to coming to the ED but it did not alleviate her left flank pain  She states that her pain is been intermittent, left flank radiated around to the left side of her abdomen  She denies any fever chills, she has had nausea and decreased p o  Intake, she has been taking Zofran for this and helped her nausea  No vomiting  She denies any diarrhea, constipation  Endorses hematuria, no dysuria, urgency or frequency  History provided by:  Patient   used: No    Abdominal Pain  Associated symptoms: hematuria and nausea    Associated symptoms: no chills, no constipation, no diarrhea, no fever and no vomiting        Prior to Admission Medications   Prescriptions Last Dose Informant Patient Reported? Taking?    Calcium-Magnesium-Vitamin D (CALCIUM 500 PO)  Self Yes Yes   Sig: Take by mouth   Cholecalciferol (EQL VITAMIN D3) 1000 units capsule  Self Yes Yes   Sig: Take by mouth daily Glucos-Chondroit-Hyaluron-MSM (GLUCOSAMINE CHONDROITIN JOINT) TABS  Self Yes No   Sig: Take by mouth daily     Multiple Vitamins-Minerals (OCUVITE PO)  Self Yes Yes   Sig: Take by mouth daily   Omega-3 Fatty Acids (FISH OIL) 1000 MG CPDR  Self Yes Yes   Sig: Take by mouth   amLODIPine (NORVASC) 2 5 mg tablet Not Taking at Unknown time  No No   Sig: Take 1 tablet (2 5 mg total) by mouth daily   Patient not taking: Reported on 3/28/2021   amLODIPine (NORVASC) 5 mg tablet   No Yes   Sig: Take 1 tablet (5 mg total) by mouth daily   ascorbic acid (VITAMIN C) 500 mg tablet  Self Yes Yes   Sig: Take 500 mg by mouth daily   aspirin (ECOTRIN LOW STRENGTH) 81 mg EC tablet  Self Yes Yes   diclofenac sodium (VOLTAREN) 1 %   No No   Sig: Apply 2 g topically 3 (three) times a day   metoprolol succinate (TOPROL-XL) 50 mg 24 hr tablet   No Yes   Sig: TAKE 1 TABLET DAILY   ondansetron (ZOFRAN-ODT) 4 mg disintegrating tablet  Self No Yes   Sig: Take 1 tablet (4 mg total) by mouth every 6 (six) hours as needed for nausea or vomiting   oxyCODONE (ROXICODONE) 5 mg immediate release tablet  Self No Yes   Sig: Take 1 tablet (5 mg total) by mouth every 4 (four) hours as needed for moderate pain for up to 12 dosesMax Daily Amount: 30 mg      Facility-Administered Medications: None       Past Medical History:   Diagnosis Date    H/O nonmelanoma skin cancer     last assessed 9/28/17    Hypertension        Past Surgical History:   Procedure Laterality Date    APPENDECTOMY  2010    BLADDER SURGERY  1997    BREAST EXCISIONAL BIOPSY Left 1995    benign    no visible scar    CERVIX SURGERY      dilation and curettage of cervical stump 2005, 1998, [address]      partial - last assessed 10/28/14    COLONOSCOPY      last assessed 8/9/17    HERNIA REPAIR  2011    TONSILLECTOMY  1971       Family History   Problem Relation Age of Onset    Arthritis Mother     Osteoporosis Mother     Stroke Mother    [facility] Heart disease Father     Osteoporosis Father     Breast cancer Family     Osteoporosis Family     Breast cancer additional onset Family     No Known Problems Sister     No Known Problems Daughter     No Known Problems Maternal Grandmother     No Known Problems Maternal Grandfather     Breast cancer Paternal Grandmother     No Known Problems Paternal Grandfather     No Known Problems Sister      I have reviewed and agree with the history as documented  E-Cigarette/Vaping    E-Cigarette Use Never User      E-Cigarette/Vaping Substances     Social History     Tobacco Use    Smoking status: Former Smoker     Quit date:      Years since quittin 2    Smokeless tobacco: Never Used   Substance Use Topics    Alcohol use: Yes     Comment: occasionally wine    Drug use: No        Review of Systems   Constitutional: Negative  Negative for chills and fever  HENT: Negative  Eyes: Negative  Respiratory: Negative  Cardiovascular: Negative  Gastrointestinal: Positive for abdominal pain and nausea  Negative for constipation, diarrhea and vomiting  Endocrine: Negative  Genitourinary: Positive for flank pain and hematuria  Negative for decreased urine volume, difficulty urinating, frequency and urgency  Skin: Negative  Allergic/Immunologic: Negative  Neurological: Negative  Hematological: Negative  Psychiatric/Behavioral: Negative          Physical Exam  ED Triage Vitals   Temperature Pulse Respirations Blood Pressure SpO2   21   98 1 °F (36 7 °C) 77 18 (!) 186/94 97 %      Temp Source Heart Rate Source Patient Position - Orthostatic VS BP Location FiO2 (%)   21 --   Oral Monitor Lying Right arm       Pain Score       21       8             Orthostatic Vital Signs  Vitals:    21   BP: (!) 186/94 144/69 116/67 Pulse: 77 70 76   Patient Position - Orthostatic VS: Lying Lying Lying       Physical Exam  Vitals signs reviewed  Constitutional:       Appearance: She is well-developed  She is not diaphoretic  HENT:      Head: Normocephalic and atraumatic  Right Ear: External ear normal       Left Ear: External ear normal       Nose: Nose normal    Eyes:      General: No scleral icterus  Conjunctiva/sclera: Conjunctivae normal    Neck:      Musculoskeletal: Normal range of motion  Vascular: No JVD  Trachea: No tracheal deviation  Cardiovascular:      Rate and Rhythm: Normal rate and regular rhythm  Heart sounds: Normal heart sounds  No murmur  Pulmonary:      Effort: Pulmonary effort is normal  No respiratory distress  Breath sounds: Normal breath sounds  Abdominal:      General: Bowel sounds are normal  There is no distension  Palpations: Abdomen is soft  Tenderness: There is no abdominal tenderness  There is no guarding  Comments: No CVAT   Musculoskeletal: Normal range of motion  Skin:     General: Skin is warm and dry  Capillary Refill: Capillary refill takes less than 2 seconds  Neurological:      Mental Status: She is alert and oriented to person, place, and time  Motor: No abnormal muscle tone     Psychiatric:         Behavior: Behavior normal          ED Medications  Medications   amLODIPine (NORVASC) tablet 5 mg (has no administration in time range)   metoprolol succinate (TOPROL-XL) 24 hr tablet 50 mg (has no administration in time range)   multivitamin-minerals (CENTRUM) tablet 1 tablet (has no administration in time range)   cholecalciferol (VITAMIN D3) tablet 1,000 Units (has no administration in time range)   calcium carbonate-vitamin D (OSCAL-D) 500 mg-200 units per tablet 1 tablet (has no administration in time range)   ascorbic acid (VITAMIN C) tablet 500 mg (has no administration in time range)   fish oil capsule 1,000 mg (has no administration in time range)   ondansetron (ZOFRAN) injection 4 mg (has no administration in time range)   acetaminophen (TYLENOL) tablet 975 mg (975 mg Oral Given 3/28/21 2352)   oxyCODONE (ROXICODONE) IR tablet 2 5 mg (has no administration in time range)   oxyCODONE (ROXICODONE) IR tablet 5 mg (has no administration in time range)   HYDROmorphone HCl (DILAUDID) injection 0 2 mg (has no administration in time range)   tamsulosin (FLOMAX) capsule 0 4 mg (0 4 mg Oral Given 3/28/21 2352)   HYDROmorphone HCl (DILAUDID) injection 0 2 mg (0 2 mg Intravenous Given 3/28/21 2121)   multi-electrolyte (ISOLYTE-S PH 7 4) bolus 500 mL (0 mL Intravenous Stopped 3/28/21 2206)       Diagnostic Studies  Results Reviewed     Procedure Component Value Units Date/Time    Urine Microscopic [[id number]]  (Abnormal) Collected: 03/28/21 2122    Lab Status: Final result Specimen: Urine, Clean Catch Updated: 03/28/21 2205     RBC, UA Innumerable /hpf      WBC, UA 10-20 /hpf      Epithelial Cells Occasional /hpf      Bacteria, UA Occasional /hpf     Urine culture [[id number]] Collected: 03/28/21 2122    Lab Status:  In process Specimen: Urine, Clean Catch Updated: 03/28/21 1105    Basic metabolic panel [[id number]]  (Abnormal) Collected: 03/28/21 2120    Lab Status: Final result Specimen: Blood from Arm, Right Updated: 03/28/21 2154     Sodium 141 mmol/L      Potassium 3 6 mmol/L      Chloride 109 mmol/L      CO2 25 mmol/L      ANION GAP 7 mmol/L      BUN 11 mg/dL      Creatinine 1 07 mg/dL      Glucose 97 mg/dL      Calcium 9 4 mg/dL      eGFR 49 ml/min/1 73sq m     Narrative:      [place] guidelines for Chronic Kidney Disease (CKD):     Stage 1 with normal or high GFR (GFR > 90 mL/min/1 73 square meters)    Stage 2 Mild CKD (GFR = 60-89 mL/min/1 73 square meters)    Stage 3A Moderate CKD (GFR = 45-59 mL/min/1 73 square meters)    Stage 3B Moderate CKD (GFR = 30-44 mL/min/1 73 square meters)    Stage 4 Severe CKD (GFR = 15-29 mL/min/1 73 square meters)    Stage 5 End Stage CKD (GFR <15 mL/min/1 73 square meters)  Note: GFR calculation is accurate only with a steady state creatinine    CBC and differential [[id number]]  (Abnormal) Collected: 03/28/21 2120    Lab Status: Final result Specimen: Blood from Arm, Right Updated: 03/28/21 2141     WBC 9 15 Thousand/uL      RBC 4 90 Million/uL      Hemoglobin 15 3 g/dL      Hematocrit 45 1 %      MCV 92 fL      MCH 31 2 pg      MCHC 33 9 g/dL      RDW 12 4 %      MPV 8 4 fL      Platelets 600 Thousands/uL      nRBC 0 /100 WBCs      Neutrophils Relative 73 %      Immat GRANS % 1 %      Lymphocytes Relative 15 %      Monocytes Relative 10 %      Eosinophils Relative 1 %      Basophils Relative 0 %      Neutrophils Absolute 6 67 Thousands/µL      Immature Grans Absolute 0 05 Thousand/uL      Lymphocytes Absolute 1 36 Thousands/µL      Monocytes Absolute 0 93 Thousand/µL      Eosinophils Absolute 0 10 Thousand/µL      Basophils Absolute 0 04 Thousands/µL     Urine culture [[id number]] Collected: 03/28/21 2124    Lab Status:  In process Specimen: Urine, Clean Catch Updated: 03/28/21 2136    POCT urinalysis dipstick [[id number]]  (Abnormal) Resulted: 03/28/21 2124    Lab Status: Final result Updated: 03/28/21 2124     Color, UA brown    Urine Macroscopic, POC [[id number]]  (Abnormal) Collected: 03/28/21 2122    Lab Status: Final result Specimen: Urine Updated: 03/28/21 2123     Color, UA Brown     Clarity, UA Slightly Cloudy     pH, UA 5 5     Leukocytes, UA Trace     Nitrite, UA Negative     Protein,  (2+) mg/dl      Glucose, UA Negative mg/dl      Ketones, UA 40 (2+) mg/dl      Urobilinogen, UA 0 2 E U /dl      Bilirubin, UA Interference- unable to analyze     Blood, UA Large     Specific Gravity, UA >=1 030    Narrative:      CLINITEK RESULT                 No orders to display         Procedures  Procedures      ED Course               Identification of Seniors at Risk      Most Recent Value (ISAR) Identification of Seniors at Risk   Before the illness or injury that brought you to the Emergency, did you need someone to help you on a regular basis? 0 Filed at: 03/28/2021 2100   In the last 24 hours, have you needed more help than usual?  1 Filed at: 03/28/2021 2100   Have you been hospitalized for one or more nights during the past 6 months? 0 Filed at: 03/28/2021 2100   In general, do you see well?  0 Filed at: 03/28/2021 2100   In general, do you have serious problems with your memory? 0 Filed at: 03/28/2021 2100   Do you take more than three different medications every day? 1 Filed at: 03/28/2021 2100   ISAR Score  2 Filed at: 03/28/2021 2100                    SBIRT 20yo+      Most Recent Value   SBIRT (23 yo +)   In order to provide better care to our patients, we are screening all of our patients for alcohol and drug use  Would it be okay to ask you these screening questions? Unable to answer at this time Filed at: 03/28/2021 2105                MDM  Number of Diagnoses or Management Options  Acute left flank pain: new and requires workup  Ureteral stone: new and does not require workup  Diagnosis management comments: 27-year-old female presenting to the ED for evaluation of left flank pain  She was diagnosed with left UPJ stone month ago, and has procedure scheduled for April 8th  This is her 3rd ED visit for uncontrolled pain, and thus it is reasonable admit for pain control and Urology consult with procedure during her hospital stay  Obtain a BMP to evaluate renal function, urinalysis to evaluate for bacteriuria, will give Dilaudid for analgesia, IV fluids for hydration  Discussed with Urology, patient will be made NPO at midnight, consultation placed  Admit to Medicine         Amount and/or Complexity of Data Reviewed  Clinical lab tests: ordered and reviewed  Tests in the radiology section of CPT®: reviewed  Review and summarize past medical records: yes  Discuss the patient with other providers: yes        Disposition  Final diagnoses:   Acute left flank pain   Ureteral stone     Time reflects when diagnosis was documented in both MDM as applicable and the Disposition within this note     Time User Action Codes Description Comment    3/28/2021 10:36 PM Maria Dolores Primas Add [R10 9] Acute left flank pain     3/28/2021 10:36 PM Maria Dolores Primas Add [N20 1] Ureteral stone     3/28/2021 10:36 PM Oksana Mainara [R10 9] Acute left flank pain     3/28/2021 10:36 PM Maria Dolores Primas Modify [N20 1] Ureteral stone       ED Disposition     ED Disposition Condition Date/Time Comment    Admit Stable Sun Mar 28, 2021 10:36 PM Case was discussed with betty and the patient's admission status was agreed to be Admission Status: observation status to the service of Dr Olamide King           Follow-up Information    None         Current Discharge Medication List      CONTINUE these medications which have NOT CHANGED    Details   !! amLODIPine (NORVASC) 5 mg tablet Take 1 tablet (5 mg total) by mouth daily  Qty: 90 tablet, Refills: 1    Associated Diagnoses: Essential hypertension      ascorbic acid (VITAMIN C) 500 mg tablet Take 500 mg by mouth daily      aspirin (ECOTRIN LOW STRENGTH) 81 mg EC tablet       Calcium-Magnesium-Vitamin D (CALCIUM 500 PO) Take by mouth      Cholecalciferol (EQL VITAMIN D3) 1000 units capsule Take by mouth daily        metoprolol succinate (TOPROL-XL) 50 mg 24 hr tablet TAKE 1 TABLET DAILY  Qty: 90 tablet, Refills: 3    Associated Diagnoses: Essential hypertension      Multiple Vitamins-Minerals (OCUVITE PO) Take by mouth daily      Omega-3 Fatty Acids (FISH OIL) 1000 MG CPDR Take by mouth      ondansetron (ZOFRAN-ODT) 4 mg disintegrating tablet Take 1 tablet (4 mg total) by mouth every 6 (six) hours as needed for nausea or vomiting  Qty: 20 tablet, Refills: 0    Associated Diagnoses: Ureterolithiasis      oxyCODONE (ROXICODONE) 5 mg immediate release tablet Take 1 tablet (5 mg total) by mouth every 4 (four) hours as needed for moderate pain for up to 12 dosesMax Daily Amount: 30 mg  Qty: 12 tablet, Refills: 0    Associated Diagnoses: Ureterolithiasis      !! amLODIPine (NORVASC) 2 5 mg tablet Take 1 tablet (2 5 mg total) by mouth daily  Qty: 90 tablet, Refills: 1    Associated Diagnoses: Essential hypertension      diclofenac sodium (VOLTAREN) 1 % Apply 2 g topically 3 (three) times a day  Qty: 180 g, Refills: 2    Associated Diagnoses: Achilles tendinitis of left lower extremity      Glucos-Chondroit-Hyaluron-MSM (GLUCOSAMINE CHONDROITIN JOINT) TABS Take by mouth daily         ! ! - Potential duplicate medications found  Please discuss with provider  No discharge procedures on file  PDMP Review       Value Time User    PDMP Reviewed  Yes 3/28/2021 10:50 PM Edith Day DO           ED Provider  Attending physically available and evaluated [place] R Day  I managed the patient along with the ED Attending      Electronically Signed by         Sarai Kuhn DO  03/29/21 8440

## 2021-03-29 NOTE — CONSULTS
[address] NOTE   Admission Date: 3/28/2021    Patient Identifiers: Padmaja Menard (MRN: [id number])  Service Requesting Consultation: Lou Corona MD  Service Providing Consultation:  Urology, Kianna Perez PA-C  Consults  Date of Service: 3/29/2021    Reason for Consultation:  8 mm left-sided ureteral calculi with hydronephrosis    History of Present Illness:     Padmaja Menard is a [de-identified] y o  female with history kidney stone  She saw Dr Sade Yeh early in March with 8 mm left-sided UPJ stone with some hydronephrosis  Due to scheduling she was due for a ureteroscopy April 8th at the [facility]  She was visiting her daughter in [place] over the weekend and went to the emergency room due to severe left-sided pain  A CT scan done there showed distal migration of the stone  She had no fever chills  She has had intermittent hematuria  Her nausea was treated with IV Zofran  WBC 9 15  Creatinine 1 07  Urine culture from March 23rd was no growth  Microscopic urinalysis showing mostly red cells with a few leukocytes      Past Medical, Past Surgical History:     Past Medical History:   Diagnosis Date    H/O nonmelanoma skin cancer     last assessed 9/28/17    Hypertension    :    Past Surgical History:   Procedure Laterality Date    APPENDECTOMY  2010    BLADDER SURGERY  1997    BREAST EXCISIONAL BIOPSY Left 1995    benign    no visible scar    CERVIX SURGERY      dilation and curettage of cervical stump 2005, 1998, [address]   [address]      partial - last assessed 10/28/14    COLONOSCOPY      last assessed 8/9/17    HERNIA REPAIR  2011    TONSILLECTOMY  1971   :    Medications, Allergies:     Current Facility-Administered Medications:     [MAR Hold] acetaminophen (TYLENOL) tablet 975 mg, 975 mg, Oral, Q8H [address], Michelle Pillow, DO, 975 mg at 03/29/21 0530    amLODIPine (NORVASC) tablet 5 mg, 5 mg, Oral, Daily, Mulu Eddy Imani, DO    ascorbic acid (VITAMIN C) tablet 500 mg, 500 mg, Oral, Daily, , 500 mg at 21 1695    calcium carbonate-vitamin D (OSCAL-D) 500 mg-200 units per tablet 1 tablet, 1 tablet, Oral, Daily With Breakfast, , 1 tablet at 21 3169    cholecalciferol (VITAMIN D3) tablet 1,000 Units, 1,000 Units, Oral, Daily, , 1,000 Units at 21 3320    fish oil capsule 1,000 mg, 1,000 mg, Oral, Daily, , 1,000 mg at 21 0815    [MAR Hold] HYDROmorphone HCl (DILAUDID) injection 0 2 mg, 0 2 mg, Intravenous, Q4H PRN,     lactated ringers infusion, 125 mL/hr, Intravenous, Continuous, Ranjeet Rivas PA-C, Last Rate: 125 mL/hr at 21 0819, 125 mL/hr at 21 0819    metoprolol succinate (TOPROL-XL) 24 hr tablet 50 mg, 50 mg, Oral, Daily,     multivitamin-minerals (CENTRUM) tablet 1 tablet, 1 tablet, Oral, Daily, , 1 tablet at 21 0814    [MAR Hold] ondansetron (ZOFRAN) injection 4 mg, 4 mg, Intravenous, Q6H PRN, , 4 mg at 21 0530    [MAR Hold] oxyCODONE (ROXICODONE) IR tablet 2 5 mg, 2 5 mg, Oral, Q4H PRN,     [facility] Hold] oxyCODONE (ROXICODONE) IR tablet 5 mg, 5 mg, Oral, Q4H PRN,     [facility] Hold] tamsulosin [facility]) capsule 0 4 mg, 0 4 mg, Oral, HS, , 0 4 mg at 21 2435    Allergies:   Allergies   Allergen Reactions    Azithromycin Vomiting and Dizziness    Morphine Vomiting   :    Social and Family History:   Social History:   Social History     Tobacco Use    Smoking status: Former Smoker     Quit date:      Years since quittin 2    Smokeless tobacco: Never Used   Substance Use Topics    Alcohol use: Yes     Comment: occasionally wine    Drug use: No        Social History     Tobacco Use   Smoking Status Former Smoker    Quit date:     Years since quittin 2 Smokeless Tobacco Never Used       Family History:  Family History   Problem Relation Age of Onset    Arthritis Mother     Osteoporosis Mother     Stroke Mother     Heart disease Father     Osteoporosis Father     Breast cancer Family     Osteoporosis Family     Breast cancer additional onset Family     No Known Problems Sister     No Known Problems Daughter     No Known Problems Maternal Grandmother     No Known Problems Maternal Grandfather     Breast cancer Paternal Grandmother     No Known Problems Paternal Grandfather     No Known Problems Sister    :     Review of Systems:     General: Fever, chills, or night sweats: negative  Cardiac: Negative for chest pain  Pulmonary: Negative for shortness of breath  Gastrointestinal: Abdominal pain negative  Nausea, vomiting, or diarrhea negative,  Genitourinary: See HPI above  Patient does have hematuria  All other systems queried were negative  Physical Exam:   General: Patient is pleasant and in NAD  Awake and alert  /60 (BP Location: Right arm)   Pulse 59   Temp 97 6 °F (36 4 °C) (Oral)   Resp 18   Ht 5' 3" (1 6 m)   Wt 102 kg (224 lb 13 9 oz)   SpO2 95%   BMI 39 83 kg/m²   HEENT:  Conjunctiva are clear  Constitutional:  pleasant and cooperative     no apparent distress  Cardiac: Peripheral edema: negative  Pulmonary: Non-labored breathing  Abdomen: Soft, non-tender, non-distended  No surgical scars  No masses, tenderness, hernias noted  Genitourinary: Positive CVA tenderness, negative suprapubic tenderness  Extremities:  Moves all extremities  Neurological:CNII-XII intact  No numbness or tingling   Essentially non focal neurologic exam  Psychiatric:mood affect and behavior normal        Labs:     Lab Results   Component Value Date    HGB 15 3 03/28/2021    HCT 45 1 03/28/2021    WBC 9 15 03/28/2021     03/28/2021   ]    Lab Results   Component Value Date    K 3 6 03/28/2021     (H) 03/28/2021    CO2 25 03/28/2021    BUN 11 03/28/2021    CREATININE 1 07 03/28/2021    CALCIUM 9 4 03/28/2021   ]    Imaging:   I personally reviewed the images and report of the following studies, and reviewed them with the patient:  CT scan from March 1st shows an 8 mm stone at the left UPJ resulting in left-sided hydronephrosis  A CT scan from [facility] indicates distal migration of the stone with mild left-sided hydronephrosis  ASSESSMENT:     1  Left ureteral calculi with hydronephrosis     PLAN:   -options of management reviewed the patient  -she is scheduled for cystoscopy with ureteroscopy and laser lithotripsy for later this morning  -I did explain that if there is signs of infection or if the stone cannot be safely in gauged a stent would be placed and she would need to return as an outpatient for definitive stone management  -she may be able to be discharged later this evening or tomorrow following her procedure      Thank you for allowing me to participate in this patients care  Please do not hesitate to call with any additional questions    Rigoberto Murphy PA-C

## 2021-03-29 NOTE — TELEPHONE ENCOUNTER
Following message has been sent to our clinical team:    Patient is status post left ureteroscopy, we could not visualize her stone, as such a stent was placed, [address] by 24 centimeter  Please update the stent database  I have placed orders for subsequent stone surgery, while she does have a date for surgery roughly 7 days from now, this is to soon    Please look for a date in the next 3-4 weeks with the 1st available urologist for left ureteroscopy with laser lithotripsy stone basketing and stent exchange, orders have been placed, thank you

## 2021-03-29 NOTE — PLAN OF CARE
Problem: PAIN - ADULT  Goal: Verbalizes/displays adequate comfort level or baseline comfort level  Description: Interventions:  - Encourage patient to monitor pain and request assistance  - Assess pain using appropriate pain scale  - Administer analgesics based on type and severity of pain and evaluate response  - Implement non-pharmacological measures as appropriate and evaluate response  - Consider cultural and social influences on pain and pain management  - Notify physician/advanced practitioner if interventions unsuccessful or patient reports new pain  Outcome: Progressing     Problem: INFECTION - ADULT  Goal: Absence or prevention of progression during hospitalization  Description: INTERVENTIONS:  - Assess and monitor for signs and symptoms of infection  - Monitor lab/diagnostic results  - Monitor all insertion sites, i e  indwelling lines, tubes, and drains  - Monitor endotracheal if appropriate and nasal secretions for changes in amount and color  - [place] appropriate cooling/warming therapies per order  - Administer medications as ordered  - Instruct and encourage patient and family to use good hand hygiene technique  - Identify and instruct in appropriate isolation precautions for identified infection/condition  Outcome: Progressing  Goal: Absence of fever/infection during neutropenic period  Description: INTERVENTIONS:  - Monitor WBC    Outcome: Progressing     Problem: SAFETY ADULT  Goal: Patient will remain free of falls  Description: INTERVENTIONS:  - Assess patient frequently for physical needs  -  Identify cognitive and physical deficits and behaviors that affect risk of falls    -  [place] fall precautions as indicated by assessment   - Educate patient/family on patient safety including physical limitations  - Instruct patient to call for assistance with activity based on assessment  - Modify environment to reduce risk of injury  - Consider OT/PT consult to assist with strengthening/mobility  Outcome: Progressing  Goal: Maintain or return to baseline ADL function  Description: INTERVENTIONS:  -  Assess patient's ability to carry out ADLs; assess patient's baseline for ADL function and identify physical deficits which impact ability to perform ADLs (bathing, care of mouth/teeth, toileting, grooming, dressing, etc )  - Assess/evaluate cause of self-care deficits   - Assess range of motion  - Assess patient's mobility; develop plan if impaired  - Assess patient's need for assistive devices and provide as appropriate  - Encourage maximum independence but intervene and supervise when necessary  - Involve family in performance of ADLs  - Assess for home care needs following discharge   - Consider OT consult to assist with ADL evaluation and planning for discharge  - Provide patient education as appropriate  Outcome: Progressing  Goal: Maintain or return mobility status to optimal level  Description: INTERVENTIONS:  - Assess patient's baseline mobility status (ambulation, transfers, stairs, etc )    - Identify cognitive and physical deficits and behaviors that affect mobility  - Identify mobility aids required to assist with transfers and/or ambulation (gait belt, sit-to-stand, lift, walker, cane, etc )  - [place] fall precautions as indicated by assessment  - Record patient progress and toleration of activity level on Mobility SBAR; progress patient to next Phase/Stage  - Instruct patient to call for assistance with activity based on assessment  - Consider rehabilitation consult to assist with strengthening/weightbearing, etc   Outcome: Progressing     Problem: DISCHARGE PLANNING  Goal: Discharge to home or other facility with appropriate resources  Description: INTERVENTIONS:  - Identify barriers to discharge w/patient and caregiver  - Arrange for needed discharge resources and transportation as appropriate  - Identify discharge learning needs (meds, wound care, etc )  - Arrange for interpretive services to assist at discharge as needed  - Refer to Case Management Department for coordinating discharge planning if the patient needs post-hospital services based on physician/advanced practitioner order or complex needs related to functional status, cognitive ability, or social support system  Outcome: Progressing     Problem: Knowledge Deficit  Goal: Patient/family/caregiver demonstrates understanding of disease process, treatment plan, medications, and discharge instructions  Description: Complete learning assessment and assess knowledge base    Interventions:  - Provide teaching at level of understanding  - Provide teaching via preferred learning methods  Outcome: Progressing

## 2021-03-29 NOTE — TELEPHONE ENCOUNTER
Patient currently admitted and scheduled for surgery with Dr Holly Falcon today  Will monitor for discharge and follow up with patient post op

## 2021-03-29 NOTE — ANESTHESIA POSTPROCEDURE EVALUATION
Post-Op Assessment Note    CV Status:  Stable  Pain Score: 0    Pain management: adequate     Mental Status:  Awake   Hydration Status:  Euvolemic   PONV Controlled:  Controlled   Airway Patency:  Patent      Post Op Vitals Reviewed: Yes      Staff: CRNA         No complications documented      BP   111/55   Temp      Pulse  92   Resp   18   SpO2   95 4LFM

## 2021-03-29 NOTE — TELEPHONE ENCOUNTER
Surgery on 4/8 has been cancelled  I am currently holding a new surgery date for pt on 4/29 with Dr David Holloway at [facility]   I will call and confirm date with patient once she is discharged from the hosptial

## 2021-03-29 NOTE — ASSESSMENT & PLAN NOTE
· Known ureteral calculus as below discovered 03/01/2021  Was initially planned for intervention with urology for 08/20/2021, however with intractable pain with nausea/vomiting tonight  · Pain improved s/p IV Dilaudid provided in ED  · Admitted for urology evaluation/intervention, will keep NPO  · Continue IV fluids  · Pain control as per geriatric pain management order set  · P r n   Zofran as antiemetic  · Start Flomax  · Strain all urine

## 2021-03-29 NOTE — ANESTHESIA PREPROCEDURE EVALUATION
Procedure:  CYSTOSCOPY URETEROSCOPY WITH LITHOTRIPSY HOLMIUM LASER, RETROGRADE PYELOGRAM AND INSERTION STENT URETERAL (Left Bladder)    Relevant Problems   CARDIO   (+) Essential hypertension   (+) Hyperlipidemia      NEURO/PSYCH   (+) History of skin cancer      Other   (+) Obesity   (+) Ureteral calculus, left        Physical Exam    Airway    Mallampati score: II  TM Distance: >3 FB  Neck ROM: full     Dental   No notable dental hx     Cardiovascular  Rhythm: regular, Rate: normal,     Pulmonary  Breath sounds clear to auscultation,     Other Findings        Anesthesia Plan  ASA Score- 2     Anesthesia Type- general with ASA Monitors  Additional Monitors:   Airway Plan: LMA  Plan Factors-Exercise tolerance (METS): >4 METS  Chart reviewed  EKG reviewed  Imaging results reviewed  Existing labs reviewed  Patient summary reviewed  Induction- intravenous  Postoperative Plan-     Informed Consent- Anesthetic plan and risks discussed with patient  I personally reviewed this patient with the CRNA  Discussed and agreed on the Anesthesia Plan with the CRNA  Naz Clements

## 2021-03-29 NOTE — PLAN OF CARE
Problem: PAIN - ADULT  Goal: Verbalizes/displays adequate comfort level or baseline comfort level  Description: Interventions:  - Encourage patient to monitor pain and request assistance  - Assess pain using appropriate pain scale  - Administer analgesics based on type and severity of pain and evaluate response  - Implement non-pharmacological measures as appropriate and evaluate response  - Consider cultural and social influences on pain and pain management  - Notify physician/advanced practitioner if interventions unsuccessful or patient reports new pain  Outcome: Progressing     Problem: INFECTION - ADULT  Goal: Absence or prevention of progression during hospitalization  Description: INTERVENTIONS:  - Assess and monitor for signs and symptoms of infection  - Monitor lab/diagnostic results  - Monitor all insertion sites, i e  indwelling lines, tubes, and drains  - Monitor endotracheal if appropriate and nasal secretions for changes in amount and color  - [place] appropriate cooling/warming therapies per order  - Administer medications as ordered  - Instruct and encourage patient and family to use good hand hygiene technique  - Identify and instruct in appropriate isolation precautions for identified infection/condition  Outcome: Progressing  Goal: Absence of fever/infection during neutropenic period  Description: INTERVENTIONS:  - Monitor WBC    Outcome: Progressing     Problem: SAFETY ADULT  Goal: Patient will remain free of falls  Description: INTERVENTIONS:  - Assess patient frequently for physical needs  -  Identify cognitive and physical deficits and behaviors that affect risk of falls    -  [place] fall precautions as indicated by assessment   - Educate patient/family on patient safety including physical limitations  - Instruct patient to call for assistance with activity based on assessment  - Modify environment to reduce risk of injury  - Consider OT/PT consult to assist with strengthening/mobility  Outcome: Progressing  Goal: Maintain or return to baseline ADL function  Description: INTERVENTIONS:  -  Assess patient's ability to carry out ADLs; assess patient's baseline for ADL function and identify physical deficits which impact ability to perform ADLs (bathing, care of mouth/teeth, toileting, grooming, dressing, etc )  - Assess/evaluate cause of self-care deficits   - Assess range of motion  - Assess patient's mobility; develop plan if impaired  - Assess patient's need for assistive devices and provide as appropriate  - Encourage maximum independence but intervene and supervise when necessary  - Involve family in performance of ADLs  - Assess for home care needs following discharge   - Consider OT consult to assist with ADL evaluation and planning for discharge  - Provide patient education as appropriate  Outcome: Progressing  Goal: Maintain or return mobility status to optimal level  Description: INTERVENTIONS:  - Assess patient's baseline mobility status (ambulation, transfers, stairs, etc )    - Identify cognitive and physical deficits and behaviors that affect mobility  - Identify mobility aids required to assist with transfers and/or ambulation (gait belt, sit-to-stand, lift, walker, cane, etc )  - [place] fall precautions as indicated by assessment  - Record patient progress and toleration of activity level on Mobility SBAR; progress patient to next Phase/Stage  - Instruct patient to call for assistance with activity based on assessment  - Consider rehabilitation consult to assist with strengthening/weightbearing, etc   Outcome: Progressing     Problem: DISCHARGE PLANNING  Goal: Discharge to home or other facility with appropriate resources  Description: INTERVENTIONS:  - Identify barriers to discharge w/patient and caregiver  - Arrange for needed discharge resources and transportation as appropriate  - Identify discharge learning needs (meds, wound care, etc )  - Arrange for interpretive services to assist at discharge as needed  - Refer to Case Management Department for coordinating discharge planning if the patient needs post-hospital services based on physician/advanced practitioner order or complex needs related to functional status, cognitive ability, or social support system  Outcome: Progressing     Problem: Knowledge Deficit  Goal: Patient/family/caregiver demonstrates understanding of disease process, treatment plan, medications, and discharge instructions  Description: Complete learning assessment and assess knowledge base    Interventions:  - Provide teaching at level of understanding  - Provide teaching via preferred learning methods  Outcome: Progressing

## 2021-03-29 NOTE — QUICK NOTE
Stopped to see the patient, having some flank pain as expected after ureteroscopy    Reviewed with her today's operative events, and the need for a stone surgery, we are holding 04/29/2021 with Dr Martha Edmondson this purpose, could not see her stone due to bloody urine within the collecting system, it is hoped that with  aperiod any drainage with a stent in place her  [employer]ergia Parents will be visible and can be removed and treated

## 2021-03-29 NOTE — UTILIZATION REVIEW
Initial Clinical Review    OBS 3/28 @ 2237 UPGRADED TO INPATIENT 3/29 @ 1400 WITH L URETERAL CALCULI S/P CYSTO URETEROSCOPY WITH STEN INSERTION NOW WITH INTRACTABLE PAIN REQUIRING IV PAIN MEDS     03/29/21 1401  Inpatient Admission Once     Question Answer Comment   Level of Care Med Surg    Estimated length of stay More than 2 Midnights    Certification I certify that inpatient services are medically necessary for this patient for a duration of greater than two midnights  See H&P and MD Progress Notes for additional information about the patient's course of treatment  03/29/21 1400     ED Arrival Information     Expected Arrival Acuity Means of Arrival Escorted By Service Admission Type    3/28/2021 21:01 3/28/2021 20:46 Urgent Walk-In Friend Hospitalist Urgent    Arrival Complaint    Severe abdominal/ flank Pain, has a kidney stone        Chief Complaint   Patient presents with    Abdominal Pain     Pt reports recent diagnosis of kidney stones scheduled for removal on the  April 8th  Assessment/Plan: [de-identified] y o  female who presented to [facility] ED with intractable left flank and abdominal pain  Patient was discovered with left UPJ stone during ED visit at [facility] 03/26/2021 in the setting of intractable flank pain with nausea/vomiting; pain was able to be controlled at that time and patient discharged to home, and follow-up with Urology for planned for intervention 04/08/2021  Since evaluation did have persistent intermittent left flank pain, however over the past 1 week had noted a marked worsening despite use of analgesics; was seen at [facility] ED 03/26/2021 with similar symptoms with CT abdomen/pelvis again confirming stone and patient discharged with p r n  Oxycodone  Unfortunately this did not significantly improved pain, and patient presented this evening for further evaluation  Does note some intermittent nausea/vomiting however controlled by Zofran    Endorses intermittent hematuria, denies fevers/chills, chest pain/pressure, shortness of breath, dysuria, diarrhea, or headache  Is admitted for evaluation and potential intervention by Urology Service given intractable pain from left UPJ stone  Admit observation --   Ureteral calculus, left  Assessment & Plan  · Known ureteral calculus as below discovered 03/01/2021  Was initially planned for intervention with urology for 08/20/2021, however with intractable pain with nausea/vomiting tonight  · Pain improved s/p IV Dilaudid provided in ED  · Admitted for urology evaluation/intervention, will keep NPO  · Continue IV fluids  · Pain control as per geriatric pain management order set  · P r n  Zofran as antiemetic  · Start Flomax  · Strain all urine     Essential hypertension  Assessment & Plan  · Currently acceptable, continue metoprolol succinate and amlodipine with hold parameters    Anticipated Length of Stay:  Patient will be admitted on an Observation basis with an anticipated length of stay of  less than 2 midnights  Justification for Hospital Stay: Please see detailed plans noted above  Urology consult 3/29 -- A: Left ureteral calculi with hydronephrosis  Plan: cystoscopy with ureteroscopy and laser lithotripsy for later this morning  Keep npo, analgesics prn  IVF's    3/29 -- Ureteral calculus, left  Assessment & Plan  · Known ureteral calculus as below discovered 03/01/2021  Was initially planned for intervention with urology for however with intractable pain with nausea/vomiting tonight  · Pain improved s/p IV Dilaudid provided in ED and now she is in severe pain following procedure  Will give another dose of IV dilaudid  Unable to discharge her due to the pain  · Admitted for urology evaluation/intervention which was done this morning  · Continue IV fluids tonight as urine is bloody following the procedure  · Pain control as per geriatric pain management order set- encourage oral pain medications as able   Discussed with her nurse on a possible regimen  Give IV dilaudid now, then follow up with oral oxycodone 5mg for moderate and 7 5 for severe pain  Was taking percocet 5/325mg at home with minimal relief   · P r n  Zofran as antiemetic  · Continue Flomax  · Strain all urine    Certification Statement: The patient, admitted on an observation basis, will now require > 2 midnight hospital stay due to intractable pain due to nephrolithiasis      ED Triage Vitals   Temperature Pulse Respirations Blood Pressure SpO2   03/28/21 2108 03/28/21 2059 03/28/21 2059 03/28/21 2059 03/28/21 2059   98 1 °F (36 7 °C) 77 18 (!) 186/94 97 %      Temp Source Heart Rate Source Patient Position - Orthostatic VS BP Location FiO2 (%)   03/28/21 2108 03/28/21 2059 03/28/21 2059 03/28/21 2059 --   Oral Monitor Lying Right arm       Pain Score       03/28/21 2059       8          Wt Readings from Last 1 Encounters:   03/28/21 102 kg (224 lb 13 9 oz)     Additional Vital Signs:   Date/Time  Temp  Pulse  Resp  BP  MAP (mmHg)  SpO2  O2 Device  Patient Position - Orthostatic VS   03/29/21 0818  97 6 °F (36 4 °C)  59  --  109/60  --  --  --  Lying   03/28/21 2345  --  --  --  --  --  --  None (Room air)  --   03/28/21 2319  98 4 °F (36 9 °C)  76  18  116/67  --  95 %  None (Room air)  Lying   03/28/21 2215  --  70  18  144/69  99  96 %  None (Room air)  Lying   03/28/21 2150  --  --  --  --  --  --  None (Room air)  --   03/28/21 2108  98 1 °F (36 7 °C)  --  --  --  --  --  --  --   03/28/21 2059  --  77  18  186/94Abnormal   --  97 %  None (Room air)  Lying       Pertinent Labs/Diagnostic Test Results:   EKG 3 29 -- Sinus bradycardia   Septal infarct , age undetermined      Results from last 7 days   Lab Units 03/28/21  2120   WBC Thousand/uL 9 15   HEMOGLOBIN g/dL 15 3   HEMATOCRIT % 45 1   PLATELETS Thousands/uL 234   NEUTROS ABS Thousands/µL 6 67     Results from last 7 days   Lab Units 03/28/21  2120   SODIUM mmol/L 141   POTASSIUM mmol/L 3 6   CHLORIDE mmol/L 109*   CO2 mmol/L 25   ANION GAP mmol/L 7   BUN mg/dL 11   CREATININE mg/dL 1 07   EGFR ml/min/1 73sq m 49   CALCIUM mg/dL 9 4     Results from last 7 days   Lab Units 03/28/21 2120   GLUCOSE RANDOM mg/dL 97       Results from last 7 days   Lab Units 03/28/21 2124 03/28/21 2122   CLARITY UA   --  Slightly Cloudy   COLOR UA  brown Brown   SPEC GRAV UA   --  >=1 030   PH UA   --  5 5   GLUCOSE UA mg/dl  --  Negative   KETONES UA mg/dl  --  40 (2+)*   BLOOD UA   --  Large*   PROTEIN UA mg/dl  --  100 (2+)*   NITRITE UA   --  Negative   BILIRUBIN UA   --  Interference- unable to analyze*   UROBILINOGEN UA E U /dl  --  0 2   LEUKOCYTES UA   --  Trace*   WBC UA /hpf  --  10-20*   RBC UA /hpf  --  Innumerable*   BACTERIA UA /hpf  --  Occasional   EPITHELIAL CELLS WET PREP /hpf  --  Occasional     Results from last 7 days   Lab Units 03/23/21  1356   URINE CULTURE  No Growth <1000 cfu/mL     ED Treatment:   Medication Administration from 03/28/2021 1932 to 03/28/2021 2311       Date/Time Order Dose Route Action     03/28/2021 2121 HYDROmorphone HCl (DILAUDID) injection 0 2 mg 0 2 mg Intravenous Given     03/28/2021 2120 multi-electrolyte (ISOLYTE-S PH 7 4) bolus 500 mL 500 mL Intravenous New Bag     Past Medical History:   Diagnosis Date    H/O nonmelanoma skin cancer     last assessed 9/28/17    Hypertension      Present on Admission:   Ureteral calculus, left   Essential hypertension      Admitting Diagnosis: Ureteral stone [N20 1]  Abdominal pain [R10 9]  Acute left flank pain [R10 9]  Age/Sex: [de-identified] y o  female  Admission Orders:  Scheduled Medications:  acetaminophen, 975 mg, Oral, Q8H [facility]  amLODIPine, 5 mg, Oral, Daily  ascorbic acid, 500 mg, Oral, Daily  calcium carbonate-vitamin D, 1 tablet, Oral, Daily With Breakfast  cholecalciferol, 1,000 Units, Oral, Daily  fish oil, 1,000 mg, Oral, Daily  metoprolol succinate, 50 mg, Oral, Daily  multivitamin-minerals, 1 tablet, Oral, Daily  tamsulosin, 0 4 mg, Oral, HS    Continuous IV Infusions:  lactated ringers, 125 mL/hr, Intravenous, Continuous    PRN Meds:  HYDROmorphone, 0 2 mg, Intravenous, Q4H PRN  ondansetron, 4 mg, Intravenous, Q6H PRN 3/29 x1  oxyCODONE, 2 5 mg, Oral, Q4H PRN  oxyCODONE, 5 mg, Oral, Q4H PRN        Network Utilization Review Department  ATTENTION: Please call with any questions or concerns to [phone] and carefully listen to the prompts so that you are directed to the right person  All voicemails are confidential   Tylerbatsheva Pierre all requests for admission clinical reviews, approved or denied determinations and any other requests to dedicated fax number below belonging to the campus where the patient is receiving treatment   List of dedicated fax numbers for the Facilities:  [address] DENIALS (Administrative/Medical Necessity) [phone]   [address] (Maternity/NICU/Pediatrics) [phone] [address] [place] [address] Dr Yoav Preciado 9381 (  [facility]) [address] [place] [address] Magali [place] 1481 [address] [place] [address] [phone]

## 2021-03-29 NOTE — PROGRESS NOTES
[address] [facility]  Progress Note - [place] 1940, [de-identified] y o  female MRN: [id number]  Unit/Bed#: -05 Encounter: [id number]  Primary Care Provider: Keisha Mix MD   Date and time admitted to hospital: 3/28/2021  8:56 PM    * Ureteral calculus, left  Assessment & Plan  · Known ureteral calculus as below discovered 03/01/2021  Was initially planned for intervention with urology for however with intractable pain with nausea/vomiting tonight  · Pain improved s/p IV Dilaudid provided in ED and now she is in severe pain following procedure  Will give another dose of IV dilaudid  Unable to discharge her due to the pain  · Admitted for urology evaluation/intervention which was done this morning  · Continue IV fluids tonight as urine is bloody following the procedure  · Pain control as per geriatric pain management order set- encourage oral pain medications as able  Discussed with her nurse on a possible regimen  Give IV dilaudid now, then follow up with oral oxycodone 5mg for moderate and 7 5 for severe pain  Was taking percocet 5/325mg at home with minimal relief   · P r n  Zofran as antiemetic  · Continue Flomax  · Strain all urine    Essential hypertension  Assessment & Plan  · Currently acceptable, continue metoprolol succinate and amlodipine with hold parameters          VTE Pharmacologic Prophylaxis: VTE Score: 4 Moderate Risk (Score 3-4) - Pharmacological DVT Prophylaxis Contraindicated  Sequential Compression Devices Ordered  Patient Centered Rounds: I have performed bedside rounds with nursing staff today  Discussions with Specialists or Other Care Team Provider: Primary RN and CM    Education and Discussions with Family / Patient: Updated  (daughter) via phone  Time Spent for Care: 20 minutes  More than 50% of total time spent on counseling and coordination of care as described above      Current Length of Stay: 0 day(s)  Current Patient Status: Inpatient   Certification Statement: The patient, admitted on an observation basis, will now require > 2 midnight hospital stay due to intractable pain due to nephrolithiasis   Discharge Plan: Anticipate discharge in 48 hrs to home  Code Status: Level 3 - DNAR and DNI    Subjective:   Complains of terrible colicky pain to the left and "can not possibly go home like this " Patient states she was taking percocet at home intermittently, but it was only helping minimally  No nausea currently and tolerated her lunch  No fever or chills  Objective:     Vitals:   Temp (24hrs), Av °F (36 7 °C), Min:97 1 °F (36 2 °C), Max:98 8 °F (37 1 °C)    Temp:  [97 1 °F (36 2 °C)-98 8 °F (37 1 °C)] 97 1 °F (36 2 °C)  HR:  [59-86] 70  Resp:  [12-20] 20  BP: (109-186)/(53-94) 127/69  SpO2:  [93 %-97 %] 96 %  Body mass index is 39 83 kg/m²  Input and Output Summary (last 24 hours): Intake/Output Summary (Last 24 hours) at 3/29/2021 1400  Last data filed at 3/29/2021 1045  Gross per 24 hour   Intake 1200 ml   Output --   Net 1200 ml       Physical Exam:   Physical Exam  Constitutional:       Appearance: She is obese  Comments: Appears to be uncomfortable   HENT:      Head: Normocephalic  Nose: Nose normal       Mouth/Throat:      Mouth: Mucous membranes are moist    Neck:      Musculoskeletal: Normal range of motion  Cardiovascular:      Rate and Rhythm: Normal rate and regular rhythm  Heart sounds: Murmur present  Pulmonary:      Effort: Pulmonary effort is normal       Breath sounds: Normal breath sounds  Abdominal:      General: Abdomen is flat  Musculoskeletal: Normal range of motion  Skin:     General: Skin is warm and dry  Capillary Refill: Capillary refill takes less than 2 seconds  Coloration: Skin is pale  Neurological:      General: No focal deficit present  Mental Status: She is oriented to person, place, and time     Psychiatric:         Mood and Affect: Mood normal             Additional Data:     Labs:  Results from last 7 days   Lab Units 03/28/21  2120   WBC Thousand/uL 9 15   HEMOGLOBIN g/dL 15 3   HEMATOCRIT % 45 1   PLATELETS Thousands/uL 234   NEUTROS PCT % 73   LYMPHS PCT % 15   MONOS PCT % 10   EOS PCT % 1     Results from last 7 days   Lab Units 03/28/21  2120   SODIUM mmol/L 141   POTASSIUM mmol/L 3 6   CHLORIDE mmol/L 109*   CO2 mmol/L 25   BUN mg/dL 11   CREATININE mg/dL 1 07   ANION GAP mmol/L 7   CALCIUM mg/dL 9 4   GLUCOSE RANDOM mg/dL 97                       Lines/Drains:  Invasive Devices     Peripheral Intravenous Line            Peripheral IV 03/28/21 Right Antecubital less than 1 day                Telemetry:        Imaging: No pertinent imaging reviewed      Recent Cultures (last 7 days):   Results from last 7 days   Lab Units 03/23/21  1356   URINE CULTURE  No Growth <1000 cfu/mL       Last 24 Hours Medication List:   Current Facility-Administered Medications   Medication Dose Route Frequency Provider Last Rate    acetaminophen  975 mg Oral Q8H CARLTON Guido Herrera MD      amLODIPine  5 mg Oral Daily Guido Herrera MD      ascorbic acid  500 mg Oral Daily Guido Herrera MD      calcium carbonate-vitamin D  1 tablet Oral Daily With Breakfast Guido Herrera MD      cholecalciferol  1,000 Units Oral Daily Guido Herrera MD      fish oil  1,000 mg Oral Daily Guido Herrera MD      HYDROmorphone  0 2 mg Intravenous Q4H PRN Guido Herrera MD      lactated ringers  125 mL/hr Intravenous Continuous Guido Herrera  mL/hr (03/29/21 1215)    metoprolol succinate  50 mg Oral Daily Guido Herrera MD      multivitamin-minerals  1 tablet Oral Daily Guido Herrera MD      ondansetron  4 mg Intravenous Q6H PRN Guido Herrera MD      oxyCODONE  5 mg Oral Q4H PRN RYAN Steele      oxyCODONE  7 5 mg Oral Q4H PRN RYAN Steele      tamsulosin  0 4 mg Oral HS Guido Herrera MD          Today, Patient Was Seen By: RYAN Newell    ** Please Note: Dictation voice to text software may have been used in the creation of this document   **

## 2021-03-29 NOTE — ASSESSMENT & PLAN NOTE
· Known ureteral calculus as below discovered 03/01/2021  Was initially planned for intervention with urology for however with intractable pain with nausea/vomiting tonight  · Pain improved s/p IV Dilaudid provided in ED and now she is in severe pain following procedure  Will give another dose of IV dilaudid  Unable to discharge her due to the pain  · Admitted for urology evaluation/intervention which was done this morning  · Continue IV fluids tonight as urine is bloody following the procedure  · Pain control as per geriatric pain management order set- encourage oral pain medications as able  Discussed with her nurse on a possible regimen  Give IV dilaudid now, then follow up with oral oxycodone 5mg for moderate and 7 5 for severe pain  Was taking percocet 5/325mg at home with minimal relief   · P r n   Zofran as antiemetic  · Continue Flomax  · Strain all urine

## 2021-03-29 NOTE — ED ATTENDING ATTESTATION
3/28/2021  IAngel MD, saw and evaluated the patient  I have discussed the patient with the resident/non-physician practitioner and agree with the resident's/non-physician practitioner's findings, Plan of Care, and MDM as documented in the resident's/non-physician practitioner's note, except where noted  All available labs and Radiology studies were reviewed  I was present for key portions of any procedure(s) performed by the resident/non-physician practitioner and I was immediately available to provide assistance  At this point I agree with the current assessment done in the Emergency Department  I have conducted an independent evaluation of this patient a history and physical is as follows:    ED Course     Known left UPJ stone, seen on CT on March 1st and redemonstrated on CT performed on March 26th at [facility] [place], presenting to the emergency department for intractable left flank and left lower quadrant abdominal pain which has not been controlled by oxycodone 5 or Percocet 5  Patient has baseline chronic kidney dysfunction and so has not taken NSAIDs  No fever  No urinary symptoms  Ten systems reviewed negative except as noted in the history of present illness  The patient is resting comfortably on a stretcher in no acute respiratory distress  The patient appears nontoxic  Head is normocephalic and atraumatic  Conjunctiva and sclera are normal  Neck is nontender and supple with full range of motion to flexion, extension, lateral rotation  No meningismus appreciated  No masses are appreciated  Lungs are clear to auscultation bilaterally without any wheezes, rales or rhonchi  Heart is regular rate and rhythm without any murmurs, rubs or gallops  Abdomen with some left lower quadrant tenderness without any rebound or guarding  Extremities appear grossly normal without any significant arthropathy  Patient is awake, alert, and oriented x3   The patient has normal interaction  Motor is 5 out of 5  Assessment and plan: Intractable pain from left UPJ stone  Plan to admit to Medicine, Urology consult  Labs Reviewed   BASIC METABOLIC PANEL - Abnormal       Result Value Ref Range Status    Sodium 141  136 - 145 mmol/L Final    Potassium 3 6  3 5 - 5 3 mmol/L Final    Chloride 109 (*) 100 - 108 mmol/L Final    CO2 25  21 - 32 mmol/L Final    ANION GAP 7  4 - 13 mmol/L Final    BUN 11  5 - 25 mg/dL Final    Creatinine 1 07  0 60 - 1 30 mg/dL Final    Comment: Standardized to IDMS reference method    Glucose 97  65 - 140 mg/dL Final    Comment: If the patient is fasting, the ADA then defines impaired fasting glucose as > 100 mg/dL and diabetes as > or equal to 123 mg/dL  Specimen collection should occur prior to Sulfasalazine administration due to the potential for falsely depressed results  Specimen collection should occur prior to Sulfapyridine administration due to the potential for falsely elevated results      Calcium 9 4  8 3 - 10 1 mg/dL Final    eGFR 49  ml/min/1 73sq m Final    Narrative:     National Kidney Disease Foundation guidelines for Chronic Kidney Disease (CKD):     Stage 1 with normal or high GFR (GFR > 90 mL/min/1 73 square meters)    Stage 2 Mild CKD (GFR = 60-89 mL/min/1 73 square meters)    Stage 3A Moderate CKD (GFR = 45-59 mL/min/1 73 square meters)    Stage 3B Moderate CKD (GFR = 30-44 mL/min/1 73 square meters)    Stage 4 Severe CKD (GFR = 15-29 mL/min/1 73 square meters)    Stage 5 End Stage CKD (GFR <15 mL/min/1 73 square meters)  Note: GFR calculation is accurate only with a steady state creatinine   CBC AND DIFFERENTIAL - Abnormal    WBC 9 15  4 31 - 10 16 Thousand/uL Final    RBC 4 90  3 81 - 5 12 Million/uL Final    Hemoglobin 15 3  11 5 - 15 4 g/dL Final    Hematocrit 45 1  34 8 - 46 1 % Final    MCV 92  82 - 98 fL Final    MCH 31 2  26 8 - 34 3 pg Final    MCHC 33 9  31 4 - 37 4 g/dL Final    RDW 12 4  11 6 - 15 1 % Final    MPV 8 4 (*) 8 9 - 12 7 fL Final    Platelets 973  116 - 390 Thousands/uL Final    nRBC 0  /100 WBCs Final    Neutrophils Relative 73  43 - 75 % Final    Immat GRANS % 1  0 - 2 % Final    Lymphocytes Relative 15  14 - 44 % Final    Monocytes Relative 10  4 - 12 % Final    Eosinophils Relative 1  0 - 6 % Final    Basophils Relative 0  0 - 1 % Final    Neutrophils Absolute 6 67  1 85 - 7 62 Thousands/µL Final    Immature Grans Absolute 0 05  0 00 - 0 20 Thousand/uL Final    Lymphocytes Absolute 1 36  0 60 - 4 47 Thousands/µL Final    Monocytes Absolute 0 93  0 17 - 1 22 Thousand/µL Final    Eosinophils Absolute 0 10  0 00 - 0 61 Thousand/µL Final    Basophils Absolute 0 04  0 00 - 0 10 Thousands/µL Final   URINE MICROSCOPIC - Abnormal    RBC, UA Innumerable (*) None Seen, 2-4 /hpf Final    WBC, UA 10-20 (*) None Seen, 2-4 /hpf Final    Epithelial Cells Occasional  None Seen, Occasional /hpf Final    Bacteria, UA Occasional  None Seen, Occasional /hpf Final   POCT URINALYSIS DIPSTICK - Abnormal    Color, UA brown   Final   URINE MACROSCOPIC, POC - Abnormal    Color, UA Brown   Final    Clarity, UA Slightly Cloudy   Final    pH, UA 5 5  4 5 - 8 0 Final    Leukocytes, UA Trace (*) Negative Final    Nitrite, UA Negative  Negative Final    Protein,  (2+) (*) Negative mg/dl Final    Glucose, UA Negative  Negative mg/dl Final    Ketones, UA 40 (2+) (*) Negative mg/dl Final    Urobilinogen, UA 0 2  0 2, 1 0 E U /dl E U /dl Final    Bilirubin, UA Interference- unable to analyze (*) Negative Final    Comment: The dipstick result may be falsely positive due to interfering substances  We recommend reliance upon serum bilirubin, liver & kidney function tests to guide patient care if clinically indicated      Blood, UA Large (*) Negative Final    Specific Gravity, UA >=1 030  1 003 - 1 030 Final    Narrative:     CLINITEK RESULT   URINE CULTURE   URINE CULTURE       No orders to display                   Critical Care Time  Procedures

## 2021-03-29 NOTE — DISCHARGE INSTRUCTIONS
Ureteroscopy   WHAT YOU NEED TO KNOW:    Minna,    Today you had left ureteroscopy and retrograde pyelography  Unfortunately, when we got inside of your kidney, the urine was quite bloody likely due to your obstruction and I could not see your stone due to obstruction from the blood  I did try multiple times and went in multiple parts of your kidney to encounter your stone, but did not have success, unfortunately  In cases such as this, we placed a ureteral stent, and allow for a period of time for the kidney to cool off and for the blood to drain  This is typically a number of weeks  I will place orders for a subsequent stone surgery, either with myself, or with 1 of my colleagues who is 1st available in the appropriate time frame  If no stone is found at that time, it is either in a part of your kidney that is not accessible, or you have passed the stone  I do not believe that likely that you passed her stone prior to today's procedure given your pain  With a stent in place it is common to have urgency of urination, and frequency, you will also have blood in the urine that will improve with the passing of time  I would recommend holding any blood thinners at this time including fish oil, and aspirin, if okay with your primary care doctor  Typically they are okay with this if you do not have fresh coronary artery stents  We have sent medicines to the pharmacy to make your recovery more comfortable, please be on look out for telephone call from our surgical schedulers with regard to scheduling your subsequent stone surgery    Dr Daniel Fam ureteroscopy is a procedure to examine in the inside of your urinary tract, which includes your urethra, bladder, ureters, and kidneys  A ureteroscope is a small, thin tube with a light and camera on the end  Ureteroscopy can help your healthcare provider diagnose and treat problems in your urinary tract, such as kidney stones     DISCHARGE INSTRUCTIONS: Medicine:   · Antibiotics  may be given to treat or prevent an infection  · Take your medicine as directed  Contact your healthcare provider if you think your medicine is not helping or if you have side effects  Tell him or her if you are allergic to any medicine  Keep a list of the medicines, vitamins, and herbs you take  Include the amounts, and when and why you take them  Bring the list or the pill bottles to follow-up visits  Carry your medicine list with you in case of an emergency  Follow up with your healthcare provider as directed:  Write down your questions so you remember to ask them during your visits  Drink liquids as directed  Liquids can help prevent kidney stones and urinary tract infections  Drink water and limit the amount of caffeine you drink  Caffeine may be found in coffee, tea, soda, sports drinks, and foods  Ask your healthcare provider how much liquid to drink each day  Contact your healthcare provider if:   · You have a fever  · You cannot urinate  · You have blood in your urine  · You are vomiting  · You have pain in your abdomen or side  · You have questions or concerns about your condition or care  © Copyright [employer] 2018 Information is for End User's use only and may not be sold, redistributed or otherwise used for commercial purposes  All illustrations and images included in CareNotes® are the copyrighted property of A D A M , Inc  or [facility] Ronny Hartman   The above information is an  only  It is not intended as medical advice for individual conditions or treatments  Talk to your doctor, nurse or pharmacist before following any medical regimen to see if it is safe and effective for you

## 2021-03-29 NOTE — TELEPHONE ENCOUNTER
The following message has been sent to our clinical team:    We are holding the 29th of April for this patient with Dr Nadia Mota, please arrange for a 2 week CT stone protocol, this will help to determine whether not stent removal is indicated versus a repeat trip to the operating room, thank you

## 2021-03-29 NOTE — PROGRESS NOTES
Pastoral Care Progress Note    3/29/2021  Patient: Erendira Menard : 1940  Admission Date & Time: 3/28/2021 2056  MRN: [id number] [facility]: [id number]                     Chaplaincy Interventions Utilized:   Empowerment: Encouraged focus on present and Provided chaplaincy education    Exploration: Explored hope, Explored emotional needs & resources and Explored spiritual needs & resources    Relationship Building: Cultivated a relationship of care and support and Listened empathically    Chaplaincy Outcomes Achieved:  Expressed gratitude and Expressed intermediate hope    Spiritual Coping Strategies Utilized:   Connectedness       21 1500   Clinical Encounter Type   Visited With Patient   Routine Visit Introduction

## 2021-03-29 NOTE — H&P
[address] 1940, [de-identified] y o  female MRN: [id number]  Unit/Bed#: -53 Encounter: [id number]  Primary Care Provider: Binh Ellis MD   Date and time admitted to hospital: 3/28/2021  8:56 PM    * Ureteral calculus, left  Assessment & Plan  · Known ureteral calculus as below discovered 03/01/2021  Was initially planned for intervention with urology for 08/20/2021, however with intractable pain with nausea/vomiting tonight  · Pain improved s/p IV Dilaudid provided in ED  · Admitted for urology evaluation/intervention, will keep NPO  · Continue IV fluids  · Pain control as per geriatric pain management order set  · P r n  Zofran as antiemetic  · Start Flomax  · Strain all urine    Essential hypertension  Assessment & Plan  · Currently acceptable, continue metoprolol succinate and amlodipine with hold parameters      VTE Prophylaxis: Pharmacologic VTE Prophylaxis contraindicated due to preop  / sequential compression device   Code Status: Level 3 - DNAR and DNI  POLST: POLST form is not discussed and not completed at this time  Anticipated Length of Stay:  Patient will be admitted on an Observation basis with an anticipated length of stay of  less than 2 midnights  Justification for Hospital Stay: Please see detailed plans noted above  Chief Complaint:     Left flank pain  History of Present Illness:  Tammie Eagle is a [de-identified] y o  female who presented with intractable left flank and abdominal pain  Patient was discovered with left UPJ stone during ED visit at [facility] 03/26/2021 in the setting of intractable flank pain with nausea/vomiting; pain was able to be controlled at that time and patient discharged to home, and follow-up with Urology for planned for intervention 04/08/2021    Since evaluation did have persistent intermittent left flank pain, however over the past 1 week had noted a marked worsening despite use of analgesics; was seen at [facility] ED 03/26/2021 with similar symptoms with CT abdomen/pelvis again confirming stone and patient discharged with p r n  Oxycodone  Unfortunately this did not significantly improved pain, and patient presented this evening for further evaluation  Does note some intermittent nausea/vomiting however controlled by Zofran  Endorses intermittent hematuria, denies fevers/chills, chest pain/pressure, shortness of breath, dysuria, diarrhea, or headache  Is admitted for evaluation and potential intervention by Urology Service given intractable pain from left UPJ stone  Currently, patient is lying in bed in no acute distress, comfortable appearing, and notes improvement in pain s/p IV Dilaudid provided in ED  Denies nausea/vomiting at this time      Review of Systems:    Constitutional:  Denies fever or chills   Eyes:  Denies change in visual acuity   HENT:  Denies nasal congestion or sore throat   Respiratory:  Denies cough or shortness of breath   Cardiovascular:  Denies chest pain or edema   GI:  Denies bloody stools or diarrhea but endorsed abdominal pain, nausea, and vomiting  :  Denies dysuria but endorsed left flank pain  Musculoskeletal:  Denies back pain or joint pain   Integument:  Denies rash   Neurologic:  Denies headache, focal weakness or sensory changes   Endocrine:  Denies polyuria or polydipsia   Lymphatic:  Denies swollen glands   Psychiatric:  Denies depression or anxiety     Past Medical and Surgical History:   Past Medical History:   Diagnosis Date    H/O nonmelanoma skin cancer     last assessed 9/28/17    Hypertension      Past Surgical History:   Procedure Laterality Date    APPENDECTOMY  2010    BLADDER SURGERY  1997    BREAST EXCISIONAL BIOPSY Left 1995    benign    no visible scar    CERVIX SURGERY      dilation and curettage of cervical stump 2005, 1998, [address]      partial - last assessed 10/28/14    COLONOSCOPY      last assessed 8/9/17    HERNIA REPAIR  2011    TONSILLECTOMY  1971       Meds/Allergies:  Medications Prior to Admission   Medication    amLODIPine (NORVASC) 5 mg tablet    ascorbic acid (VITAMIN C) 500 mg tablet    aspirin (ECOTRIN LOW STRENGTH) 81 mg EC tablet    Calcium-Magnesium-Vitamin D (CALCIUM 500 PO)    Cholecalciferol (EQL VITAMIN D3) 1000 units capsule    metoprolol succinate (TOPROL-XL) 50 mg 24 hr tablet    Multiple Vitamins-Minerals (OCUVITE PO)    Omega-3 Fatty Acids (FISH OIL) 1000 MG CPDR    ondansetron (ZOFRAN-ODT) 4 mg disintegrating tablet    oxyCODONE (ROXICODONE) 5 mg immediate release tablet    amLODIPine (NORVASC) 2 5 mg tablet    diclofenac sodium (VOLTAREN) 1 %    Glucos-Chondroit-Hyaluron-MSM (GLUCOSAMINE CHONDROITIN JOINT) TABS       Allergies: Allergies   Allergen Reactions    Azithromycin Vomiting and Dizziness    Morphine Vomiting     History:  Marital Status:       Substance Use History:   Social History     Substance and Sexual Activity   Alcohol Use Yes    Comment: occasionally wine     Social History     Tobacco Use   Smoking Status Former Smoker    Quit date:     Years since quittin 2   Smokeless Tobacco Never Used     Social History     Substance and Sexual Activity   Drug Use No       Family History:  Family History   Problem Relation Age of Onset    Arthritis Mother     Osteoporosis Mother     Stroke Mother     Heart disease Father     Osteoporosis Father     Breast cancer Family     Osteoporosis Family     Breast cancer additional onset Family     No Known Problems Sister     No Known Problems Daughter     No Known Problems Maternal Grandmother     No Known Problems Maternal Grandfather     Breast cancer Paternal Grandmother     No Known Problems Paternal Grandfather     No Known Problems Sister        Physical Exam:     Vitals:   Blood Pressure: 116/67 (21)  Pulse: 76 (21)  Temperature: 98 4 °F (36 9 °C) (21)  Temp Source: Oral (03/28/21 2319)  Respirations: 18 (03/28/21 2319)  Height: 5' 3" (160 cm) (03/28/21 2319)  Weight - Scale: 102 kg (224 lb 13 9 oz) (03/28/21 2319)  SpO2: 95 % (03/28/21 2319)    Constitutional:  Well developed, obese, no acute distress, non-toxic appearance   Eyes:  PERRL, conjunctiva normal   HENT:  Atraumatic, external ears normal, nose normal, oropharynx moist, no pharyngeal exudates  Neck- normal range of motion, no tenderness, supple   Respiratory:  No respiratory distress, normal breath sounds, no rales, no wheezing   Cardiovascular:  Normal rate, normal rhythm, no murmurs, no gallops, no rubs   GI:  Soft, nondistended, normal bowel sounds, nontender, no organomegaly, no mass, no rebound, no guarding   :  No costovertebral angle tenderness   Musculoskeletal:  No edema, no tenderness, no deformities  Back- no tenderness  Integument:  Well hydrated, no rash   Lymphatic:  No lymphadenopathy noted   Neurologic:  Alert &awake, communicative, CN 2-12 normal, normal motor function, normal sensory function, no focal deficits noted   Psychiatric:  Speech and behavior appropriate       Lab Results: I have personally reviewed pertinent reports  Results from last 7 days   Lab Units 03/28/21  2120   WBC Thousand/uL 9 15   HEMOGLOBIN g/dL 15 3   HEMATOCRIT % 45 1   PLATELETS Thousands/uL 234   NEUTROS PCT % 73   LYMPHS PCT % 15   MONOS PCT % 10   EOS PCT % 1     Results from last 7 days   Lab Units 03/28/21  2120   POTASSIUM mmol/L 3 6   CHLORIDE mmol/L 109*   CO2 mmol/L 25   BUN mg/dL 11   CREATININE mg/dL 1 07   CALCIUM mg/dL 9 4           Imaging: I have personally reviewed pertinent reports  Ct Abdomen Pelvis With Contrast    Result Date: 3/1/2021  Narrative: CT ABDOMEN AND PELVIS WITH IV CONTRAST INDICATION:   LLQ abdominal pain, diverticulitis suspected l flank pain  COMPARISON:  10/5/2018 TECHNIQUE:  CT examination of the abdomen and pelvis was performed   Axial, sagittal, and coronal 2D reformatted images were created from the source data and submitted for interpretation  Radiation dose length product (DLP) for this visit:  1055 mGy-cm   This examination, like all CT scans performed in the [facility], was performed utilizing techniques to minimize radiation dose exposure, including the use of iterative reconstruction and automated exposure control  IV Contrast:  100 mL of iohexol (OMNIPAQUE) Enteric Contrast:  Enteric contrast was not administered  FINDINGS: ABDOMEN LOWER CHEST:  Mild passive atelectatic change within the posterior lung bases  Small pulmonary nodule within the lateral right lung base on series 2 image 10 is unchanged from 2018  Limited visualization of the heart demonstrates normal cardiac size  LIVER/BILIARY TREE:  Mild hepatic steatosis  No discrete mass  Normal portal vein and hepatic veins  GALLBLADDER:  Gallbladder is surgically absent  SPLEEN:  Unremarkable  PANCREAS:  Unremarkable  ADRENAL GLANDS:  Unremarkable  KIDNEYS/URETERS:  Multiple parenchymal and parapelvic cysts are present  There is mild left-sided hydronephrosis with dilatation of the renal pelvis  At the UPJ there is a calculus present which measures 8 mm in length and approximately 2 mm in width, best seen on series 2 images 46 and 47  STOMACH AND BOWEL:  Limited without oral contrast   Stomach is decompressed  No small bowel dilatation  Evaluation of the large bowel demonstrates fairly extensive colonic diverticulosis involving the left colon and sigmoid colon  No active inflammation identified to suggest acute diverticulitis  APPENDIX:  Previous appendectomy  ABDOMINOPELVIC CAVITY:  No ascites  No pneumoperitoneum  No lymphadenopathy  VESSELS:  Unremarkable for patient's age  PELVIS REPRODUCTIVE ORGANS:  There is a small amount of fluid noted within the endometrial cavity, atypical for a patient of this age  No adnexal mass  URINARY BLADDER:  Unremarkable   ABDOMINAL WALL/INGUINAL REGIONS: Previous hernia repair is intact  OSSEOUS STRUCTURES:  No acute osseous abnormality  Thoracolumbar spondylitic degenerative change with loss of disc height, vacuum phenomenon and endplate /facet hypertrophic degenerative change  Impression: There is a calculus present just distal to the left UP junction measuring 8 mm x 2 mm with mild resulting left-sided hydronephrosis  Extensive colonic diverticulosis involving primarily the left colon and sigmoid colon with no acute inflammation to suggest diverticulitis  Stable mild hepatic steatosis  There is a trace amount of fluid in the endometrial cavity, atypical for a postmenopausal female  Consider pelvic ultrasound follow-up  This examination was marked "immediate notification" in Epic in order to begin the standard process by which the radiology reading room liaison alerts the referring practitioner  Workstation performed: [id number]         ** Please Note: Dragon 360 Dictation voice to text software was used in the creation of this document   **

## 2021-03-30 LAB
BACTERIA UR CULT: NORMAL
BACTERIA UR CULT: NORMAL

## 2021-03-30 PROCEDURE — 99232 SBSQ HOSP IP/OBS MODERATE 35: CPT | Performed by: NURSE PRACTITIONER

## 2021-03-30 RX ORDER — POLYETHYLENE GLYCOL 3350 17 G/17G
17 POWDER, FOR SOLUTION ORAL 2 TIMES DAILY
Status: DISCONTINUED | OUTPATIENT
Start: 2021-03-30 | End: 2021-03-31 | Stop reason: HOSPADM

## 2021-03-30 RX ADMIN — OMEGA-3 FATTY ACIDS CAP 1000 MG 1000 MG: 1000 CAP at 08:31

## 2021-03-30 RX ADMIN — HYDROMORPHONE HYDROCHLORIDE 0.2 MG: 0.2 INJECTION, SOLUTION INTRAMUSCULAR; INTRAVENOUS; SUBCUTANEOUS at 00:49

## 2021-03-30 RX ADMIN — ACETAMINOPHEN 975 MG: 325 TABLET, FILM COATED ORAL at 21:17

## 2021-03-30 RX ADMIN — POLYETHYLENE GLYCOL 3350 17 G: 17 POWDER, FOR SOLUTION ORAL at 21:17

## 2021-03-30 RX ADMIN — SODIUM CHLORIDE, SODIUM LACTATE, POTASSIUM CHLORIDE, AND CALCIUM CHLORIDE 125 ML/HR: .6; .31; .03; .02 INJECTION, SOLUTION INTRAVENOUS at 05:02

## 2021-03-30 RX ADMIN — OXYCODONE HYDROCHLORIDE 7.5 MG: 5 TABLET ORAL at 08:31

## 2021-03-30 RX ADMIN — ACETAMINOPHEN 975 MG: 325 TABLET, FILM COATED ORAL at 04:58

## 2021-03-30 RX ADMIN — BISACODYL 5 MG: 5 TABLET, COATED ORAL at 21:17

## 2021-03-30 RX ADMIN — Medication 1 TABLET: at 08:32

## 2021-03-30 RX ADMIN — ACETAMINOPHEN 975 MG: 325 TABLET, FILM COATED ORAL at 13:03

## 2021-03-30 RX ADMIN — SODIUM CHLORIDE, SODIUM LACTATE, POTASSIUM CHLORIDE, AND CALCIUM CHLORIDE 125 ML/HR: .6; .31; .03; .02 INJECTION, SOLUTION INTRAVENOUS at 17:49

## 2021-03-30 RX ADMIN — OXYCODONE HYDROCHLORIDE 7.5 MG: 5 TABLET ORAL at 04:57

## 2021-03-30 RX ADMIN — TAMSULOSIN HYDROCHLORIDE 0.4 MG: 0.4 CAPSULE ORAL at 21:17

## 2021-03-30 RX ADMIN — POLYETHYLENE GLYCOL 3350 17 G: 17 POWDER, FOR SOLUTION ORAL at 12:59

## 2021-03-30 NOTE — CASE MANAGEMENT
Pt is not a 30 day readmission  Pt is not a bundle  Unplanned readmission risk color- Green  CM met pt at bedside, introduce self and made aware of CM role at dc  Pt has a LW and POA  POA is her dtr Rachael Saran- [phone]  Pt lives alone in a 2 story house with 2 WHITNEY  Pt has a 1st flr set up  Pt was IPTA with all ADL's, drive and retired  Pt has an [facility] that she uses as need and a [place]  PCP is dr Susannah Mena  Pharmacy is [facility] in [place]  Pt has hx with [facility]  Pt denies hx with STR, alc, drug and IP psych tx  Pt has transportation when dc  CM reviewed d/c planning process including the following: identifying help at home, patient preference for d/c planning needs, Discharge Lounge, [facility] Meds to Bed program, availability of treatment team to discuss questions or concerns patient and/or family may have regarding understanding medications and recognizing signs and symptoms once discharged  CM also encouraged patient to follow up with all recommended appointments after discharge  Patient advised of importance for patient and family to participate in managing patients medical well being

## 2021-03-30 NOTE — TELEPHONE ENCOUNTER
Nikki Man MD         3/29/21 4:16 PM  Note     The following message has been sent to our clinical team:     We are holding the 29th of April for this patient with Dr Kathrin Kelly, please arrange for a 2 week CT stone protocol, this will help to determine whether not stent removal is indicated versus a repeat trip to the operating room, thank you

## 2021-03-30 NOTE — PROGRESS NOTES
[address] [facility]  Progress Note - [place] [address] 1940, [de-identified] y o  female MRN: [id number]  Unit/Bed#: [facility] 723-01 Encounter: [id number]  Primary Care Provider: Keisha Mix MD   Date and time admitted to hospital: 3/28/2021  8:56 PM    * Ureteral calculus, left  Assessment & Plan  · Known ureteral calculus as below discovered 03/01/2021  Was initially planned for intervention with urology for however with intractable pain with nausea/vomiting tonight  · Pain improved s/p IV Dilaudid provided in ED and now she was in severe pain following procedure  Required IV dilaudid due to pain  · Is s/p ureteroscopy yesterday but stone could not be visualized, patient will need a repeat CT scan in 2 weeks per urologist   · Pain control as per geriatric pain management order set- encourage oral pain medications as able  · IV dilaudid- use sparingly  · oral oxycodone 5mg for moderate and 7 5 for severe pain  Was taking percocet 5/325mg at home with minimal relief   · P r n  Zofran as antiemetic  · Continue Flomax  · Strain all urine    Essential hypertension  Assessment & Plan  · Currently acceptable, continue metoprolol succinate and amlodipine with hold parameters          VTE Pharmacologic Prophylaxis: VTE Score: 4 Moderate Risk (Score 3-4) - Pharmacological DVT Prophylaxis Contraindicated  Sequential Compression Devices Ordered  Patient Centered Rounds: I have performed bedside rounds with nursing staff today  Discussions with Specialists or Other Care Team Provider:  Primary RN and Case Management    Education and Discussions with Family / Patient: Updated  (daughter) via phone  Time Spent for Care: 20 minutes  More than 50% of total time spent on counseling and coordination of care as described above      Current Length of Stay: 1 day(s)  Current Patient Status: Inpatient   Certification Statement: The patient will continue to require additional inpatient hospital stay due to Intractable pain secondary to ureteronephro lithiasis  Discharge Plan: Anticipate discharge tomorrow to home  Code Status: Level 3 - DNAR and DNI    Subjective:   Still with pain to her left flank area  Also started with right lower quadrant pain but thinks that could be related to constipation  She has not had a bowel movement since last Thursday  No fever or chills  Was out of bed earlier today  Still requiring pain medications every 4 hours  Does not feel ready to go home yet  Objective:     Vitals:   Temp (24hrs), Av °F (36 7 °C), Min:97 9 °F (36 6 °C), Max:98 1 °F (36 7 °C)    Temp:  [97 9 °F (36 6 °C)-98 1 °F (36 7 °C)] 97 9 °F (36 6 °C)  HR:  [79-99] 85  Resp:  [16-18] 18  BP: (100-132)/(66-72) 129/66  SpO2:  [92 %-94 %] 94 %  Body mass index is 39 83 kg/m²  Input and Output Summary (last 24 hours): Intake/Output Summary (Last 24 hours) at 3/30/2021 1242  Last data filed at 3/30/2021 1001  Gross per 24 hour   Intake 480 ml   Output 865 ml   Net -385 ml       Physical Exam:   Physical Exam  Constitutional:       Appearance: Normal appearance  She is not ill-appearing  HENT:      Head: Normocephalic  Nose: Nose normal       Mouth/Throat:      Mouth: Mucous membranes are moist    Cardiovascular:      Rate and Rhythm: Normal rate and regular rhythm  Pulses: Normal pulses  Pulmonary:      Effort: Pulmonary effort is normal       Breath sounds: Normal breath sounds  Abdominal:      General: Abdomen is flat  Bowel sounds are normal    Musculoskeletal: Normal range of motion  Skin:     General: Skin is warm  Capillary Refill: Capillary refill takes less than 2 seconds  Neurological:      General: No focal deficit present  Mental Status: She is oriented to person, place, and time     Psychiatric:         Mood and Affect: Mood normal             Additional Data:     Labs:  Results from last 7 days   Lab Units 21  2120   WBC Thousand/uL 9 15 HEMOGLOBIN g/dL 15 3   HEMATOCRIT % 45 1   PLATELETS Thousands/uL 234   NEUTROS PCT % 73   LYMPHS PCT % 15   MONOS PCT % 10   EOS PCT % 1     Results from last 7 days   Lab Units 03/28/21 2120   SODIUM mmol/L 141   POTASSIUM mmol/L 3 6   CHLORIDE mmol/L 109*   CO2 mmol/L 25   BUN mg/dL 11   CREATININE mg/dL 1 07   ANION GAP mmol/L 7   CALCIUM mg/dL 9 4   GLUCOSE RANDOM mg/dL 97                       Lines/Drains:  Invasive Devices     Peripheral Intravenous Line            Peripheral IV 03/28/21 Right Antecubital 1 day                  Recent Cultures (last 7 days):   Results from last 7 days   Lab Units 03/28/21 2124 03/28/21 2122 03/23/21  1356   URINE CULTURE  60,000-69,000 cfu/ml  20,000-29,000 cfu/ml  No Growth <1000 cfu/mL       Last 24 Hours Medication List:   Current Facility-Administered Medications   Medication Dose Route Frequency Provider Last Rate    acetaminophen  975 mg Oral Q8H [facility] Luis Enrique Horvath MD      amLODIPine  5 mg Oral Daily Luis Enrique Horvath MD      calcium carbonate-vitamin D  1 tablet Oral Daily With Breakfast Luis Enrique Horvath MD      cholecalciferol  1,000 Units Oral Daily Luis Enrique Horvath MD      fish oil  1,000 mg Oral Daily Luis Enrique Horvath MD      HYDROmorphone  0 2 mg Intravenous Q4H PRN Luis Enrique Horvath MD      lactated ringers  125 mL/hr Intravenous Continuous Luis Enrique Horvath  mL/hr (03/30/21 0502)    metoprolol succinate  50 mg Oral Daily Luis Enrique Horvath MD      multivitamin-minerals  1 tablet Oral Daily Luis Enrique Horvath MD      ondansetron  4 mg Intravenous Q6H PRN Luis Enrique Horvath MD      oxyCODONE  5 mg Oral Q4H PRN RYAN Rivera      oxyCODONE  7 5 mg Oral Q4H PRN RYAN Rivera      polyethylene glycol  17 g Oral BID RYAN Rivera      tamsulosin  0 4 mg Oral HS Luis Enrique Horvath MD          Today, Patient Was Seen By: RYAN Rivera    ** Please Note: Dictation voice to text software may have been used in the creation of this document   **

## 2021-03-30 NOTE — PLAN OF CARE
Problem: PAIN - ADULT  Goal: Verbalizes/displays adequate comfort level or baseline comfort level  Description: Interventions:  - Encourage patient to monitor pain and request assistance  - Assess pain using appropriate pain scale  - Administer analgesics based on type and severity of pain and evaluate response  - Implement non-pharmacological measures as appropriate and evaluate response  - Consider cultural and social influences on pain and pain management  - Notify physician/advanced practitioner if interventions unsuccessful or patient reports new pain  Outcome: Progressing     Problem: INFECTION - ADULT  Goal: Absence or prevention of progression during hospitalization  Description: INTERVENTIONS:  - Assess and monitor for signs and symptoms of infection  - Monitor lab/diagnostic results  - Monitor all insertion sites, i e  indwelling lines, tubes, and drains  - Monitor endotracheal if appropriate and nasal secretions for changes in amount and color  - [place] appropriate cooling/warming therapies per order  - Administer medications as ordered  - Instruct and encourage patient and family to use good hand hygiene technique  - Identify and instruct in appropriate isolation precautions for identified infection/condition  Outcome: Progressing  Goal: Absence of fever/infection during neutropenic period  Description: INTERVENTIONS:  - Monitor WBC    Outcome: Progressing     Problem: SAFETY ADULT  Goal: Patient will remain free of falls  Description: INTERVENTIONS:  - Assess patient frequently for physical needs  -  Identify cognitive and physical deficits and behaviors that affect risk of falls    -  [place] fall precautions as indicated by assessment   - Educate patient/family on patient safety including physical limitations  - Instruct patient to call for assistance with activity based on assessment  - Modify environment to reduce risk of injury  - Consider OT/PT consult to assist with strengthening/mobility  Outcome: Progressing  Goal: Maintain or return to baseline ADL function  Description: INTERVENTIONS:  -  Assess patient's ability to carry out ADLs; assess patient's baseline for ADL function and identify physical deficits which impact ability to perform ADLs (bathing, care of mouth/teeth, toileting, grooming, dressing, etc )  - Assess/evaluate cause of self-care deficits   - Assess range of motion  - Assess patient's mobility; develop plan if impaired  - Assess patient's need for assistive devices and provide as appropriate  - Encourage maximum independence but intervene and supervise when necessary  - Involve family in performance of ADLs  - Assess for home care needs following discharge   - Consider OT consult to assist with ADL evaluation and planning for discharge  - Provide patient education as appropriate  Outcome: Progressing  Goal: Maintain or return mobility status to optimal level  Description: INTERVENTIONS:  - Assess patient's baseline mobility status (ambulation, transfers, stairs, etc )    - Identify cognitive and physical deficits and behaviors that affect mobility  - Identify mobility aids required to assist with transfers and/or ambulation (gait belt, sit-to-stand, lift, walker, cane, etc )  - [place] fall precautions as indicated by assessment  - Record patient progress and toleration of activity level on Mobility SBAR; progress patient to next Phase/Stage  - Instruct patient to call for assistance with activity based on assessment  - Consider rehabilitation consult to assist with strengthening/weightbearing, etc   Outcome: Progressing     Problem: DISCHARGE PLANNING  Goal: Discharge to home or other facility with appropriate resources  Description: INTERVENTIONS:  - Identify barriers to discharge w/patient and caregiver  - Arrange for needed discharge resources and transportation as appropriate  - Identify discharge learning needs (meds, wound care, etc )  - Arrange for interpretive services to assist at discharge as needed  - Refer to Case Management Department for coordinating discharge planning if the patient needs post-hospital services based on physician/advanced practitioner order or complex needs related to functional status, cognitive ability, or social support system  Outcome: Progressing     Problem: Knowledge Deficit  Goal: Patient/family/caregiver demonstrates understanding of disease process, treatment plan, medications, and discharge instructions  Description: Complete learning assessment and assess knowledge base    Interventions:  - Provide teaching at level of understanding  - Provide teaching via preferred learning methods  Outcome: Progressing

## 2021-03-30 NOTE — PLAN OF CARE
Problem: PAIN - ADULT  Goal: Verbalizes/displays adequate comfort level or baseline comfort level  Description: Interventions:  - Encourage patient to monitor pain and request assistance  - Assess pain using appropriate pain scale  - Administer analgesics based on type and severity of pain and evaluate response  - Implement non-pharmacological measures as appropriate and evaluate response  - Consider cultural and social influences on pain and pain management  - Notify physician/advanced practitioner if interventions unsuccessful or patient reports new pain  Outcome: Progressing     Problem: INFECTION - ADULT  Goal: Absence or prevention of progression during hospitalization  Description: INTERVENTIONS:  - Assess and monitor for signs and symptoms of infection  - Monitor lab/diagnostic results  - Monitor all insertion sites, i e  indwelling lines, tubes, and drains  - Monitor endotracheal if appropriate and nasal secretions for changes in amount and color  - Supai appropriate cooling/warming therapies per order  - Administer medications as ordered  - Instruct and encourage patient and family to use good hand hygiene technique  - Identify and instruct in appropriate isolation precautions for identified infection/condition  Outcome: Progressing  Goal: Absence of fever/infection during neutropenic period  Description: INTERVENTIONS:  - Monitor WBC    Outcome: Progressing     Problem: SAFETY ADULT  Goal: Patient will remain free of falls  Description: INTERVENTIONS:  - Assess patient frequently for physical needs  -  Identify cognitive and physical deficits and behaviors that affect risk of falls    -  Sup[place] fall precautions as indicated by assessment   - Educate patient/family on patient safety including physical limitations  - Instruct patient to call for assistance with activity based on assessment  - Modify environment to reduce risk of injury  - Consider OT/PT consult to assist with strengthening/mobility  Outcome: Progressing  Goal: Maintain or return to baseline ADL function  Description: INTERVENTIONS:  -  Assess patient's ability to carry out ADLs; assess patient's baseline for ADL function and identify physical deficits which impact ability to perform ADLs (bathing, care of mouth/teeth, toileting, grooming, dressing, etc )  - Assess/evaluate cause of self-care deficits   - Assess range of motion  - Assess patient's mobility; develop plan if impaired  - Assess patient's need for assistive devices and provide as appropriate  - Encourage maximum independence but intervene and supervise when necessary  - Involve family in performance of ADLs  - Assess for home care needs following discharge   - Consider OT consult to assist with ADL evaluation and planning for discharge  - Provide patient education as appropriate  Outcome: Progressing  Goal: Maintain or return mobility status to optimal level  Description: INTERVENTIONS:  - Assess patient's baseline mobility status (ambulation, transfers, stairs, etc )    - Identify cognitive and physical deficits and behaviors that affect mobility  - Identify mobility aids required to assist with transfers and/or ambulation (gait belt, sit-to-stand, lift, walker, cane, etc )  - [place] fall precautions as indicated by assessment  - Record patient progress and toleration of activity level on Mobility SBAR; progress patient to next Phase/Stage  - Instruct patient to call for assistance with activity based on assessment  - Consider rehabilitation consult to assist with strengthening/weightbearing, etc   Outcome: Progressing     Problem: DISCHARGE PLANNING  Goal: Discharge to home or other facility with appropriate resources  Description: INTERVENTIONS:  - Identify barriers to discharge w/patient and caregiver  - Arrange for needed discharge resources and transportation as appropriate  - Identify discharge learning needs (meds, wound care, etc )  - Arrange for interpretive services to assist at discharge as needed  - Refer to Case Management Department for coordinating discharge planning if the patient needs post-hospital services based on physician/advanced practitioner order or complex needs related to functional status, cognitive ability, or social support system  Outcome: Progressing     Problem: Knowledge Deficit  Goal: Patient/family/caregiver demonstrates understanding of disease process, treatment plan, medications, and discharge instructions  Description: Complete learning assessment and assess knowledge base    Interventions:  - Provide teaching at level of understanding  - Provide teaching via preferred learning methods  Outcome: Progressing

## 2021-03-30 NOTE — ASSESSMENT & PLAN NOTE
· Known ureteral calculus as below discovered 03/01/2021  Was initially planned for intervention with urology for however with intractable pain with nausea/vomiting tonight  · Pain improved s/p IV Dilaudid provided in ED and now she was in severe pain following procedure  Required IV dilaudid due to pain  · Is s/p ureteroscopy yesterday but stone could not be visualized, patient will need a repeat CT scan in 2 weeks per urologist   · Pain control as per geriatric pain management order set- encourage oral pain medications as able  · IV dilaudid- use sparingly  · oral oxycodone 5mg for moderate and 7 5 for severe pain  Was taking percocet 5/325mg at home with minimal relief   · P r n   Zofran as antiemetic  · Continue Flomax  · Strain all urine

## 2021-03-31 VITALS
DIASTOLIC BLOOD PRESSURE: 77 MMHG | HEART RATE: 70 BPM | OXYGEN SATURATION: 93 % | TEMPERATURE: 97.9 F | HEIGHT: 63 IN | WEIGHT: 224.87 LBS | RESPIRATION RATE: 16 BRPM | BODY MASS INDEX: 39.84 KG/M2 | SYSTOLIC BLOOD PRESSURE: 140 MMHG

## 2021-03-31 PROBLEM — N13.9 ACUTE UNILATERAL OBSTRUCTIVE UROPATHY: Status: ACTIVE | Noted: 2021-03-02

## 2021-03-31 PROCEDURE — 99238 HOSP IP/OBS DSCHRG MGMT 30/<: CPT | Performed by: INTERNAL MEDICINE

## 2021-03-31 RX ORDER — OXYCODONE HYDROCHLORIDE AND ACETAMINOPHEN 5; 325 MG/1; MG/1
1 TABLET ORAL EVERY 4 HOURS PRN
Status: ON HOLD | COMMUNITY
End: 2021-03-31 | Stop reason: SDUPTHER

## 2021-03-31 RX ORDER — OXYCODONE HYDROCHLORIDE AND ACETAMINOPHEN 5; 325 MG/1; MG/1
1 TABLET ORAL EVERY 4 HOURS PRN
Refills: 0
Start: 2021-03-31 | End: 2021-04-05

## 2021-03-31 RX ADMIN — ACETAMINOPHEN 975 MG: 325 TABLET, FILM COATED ORAL at 05:11

## 2021-03-31 RX ADMIN — AMLODIPINE BESYLATE 5 MG: 5 TABLET ORAL at 08:52

## 2021-03-31 RX ADMIN — OMEGA-3 FATTY ACIDS CAP 1000 MG 1000 MG: 1000 CAP at 08:51

## 2021-03-31 RX ADMIN — ONDANSETRON 4 MG: 2 INJECTION INTRAMUSCULAR; INTRAVENOUS at 08:53

## 2021-03-31 RX ADMIN — ACETAMINOPHEN 975 MG: 325 TABLET, FILM COATED ORAL at 08:51

## 2021-03-31 RX ADMIN — METOPROLOL SUCCINATE 50 MG: 50 TABLET, EXTENDED RELEASE ORAL at 08:52

## 2021-03-31 NOTE — RESTORATIVE TECHNICIAN NOTE
Restorative Specialist Mobility Note       Activity: Ambulate in reis, Ambulate in room, Bathroom privileges, Chair, Dangle, Stand at bedside(Educated/encouraged pt to ambulate with assistance 3-4 x's/day   Pt callbell, phone/tray within reach )     Assistive Device: None          Carmen GARCIA, Restorative Technician, [employer]

## 2021-04-01 ENCOUNTER — TELEPHONE (OUTPATIENT)
Dept: FAMILY MEDICINE CLINIC | Facility: MEDICAL CENTER | Age: 81
End: 2021-04-01

## 2021-04-01 ENCOUNTER — TRANSITIONAL CARE MANAGEMENT (OUTPATIENT)
Dept: FAMILY MEDICINE CLINIC | Facility: MEDICAL CENTER | Age: 81
End: 2021-04-01

## 2021-04-01 ENCOUNTER — PREP FOR PROCEDURE (OUTPATIENT)
Dept: UROLOGY | Facility: AMBULATORY SURGERY CENTER | Age: 81
End: 2021-04-01

## 2021-04-01 DIAGNOSIS — N20.0 NEPHROLITHIASIS: Primary | ICD-10-CM

## 2021-04-01 DIAGNOSIS — R39.89 SUSPECTED UTI: ICD-10-CM

## 2021-04-01 DIAGNOSIS — Z01.818 PRE-OPERATIVE EXAMINATION: ICD-10-CM

## 2021-04-01 RX ORDER — SULFAMETHOXAZOLE AND TRIMETHOPRIM 800; 160 MG/1; MG/1
1 TABLET ORAL EVERY 12 HOURS SCHEDULED
Qty: 6 TABLET | Refills: 0 | Status: SHIPPED | OUTPATIENT
Start: 2021-04-01 | End: 2021-04-04

## 2021-04-01 NOTE — TELEPHONE ENCOUNTER
I called patient to confirm medical clearance appt on 4/21 with Dr Jaleesa Rhodes  Pt appreciated my call

## 2021-04-01 NOTE — TELEPHONE ENCOUNTER
I spoke with patient and rescheduled her surgery on 4/29 with Dr Praful Stern at [facility]  Patient will just need to repear her ucx 2 weeks prior to sx, EKG was completed while in the hospital, so she does not need to have that done again  Patient stated that she is scheduled for a CT on 4/8  She also requested that she had the order for her prescription for Bactrim is printed and mailed to her  She stated that she would like to have a paper copy  I told her I would see if this is something I could do for her, and most times prescriptions are sent electronically to the pharmacy  She is aware and in agreement to the plan

## 2021-04-01 NOTE — DISCHARGE SUMMARY
Discharge Summary - [address] [facility]    Patient Information: Geneva Menard [de-identified] y o  female MRN: [id number]  Unit/Bed#: DISCHARGE POOL Encounter: [id number]    Discharging Physician / Practitioner: Aminah Padilla MD  PCP: Malu Espinal MD  Admission Date: 3/28/2021  Discharge Date: 03/31/21    Principal discharge diagnosis:  Acute obstructive uropathy due to calculus    Secondary diagnosis:  Hypertension    Consultations During Hospital Stay:  Urology    Procedures Performed:   Cystoscopy, ureteroscopy, retrograde pyelogram and insertion of left ureteral stent on 03/29/21    Hospital Course:     Padmaja Menard is a [de-identified] y o  female patient with a history of hypertension who originally presented to the hospital on 3/28/2021 due to left flank pain  She was noted to have a left UP junction calculus measuring 8 x 2 mm with mild resulting left-sided hydronephrosis on CT done on 03/01/21 when she presented to the ED at [facility]  Plan was for urology intervention on 04/08/21  She was seen at [facility] ED on 03/26/2021 with worsening pain and she was discharged on oxycodone as needed with no relief and pain  She underwent urology intervention as above with insertion of left ureteral stent  Her stone could not be seen due to bloody urine within the collecting system  Urology recommends a follow-up CT abdomen in 2 weeks  She will follow up with Urology as an outpatient for stent removal   She is being tentatively scheduled for stone surgery for 04/29/21 with Dr Osiel Rivero if needed  Condition at Discharge: stable     Discharge Day Visit / Exam:     * Please refer to separate progress for these details *    Discharge instructions/Information to patient and family:   See after visit summary for information provided to patient and family  Provisions for Follow-Up Care:  See after visit summary for information related to follow-up care and any pertinent home health orders        Disposition: Home    Planned Readmission: No    Discharge Statement:  I spent 30 minutes discharging the patient  This time was spent on the day of discharge  I had direct contact with the patient on the day of discharge  Greater than 50% of the total time was spent examining patient, answering all patient questions, arranging and discussing plan of care with patient as well as directly providing post-discharge instructions  Additional time then spent on discharge activities  Discharge Medications:  See after visit summary for reconciled discharge medications provided to patient and family

## 2021-04-01 NOTE — ASSESSMENT & PLAN NOTE
· Status post left-sided stent  · Discussed with urology - cari for discharge home on Flomax, oxybutynin  · Outpatient Urology follow-up

## 2021-04-01 NOTE — TELEPHONE ENCOUNTER
Left message for patient to call back to find out if she has a cardiologist or nephrologist for her upcoming pre op clearance appt on 4/21

## 2021-04-01 NOTE — TELEPHONE ENCOUNTER
TCM set up for next week   Has a question about her fish oil and baby aspirin   Patient states Dr Zari Henriquez suggest she hold both for now since upcoming procedure but was told to ask her PCP  Also asking how much tylenol can she take  Avoiding oxycodone , took tylenol 500mg , 2 tablets twice yesterday which helped but asking if ok to take it more frequently ?

## 2021-04-01 NOTE — PROGRESS NOTES
[facility]  Progress Note - [place] R Day 1940, [de-identified] y o  female MRN: [id number]  Unit/Bed#: [facility] Encounter: [id number]  Primary Care Provider: Noe George MD   Date and time admitted to hospital: 3/28/2021  8:56 PM    Essential hypertension  Assessment & Plan  · Currently acceptable, continue metoprolol succinate and amlodipine with hold parameters    * Acute unilateral obstructive uropathy  Assessment & Plan  · Status post left-sided stent  · Discussed with urology - cari for discharge home on Flomax, oxybutynin  · Outpatient Urology follow-up          Patient Centered Rounds: Discussed with RN    Discussions with Specialists or Other Care Team Provider:  Discussed with Urology    Education and Discussions with Family / Patient:  Discussed with patient  Discussed with daughter on the phone    Time Spent for Care: 20 minutes  More than 50% of total time spent on counseling and coordination of care as described above  Current Length of Stay: 2 day(s)    Current Patient Status: Inpatient     Discharge Plan:  Discharge home today    Subjective:   No flank pain    Objective:     Vitals:   Temp (24hrs), Av 9 °F (36 6 °C), Min:97 9 °F (36 6 °C), Max:97 9 °F (36 6 °C)    Temp:  [97 9 °F (36 6 °C)] 97 9 °F (36 6 °C)  HR:  [69-70] 70  BP: (140)/(77) 140/77  SpO2:  [93 %] 93 %  Body mass index is 39 83 kg/m²  Physical Exam:     Physical Exam  HENT:      Head: Normocephalic  Nose: Nose normal       Mouth/Throat:      Mouth: Mucous membranes are moist    Eyes:      Extraocular Movements: Extraocular movements intact  Neck:      Musculoskeletal: Neck supple  Cardiovascular:      Rate and Rhythm: Normal rate and regular rhythm  Pulmonary:      Effort: Pulmonary effort is normal  No respiratory distress  Breath sounds: Normal breath sounds  No wheezing  Abdominal:      General: Bowel sounds are normal  There is no distension        Palpations: Abdomen is soft  Tenderness: There is no abdominal tenderness  Musculoskeletal:         General: No swelling  Skin:     General: Skin is warm and dry  Neurological:      General: No focal deficit present  Mental Status: She is alert and oriented to person, place, and time  Psychiatric:         Mood and Affect: Mood normal          Behavior: Behavior normal          Additional Data:     Labs:    Results from last 7 days   Lab Units 03/28/21 2120   WBC Thousand/uL 9 15   HEMOGLOBIN g/dL 15 3   HEMATOCRIT % 45 1   PLATELETS Thousands/uL 234   NEUTROS PCT % 73   LYMPHS PCT % 15   MONOS PCT % 10   EOS PCT % 1     Results from last 7 days   Lab Units 03/28/21 2120   SODIUM mmol/L 141   POTASSIUM mmol/L 3 6   CHLORIDE mmol/L 109*   CO2 mmol/L 25   BUN mg/dL 11   CREATININE mg/dL 1 07   ANION GAP mmol/L 7   CALCIUM mg/dL 9 4   GLUCOSE RANDOM mg/dL 97       * I Have Reviewed All Lab Data Listed Above  * Additional Pertinent Lab Tests Reviewed: [facility]    Recent Cultures (last 7 days):     Results from last 7 days   Lab Units 03/28/21 2124 03/28/21 2122   URINE CULTURE  60,000-69,000 cfu/ml  20,000-29,000 cfu/ml           Today, Patient Was Seen By: Joseph Carranza MD    ** Please Note: Dictation voice to text software may have been used in the creation of this document   **

## 2021-04-01 NOTE — TELEPHONE ENCOUNTER
Pt was just discharged from the [facility] on 3/31/21  She was told to schedule within a week and has medication questions  She said she was also ER in [place] on Friday afternoon  Please, call pt

## 2021-04-01 NOTE — TELEPHONE ENCOUNTER
I called and scheduled patient for a medical clearance appt  On 4/21 with Dr Nae Bartholomew  I will fax over our form today

## 2021-04-07 ENCOUNTER — OFFICE VISIT (OUTPATIENT)
Dept: FAMILY MEDICINE CLINIC | Facility: MEDICAL CENTER | Age: 81
End: 2021-04-07
Payer: MEDICARE

## 2021-04-07 VITALS
HEART RATE: 64 BPM | DIASTOLIC BLOOD PRESSURE: 70 MMHG | WEIGHT: 217.4 LBS | RESPIRATION RATE: 16 BRPM | TEMPERATURE: 98.8 F | BODY MASS INDEX: 38.51 KG/M2 | SYSTOLIC BLOOD PRESSURE: 148 MMHG

## 2021-04-07 DIAGNOSIS — E66.3 OVERWEIGHT: ICD-10-CM

## 2021-04-07 DIAGNOSIS — N13.9 ACUTE UNILATERAL OBSTRUCTIVE UROPATHY: Primary | ICD-10-CM

## 2021-04-07 DIAGNOSIS — I10 ESSENTIAL HYPERTENSION: ICD-10-CM

## 2021-04-07 PROCEDURE — 99496 TRANSJ CARE MGMT HIGH F2F 7D: CPT | Performed by: FAMILY MEDICINE

## 2021-04-07 PROCEDURE — 1111F DSCHRG MED/CURRENT MED MERGE: CPT | Performed by: FAMILY MEDICINE

## 2021-04-07 NOTE — PROGRESS NOTES
Almaz Coello is here for WAYNE  She was hospitalized  for Acute unilateral obstructive uropathy due to Left UPJ calculus at [facility] on 3/28  She had a left-sided hydronephrosis on CT on 03/01  She underwent cystoscopy ureteroscopy retrograde pyelogram and insertion of a left ureteral stent on 03/29  Urology was unable to visualize stone due to bloody urine  She will be having a follow-up CT scan tomorrow  She will have follow-up with Urology for stent removal  And stone removal which is tentatively scheduled for  4/29  She was originally seen at [facility] on 03/26 for the kidney stone  Discharge with analgesics  Since discharge on  3/31 she has not had to take any further pain medication  Pain is resolved  She does continue to have some blood in the urine  She  takes Zofran as needed for nausea  she said her blood pressure has been normal during the hospitalization  She said that she has been trying to lose weight as we discussed previously  She is watching portion control  O: /70 (Cuff Size: Large)   Pulse 64   Temp 98 8 °F (37 1 °C)   Resp 16   Wt 98 6 kg (217 lb 6 4 oz)   BMI 38 51 kg/m²     Looks well  Weight is down 7 lb  Assessment  1  Nephrolithiasis- scheduled for surgery  Presently asymptomatic   2  Hypertension -well controlled;  Continue  Metoprolol and amlodipine 7 5 mg    3  Overweight -encouraged continued efforts at weight loss    Plan   As  above    She will revisit for  preop clearance

## 2021-04-07 NOTE — TELEPHONE ENCOUNTER
Patient called stating she was in the hospital and she has a stent and she wants to know if she needs to come in and get stent out or is he to keep it in until her surgery on 04/29  Patient stated if she doesn't answer please leave a detail message      Patient can be reached at [phone] ([phone])

## 2021-04-07 NOTE — TELEPHONE ENCOUNTER
Call placed to patient, left detailed message to advise that current stent is to remain in place until 4/29 procedure (no office visit for stent removal in meantime)  Office number provided on voicemail for any further questions

## 2021-04-08 ENCOUNTER — HOSPITAL ENCOUNTER (OUTPATIENT)
Dept: CT IMAGING | Facility: CLINIC | Age: 81
Discharge: HOME | End: 2021-04-08
Payer: MEDICARE

## 2021-04-08 DIAGNOSIS — N20.0 NEPHROLITHIASIS: ICD-10-CM

## 2021-04-08 PROCEDURE — 74176 CT ABD & PELVIS W/O CONTRAST: CPT

## 2021-04-08 PROCEDURE — G1004 CDSM NDSC: HCPCS

## 2021-04-08 NOTE — TELEPHONE ENCOUNTER
Pt called to speak with procedure coordinator in the interim she wanted to inform the office she completed CT Scan and still having blood in urine

## 2021-04-08 NOTE — TELEPHONE ENCOUNTER
Patient called and asked if I can email her her order for her ucx since she will be going out of network to have it completed  I will e-mail that to her today  Patient is okay with bactrim prescription being sent to the pharmacy  I will forward a message to the clinical team to see who could assist with sending the order to the pharmacy  Pt was appreciative of everyone's help in the office with her care

## 2021-04-21 ENCOUNTER — OFFICE VISIT (OUTPATIENT)
Dept: FAMILY MEDICINE CLINIC | Facility: MEDICAL CENTER | Age: 81
End: 2021-04-21
Payer: MEDICARE

## 2021-04-21 VITALS
HEART RATE: 64 BPM | WEIGHT: 217 LBS | TEMPERATURE: 98.1 F | BODY MASS INDEX: 38.45 KG/M2 | RESPIRATION RATE: 16 BRPM | SYSTOLIC BLOOD PRESSURE: 136 MMHG | HEIGHT: 63 IN | DIASTOLIC BLOOD PRESSURE: 78 MMHG

## 2021-04-21 DIAGNOSIS — I10 ESSENTIAL HYPERTENSION: ICD-10-CM

## 2021-04-21 DIAGNOSIS — Z01.818 PRE-OP EXAMINATION: Primary | ICD-10-CM

## 2021-04-21 DIAGNOSIS — N13.9 ACUTE UNILATERAL OBSTRUCTIVE UROPATHY: ICD-10-CM

## 2021-04-21 PROCEDURE — 99214 OFFICE O/P EST MOD 30 MIN: CPT | Performed by: FAMILY MEDICINE

## 2021-04-21 NOTE — PROGRESS NOTES
Jian Ferguson is here for preopclearance  Cystoscopy ureteroscopy with lithotripsy , retrograde pyelogram and insertion stent left  Dr Evans [place]  4/29/21  General anesthesia  Past medical history includes HTN and  Hyperlipidemia   PSH includes multiple surgeries   Has had problems with anesthesia due to morphine with vomiting, lightheadedness  No history of bleeding or clotting disorders  No FH of the above  SH: Nonsmoker  Rare alcohol     Review of Systems   Constitutional: Negative for chills and fever  HENT: Negative for congestion  Respiratory: Positive for shortness of breath  She complains of  Occasional shortness of breath with exertion as well as some fatigue recently  Cardiovascular: Negative for chest pain, palpitations and leg swelling  Gastrointestinal: Negative for abdominal pain and diarrhea  Skin: Negative for rash  Neurological: Negative for dizziness and headaches  O: /86 (BP Location: Left arm, Patient Position: Sitting, Cuff Size: Large)   Pulse 64   Temp 98 1 °F (36 7 °C)   Resp 16   Ht 5' 3" (1 6 m)   Wt 98 4 kg (217 lb)   BMI 38 44 kg/m²      BP by me  136/78 large cuff  Physical Exam  Constitutional:       Appearance: She is well-developed  HENT:      Head: Normocephalic  Right Ear: Tympanic membrane and external ear normal       Left Ear: Tympanic membrane and external ear normal       Nose: Nose normal    Eyes:      General: No scleral icterus  Conjunctiva/sclera: Conjunctivae normal       Pupils: Pupils are equal, round, and reactive to light  Neck:      Musculoskeletal: Normal range of motion and neck supple  Thyroid: No thyroid mass or thyromegaly  Vascular: No carotid bruit  Cardiovascular:      Rate and Rhythm: Normal rate and regular rhythm  Pulses:           Femoral pulses are 2+ on the right side and 2+ on the left side  Popliteal pulses are 2+ on the right side and 2+ on the left side          Dorsalis pedis pulses are 2+ on the right side and 2+ on the left side  Posterior tibial pulses are 2+ on the right side and 2+ on the left side  Heart sounds: Normal heart sounds  Pulmonary:      Effort: Pulmonary effort is normal  No respiratory distress  Breath sounds: Normal breath sounds  No wheezing or rales  Abdominal:      General: Bowel sounds are normal  There is no distension  Palpations: Abdomen is soft  There is no mass  Tenderness: There is no abdominal tenderness  Musculoskeletal: Normal range of motion  Lymphadenopathy:      Head:      Right side of head: No submandibular or occipital adenopathy  Left side of head: No submandibular or occipital adenopathy  Cervical: No cervical adenopathy  Upper Body:      Right upper body: No supraclavicular adenopathy  Left upper body: No supraclavicular adenopathy  Skin:     Findings: No lesion or rash  Neurological:      Mental Status: She is oriented to person, place, and time  Psychiatric:         Behavior: Behavior normal        Assessment  1  HTN-controlled  2  Hyperlipidemia-controlled  3  Incidental pulmonary nodule-stable  4  Left UPJ stone-hydronephrosis has resolved    Plan  Medically cleared for surgery     Has f/u appt in May

## 2021-04-22 RX ORDER — AMLODIPINE BESYLATE 2.5 MG/1
2.5 TABLET ORAL DAILY
COMMUNITY
End: 2021-11-16

## 2021-04-22 RX ORDER — OXYCODONE HYDROCHLORIDE AND ACETAMINOPHEN 5; 325 MG/1; MG/1
1 TABLET ORAL EVERY 4 HOURS PRN
COMMUNITY
End: 2021-11-18

## 2021-04-22 RX ORDER — MULTIVIT-MIN/IRON/FOLIC ACID/K 18-600-40
1 CAPSULE ORAL DAILY
COMMUNITY

## 2021-04-22 NOTE — TELEPHONE ENCOUNTER
I called and left a message to let pt know that I asked Dr Mell Galvan to place a new order for her Bactrim  I advised her to call her pharmacy first before going to pick it up to make sure it is there, that I was unsure of when the order will be sent to the pharmacy  I did carla the message as high priority  I am not sure why the pharmacy is not receiving the order, but patient was supposed to begin taking this today  Pt advised to call with any additional questions or concerns  UPDATE: Note to Dr Mell Galvan was sent under prep for procedure on 4/22

## 2021-04-22 NOTE — PRE-PROCEDURE INSTRUCTIONS
Pre-Surgery Instructions:   Medication Instructions    amLODIPine (NORVASC) 2 5 mg tablet TAKE DAY OF SURGERY    amLODIPine (NORVASC) 5 mg tablet TAKE DAY OF SURGERY    Glucos-Chondroit-Hyaluron-MSM (GLUCOSAMINE CHONDROITIN JOINT) TABS HOLD TILL AFTER SURGERY    metoprolol succinate (TOPROL-XL) 50 mg 24 hr tablet TAKE DAY OF SURGERY    Multiple Vitamins-Minerals (OCUVITE PO) HOLD TILL AFTER SURGERY    PT HAS BEEN HOLDING VITAMINS AND ASA SINCE HOSPITAL ADMIT  My Surgical Experience    The following information was developed to assist you to prepare for your operation  What do I need to do before coming to the hospital?   Arrange for a responsible person to drive you to and from the hospital    Arrange care for your children at home  Children are not allowed in the recovery areas of the hospital   Plan to wear clothing that is easy to put on and take off  If you are having shoulder surgery, wear a shirt that buttons or zippers in the front  Bathing  o Shower the evening before and the morning of your surgery with an antibacterial soap  Please refer to the Pre Op Showering Instructions for Surgery Patients Sheet   o Remove nail polish and all body piercing jewelry  o Do not shave any body part for at least 24 hours before surgery-this includes face, arms, legs and upper body  Food  o Nothing to eat or drink after midnight the night before your surgery  This includes candy and chewing gum  o Exception: If your surgery is after 12:00pm (noon), you may have clear liquids such as 7-Up®, ginger ale, apple or cranberry juice, Jell-O®, water, or clear broth until 8:00 am  o Do not drink milk or juice with pulp on the morning before surgery  o Do not drink alcohol 24 hours before surgery  Medicine  o Follow instructions you received from your surgeon about which medicines you may take on the day of surgery  o If instructed to take medicine on the morning of surgery, take pills with just a small sip of water   Call your prescribing doctor for specific infroamtion on what to do if you take insulin    What should I bring to the hospital?    Bring:  Jaimee Potters or a walker, if you have them, for foot or knee surgery   A list of the daily medicines, vitamins, minerals, herbals and nutritional supplements you take  Include the dosages of medicines and the time you take them each day   Glasses, dentures or hearing aids   Minimal clothing; you will be wearing hospital sleepwear   Photo ID; required to verify your identity   If you have a Living Will or Power of , bring a copy of the documents   If you have an ostomy, bring an extra pouch and any supplies you use    Do not bring   Medicines or inhalers   Money, valuables or jewelry    What other information should I know about the day of surgery?  Notify your surgeons if you develop a cold, sore throat, cough, fever, rash or any other illness   Report to the Ambulatory Surgical/Same Day Surgery Unit   You will be instructed to stop at Registration only if you have not been pre-registered   Inform your  fi they do not stay that they will be asked by the staff to leave a phone number where they can be reached   Be available to be reached before surgery  In the event the operating room schedule changes, you may be asked to come in earlier or later than expected    *It is important to tell your doctor and others involved in your health care if you are taking or have been taking any non-prescription drugs, vitamins, minerals, herbals or other nutritional supplements   Any of these may interact with some food or medicines and cause a reaction

## 2021-04-26 NOTE — TELEPHONE ENCOUNTER
Patient called in and said that her prescription still has not been called into the pharmacy  She said her pharmacy has no record of it  Please advise  Please call patient once done

## 2021-04-27 DIAGNOSIS — N20.0 NEPHROLITHIASIS: Primary | ICD-10-CM

## 2021-04-27 DIAGNOSIS — N20.1 URETERAL CALCULUS, LEFT: ICD-10-CM

## 2021-04-27 RX ORDER — SULFAMETHOXAZOLE AND TRIMETHOPRIM 800; 160 MG/1; MG/1
1 TABLET ORAL EVERY 12 HOURS SCHEDULED
Qty: 6 TABLET | Refills: 0 | Status: SHIPPED | OUTPATIENT
Start: 2021-04-27 | End: 2021-04-30

## 2021-04-27 NOTE — TELEPHONE ENCOUNTER
I spoke to the patient she just wanted to make us aware that she will be starting both metoprolol and amlodipine as instructed and call our office if she has any swelling  Rx for Bactrim to take prior to procedure sent into pharmacy

## 2021-04-27 NOTE — TELEPHONE ENCOUNTER
I called and spoke with patient notifying her that prescription has been sent to pharmacy  Patient was very appreciative of my call  She knows to call if there are any issues with the pharmacy receiving the prescription  Attending Attestation (For Attendings USE Only)...

## 2021-04-29 ENCOUNTER — ANESTHESIA EVENT (OUTPATIENT)
Dept: PERIOP | Facility: HOSPITAL | Age: 81
End: 2021-04-29
Payer: MEDICARE

## 2021-04-29 ENCOUNTER — ANESTHESIA (OUTPATIENT)
Dept: PERIOP | Facility: HOSPITAL | Age: 81
End: 2021-04-29
Payer: MEDICARE

## 2021-04-29 ENCOUNTER — HOSPITAL ENCOUNTER (OUTPATIENT)
Facility: HOSPITAL | Age: 81
Setting detail: OUTPATIENT SURGERY
Discharge: HOME | End: 2021-04-29
Attending: UROLOGY | Admitting: UROLOGY
Payer: MEDICARE

## 2021-04-29 ENCOUNTER — APPOINTMENT (OUTPATIENT)
Dept: RADIOLOGY | Facility: HOSPITAL | Age: 81
End: 2021-04-29
Payer: MEDICARE

## 2021-04-29 ENCOUNTER — TELEPHONE (OUTPATIENT)
Dept: UROLOGY | Facility: CLINIC | Age: 81
End: 2021-04-29

## 2021-04-29 VITALS
BODY MASS INDEX: 38.28 KG/M2 | RESPIRATION RATE: 18 BRPM | DIASTOLIC BLOOD PRESSURE: 77 MMHG | WEIGHT: 216.05 LBS | HEART RATE: 55 BPM | SYSTOLIC BLOOD PRESSURE: 133 MMHG | TEMPERATURE: 97.8 F | HEIGHT: 63 IN | OXYGEN SATURATION: 95 %

## 2021-04-29 DIAGNOSIS — Z46.6 ENCOUNTER FOR ADJUSTMENT OF URETERAL STENT: ICD-10-CM

## 2021-04-29 DIAGNOSIS — N20.0 NEPHROLITHIASIS: Primary | ICD-10-CM

## 2021-04-29 PROCEDURE — 82360 CALCULUS ASSAY QUANT: CPT | Performed by: UROLOGY

## 2021-04-29 PROCEDURE — 74420 UROGRAPHY RTRGR +-KUB: CPT

## 2021-04-29 PROCEDURE — C1769 GUIDE WIRE: HCPCS | Performed by: UROLOGY

## 2021-04-29 PROCEDURE — NC001 PR NO CHARGE: Performed by: UROLOGY

## 2021-04-29 PROCEDURE — 52356 CYSTO/URETERO W/LITHOTRIPSY: CPT | Performed by: UROLOGY

## 2021-04-29 PROCEDURE — C1758 CATHETER, URETERAL: HCPCS | Performed by: UROLOGY

## 2021-04-29 RX ORDER — HEPARIN SODIUM 5000 [USP'U]/ML
5000 INJECTION, SOLUTION INTRAVENOUS; SUBCUTANEOUS ONCE
Status: COMPLETED | OUTPATIENT
Start: 2021-04-29 | End: 2021-04-29

## 2021-04-29 RX ORDER — CEPHALEXIN 500 MG/1
500 CAPSULE ORAL EVERY 6 HOURS SCHEDULED
Qty: 3 CAPSULE | Refills: 0 | Status: SHIPPED | OUTPATIENT
Start: 2021-04-29 | End: 2021-04-30

## 2021-04-29 RX ORDER — PROPOFOL 10 MG/ML
INJECTION, EMULSION INTRAVENOUS AS NEEDED
Status: DISCONTINUED | OUTPATIENT
Start: 2021-04-29 | End: 2021-04-29

## 2021-04-29 RX ORDER — FENTANYL CITRATE 50 UG/ML
INJECTION, SOLUTION INTRAMUSCULAR; INTRAVENOUS AS NEEDED
Status: DISCONTINUED | OUTPATIENT
Start: 2021-04-29 | End: 2021-04-29

## 2021-04-29 RX ORDER — GABAPENTIN 300 MG/1
300 CAPSULE ORAL ONCE
Status: COMPLETED | OUTPATIENT
Start: 2021-04-29 | End: 2021-04-29

## 2021-04-29 RX ORDER — ONDANSETRON 2 MG/ML
4 INJECTION INTRAMUSCULAR; INTRAVENOUS ONCE AS NEEDED
Status: DISCONTINUED | OUTPATIENT
Start: 2021-04-29 | End: 2021-04-29 | Stop reason: HOSPADM

## 2021-04-29 RX ORDER — MIDAZOLAM HYDROCHLORIDE 2 MG/2ML
INJECTION, SOLUTION INTRAMUSCULAR; INTRAVENOUS AS NEEDED
Status: DISCONTINUED | OUTPATIENT
Start: 2021-04-29 | End: 2021-04-29

## 2021-04-29 RX ORDER — ACETAMINOPHEN 325 MG/1
650 TABLET ORAL EVERY 4 HOURS PRN
Qty: 30 TABLET | Refills: 0
Start: 2021-04-29 | End: 2021-11-16

## 2021-04-29 RX ORDER — PHENAZOPYRIDINE HYDROCHLORIDE 200 MG/1
200 TABLET, FILM COATED ORAL 3 TIMES DAILY PRN
Qty: 10 TABLET | Refills: 0 | Status: SHIPPED | OUTPATIENT
Start: 2021-04-29 | End: 2021-05-02

## 2021-04-29 RX ORDER — DOCUSATE SODIUM 100 MG/1
100 CAPSULE, LIQUID FILLED ORAL 2 TIMES DAILY
Qty: 30 CAPSULE | Refills: 0 | Status: SHIPPED | OUTPATIENT
Start: 2021-04-29 | End: 2021-05-24 | Stop reason: ALTCHOICE

## 2021-04-29 RX ORDER — OXYCODONE HYDROCHLORIDE 5 MG/1
5 TABLET ORAL EVERY 4 HOURS PRN
Status: DISCONTINUED | OUTPATIENT
Start: 2021-04-29 | End: 2021-04-29 | Stop reason: HOSPADM

## 2021-04-29 RX ORDER — MAGNESIUM HYDROXIDE 1200 MG/15ML
LIQUID ORAL AS NEEDED
Status: DISCONTINUED | OUTPATIENT
Start: 2021-04-29 | End: 2021-04-29 | Stop reason: HOSPADM

## 2021-04-29 RX ORDER — TAMSULOSIN HYDROCHLORIDE 0.4 MG/1
0.4 CAPSULE ORAL
Qty: 15 CAPSULE | Refills: 0 | Status: SHIPPED | OUTPATIENT
Start: 2021-04-29 | End: 2021-05-24 | Stop reason: ALTCHOICE

## 2021-04-29 RX ORDER — ACETAMINOPHEN 325 MG/1
975 TABLET ORAL ONCE
Status: COMPLETED | OUTPATIENT
Start: 2021-04-29 | End: 2021-04-29

## 2021-04-29 RX ORDER — CEFAZOLIN SODIUM 2 G/50ML
2000 SOLUTION INTRAVENOUS ONCE
Status: COMPLETED | OUTPATIENT
Start: 2021-04-29 | End: 2021-04-29

## 2021-04-29 RX ORDER — FENTANYL CITRATE/PF 50 MCG/ML
25 SYRINGE (ML) INJECTION
Status: DISCONTINUED | OUTPATIENT
Start: 2021-04-29 | End: 2021-04-29 | Stop reason: HOSPADM

## 2021-04-29 RX ORDER — NAPROXEN 500 MG/1
500 TABLET ORAL 2 TIMES DAILY WITH MEALS
Qty: 10 TABLET | Refills: 0 | Status: SHIPPED | OUTPATIENT
Start: 2021-04-29 | End: 2021-05-24 | Stop reason: ALTCHOICE

## 2021-04-29 RX ORDER — SODIUM CHLORIDE, SODIUM LACTATE, POTASSIUM CHLORIDE, CALCIUM CHLORIDE 600; 310; 30; 20 MG/100ML; MG/100ML; MG/100ML; MG/100ML
125 INJECTION, SOLUTION INTRAVENOUS CONTINUOUS
Status: DISCONTINUED | OUTPATIENT
Start: 2021-04-29 | End: 2021-04-29 | Stop reason: HOSPADM

## 2021-04-29 RX ADMIN — FENTANYL CITRATE 50 MCG: 50 INJECTION, SOLUTION INTRAMUSCULAR; INTRAVENOUS at 15:04

## 2021-04-29 RX ADMIN — PROPOFOL 150 MG: 10 INJECTION, EMULSION INTRAVENOUS at 15:04

## 2021-04-29 RX ADMIN — HEPARIN SODIUM 5000 UNITS: 5000 INJECTION INTRAVENOUS; SUBCUTANEOUS at 13:55

## 2021-04-29 RX ADMIN — SODIUM CHLORIDE, SODIUM LACTATE, POTASSIUM CHLORIDE, AND CALCIUM CHLORIDE 125 ML/HR: .6; .31; .03; .02 INJECTION, SOLUTION INTRAVENOUS at 14:08

## 2021-04-29 RX ADMIN — ACETAMINOPHEN 975 MG: 325 TABLET, FILM COATED ORAL at 13:55

## 2021-04-29 RX ADMIN — FENTANYL CITRATE 50 MCG: 50 INJECTION, SOLUTION INTRAMUSCULAR; INTRAVENOUS at 15:17

## 2021-04-29 RX ADMIN — GABAPENTIN 300 MG: 300 CAPSULE ORAL at 13:55

## 2021-04-29 RX ADMIN — MIDAZOLAM HYDROCHLORIDE 2 MG: 1 INJECTION, SOLUTION INTRAMUSCULAR; INTRAVENOUS at 14:57

## 2021-04-29 RX ADMIN — CEFAZOLIN SODIUM 2000 MG: 2 SOLUTION INTRAVENOUS at 14:40

## 2021-04-29 NOTE — TELEPHONE ENCOUNTER
Scheduled nurse visit for 5/4 stent removal, this will print on discharge papers  Will call patient tomorrow to confirm

## 2021-04-29 NOTE — OP NOTE
Operative Note     PATIENT:  Padmaja Menard (MRN [id number])    DATE OF PROCEDURE:   4/29/2021    PRE-OP DIAGNOSIS:   1) Left ureteral calculus  2) indwelling left ureteral stent    POST-OP DIAGNOSIS:   1) Left ureteral calculus  2) indwelling left ureteral stent    PROCEDURES PERFORMED:  1) Cystoscopy  2) Left retrograde pyelography with fluoroscopic interpretation  3) Left ureteroscopy with laser lithotripsy and basket extraction of stone  4) Left ureteral stent placement     SURGEON:  Mimi Sanchez MD    NOTE:  There were no qualified teaching residents to assist with this case    ANESTHESIA: General     COMPLICATIONS:   None    ANTIBIOTICS:  Cephazolin    INTRAOPERATIVE THROMBOEMBOLISM PROPHYLAXIS:  Pneumatic compression stockings     FINDINGS:  Left proximal ureteral calculus was 1st repositioned into an upper pole calyx were was fragmented to pieces, each were subsequently basket extracted  Postop ureteral stent was left in place on a string  As per documentation of the prior procedure, patient did have significant irritation throughout the length of the ureter and some associated hematuria though no focal bleeding identified within the entire collecting system    INDICATIONS FOR PROCEDURE:  Padmaja Menard is an [de-identified] y o  old female with Left ureteral calculus  After discussing the options, the patient elected to undergo ureteroscopy and ureteral stent placement  We discussed the procedure in detail, the alternatives, and the risks, and they signed informed consent to proceed  PROCEDURE IN DETAIL:   The patient was identified and brought to the OR  Antibiotic prophylaxis and DVT prophylaxis were administered  They were placed in the comfortable dorsal lithotomy position with care to pad all pressure points  They were prepped and draped in the usual sterile fashion using hibiclens    A surgical time out was performed with all in the room in agreement with the correct patient, procedure, indications, and laterality  A 21-Serbian rigid cystoscope was used to enter the bladder  The bladder was inspected in its entirety and there were no lesions noted  The ureteral orifices were identified in their normal orthotopic positions  The Left ureteral orifice was identified and a 5 Fr open ended catheter was placed into the ureteral orifice alongside the preplaced ureteral stent which was then removed  A retrograde pyelogram was performed with the findings as described above  A Sensor wire was advanced up to the kidney under fluoroscopic guidance  Leaving this safety wire in place, the bladder was drained  A second working wire was then placed, and over this a 12/14 ureteral access sheath was sequentially passed under fluoroscopic guidance  A  5 3 Serbian flexible ureteroscope was advanced up the ureter under vision   The stone was encountered in the Proximal ureter  It was 1st repositioned into the upper pole calyx  The stone was and was not noted to be impacted  A holmium laser fiber was passed through the ureteroscope and laser lithotripsy was commenced at settings of 0 7 J and 7 Hz  The stones were fragmented to very small pieces  Once we were satisfied that the stone was fragmented to dust, a 1 9 Western Judy zero tip nitinol basket was passed through the ureteroscope  The residual fragments were basketed out and removed  The ureteroscope was backed down the ureter under vision and there were no residual fragments and the ureter was noted to be intact with no injury and mild edema where the stone was located  A JJ stent was then passed up the wire  under fluoroscopic guidance into the kidney with a good curl noted in the kidney and in the bladder  An externalized tethering string was left in place and secured to the skin  The bladder was drained  The patient was placed back supine, awakened from general anesthesia and brought to recovery room in stable condition        ESTIMATED BLOOD LOSS:  Minimal      SPECIMENS:   Order Name Source Comment Collection Info Order Time   STONE ANALYSIS Ureter, Left  Collected By: David Manuel MD 4/29/2021  3:30 PM        IMPLANTS:   Implant Name Type Inv   Item Serial No   Lot No  LRB No  Used Action   STENT URETERAL 6FR 24CML SOFT PU - [id number]  STENT URETERAL 6FR 24CML SOFT PU  [employer] [id number] Left 1 Explanted        COMPLICATIONS: None    DISPOSITION: PACU    PLAN:  Patient will be scheduled for a nursing visit early next week for ureteral stent removal   She will follow up in 2 months with a KUB and renal ultrasound

## 2021-04-29 NOTE — ANESTHESIA PREPROCEDURE EVALUATION
Procedure:  CYSTOSCOPY URETEROSCOPY WITH LITHOTRIPSY HOLMIUM LASER, RETROGRADE PYELOGRAM AND INSERTION STENT URETERAL (Left Bladder)    Relevant Problems   CARDIO   (+) Essential hypertension   (+) Hyperlipidemia      NEURO/PSYCH   (+) History of skin cancer        Physical Exam    Airway    Mallampati score: III  TM Distance: >3 FB  Neck ROM: full     Dental   lower dentures,     Cardiovascular  Rhythm: regular, Rate: normal, Cardiovascular exam normal    Pulmonary  Pulmonary exam normal Breath sounds clear to auscultation,     Other Findings        Anesthesia Plan  ASA Score- 2     Anesthesia Type- general with ASA Monitors  Additional Monitors:   Airway Plan: LMA  Plan Factors-Exercise tolerance (METS): >4 METS  Chart reviewed  Patient is not a current smoker  Patient instructed to abstain from smoking on day of procedure  Patient did not smoke on day of surgery  There is medical exclusion for perioperative obstructive sleep apnea risk education  Induction- intravenous  Postoperative Plan- Plan for postoperative opioid use  Informed Consent- Anesthetic plan and risks discussed with patient  I personally reviewed this patient with the CRNA  Discussed and agreed on the Anesthesia Plan with the CRNA  Joon Montana

## 2021-04-29 NOTE — ANESTHESIA POSTPROCEDURE EVALUATION
Post-Op Assessment Note    CV Status:  Stable  Pain Score: 0    Pain management: adequate     Mental Status:  Alert and awake   PONV Controlled:  Controlled   Airway Patency:  Patent   Two or more mitigation strategies used for obstructive sleep apnea   Post Op Vitals Reviewed: Yes      Staff: CRNA         No complications documented      BP      Temp      Pulse     Resp      SpO2

## 2021-04-29 NOTE — H&P
UROLOGY HISTORY AND PHYSICAL     Patient Identifiers: Padmaja Menard (MRN [id number])      Date of Service: 4/29/2021        ASSESSMENT:     [de-identified] y o  old female with  left proximal ureteral calculus, previously treated with ureteral stent placement  She presents today for follow-up definitive ureteroscopy    Follow-up imaging does reveal persistent stone present in the UPJ  PLAN:     To OR for left ureteroscopy      History of Present Illness:     Padmaja Menard is a [de-identified] y o  old with a history of left proximal ureteral calculus    Past Medical, Past Surgical History:     Past Medical History:   Diagnosis Date    H/O nonmelanoma skin cancer     last assessed 9/28/17    Hypertension     Kidney stone     Pneumonia 1961   :    Past Surgical History:   Procedure Laterality Date    APPEN[address]CTOMY  2010   [address]    BREAST EXCISIONAL BIOPSY Left 1995    benign    no visible scar    CERVIX SURGERY      dilation and curettage of cervical stump 2005, 1998, 1994   [address]      partial - last assessed 10/28/14    COLONOSCOPY      last assessed 8/9/17    FL RETROGRADE PYELOGRAM  3/29/2021    HERNIA REPAIR  2011    OR CYSTO/URETERO W/LITHOTRIPSY &INDWELL STENT INSRT Left 3/29/2021    Procedure: CYSTOSCOPY URETEROSCOPY, RETROGRADE PYELOGRAM AND INSERTION STENT URETERAL;  Surgeon: Glendy Chung MD;  Location: [facility];  Service: Urology    TONSILLECTOMY  1971    TUBAL LIGATION     :    Medications, Allergies:     Current Facility-Administered Medications:     ceFAZolin (ANCEF) IVPB (premix in dextrose) 2,000 mg 50 mL, 2,000 mg, Intravenous, Once, Glendy Chung MD    lactated ringers infusion, 125 mL/hr, Intravenous, Continuous, Renetta Fernandez MD, Last Rate: 125 mL/hr at 04/29/21 1408, 125 mL/hr at 04/29/21 1408    Allergies:   Allergies   Allergen Reactions    Azithromycin Vomiting and Dizziness    Morphine Vomiting   :    Social and Family History:   Social History:   Social History     Tobacco Use    Smoking status: Former Smoker     Quit date:      Years since quittin 3    Smokeless tobacco: Never Used   Substance Use Topics    Alcohol use: Yes     Comment: occasionally wine    Drug use: No        Social History     Tobacco Use   Smoking Status Former Smoker    Quit date:     Years since quittin 3   Smokeless Tobacco Never Used       Family History:  Family History   Problem Relation Age of Onset    Arthritis Mother     Osteoporosis Mother     Stroke Mother     Heart disease Father     Osteoporosis Father     Breast cancer Family     Osteoporosis Family     Breast cancer additional onset Family     No Known Problems Sister     No Known Problems Daughter     No Known Problems Maternal Grandmother     No Known Problems Maternal Grandfather     Breast cancer Paternal Grandmother     No Known Problems Paternal Grandfather     No Known Problems Sister    :     Review of Systems:     General: Fever, chills, or night sweats: negative  Cardiac: Negative for chest pain  Pulmonary: Negative for shortness of breath  Gastrointestinal: Abdominal pain negative  Nausea, vomiting, or diarrhea negative  Genitourinary: See HPI above  Patient does nothave hematuria  All other systems queried were negative  Physical Exam:   General: Patient is pleasant and in NAD  Awake and alert  /75   Pulse 97   Temp 97 6 °F (36 4 °C) (Temporal)   Resp 22   Ht 5' 3" (1 6 m)   Wt 98 kg (216 lb 0 8 oz)   SpO2 97%   BMI 38 27 kg/m²   Cardiac: Peripheral edema: negative  Pulmonary: Non-labored breathing  Abdomen: Soft, non-tender, non-distended  No surgical scars  No masses, tenderness, hernias noted  Genitourinary: negative CVA tenderness, negative suprapubic tenderness        Labs:     Lab Results   Component Value Date    HGB 15 3 2021    HCT 45 1 2021    WBC 9 15 2021     2021   ]    Lab Results   Component Value Date    K 3 6 03/28/2021     (H) 03/28/2021    CO2 25 03/28/2021    BUN 11 03/28/2021    CREATININE 1 07 03/28/2021    CALCIUM 9 4 03/28/2021   ]    Imaging:   I personally reviewed the images and report of the following studies, and reviewed them with the patient:        Thank you for allowing me to participate in this patients care  Please do not hesitate to call with any additional questions    Gary Gann MD

## 2021-04-29 NOTE — TELEPHONE ENCOUNTER
Please schedule nurse visit early next week for stent removal     Please also schedule follow-up advanced practitioner 2 months KUB ultrasound prior

## 2021-04-29 NOTE — DISCHARGE INSTRUCTIONS
Mrs Day: Your surgery went very well  Your left ureteral stone was successfully removed  I will schedule you for nursing visit early next week to remove your stent  Please be sure to continue to drink plenty of fluids  Please take your medications as prescribed with caution for comfort  Please call with any questions in the meantime  Elham Hess MD,PhD  [facility]  [phone]    WHAT IS A STENT? At the end of the procedure, your doctor may place a stent into your ureter  A stent is a thin, flexible piece of plastic that will hold open your ureter while the remaining small pieces of stone pass  This allows your kidney to drain easily and prevents you from having to pass these small stone pieces on your own, which could be painful  The stent is about 12 inches long and looks and feels like a thin piece of spaghetti  AFTER THE PROCEDURE  After the procedure you may experience the following symptoms  All of these are normal and should resolve within 1 or 2 days after your stent is removed  1) Urinary frequency (urinating more often than usual)  2) Urinary urgency (the sensation that you need to urinate right away)  3) Painful urination (this can be pain in your bladder or in your back when  you urinate)  4) Blood in your urine ( a stent can irritate the lining of your bladder causing it to bleed)  5) Back/Flank pain, especially with urination  You will receive a prescription for narcotic pain medication after the procedure  You will also receive a prescription for tamsulosin which you will take once a day for 2 weeks to help relax your ureter and decrease stent discomfort  You will also need to purchase a stool softener (i e  Colace) or mild laxative (i e  Miralax) as the narcotic pain medication can make you constipated  This is important as constipation can exacerbate stent related symptoms       STENT REMOVAL  In some cases, your doctor will leave strings attached to your stent  The strings will be taped to your skin after the procedure  The strings will allow you to remove the thin flexible stent while you are at home  Normally, the stent can be removed 3-5 days after your procedure; your physician will tell you the specific date after your procedure  On the day you are supposed to remove your stent, do the followin) When you wake up in the morning, take 1-2 pain pills with food  Start your antibiotic pill the morning of schedule stent removal if prescribed  2) One hour later sit on the toilet or in the bath tub  3) Take a deep breath in and while exhaling, pull the string  4)  Dispose of the stent in the garbage  5)   Alternatively, you will come back for an office procedure to remove the stent by placing a small camera into your bladder to remove the stent  During the next 4-8 hours after removing your stent, you may experience additional blood in your urine, pain with urination or back/side pain  You should take the pain medication you were prescribed to help you with the pain, as well as continue the Flomax  If the pain is severe, you are vomiting, and/or have a fever > 101 4 please call the clinic

## 2021-04-30 NOTE — TELEPHONE ENCOUNTER
Called and spoke with patient to confirm stent removal on Tuesday 5/4  Patient is also scheduled for follow up with Luis Humphreys 7/19/21 with KUB and US prior to visit  Offered sooner appt in July but patient states she will be away  Patient should like print out of order requisitions when she comes in on 5/4 and number for CS provided to her at that time  Aware these tests need to be scheduled/completed approx 2-3 weeks prior to appt to ensure finalized results  No further questions at this time

## 2021-05-04 ENCOUNTER — PROCEDURE VISIT (OUTPATIENT)
Dept: UROLOGY | Facility: CLINIC | Age: 81
End: 2021-05-04

## 2021-05-04 VITALS
SYSTOLIC BLOOD PRESSURE: 138 MMHG | HEIGHT: 63 IN | HEART RATE: 62 BPM | WEIGHT: 216 LBS | BODY MASS INDEX: 38.27 KG/M2 | DIASTOLIC BLOOD PRESSURE: 88 MMHG

## 2021-05-04 DIAGNOSIS — N20.0 CALCULUS OF KIDNEY: Primary | ICD-10-CM

## 2021-05-04 PROCEDURE — 99024 POSTOP FOLLOW-UP VISIT: CPT | Performed by: UROLOGY

## 2021-05-04 NOTE — PROGRESS NOTES
[place] R Day  [id number]  1940      5/4/2021      Diagnosis  Chief Complaint     Post-op          Patient is s/p left ureteroscopy with stone extraction on 4/29/2021 with Dr Merry Khoury  Patient will follow up in two months with KUB and US ptv  Patient knows to call office in meantime with any concerns  Procedure Stent with String Removal    Vitals:    05/04/21 1100   BP: 138/88   BP Location: Left arm   Patient Position: Sitting   Cuff Size: Adult   Pulse: 62   Weight: 98 kg (216 lb)   Height: 5' 3" (1 6 m)       Stent with string removed without difficulty  Reviewed post stent removal symptoms including flank pain, nausea, dysuria, and hematuria  Instructed to increase PO fluids  Take NSAIDS and other prescribed pain medication PRN  Encouraged to call with for uncontrolled pain and fever        Slava Dominique RN

## 2021-05-04 NOTE — PATIENT INSTRUCTIONS
URETERAL STENTS     [id number]  [id number]  A ureteral stent is a soft plastic tube with holes in it  It's temporarily inserted into a ureter to help drain urine into the bladder  One end goes in the kidney  The other end goes in the bladder  A coil on each end holds the stent in place  The stent can't be seen from outside the body  It shouldn't interfere with your normal routine  When Is a Ureteral Stent Used?  To bypass a blockage in a kidney or ureter   During kidney stone removal    To let a ureter heal after surgery  After the Procedure:  After the procedure you may experience the following symptoms  All of these are normal and should resolve within 1 or 2 days after your stent is removed:   Urinary frequency (urinating more often than usual)   Urinary urgency (the sensation that you need to urinate right away)   Painful urination (this can be pain in your bladder or in your back when  you urinate)   Blood in your urine ( a stent can irritate the lining of your bladder causing it to bleed)   Back/Flank pain, especially with urination    Staying Comfortable with a Stent in Place:   Fluids: Stay very well hydrated  Try to drink at least 6-8 glasses of water per day   Ibuprofen 600mg every 6-8 hours  Ask your doctor if you need to reduce the dose (ie, for renal insufficiency)   Flomax (tamsulosin): Usually provided at hospital discharge  Taken daily, relaxes the muscle around the ureter tube, and makes stents more comfortable   Narcotics (Percocet, Vicodin, Oxycodone) are usually provided at hospital discharge - for severe pain when needed   Use a stool softener! Constipation worsens stent discomfort  We recommend using Colace, Miralax, and/or Senna every day  Stent Removal:  You will be scheduled for stent removal in the office  In most cases this is removed by the nurse, using the black string which is secured from your urethra    Sometimes stents are removed in the office using a cystoscope  Stent removal   Blood may continue in the urine for a few days   May develop Flank pain on the side the stent was placed, this may last a few days  Take ibuprofen 600 mg by mouth, three times a day (every 8 hours), as needed   Increase water intake   No restrictions after stent removed      Call Your Doctor   You have a fever over 101°F (38 5°C), chills, nausea, or vomiting   Your urine contains blood clots or you see a large amount of blood-tinged urine      Your pain is not relieved with medication   You constantly leak urine   Your stent is accidentally removed

## 2021-05-06 LAB
CALCIUM OXALATE DIHYDRATE MFR STONE IR: 100 %
COLOR STONE: NORMAL
COMMENT-STONE3: NORMAL
COMPOSITION: NORMAL
LABORATORY COMMENT REPORT: NORMAL
PHOTO: NORMAL
SIZE STONE: NORMAL MM
SPEC SOURCE SUBJ: NORMAL
STONE ANALYSIS-IMP: NORMAL
WT STONE: 29 MG

## 2021-05-24 ENCOUNTER — OFFICE VISIT (OUTPATIENT)
Dept: FAMILY MEDICINE CLINIC | Facility: MEDICAL CENTER | Age: 81
End: 2021-05-24
Payer: MEDICARE

## 2021-05-24 VITALS
HEIGHT: 63 IN | DIASTOLIC BLOOD PRESSURE: 88 MMHG | BODY MASS INDEX: 38.98 KG/M2 | HEART RATE: 78 BPM | WEIGHT: 220 LBS | SYSTOLIC BLOOD PRESSURE: 134 MMHG | TEMPERATURE: 97.8 F

## 2021-05-24 DIAGNOSIS — I10 ESSENTIAL HYPERTENSION: ICD-10-CM

## 2021-05-24 DIAGNOSIS — Z01.419 ENCOUNTER FOR GYNECOLOGICAL EXAMINATION WITHOUT ABNORMAL FINDING: ICD-10-CM

## 2021-05-24 PROCEDURE — 99214 OFFICE O/P EST MOD 30 MIN: CPT | Performed by: FAMILY MEDICINE

## 2021-05-24 PROCEDURE — G0101 CA SCREEN;PELVIC/BREAST EXAM: HCPCS | Performed by: FAMILY MEDICINE

## 2021-05-24 NOTE — PROGRESS NOTES
Gaining is here for follow-up of her hypertension as well as for gyn exam Minna says she was lightheaded on 5/12  Felt a dizzy   sensation  Seh has also had some swelling in her ankles  She stopped the amlodipine on 5/13  She feels better  Taking her blood pressure and it has been normal   Her blood pressure at home is been normal compared to the office  Gyn    No vaginal pain, discharge, bleeding, itching, burning  Some urinary incontinence  Does not wish to pursue  O: /88 (BP Location: Left arm, Patient Position: Sitting, Cuff Size: Adult)   Pulse 78   Temp 97 8 °F (36 6 °C)   Ht 5' 3" (1 6 m)   Wt 99 8 kg (220 lb)   BMI 38 97 kg/m²      BP by me  160/80  Physical Exam  Constitutional:       General: She is not in acute distress  Appearance: She is well-developed  Neck:      Thyroid: No thyromegaly  Vascular: No carotid bruit  Cardiovascular:      Rate and Rhythm: Normal rate and regular rhythm  Heart sounds: Normal heart sounds  Pulmonary:      Effort: Pulmonary effort is normal       Breath sounds: Normal breath sounds  Abdominal:      General: Bowel sounds are normal       Palpations: Abdomen is soft  There is no hepatomegaly or mass  Tenderness: There is no abdominal tenderness  Lymphadenopathy:      Cervical: No cervical adenopathy  Breasts no masses or discharge  External genitalia normal  Vagina normal  Cervix normal no lesions  Uterus normal size shape and consistency  Adnexae non palpable  Rectal exam no masses stool  heme negative    Assessment  1  Postmenopausal female -normal exam    2  Hypertension- side effects with amlodipine  She wishes to hold off for now and monitor her blood pressure  If starts to go back up will add 2 5 mg back  She will call with readings  Also may need to consider another agent  3  HM-  Discussed colorectal cancer screening  Colonoscopy in 2010  She prefers Hemoccult  Plan   Check blood work  Hemoccult  As above  Recheck 6 months

## 2021-06-04 ENCOUNTER — TRANSCRIBE ORDERS (OUTPATIENT)
Dept: ADMINISTRATIVE | Facility: HOSPITAL | Age: 81
End: 2021-06-04

## 2021-06-04 DIAGNOSIS — Z12.31 VISIT FOR SCREENING MAMMOGRAM: Primary | ICD-10-CM

## 2021-06-21 ENCOUNTER — OFFICE VISIT (OUTPATIENT)
Dept: DERMATOLOGY | Facility: CLINIC | Age: 81
End: 2021-06-21
Payer: MEDICARE

## 2021-06-21 DIAGNOSIS — Z13.89 SCREENING FOR SKIN CONDITION: ICD-10-CM

## 2021-06-21 DIAGNOSIS — L82.0 INFLAMED SEBORRHEIC KERATOSIS: ICD-10-CM

## 2021-06-21 DIAGNOSIS — L82.1 SEBORRHEIC KERATOSIS: ICD-10-CM

## 2021-06-21 DIAGNOSIS — L57.0 ACTINIC KERATOSIS: Primary | ICD-10-CM

## 2021-06-21 DIAGNOSIS — Z85.828 HISTORY OF SKIN CANCER: ICD-10-CM

## 2021-06-21 PROCEDURE — 17000 DESTRUCT PREMALG LESION: CPT | Performed by: DERMATOLOGY

## 2021-06-21 PROCEDURE — 17110 DESTRUCTION B9 LES UP TO 14: CPT | Performed by: DERMATOLOGY

## 2021-06-21 PROCEDURE — 99213 OFFICE O/P EST LOW 20 MIN: CPT | Performed by: DERMATOLOGY

## 2021-06-21 PROCEDURE — 17003 DESTRUCT PREMALG LES 2-14: CPT | Performed by: DERMATOLOGY

## 2021-06-21 NOTE — PATIENT INSTRUCTIONS
[facility] Keratosis:  Patient advised lesions are precancers  These should resolve with cryosurgery if not to let us know sun block recommended  Inflamed keratosis lesion treated on the left cheek because the patient concern and irritation  Seborrheic keratosis patient reassured these are normal growths we acquire with age no treatment needed  History of skin cancer in no recurrence nothing else atypical sunblock recommended follow-up in 6 months  Screening for dermatologic disorders nothing else of concern noted on complete exam follow-up in 6 months  Treatment with Cryotherapy    The doctor has treated your skin with nitrogen, which is 320 degrees Fahrenheit below zero  He has given the treated area "frostbite "    Stinging should subside within a few hours  You can take Tylenol for pain, if needed  Over the next few days, it is normal if the area becomes reddened, a blood blister, or swollen with fluid  If the lesion treated was near the eye - you could get a swollen eye over the next few days  Do not panic! This is all temporary, and will resolve with time  There is no special treatment - just keep the area clean  Makeup and BandAids can be used, if preferred  When the area starts to dry up and peel off, using Vaseline can help healing  It usually takes up to a month for it to heal   Some lesions are recurrent and may require repeat treatments  If a lesion has not healed in one month, please don't hesitate to contact us  If you have any further questions that are not answered here, please call us  [phone]    Thank you for allowing us to care for you

## 2021-06-21 NOTE — PROGRESS NOTES
[address]  [address] [facility] [address]  [phone]  [phone]     MRN: [id number] : 1940  Encounter: [id number]  Patient Information: [place]  Chief complaint:  Skin cancer checkup    History of present illness:  66-year-old female who has not been seen for almost 2 years with previous history of skin cancer presents for concerns regarding growth on the left cheek and overall checkup  Past Medical History:   Diagnosis Date    H/O nonmelanoma skin cancer     last assessed 17    Hypertension     Kidney stone     Pneumonia      Past Surgical History:   Procedure Laterality Date    APPEN[address]CTOMY     [address]    BREAST EXCISIONAL BIOPSY Left     benign    no visible scar    CERVIX SURGERY      dilation and curettage of cervical stump , ,    [address]      partial - last assessed 10/28/14    COLONOSCOPY      last assessed 17    FL RETROGRADE PYELOGRAM  3/29/2021    FL RETROGRADE PYELOGRAM  2021    HERNIA REPAIR  2011    ID CYSTO/URETERO W/LITHOTRIPSY &INDWELL STENT INSRT Left 3/29/2021    Procedure: CYSTOSCOPY URETEROSCOPY, RETROGRADE PYELOGRAM AND INSERTION STENT URETERAL;  Surgeon: Gerhard Wang MD;  Location:  MAIN OR;  Service: Urology    ID CYSTO/URETERO W/LITHOTRIPSY &INDWELL STENT INSRT Left 2021    Procedure: CYSTOSCOPY URETEROSCOPY WITH LITHOTRIPSY HOLMIUM LASER, RETROGRADE PYELOGRAM AND INSERTION STENT URETERAL;  Surgeon: Brain Franklin MD;  Location: [facility];  Service: Urology    TONSILLECTOMY  1971    TUBAL LIGATION       Social History   Social History     Substance and Sexual Activity   Alcohol Use Yes    Comment: occasionally wine     Social History     Substance and Sexual Activity   Drug Use No     Social History     Tobacco Use   Smoking Status Former Smoker    Quit date:     Years since quittin 4 Smokeless Tobacco Never Used     Family History   Problem Relation Age of Onset    Arthritis Mother     Osteoporosis Mother     Stroke Mother     Heart disease Father     Osteoporosis Father     Breast cancer Family     Osteoporosis Family     Breast cancer additional onset Family     No Known Problems Sister     No Known Problems Daughter     No Known Problems Maternal Grandmother     No Known Problems Maternal Grandfather     Breast cancer Paternal Grandmother     No Known Problems Paternal Grandfather     No Known Problems Sister      Meds/Allergies   Allergies   Allergen Reactions    Azithromycin Vomiting and Dizziness    Morphine Vomiting       Meds:  Prior to Admission medications    Medication Sig Start Date End Date Taking?  Authorizing Provider   acetaminophen (TYLENOL) 325 mg tablet Take 2 tablets (650 mg total) by mouth every 4 (four) hours as needed for mild pain 4/29/21  Yes Trenton Schaefer MD   amLODIPine (NORVASC) 2 5 mg tablet Take 2 5 mg by mouth daily TAKES 7 5 MG DAILY   Yes Historical Provider, MD   amLODIPine (NORVASC) 5 mg tablet Take 1 tablet (5 mg total) by mouth daily  Patient taking differently: Take 5 mg by mouth daily TAKES 7 5 MG DAILY 3/23/21  Yes Keisha Mix MD   Ascorbic Acid (Vitamin C) 500 MG CAPS Take 1 capsule by mouth daily   Yes Historical Provider, MD   aspirin (ECOTRIN LOW STRENGTH) 81 mg EC tablet  12/11/19  Yes Historical Provider, MD   Calcium-Magnesium-Vitamin D (CALCIUM 500 PO) Take by mouth PT CURRENTLY NOT TAKING   Yes Historical Provider, MD   Cholecalciferol (EQL VITAMIN D3) 1000 units capsule Take by mouth daily     Yes Historical Provider, MD   Glucos-Chondroit-Hyaluron-MSM (GLUCOSAMINE CHONDROITIN JOINT) TABS Take by mouth daily     Yes Historical Provider, MD   metoprolol succinate (TOPROL-XL) 50 mg 24 hr tablet TAKE 1 TABLET DAILY 3/26/21  Yes Keisha Mix MD   Multiple Vitamins-Minerals (OCUVITE PO) Take by mouth daily   Yes Historical Provider, MD   Omega-3 Fatty Acids (FISH OIL) 1000 MG CPDR Take by mouth   Yes Historical Provider, MD   oxyCODONE-acetaminophen (PERCOCET) 5-325 mg per tablet Take 1 tablet by mouth every 4 (four) hours as needed for moderate pain   Yes Historical Provider, MD   diclofenac sodium (VOLTAREN) 1 % Apply 2 g topically 3 (three) times a day 11/14/19 4/29/21  Aixa Griffith DPM       Subjective:     Review of Systems:    General: negative for - chills, fatigue, fever,  weight gain or weight loss  Psychological: negative for - anxiety, behavioral disorder, concentration difficulties, decreased libido, depression, irritability, memory difficulties, mood swings, sleep disturbances or suicidal ideation  ENT: negative for - hearing difficulties , nasal congestion, nasal discharge, oral lesions, sinus pain, sneezing, sore throat  Allergy and Immunology: negative for - hives, insect bite sensitivity,  Hematological and Lymphatic: negative for - bleeding problems, blood clots,bruising, swollen lymph nodes  Endocrine: negative for - hair pattern changes, hot flashes, malaise/lethargy, mood swings, palpitations, polydipsia/polyuria, skin changes, temperature intolerance or unexpected weight change  Respiratory: negative for - cough, hemoptysis, orthopnea, shortness of breath, or wheezing  Cardiovascular: negative for - chest pain, dyspnea on exertion, edema,  Gastrointestinal: negative for - abdominal pain, nausea/vomiting  Genito-Urinary: negative for - dysuria, incontinence, irregular/heavy menses or urinary frequency/urgency  Musculoskeletal: negative for - gait disturbance, joint pain, joint stiffness, joint swelling, muscle pain, muscular weakness  Dermatological:  As in HPI  Neurological: negative for confusion, dizziness, headaches, impaired coordination/balance, memory loss, numbness/tingling, seizures, speech problems, tremors or weakness       Objective:    There were no vitals taken for this visit     Physical Exam:    General Appearance:    Alert, cooperative, no distress   Head:    Normocephalic, without obvious abnormality, atraumatic           Skin:   A full skin exam was performed including scalp, head scalp, eyes, ears, nose, lips, neck, chest, axilla, abdomen, back, buttocks, bilateral upper extremities, bilateral lower extremities, hands, feet, fingers, toes, fingernails, and toenails previous sites of skin cancer well healed without recurrence scaling erythematous areas noted below inflamed keratotic papule with greasy stuck on appearance noted on the left cheek nothing else remarkable noted on exam  Physical Exam  Constitutional:             Cryotherapy Procedure Note    Pre-operative Diagnosis: actinic keratosis    Plan:  1  Instructed to keep the area dry and clean thereafter  Apply petrolatum if area gets crusty  2  Recommended that the patient use acetaminophen  as needed for pain  Locations:  Chest    Indications: Destruction of actinic keratosis x3    Patient informed of risks (permanent scarring, infection, light or dark discoloration, bleeding, infection, weakness, numbness and recurrence of the lesion) and benefits of the procedure and verbal informed consent obtained  The areas are treated with liquid nitrogen therapy, frozen until ice ball extended 2 mm beyond lesion, allowed to thaw, and treated again  The patient tolerated procedure well  The patient was instructed on post-op care, warned that there may be blister formation, redness and pain  Recommend OTC analgesia as needed for pain  Condition:  Stable    Complications:  None  Cryotherapy Procedure Note    Pre-operative Diagnosis: inflamed seborrheic keratosis    Plan:  1  Instructed to keep the area dry and clean thereafter  Apply petrolatum if area gets crusty  2  Recommended that the patient use acetaminophen  as needed for pain       Locations:  Left cheek    Indications: Destruction of irritated keratosis x1    Patient informed of risks (permanent scarring, infection, light or dark discoloration, bleeding, infection, weakness, numbness and recurrence of the lesion) and benefits of the procedure and verbal informed consent obtained  The areas are treated with liquid nitrogen therapy, frozen until ice ball extended 2 mm beyond lesion, allowed to thaw, and treated again  The patient tolerated procedure well  The patient was instructed on post-op care, warned that there may be blister formation, redness and pain  Recommend OTC analgesia as needed for pain  Condition:  Stable    Complications:  none  Assessment:     1  Actinic keratosis     2  Inflamed seborrheic keratosis     3  Seborrheic keratosis     4  Screening for skin condition     5  History of skin cancer           Plan:   Actinic Keratosis:  Patient advised lesions are precancers  These should resolve with cryosurgery if not to let us know sun block recommended  Inflamed keratosis lesion treated on the left cheek because the patient concern and irritation  Seborrheic keratosis patient reassured these are normal growths we acquire with age no treatment needed  History of skin cancer in no recurrence nothing else atypical sunblock recommended follow-up in 6 months  Screening for dermatologic disorders nothing else of concern noted on complete exam follow-up in 6 months    Gwenette Gosselin, MD  6/21/2021,8:57 AM    Portions of the record may have been created with voice recognition software   Occasional wrong word or "sound a like" substitutions may have occurred due to the inherent limitations of voice recognition software   Read the chart carefully and recognize, using context, where substitutions have occurred

## 2021-07-06 ENCOUNTER — HOSPITAL ENCOUNTER (OUTPATIENT)
Dept: ULTRASOUND IMAGING | Facility: HOSPITAL | Age: 81
Discharge: HOME | End: 2021-07-06
Attending: UROLOGY
Payer: MEDICARE

## 2021-07-06 ENCOUNTER — HOSPITAL ENCOUNTER (OUTPATIENT)
Dept: RADIOLOGY | Facility: HOSPITAL | Age: 81
Discharge: HOME | End: 2021-07-06
Attending: UROLOGY
Payer: MEDICARE

## 2021-07-06 DIAGNOSIS — N20.0 NEPHROLITHIASIS: ICD-10-CM

## 2021-07-06 PROCEDURE — 76770 US EXAM ABDO BACK WALL COMP: CPT

## 2021-07-06 PROCEDURE — 74018 RADEX ABDOMEN 1 VIEW: CPT

## 2021-07-21 NOTE — PROGRESS NOTES
7/22/2021      Chief Complaint   Patient presents with    Nephrolithiasis     Assessment and Plan    1  Nephrolithiasis   - Status post cystoscopy left ureteroscopy with laser lithotripsy and basket extraction of stone, retrograde pyelography and left ureteral stent placement on 04/29/2021 with Dr Dipti Seaman   Bobby Neighbors analysis reveals 100% calcium oxalate in composition   - Ultrasound from 07/06/2021 reveals mild left-sided calyectasis as well as multiple bilateral cysts with mid pole 14 mm right renal complex exophytic cysts, possibly proteinaceous cyst   A left parenchymal mid pole 3 mm calcification is seen  Bilateral ureteral jets are detected  - KUB from 07/06/2021 is negative for radiopaque urinary tract calculi  - Patient is completely asymptomatic and denies flank pain or urinary symptoms    - Recommend proper hydration dietary modifications to prevent future kidney stone formation   - Will f/u in 1 year with US to reassess stone burden, cyst stability, and mild left calyectasis seen  History of Present Illness  Padmaja Menard is a [de-identified] y o  female here for follow up evaluation of   Nephrolithiasis  She is now status post cystoscopy left ureteroscopy with laser lithotripsy and basket extraction of stone, retrograde pyelography and left ureteral stent placement on 04/29/2021 with Dr Dipti Seaman  Stone analysis reveals 100% calcium oxalate in composition  Ultrasound from 07/06/2021 reveals mild left-sided calyectasis as well as multiple bilateral cysts with mid pole 14 mm right renal complex exophytic cysts, possibly proteinaceous cyst   A left parenchymal mid pole 3 mm calcification is seen  Bilateral ureteral jets are detected  KUB from 07/06/2021 is negative for radiopaque urinary tract calculi  Patient is doing very well and is currently asymptomatic  She denies any flank pain or urinary symptoms  She does admit to chronic lower back pain  Urine dip negative for blood, leukocytes, or nitrites  Review of Systems   Constitutional: Negative for chills and fever  Gastrointestinal: Negative for abdominal pain, constipation, diarrhea, nausea and vomiting  Genitourinary: Negative for difficulty urinating, dysuria, flank pain, frequency, hematuria and urgency  Neurological: Negative for dizziness                    Past Medical History  Past Medical History:   Diagnosis Date    H/O nonmelanoma skin cancer     last assessed 17    Hypertension     Kidney stone     Pneumonia 196       Past Social History  Past Surgical History:   Procedure Laterality Date    APPENDECTOMY      BLADDER SURGERY      BREAST EXCISIONAL BIOPSY Left     benign    no visible scar    CERVIX SURGERY      dilation and curettage of cervical stump , ,     CHOLECYSTECTOMY      COLECTOMY      partial - last assessed 10/28/14    COLONOSCOPY      last assessed 17    FL RETROGRADE PYELOGRAM  3/29/2021    FL RETROGRADE PYELOGRAM  2021    HERNIA REPAIR      [place] CYSTO/URETERO W/LITHOTRIPSY &INDWELL STENT INSRT Left 3/29/2021    Procedure: CYSTOSCOPY URETEROSCOPY, RETROGRADE PYELOGRAM AND INSERTION STENT URETERAL;  Surgeon: Becky Mcdonough MD;  Location:  MAIN OR;  Service: Urology    MN CYSTO/URETERO W/LITHOTRIPSY &INDWELL STENT INSRT Left 2021    Procedure: CYSTOSCOPY URETEROSCOPY WITH LITHOTRIPSY HOLMIUM LASER, RETROGRADE PYELOGRAM AND INSERTION STENT URETERAL;  Surgeon: Eliud Mcgowan MD;  Location: [facility];  Service: Urology    TONSILLECTOMY  1971    TUBAL LIGATION       Social History     Tobacco Use   Smoking Status Former Smoker    Quit date: 5    Years since quittin 5   Smokeless Tobacco Never Used       Past Family History  Family History   Problem Relation Age of Onset    Arthritis Mother     Osteoporosis Mother     Stroke Mother     Heart disease Father     Osteoporosis Father     Breast cancer Family     Osteoporosis Family     Breast cancer additional onset Family     No Known Problems Sister     No Known Problems Daughter     No Known Problems Maternal Grandmother     No Known Problems Maternal Grandfather     Breast cancer Paternal Grandmother     No Known Problems Paternal Grandfather     No Known Problems Sister        Past Social history  Social History     Socioeconomic History    Marital status:      Spouse name: Not on file    Number of children: Not on file    Years of education: Not on file    Highest education level: Not on file   Occupational History    Not on file   Tobacco Use    Smoking status: Former Smoker     Quit date:      Years since quittin 5    Smokeless tobacco: Never Used   Vaping Use    Vaping Use: Never used   Substance and Sexual Activity    Alcohol use: Yes     Comment: occasionally wine    Drug use: No    Sexual activity: Not on file   Other Topics Concern    Not on file   Social History Narrative    Caffeine use     Social Determinants of Health     Financial Resource Strain:     Difficulty of Paying Living Expenses:    Food Insecurity:     Worried About Running Out of Food in the Last Year:     [address] in the Last Year:    Transportation Needs:     Lack of Transportation (Medical):      Lack of Transportation (Non-Medical):    Physical Activity:     Days of Exercise per Week:     Minutes of Exercise per Session:    Stress:     Feeling of Stress :    Social Connections:     Frequency of Communication with Friends and Family:     Frequency of Social Gatherings with Friends and Family:     Attends [employer] Services:     Active Member of Clubs or Organizations:     Attends Club or Organization Meetings:     Marital Status:    Intimate Partner Violence:     Fear of Current or Ex-Partner:     Emotionally Abused:     Physically Abused:     Sexually Abused:        Current Medications  Current Outpatient Medications   Medication Sig Dispense Refill    acetaminophen (TYLENOL) 325 mg tablet Take 2 tablets (650 mg total) by mouth every 4 (four) hours as needed for mild pain 30 tablet 0    Ascorbic Acid (Vitamin C) 500 MG CAPS Take 1 capsule by mouth daily      aspirin (ECOTRIN LOW STRENGTH) 81 mg EC tablet       Calcium-Magnesium-Vitamin D (CALCIUM 500 PO) Take by mouth PT CURRENTLY NOT TAKING      Cholecalciferol (EQL VITAMIN D3) 1000 units capsule Take by mouth daily        Glucos-Chondroit-Hyaluron-MSM (GLUCOSAMINE CHONDROITIN JOINT) TABS Take by mouth daily        metoprolol succinate (TOPROL-XL) 50 mg 24 hr tablet TAKE 1 TABLET DAILY 90 tablet 3    Multiple Vitamins-Minerals (OCUVITE PO) Take by mouth daily      Omega-3 Fatty Acids (FISH OIL) 1000 MG CPDR Take by mouth      oxyCODONE-acetaminophen (PERCOCET) 5-325 mg per tablet Take 1 tablet by mouth every 4 (four) hours as needed for moderate pain      amLODIPine (NORVASC) 2 5 mg tablet Take 2 5 mg by mouth daily TAKES 7 5 MG DAILY (Patient not taking: Reported on 7/22/2021)      amLODIPine (NORVASC) 5 mg tablet Take 1 tablet (5 mg total) by mouth daily (Patient not taking: Reported on 7/22/2021) 90 tablet 1    diclofenac sodium (VOLTAREN) 1 % Apply 2 g topically 3 (three) times a day (Patient not taking: Reported on 7/22/2021) 180 g 2     No current facility-administered medications for this visit  Allergies  Allergies   Allergen Reactions    Azithromycin Vomiting and Dizziness    Morphine Vomiting         The following portions of the patient's history were reviewed and updated as appropriate: allergies, current medications, past medical history, past social history, past surgical history and problem list       Vitals  Vitals:    07/22/21 0941   BP: 148/86   Pulse: 72   Weight: 97 1 kg (214 lb)   Height: 5' 3" (1 6 m)           Physical Exam  Physical Exam  Constitutional:       Appearance: Normal appearance  HENT:      Head: Normocephalic and atraumatic        Right Ear: External ear normal       Left Ear: External ear normal    Eyes:      General: No scleral icterus  Conjunctiva/sclera: Conjunctivae normal    Cardiovascular:      Pulses: Normal pulses  Pulmonary:      Effort: Pulmonary effort is normal    Abdominal:      Tenderness: There is no right CVA tenderness or left CVA tenderness  Musculoskeletal:         General: Normal range of motion  Cervical back: Normal range of motion  Neurological:      General: No focal deficit present  Mental Status: She is alert and oriented to person, place, and time  Psychiatric:         Mood and Affect: Mood normal          Behavior: Behavior normal          Thought Content:  Thought content normal          Judgment: Judgment normal            Results  Recent Results (from the past 1 hour(s))   POCT urine dip    Collection Time: 07/22/21  9:50 AM   Result Value Ref Range    LEUKOCYTE ESTERASE,UA -     NITRITE,UA -     SL AMB POCT UROBILINOGEN 0 2     POCT URINE PROTEIN -      PH,UA 6 5     BLOOD,UA -     SPECIFIC GRAVITY,UA 1 025     KETONES,UA -     BILIRUBIN,UA -     GLUCOSE, UA -      COLOR,UA yellow     CLARITY,UA clear    ]  No results found for: PSA  Lab Results   Component Value Date    CALCIUM 9 4 03/28/2021    K 3 6 03/28/2021    CO2 25 03/28/2021     (H) 03/28/2021    BUN 11 03/28/2021    CREATININE 1 07 03/28/2021     Lab Results   Component Value Date    WBC 9 15 03/28/2021    HGB 15 3 03/28/2021    HCT 45 1 03/28/2021    MCV 92 03/28/2021     03/28/2021           Orders  Orders Placed This Encounter   Procedures    POCT urine dip       Bob Hermosillo PA-C

## 2021-07-22 ENCOUNTER — OFFICE VISIT (OUTPATIENT)
Dept: UROLOGY | Facility: CLINIC | Age: 81
End: 2021-07-22
Payer: MEDICARE

## 2021-07-22 VITALS
HEIGHT: 63 IN | SYSTOLIC BLOOD PRESSURE: 148 MMHG | HEART RATE: 72 BPM | BODY MASS INDEX: 37.92 KG/M2 | DIASTOLIC BLOOD PRESSURE: 86 MMHG | WEIGHT: 214 LBS

## 2021-07-22 DIAGNOSIS — N20.0 NEPHROLITHIASIS: Primary | ICD-10-CM

## 2021-07-22 LAB
SL AMB  POCT GLUCOSE, UA: NORMAL
SL AMB LEUKOCYTE ESTERASE,UA: NORMAL
SL AMB POCT BILIRUBIN,UA: NORMAL
SL AMB POCT BLOOD,UA: NORMAL
SL AMB POCT CLARITY,UA: CLEAR
SL AMB POCT COLOR,UA: YELLOW
SL AMB POCT KETONES,UA: NORMAL
SL AMB POCT NITRITE,UA: NORMAL
SL AMB POCT PH,UA: 6.5
SL AMB POCT SPECIFIC GRAVITY,UA: 1.02
SL AMB POCT URINE PROTEIN: NORMAL
SL AMB POCT UROBILINOGEN: 0.2

## 2021-07-22 PROCEDURE — 81002 URINALYSIS NONAUTO W/O SCOPE: CPT | Performed by: PHYSICIAN ASSISTANT

## 2021-07-22 PROCEDURE — 99213 OFFICE O/P EST LOW 20 MIN: CPT | Performed by: PHYSICIAN ASSISTANT

## 2021-10-01 ENCOUNTER — HOSPITAL ENCOUNTER (OUTPATIENT)
Dept: MAMMOGRAPHY | Facility: CLINIC | Age: 81
Discharge: HOME | End: 2021-10-01
Payer: MEDICARE

## 2021-10-01 VITALS — BODY MASS INDEX: 37.92 KG/M2 | HEIGHT: 63 IN | WEIGHT: 214 LBS

## 2021-10-01 DIAGNOSIS — Z12.31 VISIT FOR SCREENING MAMMOGRAM: ICD-10-CM

## 2021-10-01 PROCEDURE — 77063 BREAST TOMOSYNTHESIS BI: CPT

## 2021-10-01 PROCEDURE — 77067 SCR MAMMO BI INCL CAD: CPT

## 2021-10-17 NOTE — TELEPHONE ENCOUNTER
I have reviewed both our old [address] and need your assistance in locating Colorectal Cancer (CRC) Screening  Per Patient, testing was done by Dr Yvonne Mejia 2010Thank you        See below Yes

## 2021-10-27 ENCOUNTER — IMMUNIZATIONS (OUTPATIENT)
Dept: FAMILY MEDICINE CLINIC | Facility: HOSPITAL | Age: 81
End: 2021-10-27

## 2021-10-27 DIAGNOSIS — Z23 ENCOUNTER FOR IMMUNIZATION: Primary | ICD-10-CM

## 2021-11-18 ENCOUNTER — OFFICE VISIT (OUTPATIENT)
Dept: FAMILY MEDICINE CLINIC | Facility: MEDICAL CENTER | Age: 81
End: 2021-11-18
Payer: MEDICARE

## 2021-11-18 ENCOUNTER — APPOINTMENT (OUTPATIENT)
Dept: LAB | Facility: MEDICAL CENTER | Age: 81
End: 2021-11-18
Payer: MEDICARE

## 2021-11-18 VITALS
DIASTOLIC BLOOD PRESSURE: 82 MMHG | TEMPERATURE: 99.2 F | HEART RATE: 64 BPM | WEIGHT: 220 LBS | BODY MASS INDEX: 38.98 KG/M2 | HEIGHT: 63 IN | SYSTOLIC BLOOD PRESSURE: 140 MMHG

## 2021-11-18 DIAGNOSIS — R51.9 TEMPORAL HEADACHE: Primary | ICD-10-CM

## 2021-11-18 DIAGNOSIS — I10 ESSENTIAL HYPERTENSION: ICD-10-CM

## 2021-11-18 DIAGNOSIS — R51.9 TEMPORAL HEADACHE: ICD-10-CM

## 2021-11-18 DIAGNOSIS — R09.89 LEFT CAROTID BRUIT: ICD-10-CM

## 2021-11-18 DIAGNOSIS — N13.9 ACUTE UNILATERAL OBSTRUCTIVE UROPATHY: ICD-10-CM

## 2021-11-18 DIAGNOSIS — R09.89 BRUIT: ICD-10-CM

## 2021-11-18 LAB
BACTERIA UR QL AUTO: ABNORMAL /HPF
BILIRUB UR QL STRIP: NEGATIVE
CAOX CRY URNS QL MICRO: ABNORMAL /HPF
CLARITY UR: ABNORMAL
COLOR UR: YELLOW
CRP SERPL QL: 3.8 MG/L
ERYTHROCYTE [SEDIMENTATION RATE] IN BLOOD: 19 MM/HOUR (ref 0–29)
GLUCOSE UR STRIP-MCNC: NEGATIVE MG/DL
HGB UR QL STRIP.AUTO: NEGATIVE
KETONES UR STRIP-MCNC: NEGATIVE MG/DL
LEUKOCYTE ESTERASE UR QL STRIP: NEGATIVE
NITRITE UR QL STRIP: NEGATIVE
NON-SQ EPI CELLS URNS QL MICRO: ABNORMAL /HPF
PH UR STRIP.AUTO: 5.5 [PH]
PROT UR STRIP-MCNC: NEGATIVE MG/DL
RBC #/AREA URNS AUTO: ABNORMAL /HPF
SP GR UR STRIP.AUTO: 1.02 (ref 1–1.03)
UROBILINOGEN UR QL STRIP.AUTO: 0.2 E.U./DL
WBC #/AREA URNS AUTO: ABNORMAL /HPF

## 2021-11-18 PROCEDURE — 86140 C-REACTIVE PROTEIN: CPT

## 2021-11-18 PROCEDURE — 99214 OFFICE O/P EST MOD 30 MIN: CPT | Performed by: FAMILY MEDICINE

## 2021-11-18 PROCEDURE — 81001 URINALYSIS AUTO W/SCOPE: CPT

## 2021-11-18 PROCEDURE — 85652 RBC SED RATE AUTOMATED: CPT

## 2021-11-18 PROCEDURE — 36415 COLL VENOUS BLD VENIPUNCTURE: CPT

## 2021-11-22 ENCOUNTER — TELEPHONE (OUTPATIENT)
Dept: FAMILY MEDICINE CLINIC | Facility: MEDICAL CENTER | Age: 81
End: 2021-11-22

## 2021-12-03 ENCOUNTER — HOSPITAL ENCOUNTER (OUTPATIENT)
Dept: VASCULAR ULTRASOUND | Facility: HOSPITAL | Age: 81
Discharge: HOME | End: 2021-12-03
Payer: MEDICARE

## 2021-12-03 ENCOUNTER — TELEPHONE (OUTPATIENT)
Dept: FAMILY MEDICINE CLINIC | Facility: MEDICAL CENTER | Age: 81
End: 2021-12-03

## 2021-12-03 DIAGNOSIS — R09.89 BRUIT: ICD-10-CM

## 2021-12-03 PROCEDURE — 93880 EXTRACRANIAL BILAT STUDY: CPT

## 2021-12-04 PROCEDURE — 93880 EXTRACRANIAL BILAT STUDY: CPT | Performed by: SURGERY

## 2021-12-15 ENCOUNTER — OFFICE VISIT (OUTPATIENT)
Dept: FAMILY MEDICINE CLINIC | Facility: MEDICAL CENTER | Age: 81
End: 2021-12-15
Payer: MEDICARE

## 2021-12-15 VITALS
BODY MASS INDEX: 38.09 KG/M2 | DIASTOLIC BLOOD PRESSURE: 78 MMHG | HEART RATE: 72 BPM | WEIGHT: 215 LBS | HEIGHT: 63 IN | OXYGEN SATURATION: 97 % | SYSTOLIC BLOOD PRESSURE: 140 MMHG

## 2021-12-15 DIAGNOSIS — I10 ESSENTIAL HYPERTENSION: Primary | ICD-10-CM

## 2021-12-15 DIAGNOSIS — R51.9 TEMPORAL HEADACHE: ICD-10-CM

## 2021-12-15 DIAGNOSIS — R09.89 LEFT CAROTID BRUIT: ICD-10-CM

## 2021-12-15 PROCEDURE — 99214 OFFICE O/P EST MOD 30 MIN: CPT | Performed by: FAMILY MEDICINE

## 2021-12-15 PROCEDURE — 1123F ACP DISCUSS/DSCN MKR DOCD: CPT | Performed by: FAMILY MEDICINE

## 2022-01-11 ENCOUNTER — OFFICE VISIT (OUTPATIENT)
Dept: DERMATOLOGY | Facility: CLINIC | Age: 82
End: 2022-01-11
Payer: COMMERCIAL

## 2022-01-11 VITALS — BODY MASS INDEX: 38.94 KG/M2 | TEMPERATURE: 97.3 F | WEIGHT: 219.8 LBS

## 2022-01-11 DIAGNOSIS — L57.0 ACTINIC KERATOSIS: Primary | ICD-10-CM

## 2022-01-11 DIAGNOSIS — Z85.828 HISTORY OF SKIN CANCER: ICD-10-CM

## 2022-01-11 DIAGNOSIS — L82.1 SEBORRHEIC KERATOSIS: ICD-10-CM

## 2022-01-11 DIAGNOSIS — Z13.89 SCREENING FOR SKIN CONDITION: ICD-10-CM

## 2022-01-11 PROCEDURE — 17000 DESTRUCT PREMALG LESION: CPT | Performed by: DERMATOLOGY

## 2022-01-11 PROCEDURE — 99213 OFFICE O/P EST LOW 20 MIN: CPT | Performed by: DERMATOLOGY

## 2022-01-11 NOTE — PROGRESS NOTES
[address]  [address] [facility] [address]  [phone]  [phone]     MRN: [id number] : 1940  Encounter: [id number]  Patient Information: [place]  Chief complaint: 6 month check up    History of present illness:  80year old female presents for overall skin check previous history nonmelanoma skin cancer last skin cancer was 2 and half years ago no other concerns noted except for scaling area noted  Past Medical History:   Diagnosis Date    H/O nonmelanoma skin cancer     last assessed 17    Hypertension     Kidney stone     Pneumonia      Past Surgical History:   Procedure Laterality Date    APPEN[address]OMY     [address]    BREAST EXCISIONAL BIOPSY Left     benign    no visible scar    CERVIX SURGERY      dilation and curettage of cervical stump , ,    [address]      partial - last assessed 10/28/14    COLONOSCOPY      last assessed 17    FL RETROGRADE PYELOGRAM  3/29/2021    FL RETROGRADE PYELOGRAM  2021    HERNIA REPAIR  2011    NE CYSTO/URETERO W/LITHOTRIPSY &INDWELL STENT INSRT Left 3/29/2021    Procedure: CYSTOSCOPY URETEROSCOPY, RETROGRADE PYELOGRAM AND INSERTION STENT URETERAL;  Surgeon: Jana Don MD;  Location:  MAIN OR;  Service: Urology    NE CYSTO/URETERO W/LITHOTRIPSY &INDWELL STENT INSRT Left 2021    Procedure: CYSTOSCOPY URETEROSCOPY WITH LITHOTRIPSY HOLMIUM LASER, RETROGRADE PYELOGRAM AND INSERTION STENT URETERAL;  Surgeon: Imelda Francis MD;  Location: [facility];  Service: Urology    TONSILLECTOMY  1971    TUBAL LIGATION       Social History   Social History     Substance and Sexual Activity   Alcohol Use Yes    Comment: occasionally wine     Social History     Substance and Sexual Activity   Drug Use No     Social History     Tobacco Use   Smoking Status Former Smoker    Quit date: 1979    Years since quittin 0   Smokeless Tobacco Never Used     Family History   Problem Relation Age of Onset    Arthritis Mother     Osteoporosis Mother     Stroke Mother     Heart disease Father     Osteoporosis Father     Breast cancer Family     Osteoporosis Family     Breast cancer additional onset Family     No Known Problems Sister     No Known Problems Daughter     No Known Problems Maternal Grandmother     No Known Problems Maternal Grandfather     Breast cancer Paternal Grandmother     No Known Problems Paternal Grandfather     No Known Problems Sister      Meds/Allergies   Allergies   Allergen Reactions    Azithromycin Vomiting and Dizziness    Morphine Vomiting       Meds:  Prior to Admission medications    Medication Sig Start Date End Date Taking?  Authorizing Provider   Ascorbic Acid (Vitamin C) 500 MG CAPS Take 1 capsule by mouth daily   Yes Historical Provider, MD   Calcium-Magnesium-Vitamin D (CALCIUM 500 PO) Take by mouth PT CURRENTLY NOT TAKING   Yes Historical Provider, MD   Cholecalciferol (EQL VITAMIN D3) 1000 units capsule Take by mouth daily     Yes Historical Provider, MD   Glucos-Chondroit-Hyaluron-MSM (GLUCOSAMINE CHONDROITIN JOINT) TABS Take by mouth daily     Yes Historical Provider, MD   Multiple Vitamins-Minerals (OCUVITE PO) Take by mouth daily   Yes Historical Provider, MD   Omega-3 Fatty Acids (FISH OIL) 1000 MG CPDR Take by mouth   Yes Historical Provider, MD   triamterene-hydrochlorothiazide (MAXZIDE-25) 37 5-25 mg per tablet Take 1 tablet by mouth daily 21  Yes Nahun Zayas MD   diclofenac sodium (VOLTAREN) 1 % Apply 2 g topically 3 (three) times a day 19  Dejah Balbuena DPM   metoprolol succinate (TOPROL-XL) 50 mg 24 hr tablet TAKE 1 TABLET DAILY 3/26/21   Nahun Zayas MD       Subjective:     Review of Systems:    General: negative for - chills, fatigue, fever,  weight gain or weight loss  Psychological: negative for - anxiety, behavioral disorder, concentration difficulties, decreased libido, depression, irritability, memory difficulties, mood swings, sleep disturbances or suicidal ideation  ENT: negative for - hearing difficulties , nasal congestion, nasal discharge, oral lesions, sinus pain, sneezing, sore throat  Allergy and Immunology: negative for - hives, insect bite sensitivity,  Hematological and Lymphatic: negative for - bleeding problems, blood clots,bruising, swollen lymph nodes  Endocrine: negative for - hair pattern changes, hot flashes, malaise/lethargy, mood swings, palpitations, polydipsia/polyuria, skin changes, temperature intolerance or unexpected weight change  Respiratory: negative for - cough, hemoptysis, orthopnea, shortness of breath, or wheezing  Cardiovascular: negative for - chest pain, dyspnea on exertion, edema,  Gastrointestinal: negative for - abdominal pain, nausea/vomiting  Genito-Urinary: negative for - dysuria, incontinence, irregular/heavy menses or urinary frequency/urgency  Musculoskeletal: negative for - gait disturbance, joint pain, joint stiffness, joint swelling, muscle pain, muscular weakness  Dermatological:  As in HPI  Neurological: negative for confusion, dizziness, headaches, impaired coordination/balance, memory loss, numbness/tingling, seizures, speech problems, tremors or weakness       Objective:   Temp (!) 97 3 °F (36 3 °C) (Temporal)   Wt 99 7 kg (219 lb 12 8 oz)   BMI 38 94 kg/m²     Physical Exam:    General Appearance:    Alert, cooperative, no distress   Head:    Normocephalic, without obvious abnormality, atraumatic           Skin:   A full skin exam was performed including scalp, head scalp, eyes, ears, nose, lips, neck, chest, axilla, abdomen, back, buttocks, bilateral upper extremities, bilateral lower extremities, hands, feet, fingers, toes, fingernails, and toenails scaling erythematous areas noted below normal keratotic papules greasy stuck appearance previous sites skin cancer well healed without recurrence nothing else atypical noted on complete exam  Physical Exam  HENT:      Head:          Cryotherapy Procedure Note    Pre-operative Diagnosis: actinic keratosis    Plan:  1  Instructed to keep the area dry and clean thereafter  Apply petrolatum if area gets crusty  2  Recommended that the patient use acetaminophen  as needed for pain  Locations:  Bridge of nose    Indications: Destruction of actinic keratosis x1  Patient informed of risks (permanent scarring, infection, light or dark discoloration, bleeding, infection, weakness, numbness and recurrence of the lesion) and benefits of the procedure and verbal informed consent obtained  The areas are treated with liquid nitrogen therapy, frozen until ice ball extended 2 mm beyond lesion, allowed to thaw, and treated again  The patient tolerated procedure well  The patient was instructed on post-op care, warned that there may be blister formation, redness and pain  Recommend OTC analgesia as needed for pain  Condition:  Stable    Complications:  none  Assessment:     1  Actinic keratosis     2  Seborrheic keratosis     3  Screening for skin condition     4  History of skin cancer           Plan:   Actinic Keratosis:  Patient advised lesions are precancers  These should resolve with cryosurgery if not to let us know sun block recommended    Seborrheic keratosis patient reassured these are normal growths we acquire with age no treatment needed  History of skin cancer in no recurrence nothing else atypical sunblock recommended follow-up in 6 months  Screening for dermatologic disorders nothing else of concern noted on complete exam follow-up in 6 months if no problems noted next visit will plan on yearly follow-up    Melissa Bhagat MD  1/11/2022,10:39 AM    Portions of the record may have been created with voice recognition software   Occasional wrong word or "sound a like" substitutions may have occurred due to the inherent limitations of voice recognition software   Read the chart carefully and recognize, using context, where substitutions have occurred

## 2022-01-11 NOTE — PATIENT INSTRUCTIONS
Actinic Keratosis:  Patient advised lesions are precancers  These should resolve with cryosurgery if not to let us know sun block recommended  Seborrheic keratosis patient reassured these are normal growths we acquire with age no treatment needed  History of skin cancer in no recurrence nothing else atypical sunblock recommended follow-up in 6 months  Screening for dermatologic disorders nothing else of concern noted on complete exam follow-up in 6 months if no problems noted next visit will plan on yearly follow-up  Treatment with Cryotherapy    The doctor has treated your skin with nitrogen, which is 320 degrees Fahrenheit below zero  He has given the treated area "frostbite "    Stinging should subside within a few hours  You can take Tylenol for pain, if needed  Over the next few days, it is normal if the area becomes reddened, a blood blister, or swollen with fluid  If the lesion treated was near the eye - you could get a swollen eye over the next few days  Do not panic! This is all temporary, and will resolve with time  There is no special treatment - just keep the area clean  Makeup and BandAids can be used, if preferred  When the area starts to dry up and peel off, using Vaseline can help healing  It usually takes up to a month for it to heal   Some lesions are recurrent and may require repeat treatments  If a lesion has not healed in one month, please don't hesitate to contact us  If you have any further questions that are not answered here, please call us  Gordy Armendariz    Thank you for allowing us to care for you

## 2022-01-18 ENCOUNTER — APPOINTMENT (OUTPATIENT)
Dept: RADIOLOGY | Facility: MEDICAL CENTER | Age: 82
End: 2022-01-18
Payer: COMMERCIAL

## 2022-01-18 ENCOUNTER — OFFICE VISIT (OUTPATIENT)
Dept: FAMILY MEDICINE CLINIC | Facility: MEDICAL CENTER | Age: 82
End: 2022-01-18
Payer: COMMERCIAL

## 2022-01-18 VITALS
HEIGHT: 63 IN | HEART RATE: 88 BPM | WEIGHT: 220.8 LBS | OXYGEN SATURATION: 98 % | BODY MASS INDEX: 39.12 KG/M2 | SYSTOLIC BLOOD PRESSURE: 142 MMHG | DIASTOLIC BLOOD PRESSURE: 86 MMHG | TEMPERATURE: 98.1 F
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
## 2022-01-18 DIAGNOSIS — M25.612 DECREASED ABDUCTION OF LEFT SHOULDER JOINT: Primary | ICD-10-CM

## 2022-01-18 DIAGNOSIS — M79.602 PAIN OF LEFT UPPER EXTREMITY: ICD-10-CM

## 2022-01-18 PROCEDURE — 99213 OFFICE O/P EST LOW 20 MIN: CPT | Performed by: STUDENT IN AN ORGANIZED HEALTH CARE EDUCATION/TRAINING PROGRAM

## 2022-01-18 PROCEDURE — 73030 X-RAY EXAM OF SHOULDER: CPT

## 2022-01-18 NOTE — PROGRESS NOTES
[address] Note  Karen Guajardo [place], [address]     Corazon Menard MRN: [id number] : 1940 Age: 80 y o  Assessment/Plan     1  Decreased abduction of left shoulder joint    - patient with decreased range of motion with abduction left shoulder, along with positive drop-arm test and positive empty can test   Advised to follow-up with Orthopedics for further evaluation and treatment options  - Ambulatory Referral to Orthopedic Surgery; Future  - Reduction in offending activity  - patient's history of osteopenia  She did unfortunately fall, did not hit head  Follow-up left shoulder x-ray  - Plain film x-rays   - Orthopedics referral  - as far as pain management, reviewed last BMP  Advised patient about avoiding overuse of NSAIDs due to nephrotoxicity and GI issues, also patient with history of hypertension  Patient states her pain is not so much exacerbated that she needs further pain management at this point in time  She states she will continue with Tylenol as needed  2  Pain of left upper extremity    - Ambulatory Referral to Orthopedic Surgery; Future  - XR shoulder 2+ vw left; Future      Padmaja Menard acknowledged understanding of treatment plan, all questions answered  Subjective     Padmaja Menard is a 80 y o  female who presents with left upper extremity pain  Presents with her friend who provided transport today  The symptoms began on Saturday after fall while taking step up, she is unsure exactly how she landed, she suspects she put left upper extremity out to stop fall  She did not hit her head  Aggravating factors: movement especially abduction  Pain is located in left lateral upper extremity  Discomfort is described as sharp/stabbing  Symptoms are exacerbated by overhead movements  Evaluation to date: none  Therapy to date includes: avoidance of offending activity and Biofreeze and Tylenol      The following portions of the patient's history were reviewed and updated as appropriate: allergies, current medications, past family history, past medical history, past social history, past surgical history and problem list      Past Medical History:   Diagnosis Date    Arthritis     Diverticulitis of colon     H/O nonmelanoma skin cancer     last assessed 9/28/17    Hypertension     Kidney stone     Pneumonia 1961       Allergies   Allergen Reactions    Azithromycin Vomiting and Dizziness    Morphine Vomiting       Past Surgical History:   Procedure Laterality Date    [address]EN[address]OMY  2010   [address]    BREAST EXCISIONAL BIOPSY Left 1995    benign    no visible scar    BREAST SURGERY      [facility]ERVIX [facility]      [facility] [address]      partial - last assessed 10/28/14    COLONOSCOPY      last assessed 8/9/17    FL RETROGRADE PYELOGRAM  3/29/2021    FL RETROGRADE PYELOGRAM  4/29/2021    HERNIA REPAIR  2011    NH CYSTO/URETERO W/LITHOTRIPSY &INDWELL STENT INSRT Left 3/29/2021    Procedure: CYSTOSCOPY URETEROSCOPY, RETROGRADE PYELOGRAM AND INSERTION STENT URETERAL;  Surgeon: Zhane Romano MD;  Location:  MAIN OR;  Service: Urology    NH CYSTO/URETERO W/LITHOTRIPSY &INDWELL STENT INSRT Left 4/29/2021    Procedure: CYSTOSCOPY URETEROSCOPY WITH LITHOTRIPSY HOLMIUM LASER, RETROGRADE PYELOGRAM AND INSERTION STENT URETERAL;  Surgeon: Verner Squires, MD;  Location: [facility];  Service: Urology    TONSILLECTOMY  1971    TUBAL LIGATION         Family History   Problem Relation Age of Onset    Arthritis Mother     Osteoporosis Mother     Stroke Mother     Heart disease Father     Osteoporosis Father     Breast cancer Family     Osteoporosis Family     Breast cancer additional onset Family     No Known Problems Sister     No Known Problems Daughter     Breast cancer Maternal Grandmother     No Known Problems Maternal Grandfather     Breast cancer Paternal Grandmother     No Known Problems Paternal Grandfather     No Known Problems Sister        Social History     Socioeconomic History    Marital status:      Spouse name: None    Number of children: None    Years of education: None    Highest education level: None   Occupational History    None   Tobacco Use    Smoking status: Former Smoker     Packs/day: 1 00     Years: 20 00     Pack years: 20 00     Types: Cigarettes     Start date: 1958     Quit date: Ronnie Prado     Years since quittin 0    Smokeless tobacco: Never Used    Tobacco comment: Parents and sisters all smoked, as well as friends  I enjoyed it  And I quit willingly   Vaping Use    Vaping Use: Never used   Substance and Sexual Activity    Alcohol use:  Yes     Alcohol/week: 4 0 standard drinks     Types: 4 Glasses of wine per week     Comment: Occasionally I have a bourbon & ginger ale    Drug use: No    Sexual activity: Not Currently     Partners: Male     Birth control/protection: Diaphragm   Other Topics Concern    None   Social History Narrative    Caffeine use     Social Determinants of Health     Financial Resource Strain: Not on file   Food Insecurity: Not on file   Transportation Needs: Not on file   Physical Activity: Not on file   Stress: Not on file   Social Connections: Not on file   Intimate Partner Violence: Not on file   Housing Stability: Not on file       Current Outpatient Medications   Medication Sig Dispense Refill    Ascorbic Acid (Vitamin C) 500 MG CAPS Take 1 capsule by mouth daily      Calcium-Magnesium-Vitamin D (CALCIUM 500 PO) Take by mouth PT CURRENTLY NOT TAKING      Cholecalciferol (EQL VITAMIN D3) 1000 units capsule Take by mouth daily        Glucos-Chondroit-Hyaluron-MSM (GLUCOSAMINE CHONDROITIN JOINT) TABS Take by mouth daily        metoprolol succinate (TOPROL-XL) 50 mg 24 hr tablet TAKE 1 TABLET DAILY 90 tablet 3    Multiple Vitamins-Minerals (OCUVITE PO) Take by mouth daily      Omega-3 Fatty Acids (FISH OIL) 1000 MG CPDR Take by mouth      triamterene-hydrochlorothiazide (MAXZIDE-25) 37 5-25 mg per tablet Take 1 tablet by mouth daily 30 tablet 5    diclofenac sodium (VOLTAREN) 1 % Apply 2 g topically 3 (three) times a day 180 g 2     No current facility-administered medications for this visit  Review of Systems   HENT: Negative for rhinorrhea  Eyes: Negative for visual disturbance  Respiratory: Negative for cough and shortness of breath  Cardiovascular: Negative for chest pain  Musculoskeletal:        LUE pain   Skin: Negative for rash  Neurological: Negative for numbness  Objective      /86 (BP Location: Right arm, Patient Position: Sitting, Cuff Size: Adult)   Pulse 88   Temp 98 1 °F (36 7 °C)   Ht 5' 3" (1 6 m)   Wt 100 kg (220 lb 12 8 oz)   SpO2 98%   BMI 39 11 kg/m²     Physical Exam  Vitals reviewed  Constitutional:       General: She is not in acute distress  Appearance: She is not ill-appearing, toxic-appearing or diaphoretic  HENT:      Head: Normocephalic and atraumatic  Nose: Nose normal       Mouth/Throat:      Mouth: Mucous membranes are moist       Pharynx: Oropharynx is clear  Eyes:      Extraocular Movements: Extraocular movements intact  Conjunctiva/sclera: Conjunctivae normal       Pupils: Pupils are equal, round, and reactive to light  Cardiovascular:      Rate and Rhythm: Normal rate and regular rhythm  Pulses: Normal pulses  Heart sounds: Normal heart sounds  Pulmonary:      Effort: Pulmonary effort is normal  No respiratory distress  Breath sounds: Normal breath sounds  Musculoskeletal:      Right shoulder: Normal       Left shoulder: Tenderness present  No swelling, deformity, effusion, laceration, bony tenderness or crepitus  Decreased range of motion  Decreased strength  Normal pulse        Comments: Positive drop-arm test left    Positive empty can test left    4/5 muscle strength in abduction left   Skin:     General: Skin is warm and dry  Capillary Refill: Capillary refill takes less than 2 seconds  Neurological:      Mental Status: She is alert and oriented to person, place, and time  Psychiatric:         Mood and Affect: Mood normal          Behavior: Behavior normal          Thought Content: Thought content normal          Judgment: Judgment normal          Some portions of this record may have been generated with voice recognition software  There may be translation, syntax, or grammatical errors  Occasional wrong word or "sound-a-like" substitutions may have occurred due to the inherent limitations of the voice recognition software  Read the chart carefully and recognize, using context, where substations may have occurred  If you have any questions, please contact the dictating provider for clarification or correction, as needed

## 2022-01-19 ENCOUNTER — TELEPHONE (OUTPATIENT)
Dept: FAMILY MEDICINE CLINIC | Facility: MEDICAL CENTER | Age: 82
End: 2022-01-19

## 2022-01-26 ENCOUNTER — OFFICE VISIT (OUTPATIENT)
Dept: OBGYN CLINIC | Facility: CLINIC | Age: 82
End: 2022-01-26
Payer: COMMERCIAL

## 2022-01-26 VITALS
BODY MASS INDEX: 38.98 KG/M2 | HEIGHT: 63 IN | HEART RATE: 71 BPM | DIASTOLIC BLOOD PRESSURE: 91 MMHG | SYSTOLIC BLOOD PRESSURE: 138 MMHG | WEIGHT: 220 LBS

## 2022-01-26 DIAGNOSIS — M19.012 ARTHRITIS OF LEFT ACROMIOCLAVICULAR JOINT: ICD-10-CM

## 2022-01-26 DIAGNOSIS — S46.002A ROTATOR CUFF INJURY, LEFT, INITIAL ENCOUNTER: ICD-10-CM

## 2022-01-26 DIAGNOSIS — M75.42 SUBACROMIAL IMPINGEMENT OF LEFT SHOULDER: ICD-10-CM

## 2022-01-26 DIAGNOSIS — M75.32 CALCIFIC TENDINITIS OF LEFT SHOULDER: ICD-10-CM

## 2022-01-26 DIAGNOSIS — M19.012 ARTHRITIS OF LEFT GLENOHUMERAL JOINT: Primary | ICD-10-CM

## 2022-01-26 PROCEDURE — 20610 DRAIN/INJ JOINT/BURSA W/O US: CPT | Performed by: FAMILY MEDICINE

## 2022-01-26 PROCEDURE — 99204 OFFICE O/P NEW MOD 45 MIN: CPT | Performed by: FAMILY MEDICINE

## 2022-01-26 RX ORDER — TRIAMCINOLONE ACETONIDE 40 MG/ML
40 INJECTION, SUSPENSION INTRA-ARTICULAR; INTRAMUSCULAR
Status: COMPLETED | OUTPATIENT
Start: 2022-01-26 | End: 2022-01-26

## 2022-01-26 RX ORDER — LIDOCAINE HYDROCHLORIDE 10 MG/ML
2 INJECTION, SOLUTION INFILTRATION; PERINEURAL
Status: COMPLETED | OUTPATIENT
Start: 2022-01-26 | End: 2022-01-26

## 2022-01-26 RX ORDER — LIDOCAINE HYDROCHLORIDE 10 MG/ML
3 INJECTION, SOLUTION INFILTRATION; PERINEURAL
Status: COMPLETED | OUTPATIENT
Start: 2022-01-26 | End: 2022-01-26

## 2022-01-26 RX ORDER — BUPIVACAINE HYDROCHLORIDE 2.5 MG/ML
2 INJECTION, SOLUTION INFILTRATION; PERINEURAL
Status: COMPLETED | OUTPATIENT
Start: 2022-01-26 | End: 2022-01-26

## 2022-01-26 RX ADMIN — LIDOCAINE HYDROCHLORIDE 3 ML: 10 INJECTION, SOLUTION INFILTRATION; PERINEURAL at 10:24

## 2022-01-26 RX ADMIN — BUPIVACAINE HYDROCHLORIDE 2 ML: 2.5 INJECTION, SOLUTION INFILTRATION; PERINEURAL at 10:24

## 2022-01-26 RX ADMIN — LIDOCAINE HYDROCHLORIDE 2 ML: 10 INJECTION, SOLUTION INFILTRATION; PERINEURAL at 10:24

## 2022-01-26 RX ADMIN — TRIAMCINOLONE ACETONIDE 40 MG: 40 INJECTION, SUSPENSION INTRA-ARTICULAR; INTRAMUSCULAR at 10:24

## 2022-01-26 NOTE — PROGRESS NOTES
Assessment/Plan:  Assessment/Plan   Diagnoses and all orders for this visit:    Arthritis of left glenohumeral joint  -     Ambulatory Referral to Orthopedic Surgery  -     Ambulatory Referral to Physical Therapy; Future    Arthritis of left acromioclavicular joint  -     Ambulatory Referral to Physical Therapy; Future    Rotator cuff injury, left, initial encounter  -     Ambulatory Referral to Physical Therapy; Future  -     Large joint arthrocentesis: R subacromial bursa    Calcific tendinitis of left shoulder  -     Ambulatory Referral to Physical Therapy; Future  -     Large joint arthrocentesis: R subacromial bursa    Subacromial impingement of left shoulder  -     Ambulatory Referral to Physical Therapy; Future  -     Large joint arthrocentesis: R subacromial bursa      80year-old right-hand-dominant female with left shoulder pain and weakness following fall injury at home 01/15/2022  Discussed with patient physical exam, radiographs, impression and plan  X-rays of left shoulder are unremarkable for acute osseous abnormality, but noted for calcific tendinopathy  Physical exam of cervical spine is unremarkable for midline or paraspinal tenderness  She has normal range of motion of cervical spine  There is negative axial load and negative Spurling's  Left shoulder noted for mild tenderness at the lateral and posterior lateral aspects, tenderness of the biceps muscle belly  She has range of motion limited actively to forward flexion 90°, abduction 80°, and internal rotation to the lower thoracic spine  She has passive forward flexion to 170°  She has 4/5 strength external rotation and supraspinatus and 4+/5 strength abduction  There is positive can test, positive drop-arm test, and mild pain with Barrios maneuver  She has normal sensation, biceps reflex, and radial pulse both upper extremities    Clinical impression is that she sustained injury to the rotator cuff and exacerbation of underlying arthritis  I advised patient of failing conservative management prior to exploring surgical intervention  I discussed treatment option of corticosteroid injection and physical therapy to which she agreed  I administered mixture of 2 cc 1% lidocaine, 2 cc 0 25% bupivacaine, and 1 cc Kenalog to left shoulder subacromial space without complication  Upon reassessment she demonstrated active forward flexion to 160° and abduction to 90°  She is to start physical therapy as soon as possible and do home exercises as directed  She will follow up in 6 weeks at which point she will be re-evaluated  Subjective:   Patient ID: Yamila Gay is a 80 y o  female  Chief Complaint   Patient presents with    Left Shoulder - Pain       25-year-old right-hand-dominant female presents for evaluation of left shoulder pain and limited range of motion following fall injury at home on 01/15/2022  She tripped going up stairs and fell forward with her arms outstretched  She had pain described as sudden in onset, generalized to the left shoulder and radiating distally along the lateral aspect of the upper arm, severe intensity, worse with movement of the arm, associated with limited range of motion, and improved with resting  She managed to get herself up  She self-treated with applying Biofreeze and taking Tylenol  She saw primary care provider and there was suspicion for rotator cuff injury  She was referred for x-rays referred to orthopedic care  She reports that most pain has localized to the upper arm and she has limited range of motion of the arm  Shoulder Pain  This is a new problem  The current episode started 1 to 4 weeks ago  The problem occurs daily  The problem has been unchanged  Associated symptoms include arthralgias and weakness  Pertinent negatives include no abdominal pain, chest pain, chills, fever, joint swelling, numbness, rash or sore throat  Exacerbated by: Arm movement   She has tried rest, acetaminophen and position changes (Biofreeze) for the symptoms  The treatment provided mild relief  The following portions of the patient's history were reviewed and updated as appropriate: She  has a past medical history of Arthritis, Diverticulitis of colon, H/O nonmelanoma skin cancer, Hypertension, Kidney stone, and Pneumonia (1961)  She  has a past surgical history that includes Appendectomy (2010); Bladder surgery (1997); Cholecystectomy (1997); Colonoscopy; Cervix surgery; Hernia repair (2011); Colectomy; Tonsillectomy (1971); Breast excisional biopsy (Left, 1995); [place] retrograde pyelogram (3/29/2021); pr cysto/uretero w/lithotripsy &indwell stent insrt (Left, 3/29/2021); Tubal ligation; pr cysto/uretero w/lithotripsy &indwell stent insrt (Left, 4/29/2021); [place] retrograde pyelogram (4/29/2021); and Breast surgery  Her family history includes Arthritis in her mother; Breast cancer in her family, maternal grandmother, and paternal grandmother; Breast cancer additional onset in her family; Heart disease in her father; No Known Problems in her daughter, maternal grandfather, paternal grandfather, sister, and sister; Osteoporosis in her family, father, and mother; Stroke in her mother  She  reports that she quit smoking about 43 years ago  Her smoking use included cigarettes  She started smoking about 63 years ago  She has a 20 00 pack-year smoking history  She has never used smokeless tobacco  She reports current alcohol use of about 4 0 standard drinks of alcohol per week  She reports that she does not use drugs  She is allergic to azithromycin and morphine       Review of Systems   Constitutional: Negative for chills and fever  HENT: Negative for sore throat  Eyes: Negative for visual disturbance  Respiratory: Negative for shortness of breath  Cardiovascular: Negative for chest pain  Gastrointestinal: Negative for abdominal pain  Genitourinary: Negative for flank pain  Musculoskeletal: Positive for arthralgias  Negative for joint swelling  Skin: Negative for rash and wound  Neurological: Positive for weakness  Negative for numbness  Hematological: Does not bruise/bleed easily  Psychiatric/Behavioral: Negative for self-injury  Objective:  Vitals:    01/26/22 0946   BP: 138/91   Pulse: 71   Weight: 99 8 kg (220 lb)   Height: 5' 3" (1 6 m)     Back Exam     Comments:    Cervical spine  -no tenderness  -normal range of motion   -negative axial load  -negative Spurling's  -normal sensation, biceps reflex, and radial pulse both upper extremities      Right Hand Exam     Muscle Strength   The patient has normal right wrist strength  Other   Sensation: normal  Pulse: present      Left Hand Exam     Muscle Strength   The patient has normal left wrist strength  Other   Sensation: normal  Pulse: present      Right Elbow Exam     Other   Sensation: normal      Left Elbow Exam     Tenderness   The patient is experiencing no tenderness  Range of Motion   The patient has normal left elbow ROM  Muscle Strength   The patient has normal left elbow strength (5/5 flexion and extension)  Other   Sensation: normal      Right Shoulder Exam     Muscle Strength   Abduction: 5/5     Other   Sensation: normal      Left Shoulder Exam     Tenderness   Left shoulder tenderness location: Anterior, lateral     Range of Motion   Active abduction: 80   Forward flexion: 90 (Passive 170°)   Left shoulder internal rotation 0 degrees: Lower thoracic  Muscle Strength   Left shoulder normal muscle strength: 4+/5 abduction    Internal rotation: 5/5   External rotation: 4/5   Supraspinatus: 4/5     Tests   Barrios test: positive  Drop arm: positive    Other   Sensation: normal     Comments:  Positive empty can test  Negative belly press          Strength/Myotome Testing     Left Wrist/Hand   Normal wrist strength    Right Wrist/Hand   Normal wrist strength      Physical Exam  Vitals and nursing note reviewed  Constitutional:       General: She is not in acute distress  Appearance: She is well-developed  She is not ill-appearing or diaphoretic  HENT:      Head: Normocephalic  Right Ear: External ear normal       Left Ear: External ear normal    Eyes:      Conjunctiva/sclera: Conjunctivae normal    Neck:      Trachea: No tracheal deviation  Cardiovascular:      Rate and Rhythm: Normal rate  Pulmonary:      Effort: Pulmonary effort is normal  No respiratory distress  Abdominal:      General: There is no distension  Musculoskeletal:         General: Tenderness present  No swelling, deformity or signs of injury  Skin:     General: Skin is warm and dry  Coloration: Skin is not jaundiced or pale  Neurological:      Mental Status: She is alert and oriented to person, place, and time  Psychiatric:         Mood and Affect: Mood normal          Behavior: Behavior normal          Thought Content: Thought content normal          Judgment: Judgment normal            I have personally reviewed pertinent films in PACS and my interpretation is Calcific tendinitis  Degenerative changes of glenohumeral and AC joint  Large joint arthrocentesis: R subacromial bursa  Universal Protocol:  Consent: Verbal consent obtained  Risks and benefits: risks, benefits and alternatives were discussed  Consent given by: patient  Time out: Immediately prior to procedure a "time out" was called to verify the correct patient, procedure, equipment, support staff and site/side marked as required    Patient understanding: patient states understanding of the procedure being performed  Patient consent: the patient's understanding of the procedure matches consent given  Procedure consent: procedure consent matches procedure scheduled  Relevant documents: relevant documents present and verified  Test results: test results available and properly labeled  Site marked: the operative site was marked  Radiology Images displayed and confirmed   If images not available, report reviewed: imaging studies available  Required items: required blood products, implants, devices, and special equipment available  Patient identity confirmed: verbally with patient    Supporting Documentation  Indications: pain   Procedure Details  Location: shoulder - R subacromial bursa  Preparation: Patient was prepped and draped in the usual sterile fashion  Needle gauge: 21G 2"  Approach: posterolateral  Medications administered: 2 mL bupivacaine 0 25 %; 2 mL lidocaine 1 %; 3 mL lidocaine 1 %; 40 mg triamcinolone acetonide 40 mg/mL    Patient tolerance: patient tolerated the procedure well with no immediate complications  Dressing:  Sterile dressing applied

## 2022-01-26 NOTE — LETTER
January 26, 2022     Referral [address] [facility] [id number]    Patient: Geneva Menard   YOB: 1940   Date of Visit: 1/26/2022       Dear Dr Mimi Moreno:    Thank you for referring [place] [facility] to me for evaluation  Below are my notes for this consultation  If you have questions, please do not hesitate to call me  I look forward to following your patient along with you  Sincerely,        [employer], DO        CC: Ju Shellutant, DO  [employer], DO  1/26/2022  1:13 PM  Sign when Signing Visit  Assessment/Plan:  Assessment/Plan   Diagnoses and all orders for this visit:    Arthritis of left glenohumeral joint  -     Ambulatory Referral to Orthopedic Surgery  -     Ambulatory Referral to Physical Therapy; Future    Arthritis of left acromioclavicular joint  -     Ambulatory Referral to Physical Therapy; Future    Rotator cuff injury, left, initial encounter  -     Ambulatory Referral to Physical Therapy; Future  -     Large joint arthrocentesis: R subacromial bursa    Calcific tendinitis of left shoulder  -     Ambulatory Referral to Physical Therapy; Future  -     Large joint arthrocentesis: R subacromial bursa    Subacromial impingement of left shoulder  -     Ambulatory Referral to Physical Therapy; Future  -     Large joint arthrocentesis: R subacromial bursa      80year-old right-hand-dominant female with left shoulder pain and weakness following fall injury at home 01/15/2022  Discussed with patient physical exam, radiographs, impression and plan  X-rays of left shoulder are unremarkable for acute osseous abnormality, but noted for calcific tendinopathy  Physical exam of cervical spine is unremarkable for midline or paraspinal tenderness  She has normal range of motion of cervical spine  There is negative axial load and negative Spurling's  Left shoulder noted for mild tenderness at the lateral and posterior lateral aspects, tenderness of the biceps muscle belly  She has range of motion limited actively to forward flexion 90°, abduction 80°, and internal rotation to the lower thoracic spine  She has passive forward flexion to 170°  She has 4/5 strength external rotation and supraspinatus and 4+/5 strength abduction  There is positive can test, positive drop-arm test, and mild pain with Barrios maneuver  She has normal sensation, biceps reflex, and radial pulse both upper extremities  Clinical impression is that she sustained injury to the rotator cuff and exacerbation of underlying arthritis  I advised patient of failing conservative management prior to exploring surgical intervention  I discussed treatment option of corticosteroid injection and physical therapy to which she agreed  I administered mixture of 2 cc 1% lidocaine, 2 cc 0 25% bupivacaine, and 1 cc Kenalog to left shoulder subacromial space without complication  Upon reassessment she demonstrated active forward flexion to 160° and abduction to 90°  She is to start physical therapy as soon as possible and do home exercises as directed  She will follow up in 6 weeks at which point she will be re-evaluated  Subjective:   Patient ID: Tammie Eagle is a 80 y o  female  Chief Complaint   Patient presents with    Left Shoulder - Pain       22-year-old right-hand-dominant female presents for evaluation of left shoulder pain and limited range of motion following fall injury at home on 01/15/2022  She tripped going up stairs and fell forward with her arms outstretched  She had pain described as sudden in onset, generalized to the left shoulder and radiating distally along the lateral aspect of the upper arm, severe intensity, worse with movement of the arm, associated with limited range of motion, and improved with resting  She managed to get herself up  She self-treated with applying Biofreeze and taking Tylenol  She saw primary care provider and there was suspicion for rotator cuff injury    She was referred for x-rays referred to orthopedic care  She reports that most pain has localized to the upper arm and she has limited range of motion of the arm  Shoulder Pain  This is a new problem  The current episode started 1 to 4 weeks ago  The problem occurs daily  The problem has been unchanged  Associated symptoms include arthralgias and weakness  Pertinent negatives include no abdominal pain, chest pain, chills, fever, joint swelling, numbness, rash or sore throat  Exacerbated by: Arm movement  She has tried rest, acetaminophen and position changes (Biofreeze) for the symptoms  The treatment provided mild relief  The following portions of the patient's history were reviewed and updated as appropriate: She  has a past medical history of Arthritis, Diverticulitis of colon, H/O nonmelanoma skin cancer, Hypertension, Kidney stone, and Pneumonia (1961)  She  has a past surgical history that includes Appendectomy (2010); Bladder surgery (1997); Cholecystectomy (1997); Colonoscopy; Cervix surgery; Hernia repair (2011); Colectomy; Tonsillectomy (1971); Breast excisional biopsy (Left, 1995); [place] retrograde pyelogram (3/29/2021); pr cysto/uretero w/lithotripsy &indwell stent insrt (Left, 3/29/2021); Tubal ligation; pr cysto/uretero w/lithotripsy &indwell stent insrt (Left, 4/29/2021); [place] retrograde pyelogram (4/29/2021); and Breast surgery  Her family history includes Arthritis in her mother; Breast cancer in her family, maternal grandmother, and paternal grandmother; Breast cancer additional onset in her family; Heart disease in her father; No Known Problems in her daughter, maternal grandfather, paternal grandfather, sister, and sister; Osteoporosis in her family, father, and mother; Stroke in her mother  She  reports that she quit smoking about 43 years ago  Her smoking use included cigarettes  She started smoking about 63 years ago  She has a 20 00 pack-year smoking history   She has never used smokeless tobacco  She reports current alcohol use of about 4 0 standard drinks of alcohol per week  She reports that she does not use drugs  She is allergic to azithromycin and morphine       Review of Systems   Constitutional: Negative for chills and fever  HENT: Negative for sore throat  Eyes: Negative for visual disturbance  Respiratory: Negative for shortness of breath  Cardiovascular: Negative for chest pain  Gastrointestinal: Negative for abdominal pain  Genitourinary: Negative for flank pain  Musculoskeletal: Positive for arthralgias  Negative for joint swelling  Skin: Negative for rash and wound  Neurological: Positive for weakness  Negative for numbness  Hematological: Does not bruise/bleed easily  Psychiatric/Behavioral: Negative for self-injury  Objective:  Vitals:    01/26/22 0946   BP: 138/91   Pulse: 71   Weight: 99 8 kg (220 lb)   Height: 5' 3" (1 6 m)     Back Exam     Comments:    Cervical spine  -no tenderness  -normal range of motion   -negative axial load  -negative Spurling's  -normal sensation, biceps reflex, and radial pulse both upper extremities      Right Hand Exam     Muscle Strength   The patient has normal right wrist strength  Other   Sensation: normal  Pulse: present      Left Hand Exam     Muscle Strength   The patient has normal left wrist strength  Other   Sensation: normal  Pulse: present      Right Elbow Exam     Other   Sensation: normal      Left Elbow Exam     Tenderness   The patient is experiencing no tenderness  Range of Motion   The patient has normal left elbow ROM  Muscle Strength   The patient has normal left elbow strength (5/5 flexion and extension)      Other   Sensation: normal      Right Shoulder Exam     Muscle Strength   Abduction: 5/5     Other   Sensation: normal      Left Shoulder Exam     Tenderness   Left shoulder tenderness location: Anterior, lateral     Range of Motion   Active abduction: 80   Forward flexion: 90 (Passive 170°)   Left shoulder internal rotation 0 degrees: Lower thoracic  Muscle Strength   Left shoulder normal muscle strength: 4+/5 abduction  Internal rotation: 5/5   External rotation: 4/5   Supraspinatus: 4/5     Tests   Barrios test: positive  Drop arm: positive    Other   Sensation: normal     Comments:  Positive empty can test  Negative belly press          Strength/Myotome Testing     Left Wrist/Hand   Normal wrist strength    Right Wrist/Hand   Normal wrist strength      Physical Exam  Vitals and nursing note reviewed  Constitutional:       General: She is not in acute distress  Appearance: She is well-developed  She is not ill-appearing or diaphoretic  HENT:      Head: Normocephalic  Right Ear: External ear normal       Left Ear: External ear normal    Eyes:      Conjunctiva/sclera: Conjunctivae normal    Neck:      Trachea: No tracheal deviation  Cardiovascular:      Rate and Rhythm: Normal rate  Pulmonary:      Effort: Pulmonary effort is normal  No respiratory distress  Abdominal:      General: There is no distension  Musculoskeletal:         General: Tenderness present  No swelling, deformity or signs of injury  Skin:     General: Skin is warm and dry  Coloration: Skin is not jaundiced or pale  Neurological:      Mental Status: She is alert and oriented to person, place, and time  Psychiatric:         Mood and Affect: Mood normal          Behavior: Behavior normal          Thought Content: Thought content normal          Judgment: Judgment normal            I have personally reviewed pertinent films in PACS and my interpretation is Calcific tendinitis  Degenerative changes of glenohumeral and AC joint  Large joint arthrocentesis: R subacromial bursa  Universal Protocol:  Consent: Verbal consent obtained    Risks and benefits: risks, benefits and alternatives were discussed  Consent given by: patient  Time out: Immediately prior to procedure a "time out" was called to verify the correct patient, procedure, equipment, support staff and site/side marked as required  Patient understanding: patient states understanding of the procedure being performed  Patient consent: the patient's understanding of the procedure matches consent given  Procedure consent: procedure consent matches procedure scheduled  Relevant documents: relevant documents present and verified  Test results: test results available and properly labeled  Site marked: the operative site was marked  Radiology Images displayed and confirmed   If images not available, report reviewed: imaging studies available  Required items: required blood products, implants, devices, and special equipment available  Patient identity confirmed: verbally with patient    Supporting Documentation  Indications: pain   Procedure Details  Location: shoulder - R subacromial bursa  Preparation: Patient was prepped and draped in the usual sterile fashion  Needle gauge: 21G 2"  Approach: posterolateral  Medications administered: 2 mL bupivacaine 0 25 %; 2 mL lidocaine 1 %; 3 mL lidocaine 1 %; 40 mg triamcinolone acetonide 40 mg/mL    Patient tolerance: patient tolerated the procedure well with no immediate complications  Dressing:  Sterile dressing applied

## 2022-02-24 ENCOUNTER — OFFICE VISIT (OUTPATIENT)
Dept: FAMILY MEDICINE CLINIC | Facility: MEDICAL CENTER | Age: 82
End: 2022-02-24
Payer: COMMERCIAL

## 2022-02-24 VITALS
DIASTOLIC BLOOD PRESSURE: 82 MMHG | WEIGHT: 221 LBS | OXYGEN SATURATION: 98 % | HEIGHT: 63 IN | TEMPERATURE: 98.8 F | HEART RATE: 68 BPM | SYSTOLIC BLOOD PRESSURE: 128 MMHG | BODY MASS INDEX: 39.16 KG/M2

## 2022-02-24 DIAGNOSIS — Z78.0 POST-MENOPAUSAL: ICD-10-CM

## 2022-02-24 DIAGNOSIS — Z12.31 ENCOUNTER FOR SCREENING MAMMOGRAM FOR MALIGNANT NEOPLASM OF BREAST: ICD-10-CM

## 2022-02-24 DIAGNOSIS — E78.5 HYPERLIPIDEMIA, UNSPECIFIED HYPERLIPIDEMIA TYPE: ICD-10-CM

## 2022-02-24 DIAGNOSIS — Z13.29 SCREENING FOR THYROID DISORDER: ICD-10-CM

## 2022-02-24 DIAGNOSIS — R09.89 LEFT CAROTID BRUIT: ICD-10-CM

## 2022-02-24 DIAGNOSIS — Z23 NEED FOR SHINGLES VACCINE: ICD-10-CM

## 2022-02-24 DIAGNOSIS — I10 ESSENTIAL HYPERTENSION: Primary | ICD-10-CM

## 2022-02-24 DIAGNOSIS — Z12.11 SCREENING FOR COLON CANCER: ICD-10-CM

## 2022-02-24 PROCEDURE — 99214 OFFICE O/P EST MOD 30 MIN: CPT | Performed by: FAMILY MEDICINE

## 2022-02-24 NOTE — PROGRESS NOTES
Amadeo Garcia is here for checkup  See ortho notes  Dr Carey Parents  Was referrred for left  lshoulder injury  Diagnosed with calcific tendinitis, subacromial impingement of the left shoulder  Given injection which resolved her pain and stiffness  She is taking her metoprolol blood pressures been good  She had her carotid ultrasound for her carotid bruit which showed no evidence for stenosis  O: /82 (BP Location: Left arm, Patient Position: Sitting, Cuff Size: Large)   Pulse 68   Temp 98 8 °F (37 1 °C)   Ht 5' 3" (1 6 m)   Wt 100 kg (221 lb)   SpO2 98%   BMI 39 15 kg/m²      BP by me 130/82  Physical Exam  Constitutional:       General: She is not in acute distress  Appearance: She is well-developed  Neck:      Thyroid: No thyromegaly  Vascular: No carotid bruit  Cardiovascular:      Rate and Rhythm: Normal rate and regular rhythm  Heart sounds: Normal heart sounds  Pulmonary:      Effort: Pulmonary effort is normal       Breath sounds: Normal breath sounds  Abdominal:      General: Bowel sounds are normal       Palpations: Abdomen is soft  There is no hepatomegaly or mass  Tenderness: There is no abdominal tenderness  Lymphadenopathy:      Cervical: No cervical adenopathy  Assessment  1  Bilateral carotid bruits-carotid ultrasound no stenosis  2  Hypertension-well controlled with metoprolol and triam/HCTZ  3  Left shoulder impingement-resolved  4  Health maintenance-immunizations are up-to-date except Shingrix     She is interested in continuing breast and colorectal cancer screening  Will give Cologuard DEXA scan mammogram and blood work  Plan  Check blood work  DEXA scan  Mammogram   Cologuard  Shingrix  Recheck 6 months

## 2022-02-28 DIAGNOSIS — I10 ESSENTIAL HYPERTENSION: ICD-10-CM

## 2022-02-28 RX ORDER — METOPROLOL SUCCINATE 50 MG/1
TABLET, EXTENDED RELEASE ORAL
Qty: 90 TABLET | Refills: 3 | Status: SHIPPED | OUTPATIENT
Start: 2022-02-28 | End: 2022-03-01 | Stop reason: SDUPTHER

## 2022-03-01 DIAGNOSIS — I10 ESSENTIAL HYPERTENSION: ICD-10-CM

## 2022-03-01 RX ORDER — METOPROLOL SUCCINATE 50 MG/1
50 TABLET, EXTENDED RELEASE ORAL DAILY
Qty: 90 TABLET | Refills: 1 | Status: SHIPPED | OUTPATIENT
Start: 2022-03-01

## 2022-03-10 ENCOUNTER — OFFICE VISIT (OUTPATIENT)
Dept: OBGYN CLINIC | Facility: CLINIC | Age: 82
End: 2022-03-10
Payer: COMMERCIAL

## 2022-03-10 VITALS
DIASTOLIC BLOOD PRESSURE: 84 MMHG | WEIGHT: 221 LBS | HEART RATE: 60 BPM | HEIGHT: 63 IN | BODY MASS INDEX: 39.16 KG/M2 | SYSTOLIC BLOOD PRESSURE: 149 MMHG

## 2022-03-10 DIAGNOSIS — S46.002D ROTATOR CUFF INJURY, LEFT, SUBSEQUENT ENCOUNTER: ICD-10-CM

## 2022-03-10 DIAGNOSIS — M75.32 CALCIFIC TENDINITIS OF LEFT SHOULDER: ICD-10-CM

## 2022-03-10 DIAGNOSIS — M19.012 ARTHRITIS OF LEFT ACROMIOCLAVICULAR JOINT: ICD-10-CM

## 2022-03-10 DIAGNOSIS — M19.012 ARTHRITIS OF LEFT GLENOHUMERAL JOINT: Primary | ICD-10-CM

## 2022-03-10 PROCEDURE — 99213 OFFICE O/P EST LOW 20 MIN: CPT | Performed by: FAMILY MEDICINE

## 2022-03-10 NOTE — PROGRESS NOTES
Assessment/Plan:  Assessment/Plan   Diagnoses and all orders for this visit:    Arthritis of left glenohumeral joint    Arthritis of left acromioclavicular joint    Rotator cuff injury, left, subsequent encounter    Calcific tendinitis of left shoulder          49-year-old right-hand-dominant female with onset of left shoulder pain and weakness following fall injury at home on 01/15/2022  Discussed with patient physical exam, impression and plan  Physical exam of left shoulder noted for mild tenderness at the anterior lateral aspect  She has full forward flexion and abduction with internal rotation lumbar spine  She has 4/5 external rotation and supraspinatus  Clinical impression is that she is improved regard to the inflammatory component and musculoskeletal mechanics within her shoulder  She is still having weakness and I shared with patient is likely that she still has rotator cuff tear/injury  I advised that since she has improved range of motion and with decreased pain there is no need to pursue surgical intervention at this time, and she is in agreement  She is to complete her current course of formal therapy and after discharge she is to continue with home exercise program on a regular basis  She was advised that corticosteroid injections can be repeated at least 3 months apart  She will follow up with me as needed  Subjective:   Patient ID: Fidel Molina is a 80 y o  female  Chief Complaint   Patient presents with    Left Shoulder - Follow-up       49-year-old right-hand-dominant female following for left shoulder pain and weakness following fall injury at home on 01/15/2022  She was last seen by me 6 weeks ago which point she was given subacromial corticosteroid injection and referred to physical therapy  She reports feeling much better since her last visit  She has been attending formal therapy and doing home exercises and also doing chair yoga on a regular basis    She reports having much improved range of motion and much less pain  She has pain described as localized to the anterior lateral aspect the shoulder, mild intensity and rated as 1-2 out of 10 at worst, worse with maintaining the arm elevated above shoulder level, worse with sudden reaching, and improved with resting  She reports being able to be much more functional with daily activities  Shoulder Pain  This is a new problem  The current episode started more than 1 month ago  The problem occurs intermittently  The problem has been rapidly improving  Associated symptoms include arthralgias  Pertinent negatives include no joint swelling, numbness or weakness  Exacerbated by: Arm elevation  She has tried rest (Corticosteroid injection, physical therapy, home exercise) for the symptoms  The treatment provided significant relief  Review of Systems   Musculoskeletal: Positive for arthralgias  Negative for joint swelling  Neurological: Negative for weakness and numbness  Objective:  Vitals:    03/10/22 1054   BP: 149/84   Pulse: 60   Weight: 100 kg (221 lb)   Height: 5' 3" (1 6 m)     Left Shoulder Exam     Tenderness   Left shoulder tenderness location: Anterior lateral     Range of Motion   Active abduction: normal   Forward flexion: normal   Internal rotation 0 degrees: Lumbar     Muscle Strength   Abduction: 5/5   Internal rotation: 5/5   External rotation: 4/5   Supraspinatus: 4/5             Physical Exam  Vitals and nursing note reviewed  Constitutional:       General: She is not in acute distress  Appearance: She is well-developed  She is not ill-appearing or diaphoretic  HENT:      Head: Normocephalic  Right Ear: External ear normal       Left Ear: External ear normal    Eyes:      Conjunctiva/sclera: Conjunctivae normal    Neck:      Trachea: No tracheal deviation  Cardiovascular:      Rate and Rhythm: Normal rate     Pulmonary:      Effort: Pulmonary effort is normal  No respiratory distress  Abdominal:      General: There is no distension  Musculoskeletal:         General: Tenderness present  No swelling, deformity or signs of injury  Skin:     General: Skin is warm and dry  Coloration: Skin is not jaundiced or pale  Neurological:      Mental Status: She is alert and oriented to person, place, and time  Psychiatric:         Mood and Affect: Mood normal          Behavior: Behavior normal          Thought Content:  Thought content normal          Judgment: Judgment normal

## 2022-03-19 LAB — COLOGUARD RESULT REPORTABLE: NEGATIVE

## 2022-03-24 ENCOUNTER — TELEPHONE (OUTPATIENT)
Dept: FAMILY MEDICINE CLINIC | Facility: MEDICAL CENTER | Age: 82
End: 2022-03-24

## 2022-03-24 DIAGNOSIS — N28.9 ABNORMAL KIDNEY FUNCTION: ICD-10-CM

## 2022-03-24 DIAGNOSIS — D58.2 ELEVATED HEMOGLOBIN (HCC): Primary | ICD-10-CM

## 2022-03-24 NOTE — TELEPHONE ENCOUNTER
----- Message from Moriah Salcedo MD sent at 3/24/2022  9:49 AM EDT -----  SEE ME high blood work results  Does have a high hemoglobin and hematocrit; has had borderline elevated in the past but this is above normal   Also her kidney function is lower than previous  Would like a repeat CBC with and BMP; nonfasting in 2 weeks    Otherwise all looks good

## 2022-03-24 NOTE — TELEPHONE ENCOUNTER
emmett- s/w patient   Aware- states this happened in the past , saw Hematology , was taking baby aspirin but then was instructed to stop it   Will go in 2 weeks    Patient asked to email order to her

## 2022-03-31 ENCOUNTER — TELEPHONE (OUTPATIENT)
Dept: FAMILY MEDICINE CLINIC | Facility: MEDICAL CENTER | Age: 82
End: 2022-03-31

## 2022-03-31 NOTE — TELEPHONE ENCOUNTER
Pt said that last night she had some chest pain, upper chest, that lasted about 5 minutes  She has last eaten a hot dog at 7pm and the pain was at 9pm   She thought it might be heartburn but she wants to speak to the nurse to make sure she shouldn't be concerned  She started taking the baby aspirin again this morning  She ate breakfast  No pain this morning

## 2022-03-31 NOTE — TELEPHONE ENCOUNTER
Triaged  States 2 hours after eating a hot dog she c/o upper chest discomfort  Lasted 10 minutes, symptoms have completely resolved- she asked if she should be seen to discuss cardiology issues in light of her family history  Told her it is reasonable since her next appt is not until August   An appt with Dr Karen Reyna was set up 4/6/2022   Aware if she experiences another episode , should call or proceed to the Er

## 2022-04-06 ENCOUNTER — OFFICE VISIT (OUTPATIENT)
Dept: FAMILY MEDICINE CLINIC | Facility: MEDICAL CENTER | Age: 82
End: 2022-04-06
Payer: COMMERCIAL

## 2022-04-06 VITALS
OXYGEN SATURATION: 98 % | SYSTOLIC BLOOD PRESSURE: 144 MMHG | DIASTOLIC BLOOD PRESSURE: 84 MMHG | HEIGHT: 63 IN | HEART RATE: 64 BPM | WEIGHT: 221.7 LBS | BODY MASS INDEX: 39.28 KG/M2 | TEMPERATURE: 97.9 F

## 2022-04-06 DIAGNOSIS — K21.9 GASTROESOPHAGEAL REFLUX DISEASE, UNSPECIFIED WHETHER ESOPHAGITIS PRESENT: Primary | ICD-10-CM

## 2022-04-06 DIAGNOSIS — R07.9 CHEST PAIN, UNSPECIFIED TYPE: ICD-10-CM

## 2022-04-06 DIAGNOSIS — R94.31 ABNORMAL EKG: ICD-10-CM

## 2022-04-06 PROCEDURE — 99213 OFFICE O/P EST LOW 20 MIN: CPT | Performed by: STUDENT IN AN ORGANIZED HEALTH CARE EDUCATION/TRAINING PROGRAM

## 2022-04-06 RX ORDER — ASPIRIN 81 MG/1
81 TABLET ORAL DAILY
COMMUNITY

## 2022-04-06 NOTE — PATIENT INSTRUCTIONS
GERD (Gastroesophageal Reflux Disease)   WHAT YOU NEED TO KNOW:   Gastroesophageal reflux disease (GERD) is reflux that happens more than 2 times a week for a few weeks  Reflux means acid and food in your stomach back up into your esophagus  GERD can cause other health problems over time if it is not treated  DISCHARGE INSTRUCTIONS:   Call your local emergency number (911 in the [address]) if:   · You have severe chest pain and sudden trouble breathing  Return to the emergency department if:   · You have trouble breathing after you vomit  · You have trouble swallowing, or pain with swallowing  · Your bowel movements are black, bloody, or tarry-looking  · Your vomit looks like coffee grounds or has blood in it  Call your doctor or gastroenterologist if:   · You feel full and cannot burp or vomit  · You vomit large amounts, or you vomit often  · You are losing weight without trying  · Your symptoms get worse or do not improve with treatment  · You have questions or concerns about your condition or care  Medicines:   · Medicines  are used to decrease stomach acid  Medicine may also be used to help your lower esophageal sphincter and stomach contract (tighten) more  · Take your medicine as directed  Contact your healthcare provider if you think your medicine is not helping or if you have side effects  Tell him of her if you are allergic to any medicine  Keep a list of the medicines, vitamins, and herbs you take  Include the amounts, and when and why you take them  Bring the list or the pill bottles to follow-up visits  Carry your medicine list with you in case of an emergency  Manage GERD:       · Do not have foods or drinks that may increase heartburn  These include chocolate, peppermint, fried or fatty foods, drinks that contain caffeine, or carbonated drinks (soda)  Other foods include spicy foods, onions, tomatoes, and tomato-based foods   Do not have foods or drinks that can irritate your esophagus, such as citrus fruits, juices, and alcohol  · Do not eat large meals  When you eat a lot of food at one time, your stomach needs more acid to digest it  Eat 6 small meals each day instead of 3 large ones, and eat slowly  Do not eat meals 2 to 3 hours before bedtime  · Elevate the head of your bed  Place 6-inch blocks under the head of your bed frame  You may also use more than one pillow under your head and shoulders while you sleep  · Maintain a healthy weight  If you are overweight, weight loss may help relieve symptoms of GERD  · Do not smoke  Smoking weakens the lower esophageal sphincter and increases the risk of GERD  Ask your healthcare provider for information if you currently smoke and need help to quit  E-cigarettes or smokeless tobacco still contain nicotine  Talk to your healthcare provider before you use these products  · Do not put pressure on your abdomen  Pressure pushes acid up into your esophagus  Do not wear clothing that is tight around your waist  Do not bend over  Bend at the knees if you need to pick something up  Follow up with your doctor or gastroenterologist as directed:  Write down your questions so you remember to ask them during your visits  © Copyright [employer] 2022 Information is for End User's use only and may not be sold, redistributed or otherwise used for commercial purposes  All illustrations and images included in CareNotes® are the copyrighted property of [employer]  or Rafael Rebolledo  The above information is an  only  It is not intended as medical advice for individual conditions or treatments  Talk to your doctor, nurse or pharmacist before following any medical regimen to see if it is safe and effective for you

## 2022-04-06 NOTE — PROGRESS NOTES
[address] Note  [place], [place], 22     Martha Menard MRN: [id number] : 1940 Age: 80 y o  Assessment/Plan     1  Chest pain, unspecified type    - patient presents to discuss concern about episode of chest pain last week "burning" about 2 hours after consuming hot dog  Symptoms perhaps consistent with GERD however, she does have a few risk factors so advised patient she may follow-up with Cardiology for consultation as she requested for rule out    - last EKG 2021 reviewed  - Ambulatory Referral to Cardiology; Future    2  Abnormal EKG    - ECG 12 lead; Future    3  Gastroesophageal reflux disease, unspecified whether esophagitis present    - patient provided with counseling and educational material about reflux  She also was advised to start Tums p r n  for episodes of heartburn  If occurring frequently will consider H2 blocker/PPI trial     Padmaja Menard acknowledged understanding of treatment plan, all questions answered  Subjective      Padmaja Menard is a 80 y o  female presents to discuss Cardiology consultation  Patient reports episode of chest pain last Wednesday  1 5-2 hours after consuming dinner  She had a hot dog, she goes on to describe it did feel like heartburn however it lasted longer than her usual symptoms from time to time she states  Patient describes as "burning"  She denies any associated jaw pain or left arm weakness at that time  Patient states she has not had any similar episodes thereafter  "I also did not have a hot dog since there"  Patient does report dyspnea upon exertion "number of years"  No leg swelling  Patient reports with that episode she had no abdominal pain      The following portions of the patient's history were reviewed and updated as appropriate: allergies, current medications, past family history, past medical history, past social history, past surgical history and problem list      Past Medical History: Diagnosis Date    Arthritis     Diverticulitis of colon     H/O nonmelanoma skin cancer     last assessed 9/28/17    Hypertension     Kidney stone     Pneumonia 1961       Allergies   Allergen Reactions    Azithromycin Vomiting and Dizziness    Morphine Vomiting       Past Surgical History:   Procedure Laterality Date    APPENDECTOMY  2010    BLADDER SURGERY  1997    BREAST EXCISIONAL BIOPSY Left 1995    benign    no visible scar    BREAST SURGERY      CERVIX SURGERY      dilation and curettage of cervical stump 2005, 1998, [address]      partial - last assessed 10/28/14    COLONOSCOPY      last assessed 8/9/17    FL RETROGRADE PYELOGRAM  3/29/2021    FL RETROGRADE PYELOGRAM  4/29/2021    HERNIA REPAIR  2011    TX CYSTO/URETERO W/LITHOTRIPSY &INDWELL STENT INSRT Left 3/29/2021    Procedure: CYSTOSCOPY URETEROSCOPY, RETROGRADE PYELOGRAM AND INSERTION STENT URETERAL;  Surgeon: Eben Ellsworth MD;  Location:  MAIN OR;  Service: Urology    TX CYSTO/URETERO W/LITHOTRIPSY &INDWELL STENT INSRT Left 4/29/2021    Procedure: CYSTOSCOPY URETEROSCOPY WITH LITHOTRIPSY HOLMIUM LASER, RETROGRADE PYELOGRAM AND INSERTION STENT URETERAL;  Surgeon: Yvette Dash MD;  Location: [facility];  Service: Urology    TONSILLECTOMY  1971    TUBAL LIGATION         Family History   Problem Relation Age of Onset    Arthritis Mother     Osteoporosis Mother     Stroke Mother     Heart disease Father     Osteoporosis Father     Breast cancer Family     Osteoporosis Family     Breast cancer additional onset Family     No Known Problems Sister     No Known Problems Daughter     Breast cancer Maternal Grandmother     No Known Problems Maternal Grandfather     Breast cancer Paternal Grandmother     No Known Problems Paternal Grandfather     No Known Problems Sister        Social History     Socioeconomic History    Marital status:       Spouse name: None    Number of children: None    Years of education: None    Highest education level: None   Occupational History    None   Tobacco Use    Smoking status: Former Smoker     Packs/day: 1 00     Years: 20 00     Pack years: 20 00     Types: Cigarettes     Start date: 1958     Quit date:      Years since quittin 2    Smokeless tobacco: Never Used    Tobacco comment: Parents and sisters all smoked, as well as friends  I enjoyed it  And I quit willingly   Vaping Use    Vaping Use: Never used   Substance and Sexual Activity    Alcohol use:  Yes     Alcohol/week: 4 0 standard drinks     Types: 4 Glasses of wine per week     Comment: Occasionally I have a bourbon & ginger ale    Drug use: No    Sexual activity: Not Currently     Partners: Male     Birth control/protection: Diaphragm   Other Topics Concern    None   Social History Narrative    Caffeine use     Social Determinants of Health     Financial Resource Strain: Not on file   Food Insecurity: Not on file   Transportation Needs: Not on file   Physical Activity: Not on file   Stress: Not on file   Social Connections: Not on file   Intimate Partner Violence: Not on file   Housing Stability: Not on file       Current Outpatient Medications   Medication Sig Dispense Refill    Ascorbic Acid (Vitamin C) 500 MG CAPS Take 1 capsule by mouth daily      aspirin (ECOTRIN LOW STRENGTH) 81 mg EC tablet Take 81 mg by mouth daily      Calcium-Magnesium-Vitamin D (CALCIUM 500 PO) Take by mouth PT CURRENTLY NOT TAKING      Cholecalciferol (EQL VITAMIN D3) 1000 units capsule Take by mouth daily        Glucos-Chondroit-Hyaluron-MSM (GLUCOSAMINE CHONDROITIN JOINT) TABS Take by mouth daily        metoprolol succinate (TOPROL-XL) 50 mg 24 hr tablet Take 1 tablet (50 mg total) by mouth daily 90 tablet 1    Multiple Vitamins-Minerals (OCUVITE PO) Take by mouth daily      Omega-3 Fatty Acids (FISH OIL) 1000 MG CPDR Take by mouth      triamterene-hydrochlorothiazide (MAXZIDE-25) 37 5-25 mg per tablet Take 1 tablet by mouth daily 30 tablet 5    diclofenac sodium (VOLTAREN) 1 % Apply 2 g topically 3 (three) times a day 180 g 2     No current facility-administered medications for this visit  Review of Systems     As noted in HPI    Objective      /84 (BP Location: Left arm, Patient Position: Sitting, Cuff Size: Large)   Pulse 64   Temp 97 9 °F (36 6 °C)   Ht 5' 3" (1 6 m)   Wt 101 kg (221 lb 11 2 oz)   SpO2 98%   BMI 39 27 kg/m²     Physical Exam  Vitals reviewed  Constitutional:       General: She is not in acute distress  Appearance: She is obese  She is not ill-appearing, toxic-appearing or diaphoretic  HENT:      Head: Normocephalic and atraumatic  Eyes:      Conjunctiva/sclera: Conjunctivae normal    Cardiovascular:      Rate and Rhythm: Normal rate and regular rhythm  Pulses: Normal pulses  Heart sounds: Normal heart sounds  No murmur heard  Pulmonary:      Effort: Pulmonary effort is normal  No respiratory distress  Breath sounds: Normal breath sounds  Abdominal:      General: Bowel sounds are normal       Palpations: Abdomen is soft  Tenderness: There is no abdominal tenderness  There is no guarding or rebound  Musculoskeletal:      Right lower leg: No edema  Left lower leg: No edema  Skin:     General: Skin is warm and dry  Neurological:      Mental Status: She is alert and oriented to person, place, and time  Psychiatric:         Mood and Affect: Mood normal          Behavior: Behavior normal          Thought Content: Thought content normal          Judgment: Judgment normal              Some portions of this record may have been generated with voice recognition software  There may be translation, syntax, or grammatical errors  Occasional wrong word or "sound-a-like" substitutions may have occurred due to the inherent limitations of the voice recognition software   Read the chart carefully and recognize, using context, where substations may have occurred  If you have any questions, please contact the dictating provider for clarification or correction, as needed

## 2022-04-15 ENCOUNTER — TELEPHONE (OUTPATIENT)
Dept: FAMILY MEDICINE CLINIC | Facility: MEDICAL CENTER | Age: 82
End: 2022-04-15

## 2022-04-15 DIAGNOSIS — N28.9 ABNORMAL KIDNEY FUNCTION: Primary | ICD-10-CM

## 2022-04-15 NOTE — TELEPHONE ENCOUNTER
----- Message from Sergey George MD sent at 4/15/2022 10:39 AM EDT -----  Notify hemoglobin and hematocrit are mildly elevated but overall no higher  I see her note regarding previous consult with hematology  Her kidney function still is lower    I am not particularly concerned but I would like her to see Nephrology for any further recommendations regarding her medication etc  can give referral ; she can go to [facility]

## 2022-04-18 ENCOUNTER — TELEPHONE (OUTPATIENT)
Dept: NEPHROLOGY | Facility: CLINIC | Age: 82
End: 2022-04-18

## 2022-04-18 NOTE — TELEPHONE ENCOUNTER
Called patient to schedule consult - patient would prefer to go to [facility] office  Provided patient with the phone number  She states she will call to schedule later today

## 2022-04-27 ENCOUNTER — TELEPHONE (OUTPATIENT)
Dept: NEPHROLOGY | Facility: CLINIC | Age: 82
End: 2022-04-27

## 2022-04-27 NOTE — TELEPHONE ENCOUNTER
Pt called and scheduled consult for 07/25  Pt was referred by Dr Laurie Wolf for abnormal kidney function  Please advise if has to be seen sooner  CKD labs ordered

## 2022-04-27 NOTE — TELEPHONE ENCOUNTER
Called and LM informing pt that appt scheduled for 07/25 is ok, per Dr Mitali Emmanuel  Lab order was sent

## 2022-05-16 ENCOUNTER — TELEPHONE (OUTPATIENT)
Dept: FAMILY MEDICINE CLINIC | Facility: MEDICAL CENTER | Age: 82
End: 2022-05-16

## 2022-05-16 NOTE — TELEPHONE ENCOUNTER
Attempted to call patient, no response  Patient is still eligible for 4th booster once she is recovered and isolation period completed

## 2022-05-16 NOTE — TELEPHONE ENCOUNTER
Pt started on Mond 5/9/22 with a tickle in her throat, Tues 5/10 took covid test at home negative  Pt stated she still had some cold sx (holger, cough, no temp) was managing with Dayquil and Nyquil, sx almost gone now, but a friend of hers that she was around Fri 5/13 emailed her that they were covid positive, and pt tested today and she is now positive  Pt worried she has plans this weekend to attend her college reunion, and if she can't go she needs to make arrangements

## 2022-05-16 NOTE — TELEPHONE ENCOUNTER
Patient is asking since she now has covid what would be recommended with the 4th booster in the future? Triaged - patient is 7 days from onset of symptoms , c/o mild symptoms   Vaccinated and boostered   Discussed cdc guidelines   Aware if she attends her [facility] , a mask would be suggest for Thursday and Friday , quarantine will end on the 5/20  Patient is managing symptoms at home

## 2022-05-17 ENCOUNTER — TELEPHONE (OUTPATIENT)
Dept: NEPHROLOGY | Facility: CLINIC | Age: 82
End: 2022-05-17

## 2022-05-17 NOTE — TELEPHONE ENCOUNTER
Patient is schedule for Monday 07/25/2022  Called patient to come in for a sooner appointment since Dr Stark had a cancelling for 05/17/2022   As per patient she was unable to come in for appointment due to patient tested positive for COVID19

## 2022-05-19 DIAGNOSIS — I10 ESSENTIAL HYPERTENSION: ICD-10-CM

## 2022-05-19 RX ORDER — TRIAMTERENE AND HYDROCHLOROTHIAZIDE 37.5; 25 MG/1; MG/1
1 TABLET ORAL DAILY
Qty: 90 TABLET | Refills: 2 | Status: SHIPPED | OUTPATIENT
Start: 2022-05-19

## 2022-05-23 NOTE — TELEPHONE ENCOUNTER
Pt left a vm stating she still has covid and wondering if there is anything she needs to do? Also, pt has an order for an EKG, and wanted to know if she is to get that before she sees cardiology?

## 2022-05-23 NOTE — TELEPHONE ENCOUNTER
I spoke with Minna, reassured  She will also get her EKG prior to seeing cardiology in a few months

## 2022-06-20 ENCOUNTER — OFFICE VISIT (OUTPATIENT)
Dept: LAB | Facility: HOSPITAL | Age: 82
End: 2022-06-20
Payer: COMMERCIAL

## 2022-06-20 DIAGNOSIS — R94.31 ABNORMAL EKG: ICD-10-CM

## 2022-06-20 PROCEDURE — 93005 ELECTROCARDIOGRAM TRACING: CPT

## 2022-06-22 ENCOUNTER — CONSULT (OUTPATIENT)
Dept: CARDIOLOGY CLINIC | Facility: MEDICAL CENTER | Age: 82
End: 2022-06-22
Payer: COMMERCIAL

## 2022-06-22 VITALS
DIASTOLIC BLOOD PRESSURE: 78 MMHG | OXYGEN SATURATION: 95 % | HEART RATE: 62 BPM | BODY MASS INDEX: 39.16 KG/M2 | SYSTOLIC BLOOD PRESSURE: 132 MMHG | WEIGHT: 221 LBS | HEIGHT: 63 IN

## 2022-06-22 DIAGNOSIS — E78.5 HYPERLIPIDEMIA, UNSPECIFIED HYPERLIPIDEMIA TYPE: ICD-10-CM

## 2022-06-22 DIAGNOSIS — R07.9 CHEST PAIN, UNSPECIFIED TYPE: Primary | ICD-10-CM

## 2022-06-22 DIAGNOSIS — I10 ESSENTIAL HYPERTENSION: ICD-10-CM

## 2022-06-22 LAB
ATRIAL RATE: 56 BPM
P AXIS: 34 DEGREES
PR INTERVAL: 182 MS
QRS AXIS: -36 DEGREES
QRSD INTERVAL: 90 MS
QT INTERVAL: 420 MS
QTC INTERVAL: 405 MS
T WAVE AXIS: 20 DEGREES
VENTRICULAR RATE: 56 BPM

## 2022-06-22 PROCEDURE — 93010 ELECTROCARDIOGRAM REPORT: CPT | Performed by: INTERNAL MEDICINE

## 2022-06-22 PROCEDURE — 99204 OFFICE O/P NEW MOD 45 MIN: CPT | Performed by: INTERNAL MEDICINE

## 2022-06-22 NOTE — PROGRESS NOTES
[facility]  Office Consultation  [place] YUMIKO Menard 80 y o  female MRN: [id number]        Problems    1  Chest pain, unspecified type  Ambulatory Referral to Cardiology    NM myocardial perfusion spect (rx stress and/or rest)    CANCELED: POCT ECG   2  Essential hypertension  NM myocardial perfusion spect (rx stress and/or rest)   3  Hyperlipidemia, unspecified hyperlipidemia type  NM myocardial perfusion spect (rx stress and/or rest)       Impression:      Burning central upper chest pain lasting 10 minutes without obvious triggers or relief, resolving on its own   Hyperlipidemia-untreated, , ASCVD risk is high due to age  Hypertension-well controlled, recent HCTZ/triamterene addition by her PCPs off    Plan    We will proceed with a Lexiscan Myoview stress test   We discussed her high risk status primarily driven by age 80, and while her symptoms of chest burning could be noncardiac, I certainly think it behooves us to rule this out from a cardiac perspective  Her blood pressure is reasonable today, she had HCTZ/triamterene introduced a few months back  Her cholesterol is mildly elevated, and considering she is at higher ASCVD risk, there is certainly reasonable dated to suggest that low-dose preventive statin therapy could be advantageous, she can further discuss this with her PCP  Reason for Consult / Principal Problem:  Burning chest pain    HPI: Garrett Menard is a 80y o  year old female with hypertension, untreated hyperlipidemia, comes to see me for chest pain that occurred back in April, it was burning, it occurred at rest, it was mid to upper sternum, it did not radiate, it lasted longer than any prior reflux she has had before, and she felt like it was different, it lasted a total of about 10 minutes  There has not been any recurrence  An EKG was done her to PCPs office but a not available for review, her EKG today shows left anterior fascicular block    Blood pressure is quite well controlled, but cholesterol is untreated LDL is 15 at 80years of age, with a family history of heart disease in her father        Review of Systems   Constitutional: Negative  HENT: Negative  Eyes: Negative  Respiratory: Negative  Cardiovascular: Negative  Gastrointestinal: Negative  Endocrine: Negative  Genitourinary: Negative  Musculoskeletal: Negative  Skin: Negative  Neurological: Negative  Hematological: Negative  Psychiatric/Behavioral: Negative            Past Medical History:   Diagnosis Date    Arthritis     Arthritis     Diverticulitis of colon     H/O nonmelanoma skin cancer     last assessed 9/28/17    Hypertension     Kidney problem     Kidney stone     Pneumonia 1961     Past Surgical History:   Procedure Laterality Date    APPENDECTOMY  2010    BLADDER SURGERY  1997    BREAST EXCISIONAL BIOPSY Left 1995    benign    no visible scar    BREAST SURGERY      CERVIX SURGERY      dilation and curettage of cervical stump 2005, 1998, 1994    [address]      partial - last assessed 10/28/14    COLONOSCOPY      last assessed 8/9/17    FL RETROGRADE PYELOGRAM  3/29/2021    FL RETROGRADE PYELOGRAM  4/29/2021    HERNIA REPAIR  2011    DC CYSTO/URETERO W/LITHOTRIPSY &INDWELL STENT INSRT Left 3/29/2021    Procedure: CYSTOSCOPY URETEROSCOPY, RETROGRADE PYELOGRAM AND INSERTION STENT URETERAL;  Surgeon: Alex Hallman MD;  Location:  MAIN OR;  Service: Urology    DC CYSTO/URETERO W/LITHOTRIPSY &INDWELL STENT INSRT Left 4/29/2021    Procedure: CYSTOSCOPY URETEROSCOPY WITH LITHOTRIPSY HOLMIUM LASER, RETROGRADE PYELOGRAM AND INSERTION STENT URETERAL;  Surgeon: Melodie Partida MD;  Location: [facility];  Service: Urology    TONSILLECTOMY  1971    TUBAL LIGATION       Social History     Substance and Sexual Activity   Alcohol Use Yes    Alcohol/week: 4 0 standard drinks    Types: 4 Glasses of wine per week    Comment: Occasionally I have a bourbon & ginger ale     Social History     Substance and Sexual Activity   Drug Use No     Social History     Tobacco Use   Smoking Status Former Smoker    Packs/day: 1 00    Years: 20 00    Pack years: 20 00    Types: Cigarettes    Start date: 1958   [facility] Quit date: 5    Years since quittin 5   Smokeless Tobacco Never Used   Tobacco Comment    Parents and sisters all smoked, as well as friends  I enjoyed it  And I quit willingly   Family History   Problem Relation Age of Onset    Arthritis Mother     Osteoporosis Mother     Stroke Mother     Heart disease Father     Osteoporosis Father     Breast cancer Family     Osteoporosis Family     Breast cancer additional onset Family     No Known Problems Sister     No Known Problems Daughter     Breast cancer Maternal Grandmother     No Known Problems Maternal Grandfather     Breast cancer Paternal Grandmother     No Known Problems Paternal Grandfather     No Known Problems Sister        Allergies:   Allergies   Allergen Reactions    Azithromycin Vomiting and Dizziness    Morphine Vomiting       Medications:     Current Outpatient Medications:     Ascorbic Acid (Vitamin C) 500 MG CAPS, Take 1 capsule by mouth daily, Disp: , Rfl:     aspirin (ECOTRIN LOW STRENGTH) 81 mg EC tablet, Take 81 mg by mouth daily, Disp: , Rfl:     Calcium-Magnesium-Vitamin D (CALCIUM 500 PO), Take by mouth PT CURRENTLY NOT TAKING, Disp: , Rfl:     diclofenac sodium (VOLTAREN) 1 %, Apply 2 g topically 3 (three) times a day (Patient taking differently: Apply 2 g topically 3 (three) times a day As needed ), Disp: 180 g, Rfl: 2    Glucos-Chondroit-Hyaluron-MSM (GLUCOSAMINE CHONDROITIN JOINT) TABS, Take by mouth daily  , Disp: , Rfl:     metoprolol succinate (TOPROL-XL) 50 mg 24 hr tablet, Take 1 tablet (50 mg total) by mouth daily, Disp: 90 tablet, Rfl: 1    Omega-3 Fatty Acids (FISH OIL) 1000 MG CPDR, Take by mouth, Disp: , Rfl:    triamterene-hydrochlorothiazide (MAXZIDE-25) 37 5-25 mg per tablet, Take 1 tablet by mouth in the morning , Disp: 90 tablet, Rfl: 2      Vitals:    06/22/22 1348   BP: 132/78   Pulse: 62   SpO2: 95%     Weight (last 2 days)     Date/Time Weight    06/22/22 1348 100 (221)        Physical Exam  Constitutional:       General: She is not in acute distress  Appearance: She is well-developed  She is not diaphoretic  HENT:      Head: Normocephalic and atraumatic  Eyes:      General: No scleral icterus  Conjunctiva/sclera: Conjunctivae normal       Pupils: Pupils are equal, round, and reactive to light  Neck:      Thyroid: No thyromegaly  Vascular: No JVD  Trachea: No tracheal deviation  Cardiovascular:      Rate and Rhythm: Normal rate and regular rhythm  Heart sounds: Normal heart sounds  No murmur heard  No friction rub  No gallop  Pulmonary:      Effort: Pulmonary effort is normal  No respiratory distress  Breath sounds: Normal breath sounds  No wheezing or rales  Abdominal:      General: Bowel sounds are normal  There is no distension  Palpations: Abdomen is soft  Tenderness: There is no abdominal tenderness  Musculoskeletal:         General: No tenderness or deformity  Normal range of motion  Cervical back: Normal range of motion and neck supple  Right lower leg: No edema  Left lower leg: No edema  Skin:     General: Skin is warm and dry  Findings: No erythema or rash  Neurological:      Mental Status: She is alert and oriented to person, place, and time  Cranial Nerves: No cranial nerve deficit  Psychiatric:         Judgment: Judgment normal            Laboratory Studies:    Laboratory studies personally reviewed    Cardiac testing:     EKG reviewed personally:   No results found for this visit on 06/22/22      Echocardiogram:      Stress test:      Holter:      Cardiac catheterization:        Scot Dao MD    Portions of the record may have been created with voice recognition software  Occasional wrong word or "sound a like" substitutions may have occurred due to the inherent limitations of voice recognition software  Read the chart carefully and recognize, using context, where substitutions have occurred

## 2022-07-07 LAB
CREAT ?TM UR-SCNC: 164 UMOL/L
EXT PROTEIN URINE: 6.3
PROT/CREAT UR: 0.04 MG/G{CREAT}

## 2022-07-08 ENCOUNTER — HOSPITAL ENCOUNTER (OUTPATIENT)
Dept: NON INVASIVE DIAGNOSTICS | Facility: CLINIC | Age: 82
Discharge: HOME | End: 2022-07-08
Payer: COMMERCIAL

## 2022-07-08 DIAGNOSIS — I10 ESSENTIAL HYPERTENSION: ICD-10-CM

## 2022-07-08 DIAGNOSIS — E78.5 HYPERLIPIDEMIA, UNSPECIFIED HYPERLIPIDEMIA TYPE: ICD-10-CM

## 2022-07-08 DIAGNOSIS — R07.9 CHEST PAIN, UNSPECIFIED TYPE: ICD-10-CM

## 2022-07-11 ENCOUNTER — HOSPITAL ENCOUNTER (OUTPATIENT)
Dept: NON INVASIVE DIAGNOSTICS | Facility: CLINIC | Age: 82
Discharge: HOME | End: 2022-07-11
Payer: COMMERCIAL

## 2022-07-11 VITALS
SYSTOLIC BLOOD PRESSURE: 150 MMHG | DIASTOLIC BLOOD PRESSURE: 92 MMHG | HEIGHT: 63 IN | OXYGEN SATURATION: 98 % | HEART RATE: 75 BPM | WEIGHT: 221 LBS | BODY MASS INDEX: 39.16 KG/M2

## 2022-07-11 DIAGNOSIS — R07.9 CHEST PAIN, UNSPECIFIED TYPE: ICD-10-CM

## 2022-07-11 DIAGNOSIS — I10 ESSENTIAL HYPERTENSION: ICD-10-CM

## 2022-07-11 DIAGNOSIS — E78.5 HYPERLIPIDEMIA, UNSPECIFIED HYPERLIPIDEMIA TYPE: ICD-10-CM

## 2022-07-11 LAB
BASELINE ST DEPRESSION: 0 MM
NUC STRESS EJECTION FRACTION: 72 %
RATE PRESSURE PRODUCT: NORMAL
SL CV REST NUCLEAR ISOTOPE DOSE: 10 MCI
SL CV STRESS NUCLEAR ISOTOPE DOSE: 31.7 MCI
SL CV STRESS RECOVERY BP: NORMAL MMHG
SL CV STRESS RECOVERY HR: 82 BPM
STRESS ANGINA INDEX: 0
STRESS BASELINE BP: NORMAL MMHG
STRESS BASELINE HR: 75 BPM
STRESS O2 SAT REST: 98 %
STRESS PEAK HR: 115 BPM
STRESS POST O2 SAT PEAK: 98 %
STRESS POST PEAK BP: 172 MMHG
STRESS ST DEPRESSION: 0 MM

## 2022-07-11 PROCEDURE — 93016 CV STRESS TEST SUPVJ ONLY: CPT | Performed by: INTERNAL MEDICINE

## 2022-07-11 PROCEDURE — 93017 CV STRESS TEST TRACING ONLY: CPT

## 2022-07-11 PROCEDURE — 78452 HT MUSCLE IMAGE SPECT MULT: CPT | Performed by: INTERNAL MEDICINE

## 2022-07-11 PROCEDURE — 78451 HT MUSCLE IMAGE SPECT SING: CPT

## 2022-07-11 PROCEDURE — A9502 TC99M TETROFOSMIN: HCPCS

## 2022-07-11 PROCEDURE — 93018 CV STRESS TEST I&R ONLY: CPT | Performed by: INTERNAL MEDICINE

## 2022-07-11 RX ORDER — AMINOPHYLLINE DIHYDRATE 25 MG/ML
50 INJECTION, SOLUTION INTRAVENOUS ONCE
Status: COMPLETED | OUTPATIENT
Start: 2022-07-11 | End: 2022-07-11

## 2022-07-11 RX ADMIN — AMINOPHYLLINE 50 MG: 25 INJECTION, SOLUTION INTRAVENOUS at 08:42

## 2022-07-11 RX ADMIN — REGADENOSON 0.4 MG: 0.08 INJECTION, SOLUTION INTRAVENOUS at 08:39

## 2022-07-12 LAB
MAX DIASTOLIC BP: 100 MMHG
MAX DIASTOLIC BP: 100 MMHG
MAX HEART RATE: 105 BPM
MAX HEART RATE: 115 BPM
MAX PREDICTED HEART RATE: 139 BPM
MAX PREDICTED HEART RATE: 139 BPM
MAX. SYSTOLIC BP: 172 MMHG
MAX. SYSTOLIC BP: 172 MMHG
PROTOCOL NAME: NORMAL
PROTOCOL NAME: NORMAL
REASON FOR TERMINATION: NORMAL
REASON FOR TERMINATION: NORMAL
TARGET HR FORMULA: NORMAL
TARGET HR FORMULA: NORMAL
TEST INDICATION: NORMAL
TEST INDICATION: NORMAL
TIME IN EXERCISE PHASE: NORMAL
TIME IN EXERCISE PHASE: NORMAL

## 2022-07-18 ENCOUNTER — TELEPHONE (OUTPATIENT)
Dept: CARDIOLOGY CLINIC | Facility: MEDICAL CENTER | Age: 82
End: 2022-07-18

## 2022-07-18 NOTE — TELEPHONE ENCOUNTER
----- Message from Autumn Issa MD sent at 7/13/2022 10:11 AM EDT -----  Please report this normal stress test result to the patient

## 2022-07-22 ENCOUNTER — TELEPHONE (OUTPATIENT)
Dept: NEPHROLOGY | Facility: CLINIC | Age: 82
End: 2022-07-22

## 2022-07-25 ENCOUNTER — CONSULT (OUTPATIENT)
Dept: NEPHROLOGY | Facility: CLINIC | Age: 82
End: 2022-07-25
Payer: COMMERCIAL

## 2022-07-25 ENCOUNTER — TELEPHONE (OUTPATIENT)
Dept: FAMILY MEDICINE CLINIC | Facility: MEDICAL CENTER | Age: 82
End: 2022-07-25

## 2022-07-25 ENCOUNTER — OFFICE VISIT (OUTPATIENT)
Dept: DERMATOLOGY | Facility: CLINIC | Age: 82
End: 2022-07-25
Payer: COMMERCIAL

## 2022-07-25 VITALS
HEART RATE: 66 BPM | BODY MASS INDEX: 37.73 KG/M2 | OXYGEN SATURATION: 97 % | RESPIRATION RATE: 16 BRPM | WEIGHT: 221 LBS | SYSTOLIC BLOOD PRESSURE: 140 MMHG | HEIGHT: 64 IN | TEMPERATURE: 96.8 F | DIASTOLIC BLOOD PRESSURE: 70 MMHG

## 2022-07-25 VITALS — BODY MASS INDEX: 39.16 KG/M2 | WEIGHT: 221 LBS | HEIGHT: 63 IN

## 2022-07-25 DIAGNOSIS — E83.9 CHRONIC KIDNEY DISEASE-MINERAL AND BONE DISORDER: ICD-10-CM

## 2022-07-25 DIAGNOSIS — I10 ESSENTIAL HYPERTENSION: ICD-10-CM

## 2022-07-25 DIAGNOSIS — E66.01 OBESITY, MORBID (HCC): ICD-10-CM

## 2022-07-25 DIAGNOSIS — M89.9 CHRONIC KIDNEY DISEASE-MINERAL AND BONE DISORDER: ICD-10-CM

## 2022-07-25 DIAGNOSIS — Z85.828 HISTORY OF SKIN CANCER: ICD-10-CM

## 2022-07-25 DIAGNOSIS — N18.9 CHRONIC KIDNEY DISEASE-MINERAL AND BONE DISORDER: ICD-10-CM

## 2022-07-25 DIAGNOSIS — Z13.89 SCREENING FOR SKIN CONDITION: ICD-10-CM

## 2022-07-25 DIAGNOSIS — Z23 NEED FOR COVID-19 VACCINE: Primary | ICD-10-CM

## 2022-07-25 DIAGNOSIS — N28.9 ABNORMAL KIDNEY FUNCTION: ICD-10-CM

## 2022-07-25 DIAGNOSIS — L82.1 SEBORRHEIC KERATOSIS: ICD-10-CM

## 2022-07-25 DIAGNOSIS — L98.9 UNKNOWN SKIN LESION: Primary | ICD-10-CM

## 2022-07-25 DIAGNOSIS — N18.32 STAGE 3B CHRONIC KIDNEY DISEASE (HCC): Primary | ICD-10-CM

## 2022-07-25 DIAGNOSIS — N13.9 ACUTE UNILATERAL OBSTRUCTIVE UROPATHY: ICD-10-CM

## 2022-07-25 PROCEDURE — 11102 TANGNTL BX SKIN SINGLE LES: CPT | Performed by: DERMATOLOGY

## 2022-07-25 PROCEDURE — 88305 TISSUE EXAM BY PATHOLOGIST: CPT | Performed by: STUDENT IN AN ORGANIZED HEALTH CARE EDUCATION/TRAINING PROGRAM

## 2022-07-25 PROCEDURE — 99214 OFFICE O/P EST MOD 30 MIN: CPT | Performed by: DERMATOLOGY

## 2022-07-25 PROCEDURE — 99204 OFFICE O/P NEW MOD 45 MIN: CPT | Performed by: INTERNAL MEDICINE

## 2022-07-25 RX ORDER — MV-MIN/FA/VIT K/LUTEIN/ZEAXANT 200MCG-5MG
CAPSULE ORAL
COMMUNITY

## 2022-07-25 NOTE — TELEPHONE ENCOUNTER
Spoke with pt to advise that [facility] is only giving second boosters of David Cruz, so she should check with her local pharmacies for the Edgard Lisette vaccine

## 2022-07-25 NOTE — ASSESSMENT & PLAN NOTE
Lab Results   Component Value Date    EGFR 49 03/28/2021    EGFR 40 03/01/2021    EGFR 64 10/07/2018    CREATININE 1 07 03/28/2021    CREATININE 1 26 03/01/2021    CREATININE 0 88 10/07/2018   Based on pause data isn't seems to have some sort of CKD  Kidney function was essentially normal before kidney stone last year  She was also started on Dyazide for the blood per submitted med and that can be contributory also    Pathophysiology of kidney disease discussed with the patient at length  Asymptomatic progression was also discussed with her  Importance of hydration and avoiding nephrotoxic medicine also discussed bradycardia at this point or do the blood work and urine tests along with kidney ultrasound    I may want to stop Dyazide as it can be nephrotoxic but I will monitor her before I start changing medication

## 2022-07-25 NOTE — TELEPHONE ENCOUNTER
----- Message from [place] R  Day sent at 2022  3:00 PM EDT -----  Regardinnd booster  I would like to get my second booster shot  Where can I do that?

## 2022-07-25 NOTE — PATIENT INSTRUCTIONS
Skin lesion possible squamous cell carcinoma would require complete excision pending results  Seborrheic keratosis patient reassured these are normal growths we acquire with age no treatment needed  History of skin cancer in no recurrence nothing else atypical sunblock recommended follow-up in 6 months  Screening for dermatologic disorders nothing else of concern noted on complete exam follow-up in 6 months  Wound care instructions given to patient

## 2022-07-25 NOTE — PROGRESS NOTES
[address] Menard 80 y o  female MRN: [id number]    Encounter: [id number] 7/25/2022    REASON FOR VISIT: Kalpana Menard is a 80 y  o female who was referred by Romario Contreras DO for evaluation of Abnormal Lab and Consult    HPI:    Kiara Cortes came in today for evaluation and management of the CKD  41-year-old woman with history of hypertension and history of kidney stone was found to have worsening renal function so was advised to see me for further management     Patient's longest of hypotension and was recently started on Dyazide for blood pressure management    Patient has a history of kidney stone in the past   Last year she almost 8 cm kidney stone with some obstruction requiring stent and lithotripsy     Kidney function was stable around that time      Doses are knee pain for which she takes Tylenol and occasional ibuprofen    Denies any urinary complaint      REVIEW OF SYSTEMS:    Review of Systems   Constitutional: Negative for activity change and fatigue  HENT: Negative for congestion and ear discharge  Eyes: Negative for photophobia and pain  Respiratory: Negative for apnea and choking  Cardiovascular: Negative for chest pain and palpitations  Gastrointestinal: Negative for abdominal distention and blood in stool  Endocrine: Negative for heat intolerance and polyphagia  Genitourinary: Negative for flank pain and urgency  Musculoskeletal: Positive for arthralgias  Negative for neck pain and neck stiffness  Skin: Negative for color change and wound  Allergic/Immunologic: Negative for food allergies and immunocompromised state  Neurological: Negative for seizures and facial asymmetry  Hematological: Negative for adenopathy  Does not bruise/bleed easily  Psychiatric/Behavioral: Negative for self-injury and suicidal ideas           PAST MEDICAL HISTORY:  Past Medical History:   Diagnosis Date    Arthritis     Arthritis     Diverticulitis of colon     Fall     H/O nonmelanoma skin cancer     last assessed 17    Hypertension     Kidney problem     Kidney stone     Pneumonia 196       PAST SURGICAL HISTORY:  Past Surgical History:   Procedure Laterality Date    APPENDECTOMY  2010    BLADDER SURGERY      BREAST EXCISIONAL BIOPSY Left     benign    no visible scar    BREAST SURGERY      CERVIX SURGERY      dilation and curettage of cervical stump , ,    [address]      partial - last assessed 10/28/14    COLONOSCOPY      last assessed 17    FL RETROGRADE PYELOGRAM  3/29/2021    FL RETROGRADE PYELOGRAM  2021    HERNIA REPAIR  2011    MS CYSTO/URETERO W/LITHOTRIPSY &INDWELL STENT INSRT Left 3/29/2021    Procedure: CYSTOSCOPY URETEROSCOPY, RETROGRADE PYELOGRAM AND INSERTION STENT URETERAL;  Surgeon: Liv Resendiz MD;  [facility];  Service: Urology    MS CYSTO/URETERO W/LITHOTRIPSY &INDWELL STENT INSRT Left 2021    Procedure: CYSTOSCOPY URETEROSCOPY WITH LITHOTRIPSY HOLMIUM LASER, RETROGRADE PYELOGRAM AND INSERTION STENT URETERAL;  Surgeon: Bharati James MD;  Location: [facility];  Service: Urology    TONSILLECTOMY  1971    TUBAL LIGATION         SOCIAL HISTORY:  Social History     Substance and Sexual Activity   Alcohol Use Yes    Alcohol/week: 4 0 standard drinks    Types: 4 Glasses of wine per week    Comment: Occasionally I have a bourbon & ginger ale     Social History     Substance and Sexual Activity   Drug Use No     Social History     Tobacco Use   Smoking Status Former Smoker    Packs/day: 1 00    Years: 20 00    Pack years: 20 00    Types: Cigarettes    Start date: 1958   [facility] Quit date: 5    Years since quittin 5   Smokeless Tobacco Never Used   Tobacco Comment    Parents and sisters all smoked, as well as friends  I enjoyed it  And I quit willingly          FAMILY HISTORY:  Family History   Problem Relation Age of Onset    Arthritis Mother    [facility] Osteoporosis Mother     Stroke Mother     Heart disease Father     Osteoporosis Father     Breast cancer Family     Osteoporosis Family     Breast cancer additional onset Family     No Known Problems Sister     No Known Problems Daughter     Breast cancer Maternal Grandmother     No Known Problems Maternal Grandfather     Breast cancer Paternal Grandmother     No Known Problems Paternal Grandfather     No Known Problems Sister        MEDICATIONS:    Current Outpatient Medications:     Ascorbic Acid (Vitamin C) 500 MG CAPS, Take 1 capsule by mouth daily, Disp: , Rfl:     aspirin (ECOTRIN LOW STRENGTH) 81 mg EC tablet, Take 81 mg by mouth daily, Disp: , Rfl:     Calcium-Magnesium-Vitamin D (CALCIUM 500 PO), Take by mouth PT CURRENTLY NOT TAKING, Disp: , Rfl:     diclofenac sodium (VOLTAREN) 1 %, Apply 2 g topically 3 (three) times a day (Patient taking differently: Apply 2 g topically 3 (three) times a day As needed ), Disp: 180 g, Rfl: 2    Glucos-Chondroit-Hyaluron-MSM (GLUCOSAMINE CHONDROITIN JOINT) TABS, Take by mouth daily  , Disp: , Rfl:     metoprolol succinate (TOPROL-XL) 50 mg 24 hr tablet, Take 1 tablet (50 mg total) by mouth daily, Disp: 90 tablet, Rfl: 1    Multiple Vitamins-Minerals (PreserVision AREDS 2+Multi Vit) CAPS, , Disp: , Rfl:     Omega-3 Fatty Acids (FISH OIL) 1000 MG CPDR, Take by mouth, Disp: , Rfl:     triamterene-hydrochlorothiazide (MAXZIDE-25) 37 5-25 mg per tablet, Take 1 tablet by mouth in the morning , Disp: 90 tablet, Rfl: 2    PHYSICAL EXAM:  Vitals:    07/25/22 1515   BP: 140/70   BP Location: Right arm   Patient Position: Sitting   Pulse: 66   Resp: 16   Temp: (!) 96 8 °F (36 °C)   TempSrc: Temporal   SpO2: 97%   Weight: 100 kg (221 lb)   Height: 5' 4" (1 626 m)     Body mass index is 37 93 kg/m²  Physical Exam  Constitutional:       General: She is not in acute distress  Appearance: She is well-developed  She is obese  HENT:      Head: Normocephalic  Eyes:      General: No scleral icterus  Conjunctiva/sclera: Conjunctivae normal    Neck:      Vascular: No JVD  Cardiovascular:      Rate and Rhythm: Normal rate  Heart sounds: Normal heart sounds  Pulmonary:      Effort: Pulmonary effort is normal       Breath sounds: No wheezing  Abdominal:      Palpations: Abdomen is soft  Tenderness: There is no abdominal tenderness  Musculoskeletal:         General: No swelling  Normal range of motion  Cervical back: Neck supple  Skin:     General: Skin is warm  Findings: No rash  Neurological:      Mental Status: She is alert and oriented to person, place, and time  Psychiatric:         Behavior: Behavior normal          LAB RESULTS:  Results for orders placed or performed during the hospital encounter of 07/11/22   Stress strip   Result Value Ref Range    Protocol Name [address]     Time In Exercise Phase 00:03:00     MAX   SYSTOLIC  mmHg    Max Diastolic Bp 829 mmHg    Max Heart Rate 115 BPM    Max Predicted Heart Rate 139 BPM    Reason for Termination Protocol Complete     Test Indication CHEST PAIN     Target Hr Formular (220 - Age)*85%     Arrhy During Ex      ECG Interp Before Ex      ECG Interp during Ex      Ex Summary Comment      Overall Hr Response To Exercise      Overall BP Response To Exercise     NM myocardial perfusion spect (rx stress)   Result Value Ref Range    Stress Nuclear Isotope Dose 31 70 mCi    Rest Nuclear Isotope Dose 10 00 mCi    Baseline HR 75 bpm    Baseline /92 mmHg    O2 sat rest 98 %    Stress peak  bpm    Post peak  mmHg    Rate Pressure Product 19,780 0     O2 sat peak 98 %    Recovery HR 82 bpm    Recovery /90 mmHg    Angina Index 0     EF (%) 72 %    Base ST Depresion (mm) 0 mm    ST Depression (mm) 0 mm       ASSESSMENT and PLAN:    Stage 3b chronic kidney disease (HCC)  Lab Results   Component Value Date    EGFR 49 03/28/2021    EGFR 40 03/01/2021    EGFR 64 10/07/2018 CREATININE 1 07 03/28/2021    CREATININE 1 26 03/01/2021    CREATININE 0 88 10/07/2018   Based on pause data isn't seems to have some sort of CKD  Kidney function was essentially normal before kidney stone last year  She was also started on Dyazide for the blood per submitted med and that can be contributory also    Pathophysiology of kidney disease discussed with the patient at length  Asymptomatic progression was also discussed with her  Importance of hydration and avoiding nephrotoxic medicine also discussed bradycardia at this point or do the blood work and urine tests along with kidney ultrasound  I may want to stop Dyazide as it can be nephrotoxic but I will monitor her before I start changing medication    Acute unilateral obstructive uropathy  Being monitored by urologist   We will get kidney ultrasound as part of the follow-up    Essential hypertension  Blood pressure seems to be reasonable control  As mentioned before may want to stop Dyazide as it can be nephrotoxic but will monitor at this point    Attending discussed at length with the patient  I will see her back in 3 months  We will get blood and urine test before that visit  Thank you very much for the consult and I will monitor the patient with you        Portions of the record may have been created with voice recognition software  Occasional wrong word or "sound a like" substitutions may have occurred due to the inherent limitations of voice recognition software  Read the chart carefully and recognize, using context, where substitutions have occurred  If you have any questions, please contact the dictating provider

## 2022-07-25 NOTE — PROGRESS NOTES
[facility] DERMATOLOGY  [address]  [place] [facility] [address]  [phone]  [phone]     MRN: [id number] : 1940  Encounter: [id number]  Patient Information: [place]  Chief complaint: 6 month checkup    History of present illness:  61-year-old female with history of nonmelanoma skin cancer presents for overall checkup it has been 3 years since her last skin cancer not aware lesion we noted on her back  Past Medical History:   Diagnosis Date    Arthritis     Arthritis     Diverticulitis of colon     Fall     H/O nonmelanoma skin cancer     last assessed 17    Hypertension     Kidney problem     Kidney stone     Pneumonia      Past Surgical History:   Procedure Laterality Date    APPEN[address]CTOMY     [address]    BREAST EXCISIONAL BIOPSY Left     benign    no visible scar    BREAST SURGERY      CERVIX SURGERY      dilation and curettage of cervical stump , ,    [address]      partial - last assessed 10/28/14    COLONOSCOPY      last assessed 17    FL RETROGRADE PYELOGRAM  3/29/2021    FL RETROGRADE PYELOGRAM  2021    HERNIA REPAIR  2011    MN CYSTO/URETERO W/LITHOTRIPSY &INDWELL STENT INSRT Left 3/29/2021    Procedure: CYSTOSCOPY URETEROSCOPY, RETROGRADE PYELOGRAM AND INSERTION STENT URETERAL;  Surgeon: Peace Maldonado MD;  Location:  MAIN OR;  Service: Urology    MN CYSTO/URETERO W/LITHOTRIPSY &INDWELL STENT INSRT Left 2021    Procedure: CYSTOSCOPY URETEROSCOPY WITH LITHOTRIPSY HOLMIUM LASER, RETROGRADE PYELOGRAM AND INSERTION STENT URETERAL;  Surgeon: Pretty Bloom MD;  Location: [facility] MAIN [facility];  Service: Urology    TONSILLECTOMY  1971    TUBAL LIGATION       Social History   Social History     Substance and Sexual Activity   Alcohol Use Yes    Alcohol/week: 4 0 standard drinks    Types: 4 Glasses of wine per week    Comment: Occasionally I have a boandréson & ginger ale     Social History     Substance and Sexual Activity   Drug Use No     Social History     Tobacco Use   Smoking Status Former Smoker    Packs/day: 1 00    Years: 20 00    Pack years: 20 00    Types: Cigarettes    Start date: 1958   Kylie Leisure Quit date: 5    Years since quittin 5   Smokeless Tobacco Never Used   Tobacco Comment    Parents and sisters all smoked, as well as friends  I enjoyed it  And I quit willingly   Family History   Problem Relation Age of Onset    Arthritis Mother     Osteoporosis Mother     Stroke Mother     Heart disease Father     Osteoporosis Father     Breast cancer Family     Osteoporosis Family     Breast cancer additional onset Family     No Known Problems Sister     No Known Problems Daughter     Breast cancer Maternal Grandmother     No Known Problems Maternal Grandfather     Breast cancer Paternal Grandmother     No Known Problems Paternal Grandfather     No Known Problems Sister      Meds/Allergies   Allergies   Allergen Reactions    Azithromycin Vomiting and Dizziness    Morphine Vomiting       Meds:  Prior to Admission medications    Medication Sig Start Date End Date Taking?  Authorizing Provider   Ascorbic Acid (Vitamin C) 500 MG CAPS Take 1 capsule by mouth daily   Yes Historical Provider, MD   aspirin (ECOTRIN LOW STRENGTH) 81 mg EC tablet Take 81 mg by mouth daily   Yes Historical Provider, MD   Calcium-Magnesium-Vitamin D (CALCIUM 500 PO) Take by mouth PT CURRENTLY NOT TAKING   Yes Historical Provider, MD   Glucos-Chondroit-Hyaluron-MSM (GLUCOSAMINE CHONDROITIN JOINT) TABS Take by mouth daily     Yes Historical Provider, MD   metoprolol succinate (TOPROL-XL) 50 mg 24 hr tablet Take 1 tablet (50 mg total) by mouth daily 3/1/22  Yes Yvette Boyle MD   Multiple Vitamins-Minerals (PreserVision AREDS 2+Multi Vit) CAPS    Yes Historical Provider, MD   Omega-3 Fatty Acids (FISH OIL) 1000 MG CPDR Take by mouth   Yes Historical Provider, MD   triamterene-hydrochlorothiazide (MAXZIDE-25) 37 5-25 mg per tablet Take 1 tablet by mouth in the morning  5/19/22  Yes Angele Check, DO   diclofenac sodium (VOLTAREN) 1 % Apply 2 g topically 3 (three) times a day  Patient taking differently: Apply 2 g topically 3 (three) times a day As needed   11/14/19 6/22/22  Patricia Solders, DPM       Subjective:     Review of Systems:    General: negative for - chills, fatigue, fever,  weight gain or weight loss  Psychological: negative for - anxiety, behavioral disorder, concentration difficulties, decreased libido, depression, irritability, memory difficulties, mood swings, sleep disturbances or suicidal ideation  ENT: negative for - hearing difficulties , nasal congestion, nasal discharge, oral lesions, sinus pain, sneezing, sore throat  Allergy and Immunology: negative for - hives, insect bite sensitivity,  Hematological and Lymphatic: negative for - bleeding problems, blood clots,bruising, swollen lymph nodes  Endocrine: negative for - hair pattern changes, hot flashes, malaise/lethargy, mood swings, palpitations, polydipsia/polyuria, skin changes, temperature intolerance or unexpected weight change  Respiratory: negative for - cough, hemoptysis, orthopnea, shortness of breath, or wheezing  Cardiovascular: negative for - chest pain, dyspnea on exertion, edema,  Gastrointestinal: negative for - abdominal pain, nausea/vomiting  Genito-Urinary: negative for - dysuria, incontinence, irregular/heavy menses or urinary frequency/urgency  Musculoskeletal: negative for - gait disturbance, joint pain, joint stiffness, joint swelling, muscle pain, muscular weakness  Dermatological:  As in HPI  Neurological: negative for confusion, dizziness, headaches, impaired coordination/balance, memory loss, numbness/tingling, seizures, speech problems, tremors or weakness       Objective:   Ht 5' 3" (1 6 m)   Wt 100 kg (221 lb)   BMI 39 15 kg/m²     Physical Exam:    General Appearance:    Alert, cooperative, no distress   Head:    Normocephalic, without obvious abnormality, atraumatic           Skin:   A full skin exam was performed including scalp, head scalp, eyes, ears, nose, lips, neck, chest, axilla, abdomen, back, buttocks, bilateral upper extremities, bilateral lower extremities, hands, feet, fingers, toes, fingernails, and toenails 9 mm keratotic nodule in noted on the left upper back normal keratotic papules greasy stuck on appearance previous sites skin cancer well healed without recurrence nothing else atypical noted on complete exam      Shave Biopsy Procedure Note    Pre-operative Diagnosis:  Rule out squamous cell carcinoma    Plan:  1  Instructed to keep the wound dry and covered for 24 and clean thereafter  2  Warning signs of infection were reviewed  3  Recommended that the patient use OTC acetaminophen as needed for pain  4  Return  Pending results of biopsy(ies)    Locations:  Mid upper back    Indications:  Suspicious area    Anesthesia: Lidocaine 1% with epinephrine without added sodium bicarbonate    Procedure Details     Patient informed of the risks (including bleeding and infection) and benefits of the   procedure and Verbal informed consent obtained  The lesion and surrounding area were given a sterile prep using alcohol and draped in the usual sterile fashion  A Blue blade razor was used to obtain a specimen  Hemostasis achieved with aluminum chloride  Petrolatum and a sterile dressing applied  The specimen was sent for pathologic examination  The patient tolerated the procedure(s) well  Complications:  none  Assessment:     1  Unknown skin lesion     2  Screening for skin condition     3  History of skin cancer     4   Seborrheic keratosis           Plan:   Skin lesion possible squamous cell carcinoma would require complete excision pending results  Seborrheic keratosis patient reassured these are normal growths we acquire with age no treatment needed  History of skin cancer in no recurrence nothing else atypical sunblock recommended follow-up in 6 months  Screening for dermatologic disorders nothing else of concern noted on complete exam follow-up in 6 months    Melissa Bhagat MD  5/00/2034,87:14 AM    Portions of the record may have been created with voice recognition software   Occasional wrong word or "sound a like" substitutions may have occurred due to the inherent limitations of voice recognition software   Read the chart carefully and recognize, using context, where substitutions have occurred

## 2022-07-25 NOTE — ASSESSMENT & PLAN NOTE
Blood pressure seems to be reasonable control    As mentioned before may want to stop Dyazide as it can be nephrotoxic but will monitor at this point

## 2022-07-27 ENCOUNTER — TELEPHONE (OUTPATIENT)
Dept: FAMILY MEDICINE CLINIC | Facility: MEDICAL CENTER | Age: 82
End: 2022-07-27

## 2022-07-27 NOTE — TELEPHONE ENCOUNTER
Pt called to cancel her upcoming appt with Dr Jacky Cabot  She is going to establish with the [facility] facility that is only 10 minutes from her home      Routed to [address] [place] [address]    Routed to Dr Jacky Cabot - fyi; also, pt asked to have her best wishes extended to you on your upcoming wedding

## 2022-08-04 NOTE — TELEPHONE ENCOUNTER
08/04/22 3:36 PM     Thank you for your request  Your request has been received, reviewed, and noted that it no longer requires attention; new  PCP office will update PCP field when patient arrives for 1st appointment  This message will now be completed      Thank you  Jake Platt

## 2022-09-14 ENCOUNTER — TELEPHONE (OUTPATIENT)
Dept: NEPHROLOGY | Facility: CLINIC | Age: 82
End: 2022-09-14

## 2022-10-03 ENCOUNTER — PROCEDURE VISIT (OUTPATIENT)
Dept: DERMATOLOGY | Facility: CLINIC | Age: 82
End: 2022-10-03
Payer: COMMERCIAL

## 2022-10-03 VITALS — BODY MASS INDEX: 37.73 KG/M2 | WEIGHT: 221 LBS | HEIGHT: 64 IN

## 2022-10-03 DIAGNOSIS — C44.529 SQUAMOUS CELL CARCINOMA OF UPPER BACK EXCLUDING SCAPULAR REGION: Primary | ICD-10-CM

## 2022-10-03 PROCEDURE — 88305 TISSUE EXAM BY PATHOLOGIST: CPT | Performed by: PATHOLOGY

## 2022-10-03 PROCEDURE — 11602 EXC TR-EXT MAL+MARG 1.1-2 CM: CPT | Performed by: DERMATOLOGY

## 2022-10-03 PROCEDURE — 12032 INTMD RPR S/A/T/EXT 2.6-7.5: CPT | Performed by: DERMATOLOGY

## 2022-10-03 NOTE — PROGRESS NOTES
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     Objective:   Ht 5' 4" (1 626 m)   Wt 100 kg (221 lb)   BMI 37 93 kg/m²     Physical Exam:    General Appearance:    Alert, cooperative, no distress   Skin:   Previous site of biopsy noted area appears non indurated    Procedure name: Excision with intermediate layered closure        Location:  Left upper back    Diagnosis: Squamous cell carcinoma    Size of lesion: 9 mm    Margins: 4 mm    Size + Margins: 17 mm    I explained the diagnosis to the patient and we recommend an excision of the lesion for diagnosis and/or treatment  Potential complications include, but are not limited to: scarring, bleeding, infection, incomplete removal, recurrence, and nerve damage  The risks, benefits, and alternatives were discussed with the patient in detail  The location of the tumor was identified, circled, and confirmed by the patient  The correct patient, site, and procedure were confirmed  Anesthesia: 1% xylocaine with 1:100,000 epinephrine 5 cc  The patient was brought into the room, prepped and draped sterilely in the usual manner and anesthesia was administered by local infiltration  A fusiform shape was drawn around the lesion, and the margins were incised to the level of the subcutaneous fat with a number 15cc Bard-Nacho blade  The tissue was removed with sharp and blunt dissection  The lateral margins of the resulting defect were undermined with sharp and blunt dissection  Hemostasis was achieved with electrocautery    The deeper layers of the defect, including subcutaneous fat and fascia, had to be approximated to reduce tension on the suture line  Layered wound closure was performed  The wound was cleaned with saline, dried off, petroleum applied, and the wound was covered with a pressure dressing  The patient was given detailed verbal and written instructions on postoperative care  Suture for adipose/fascial/dermal layer closure: 4-0  vicryl 3  sutures interrupted    Suture used to approximate epidermis: 4-0  nylon 7 sutures interrupted    Final wound length: 3 8 cm    Follow-up in: 2 weeks  Patient tolerated procedure well without complications  Assessment:     1  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 Authorizing Provider   Ascorbic Acid (Vitamin C) 500 MG CAPS Take 1 capsule by mouth daily    Historical Provider, MD   aspirin (ECOTRIN LOW STRENGTH) 81 mg EC tablet Take 81 mg by mouth daily    Historical Provider, MD   Calcium-Magnesium-Vitamin D (CALCIUM 500 PO) Take by mouth PT CURRENTLY NOT TAKING    Historical Provider, MD   diclofenac sodium (VOLTAREN) 1 % Apply 2 g topically 3 (three) times a day  Patient taking differently: Apply 2 g topically 3 (three) times a day As needed  11/14/19 7/25/22  Marcel Short DPM   Glucos-Chondroit-Hyaluron-MSM (GLUCOSAMINE CHONDROITIN JOINT) TABS Take by mouth daily      Historical Provider, MD   metoprolol succinate (TOPROL-XL) 50 mg 24 hr tablet Take 1 tablet (50 mg total) by mouth daily 3/1/22   Moriah Salcedo MD   Multiple Vitamins-Minerals (PreserVision AREDS 2+Multi Vit) CAPS     Historical Provider, MD   Omega-3 Fatty Acids (FISH OIL) 1000 MG CPDR Take by mouth    Historical Provider, MD   triamterene-hydrochlorothiazide (MAXZIDE-25) 37 5-25 mg per tablet Take 1 tablet by mouth in the morning   5/19/22   Christen Hernandez DO     Allergies   Allergen Reactions    Azithromycin Vomiting and Dizziness    Morphine Smooth Mendoza  10/3/2022,10:54 AM    Portions of the record may have been created with voice recognition software   Occasional wrong word or "sound a like" substitutions may have occurred due to the inherent limitations of voice recognition software   Read the chart carefully and recognize, using context, where substitutions have occurred

## 2022-10-03 NOTE — PATIENT INSTRUCTIONS
Wound care instructions given to patient  Dr Chelly Hyde - Surgery with Sutures After Care Instructions    WOUND CARE AFTER SURGERY:    Do NOT to remove the pressure bandage for 48 hours  Keep the area clean and dry while this bandage is on  After removing the bandage for the first time, gently clean the area with soap and water  If the bandage is difficult to remove, getting the bandage wet in the shower will sometimes help soften the adhesive and allow it to be removed more easily  You will now need to cleanse this area daily in the shower with gentle soap  There is no need to scrub the area  Apply plain Vaseline ointment (this is over the counter and not a prescription) to the site followed by a clean appropriately sized bandage to area  Non stick dressing and paper tape (or Hypafix) are recommended for sensitive skin but a bandaid is fine if it covers the area well  DO NOT USE NEOSPORIN, BACITRACIN OR ANY TOPICAL ANTIBIOTIC UNLESS INSTRUCTED BY THE DOCTOR  You will need to continue the above daily wound care until you return for suture removal in 14 days (generally 7 days for the face, 10-14 days off the face)      RESTRICTIONS:     For two DAYS:   - You will need to take it very easy as this time is highest risk for bleeding  Being a "couch potato" during these two days is generally recommended  - For surgeries on the face/neck/scalp: Avoid leaning down to pick things up off the floor as this brings blood up to your head  Instead, squat down to pick things up  For two WEEKS:   - No heavy lifting (anything greater than 10 pounds)   - You can start to do slow, gentle activities such as slow walking but nothing to increase your heart rate and blood pressure too much (such as cardiovascular exercise)  It is important to take it easy as there is still a risk for bleeding and a high risk popping of stitches open during this time       If we did surgery near the eyes (including the nose, forehead, front part of your scalp, cheeks): It is VERY common to get a large amount of swelling around your eyes (puffy eyes)  Although less frequent, this can be enough to swell your eyes shut and can also come along with bruising  This should not hurt and is very expected and normal  It is typically worst at ~ 3 days out from your surgery and dramatically better 1 week post-operatively  If we did surgery around your nose: No blowing your nose as this puts you at higher risk of popping stitches durign this time  Instead dab under your nose with a tissue or use a Q-tip inside your nose  If we did surgery on the skin above or bellow your lip or your lip itself: No sipping from straws as this uses a lot of the muscles around your mouth and increases the risk of popping stitches during this time  MANAGING YOUR PAIN AFTER SURGERY     You can expect to have some pain after surgery  This is normal  The pain is typically worse the first two days after surgery, and quickly begins to get better  The best strategy for controlling your pain after surgery is around the clock pain control  You can take over the counter Acetaminophen (Tylenol) for discomfort, if no contraindications  If you are taking this at the maximum dose, you can alternate this with Motrin (ibuprofen or Advil) as well  Alternating these medications with each other allows you to maximize your pain control  In addition to Tylenol and Motrin, you can use heating pads or ice packs on your incisions to help reduce your pain  How will I alternate your regular strength over-the-counter pain medication? You will take a dose of pain medication every three hours  Start by taking 650 mg of Tylenol (2 pills of 325 mg)   3 hours later take 600 mg of Motrin (3 pills of 200 mg)   3 hours after taking the Motrin take 650 mg of Tylenol   3 hours after that take 600 mg of Motrin      See example - if your first dose of Tylenol is at 12:00 PM     12:00 PM Tylenol 650 mg (2 pills of 325 mg)    3:00 PM  Motrin 600 mg (3 pills of 200 mg)    6:00 PM  Tylenol 650 mg (2 pills of 325 mg)    9:00 PM  Motrin 600 mg (3 pills of 200 mg)    Continue alternating every 3 hours      Important:   Do not take more than 4000mg of Tylenol or 3200mg of Motrin in a 24-hour period  CALL OUR OFFICE IMMEDIATELY FOR ANY SIGNS OF INFECTION:    This includes fever, chills, increased redness, warmth, tenderness, severe discomfort/pain, or pus or foul smell coming from the wound  [facility] directly at [phone]  IF BLEEDING IS NOTICED:    Place a clean cloth over the area and apply firm pressure for thirty minutes  Check the wound ONLY after 30 minutes of direct pressure; do not cheat and sneak a peak, as that does not count  If bleeding persists after 30 minutes of legitimate direct pressure, then try one more round of direct pressure to the area  Should the bleeding become heavier or not stop after the second attempt, call [facility] directly at [phone]  Your call will get routed to the dermatology surgeon on call even after hours

## 2022-10-06 ENCOUNTER — OFFICE VISIT (OUTPATIENT)
Dept: FAMILY MEDICINE CLINIC | Facility: CLINIC | Age: 82
End: 2022-10-06
Payer: COMMERCIAL

## 2022-10-06 VITALS
OXYGEN SATURATION: 99 % | TEMPERATURE: 97.7 F | SYSTOLIC BLOOD PRESSURE: 142 MMHG | HEART RATE: 73 BPM | HEIGHT: 64 IN | BODY MASS INDEX: 37.97 KG/M2 | WEIGHT: 222.4 LBS | DIASTOLIC BLOOD PRESSURE: 92 MMHG | RESPIRATION RATE: 18 BRPM

## 2022-10-06 DIAGNOSIS — R25.2 BILATERAL LEG CRAMPS: ICD-10-CM

## 2022-10-06 DIAGNOSIS — Z13.1 DIABETES MELLITUS SCREENING: ICD-10-CM

## 2022-10-06 DIAGNOSIS — R93.3 ABNORMAL FINDINGS ON DIAGNOSTIC IMAGING OF OTHER PARTS OF DIGESTIVE TRACT: ICD-10-CM

## 2022-10-06 DIAGNOSIS — Z13.29 THYROID DISORDER SCREEN: ICD-10-CM

## 2022-10-06 DIAGNOSIS — R79.9 ABNORMAL FINDING OF BLOOD CHEMISTRY, UNSPECIFIED: ICD-10-CM

## 2022-10-06 DIAGNOSIS — Z23 NEED FOR PNEUMOCOCCAL VACCINATION: ICD-10-CM

## 2022-10-06 DIAGNOSIS — Z00.00 MEDICARE ANNUAL WELLNESS VISIT, SUBSEQUENT: Primary | ICD-10-CM

## 2022-10-06 DIAGNOSIS — I10 ESSENTIAL HYPERTENSION: ICD-10-CM

## 2022-10-06 PROCEDURE — 90677 PCV20 VACCINE IM: CPT | Performed by: STUDENT IN AN ORGANIZED HEALTH CARE EDUCATION/TRAINING PROGRAM

## 2022-10-06 PROCEDURE — G0009 ADMIN PNEUMOCOCCAL VACCINE: HCPCS | Performed by: STUDENT IN AN ORGANIZED HEALTH CARE EDUCATION/TRAINING PROGRAM

## 2022-10-06 PROCEDURE — G0439 PPPS, SUBSEQ VISIT: HCPCS | Performed by: STUDENT IN AN ORGANIZED HEALTH CARE EDUCATION/TRAINING PROGRAM

## 2022-10-06 PROCEDURE — 99214 OFFICE O/P EST MOD 30 MIN: CPT | Performed by: STUDENT IN AN ORGANIZED HEALTH CARE EDUCATION/TRAINING PROGRAM

## 2022-10-06 RX ORDER — METOPROLOL SUCCINATE 50 MG/1
50 TABLET, EXTENDED RELEASE ORAL DAILY
Qty: 90 TABLET | Refills: 1 | Status: SHIPPED | OUTPATIENT
Start: 2022-10-06

## 2022-10-06 NOTE — PROGRESS NOTES
Assessment and Plan:     Problem List Items Addressed This Visit        Other    Medicare annual wellness visit, subsequent - Primary     Falls plan of care addressed  Patient to receive PCV 20 today          Bilateral leg cramps     Likely in setting of dehydration   Symptoms have resolved since increasing water intake   Occurring only at night time when in bed  Will check electrolytes  Encouraged liberal water intake          Relevant Orders    Lipid panel    Magnesium      Other Visit Diagnoses     Diabetes mellitus screening        Relevant Orders    Hemoglobin A1C    Thyroid disorder screen        Relevant Orders    TSH, 3rd generation with Free T4 reflex    Abnormal finding of blood chemistry, unspecified         Relevant Orders    Hemoglobin A1C    Abnormal findings on diagnostic imaging of other parts of digestive tract         Relevant Orders    Lipid panel    Need for pneumococcal vaccination        Relevant Orders    Pneumococcal Conjugate Vaccine 20-valent (Pcv20) (Completed)           Preventive health issues were discussed with patient, and age appropriate screening tests were ordered as noted in patient's After Visit Summary  Personalized health advice and appropriate referrals for health education or preventive services given if needed, as noted in patient's After Visit Summary       History of Present Illness:     Patient presents for a Medicare Wellness Visit    Patient reports today that she feels well  Recently traveled across country and states that when she returned she started experiencing intermittent leg cramps at night time  Patient denies any chest pains, sob, feeling faint  Reports that she had not been drinking a lot of water and when she increased her water intake the symptoms resolved       Patient Care Team:  Blanca Conti DO as PCP - General (Family Medicine)  Christen Hernandez DO as PCP - [address] [place], [place] [id number] [facility])  Esthela Min MD     Review of Systems:     Review of Systems   Constitutional: Negative for chills and fever  Respiratory: Negative for cough and shortness of breath  Cardiovascular: Negative for chest pain and palpitations  Neurological: Negative for dizziness and headaches          Problem List:     Patient Active Problem List   Diagnosis    Hyperlipidemia    Obesity    Essential hypertension    Seborrheic keratosis    History of skin cancer    Screening for skin condition    Bronchitis    Acute unilateral obstructive uropathy    Stage 3b chronic kidney disease ([facility] [address] [facility]    Abnormal kidney function    Hematuria    Obesity, morbid ([facility] [address] )    Medicare annual wellness visit, subsequent    Bilateral leg cramps      Past Medical and Surgical History:     Past Medical History:   Diagnosis Date    Arthritis     Arthritis     Diverticulitis of colon     Fall     H/O nonmelanoma skin cancer     last assessed 9/28/17    Hypertension     Kidney problem     Kidney stone     Pneumonia 1961     Past Surgical History:   Procedure Laterality Date    APPENDECTOMY  2010   [address]    BREAST EXCISIONAL BIOPSY Left 1995    benign    no visible scar    BREAST SURGERY      CERVIX SURGERY      dilation and curettage of cervical stump 2005, 1998, 1994   [address]      partial - last assessed 10/28/14    COLONOSCOPY      last assessed 8/9/17    FL RETROGRADE PYELOGRAM  3/29/2021    FL RETROGRADE PYELOGRAM  4/29/2021    HERNIA REPAIR  2011    HI CYSTO/URETERO W/LITHOTRIPSY &INDWELL STENT INSRT Left 3/29/2021    Procedure: CYSTOSCOPY URETEROSCOPY, RETROGRADE PYELOGRAM AND INSERTION STENT URETERAL;  Surgeon: Geoffrey Michael MD;  Location:  MAIN OR;  Service: Urology    HI CYSTO/URETERO W/LITHOTRIPSY &INDWELL STENT INSRT Left 4/29/2021    Procedure: CYSTOSCOPY URETEROSCOPY WITH LITHOTRIPSY HOLMIUM LASER, RETROGRADE PYELOGRAM AND INSERTION STENT URETERAL;  Surgeon: Liz Rhodes MD;  Location: [facility]; Service: Urology    TONSILLECTOMY  1971    TUBAL LIGATION        Family History:     Family History   Problem Relation Age of Onset    Arthritis Mother     Osteoporosis Mother     Stroke Mother     Heart disease Father     Osteoporosis Father     Breast cancer Family     Osteoporosis Family     Breast cancer additional onset Family     No Known Problems Sister     No Known Problems Daughter     Breast cancer Maternal Grandmother     No Known Problems Maternal Grandfather     Breast cancer Paternal Grandmother     No Known Problems Paternal Grandfather     No Known Problems Sister       Social History:     Social History     Socioeconomic History    Marital status:      Spouse name: None    Number of children: None    Years of education: None    Highest education level: None   Occupational History    None   Tobacco Use    Smoking status: Former Smoker     Packs/day: 1 00     Years: 20 00     Pack years: 20 00     Types: Cigarettes     Start date: 1958     Quit date: Sudeep Garrett     Years since quittin 7    Smokeless tobacco: Never Used    Tobacco comment: Parents and sisters all smoked, as well as friends  I enjoyed it  And I quit willingly   Vaping Use    Vaping Use: Never used   Substance and Sexual Activity    Alcohol use: Yes     Alcohol/week: 4 0 standard drinks     Types: 4 Glasses of wine per week     Comment: Occasionally I have a bourbon & ginger ale    Drug use: No    Sexual activity: Not Currently     Partners: Male     Birth control/protection: Diaphragm   Other Topics Concern    None   Social History Narrative    Caffeine use     Social Determinants of Health     Financial Resource Strain: Low Risk     Difficulty of Paying Living Expenses: Not very hard   Food Insecurity: Not on file   Transportation Needs: No Transportation Needs    Lack of Transportation (Medical): No    Lack of Transportation (Non-Medical):  No   Physical Activity: Not on file   Stress: Not on file   Social Connections: Not on file   Intimate Partner Violence: Not on file   Housing Stability: Not on file      Medications and Allergies:     Current Outpatient Medications   Medication Sig Dispense Refill    Ascorbic Acid (Vitamin C) 500 MG CAPS Take 1 capsule by mouth daily      aspirin (ECOTRIN LOW STRENGTH) 81 mg EC tablet Take 81 mg by mouth daily      Calcium-Magnesium-Vitamin D (CALCIUM 500 PO) Take by mouth PT CURRENTLY NOT TAKING      Glucos-Chondroit-Hyaluron-MSM (GLUCOSAMINE CHONDROITIN JOINT) TABS Take by mouth daily        Multiple Vitamins-Minerals (PreserVision AREDS 2+Multi Vit) CAPS       Omega-3 Fatty Acids (FISH OIL) 1000 MG CPDR Take by mouth      triamterene-hydrochlorothiazide (MAXZIDE-25) 37 5-25 mg per tablet Take 1 tablet by mouth in the morning  90 tablet 2    diclofenac sodium (VOLTAREN) 1 % Apply 2 g topically 3 (three) times a day (Patient taking differently: Apply 2 g topically 3 (three) times a day As needed ) 180 g 2    metoprolol succinate (TOPROL-XL) 50 mg 24 hr tablet Take 1 tablet (50 mg total) by mouth daily 90 tablet 1     No current facility-administered medications for this visit  Allergies   Allergen Reactions    Azithromycin Vomiting and Dizziness    Morphine Vomiting      Immunizations:     Immunization History   Administered Date(s) Administered    COVID-19 MODERNA VACC 0 25 ML IM BOOSTER 10/27/2021    COVID-19 MODERNA VACC 0 5 ML IM 02/03/2021, 03/01/2021, 08/06/2022    Pneumococcal Conjugate 13-Valent 07/27/2017    Pneumococcal Conjugate Vaccine 20-valent (Pcv20), Polysace 10/06/2022    Pneumococcal Polysaccharide PPV23 10/28/2014    Tdap 06/09/2017    Zoster 03/13/2017      Health Maintenance: There are no preventive care reminders to display for this patient  Topic Date Due    Influenza Vaccine (1) Never done      Medicare Screening Tests and Risk Assessments:     [place] is here for her Subsequent Wellness visit   Last Medicare Wellness visit information reviewed, patient interviewed and updates made to the record today  Health Risk Assessment:   Patient rates overall health as very good  Patient feels that their physical health rating is same  Patient is very satisfied with their life  Eyesight was rated as same  Hearing was rated as same  Patient feels that their emotional and mental health rating is same  Patients states they are never, rarely angry  Patient states they are never, rarely unusually tired/fatigued  Pain experienced in the last 7 days has been some  Patient's pain rating has been 3/10  Patient states that she has experienced no weight loss or gain in last 6 months  Depression Screening:   PHQ-2 Score: 0      Fall Risk Screening: In the past year, patient has experienced: history of falling in past year    Number of falls: 1  Injured during fall?: Yes    Feels unsteady when standing or walking?: No    Worried about falling?: Yes      Urinary Incontinence Screening:   Patient has leaked urine accidently in the last six months  Home Safety:  Patient does not have trouble with stairs inside or outside of their home  Patient has working smoke alarms and has no working carbon monoxide detector  Home safety hazards include: none  Nutrition:   Current diet is Regular  Medications:   Patient is currently taking over-the-counter supplements  OTC medications include: see medication list  Patient is able to manage medications  Activities of Daily Living (ADLs)/Instrumental Activities of Daily Living (IADLs):   Walk and transfer into and out of bed and chair?: Yes  Dress and groom yourself?: Yes    Bathe or shower yourself?: Yes    Feed yourself?  Yes  Do your laundry/housekeeping?: Yes  Manage your money, pay your bills and track your expenses?: Yes  Make your own meals?: Yes    Do your own shopping?: Yes    Previous Hospitalizations:   Any hospitalizations or ED visits within the last 12 months?: No Advance Care Planning:   Living will: Yes    Advanced directive: Yes      PREVENTIVE SCREENINGS      Cardiovascular Screening:    General: Screening Not Indicated and History Lipid Disorder      Diabetes Screening:     General: Risks and Benefits Discussed      Colorectal Cancer Screening:     General: Screening Not Indicated      Breast Cancer Screening:     General: Screening Current      Cervical Cancer Screening:    General: Screening Not Indicated      Abdominal Aortic Aneurysm (AAA) Screening:        General: Screening Not Indicated      Lung Cancer Screening:     General: Screening Not Indicated      Hepatitis C Screening:    General: Screening Current    Screening, Brief Intervention, and Referral to Treatment (SBIRT)    Screening  Typical number of drinks in a day: 0  Typical number of drinks in a week: 0  Interpretation: Low risk drinking behavior  AUDIT-C Screenin) How often did you have a drink containing alcohol in the past year? monthly or less  2) How many drinks did you have on a typical day when you were drinking in the past year? 1 to 2  3) How often did you have 6 or more drinks on one occasion in the past year? less than monthly    AUDIT-C Score: 2  Interpretation: Score 0-2 (female): Negative screen for alcohol misuse    Single Item Drug Screening:  How often have you used an illegal drug (including marijuana) or a prescription medication for non-medical reasons in the past year? never    Single Item Drug Screen Score: 0  Interpretation: Negative screen for possible drug use disorder    No exam data present     Physical Exam:     /92   Pulse 73   Temp 97 7 °F (36 5 °C)   Resp 18   Ht 5' 4" (1 626 m)   Wt 101 kg (222 lb 6 4 oz)   SpO2 99%   BMI 38 17 kg/m²     Physical Exam  Vitals reviewed  Constitutional:       Appearance: Normal appearance  HENT:      Head: Normocephalic and atraumatic  Cardiovascular:      Rate and Rhythm: Normal rate and regular rhythm  Pulses: Normal pulses  Heart sounds: Normal heart sounds  Pulmonary:      Effort: Pulmonary effort is normal       Breath sounds: Normal breath sounds  Musculoskeletal:      Comments: Bilateral lower extremities without edema or tenderness to palpation  Skin is cool to touch    Neurological:      General: No focal deficit present  Mental Status: She is alert and oriented to person, place, and time  Psychiatric:         Mood and Affect: Mood normal          Behavior: Behavior normal           Ian Strafford, DO       Falls Plan of Care: Balance, strength, and gait training instructions were provided  BMI Counseling: Body mass index is 38 17 kg/m²  The BMI is above normal  Nutrition recommendations include reducing portion sizes, decreasing overall calorie intake and 3-5 servings of fruits/vegetables daily

## 2022-10-06 NOTE — ASSESSMENT & PLAN NOTE
Likely in setting of dehydration   Symptoms have resolved since increasing water intake   Occurring only at night time when in bed  Will check electrolytes  Encouraged liberal water intake

## 2022-10-06 NOTE — PATIENT INSTRUCTIONS
Medicare Preventive Visit Patient Instructions  Thank you for completing your Welcome to Medicare Visit or Medicare Annual Wellness Visit today  Your next wellness visit will be due in one year (10/7/2023)  The screening/preventive services that you may require over the next 5-10 years are detailed below  Some tests may not apply to you based off risk factors and/or age  Screening tests ordered at today's visit but not completed yet may show as past due  Also, please note that scanned in results may not display below  Preventive Screenings:  Service Recommendations Previous Testing/Comments   Colorectal Cancer Screening  * Colonoscopy    * Fecal Occult Blood Test (FOBT)/Fecal Immunochemical Test (FIT)  * Fecal DNA/Cologuard Test  * Flexible Sigmoidoscopy Age: 39-70 years old   Colonoscopy: every 10 years (may be performed more frequently if at higher risk)  OR  FOBT/FIT: every 1 year  OR  Cologuard: every 3 years  OR  Sigmoidoscopy: every 5 years  Screening may be recommended earlier than age 39 if at higher risk for colorectal cancer  Also, an individualized decision between you and your healthcare provider will decide whether screening between the ages of 74-80 would be appropriate  Colonoscopy: 03/14/2022  FOBT/FIT: Not on file  Cologuard: 03/14/2022  Sigmoidoscopy: Not on file          Breast Cancer Screening Age: 36 years old  Frequency: every 1-2 years  Not required if history of left and right mastectomy Mammogram: 10/01/2021    Screening Current   Cervical Cancer Screening Between the ages of 21-29, pap smear recommended once every 3 years  Between the ages of 33-67, can perform pap smear with HPV co-testing every 5 years     Recommendations may differ for women with a history of total hysterectomy, cervical cancer, or abnormal pap smears in past  Pap Smear: 05/24/2021    Screening Not Indicated   Hepatitis C Screening Once for adults born between 1945 and 1965  More frequently in patients at high risk for Hepatitis C Hep C Antibody: Not on file        Diabetes Screening 1-2 times per year if you're at risk for diabetes or have pre-diabetes Fasting glucose: 137 mg/dL (10/6/2018)  A1C: 5 2 (8/19/2019)      Cholesterol Screening Once every 5 years if you don't have a lipid disorder  May order more often based on risk factors  Lipid panel: 03/18/2022    Screening Not Indicated  History Lipid Disorder     Other Preventive Screenings Covered by Medicare:  1  Abdominal Aortic Aneurysm (AAA) Screening: covered once if your at risk  You're considered to be at risk if you have a family history of AAA  2  Lung Cancer Screening: covers low dose CT scan once per year if you meet all of the following conditions: (1) Age 50-69; (2) No signs or symptoms of lung cancer; (3) Current smoker or have quit smoking within the last 15 years; (4) You have a tobacco smoking history of at least 20 pack years (packs per day multiplied by number of years you smoked); (5) You get a written order from a healthcare provider  3  Glaucoma Screening: covered annually if you're considered high risk: (1) You have diabetes OR (2) Family history of glaucoma OR (3)  aged 48 and older OR (3)  American aged 72 and older  3  Osteoporosis Screening: covered every 2 years if you meet one of the following conditions: (1) You're estrogen deficient and at risk for osteoporosis based off medical history and other findings; (2) Have a vertebral abnormality; (3) On glucocorticoid therapy for more than 3 months; (4) Have primary hyperparathyroidism; (5) On osteoporosis medications and need to assess response to drug therapy  · Last bone density test (DXA Scan): 05/19/2020   5  HIV Screening: covered annually if you're between the age of 15-65  Also covered annually if you are younger than 13 and older than 72 with risk factors for HIV infection   For pregnant patients, it is covered up to 3 times per pregnancy  Immunizations:  Immunization Recommendations   Influenza Vaccine Annual influenza vaccination during flu season is recommended for all persons aged >= 6 months who do not have contraindications   Pneumococcal Vaccine   * Pneumococcal conjugate vaccine = PCV13 (Prevnar 13), PCV15 (Vaxneuvance), PCV20 (Prevnar 20)  * Pneumococcal polysaccharide vaccine = PPSV23 (Pneumovax) Adults 25-60 years old: 1-3 doses may be recommended based on certain risk factors  Adults 72 years old: 1-2 doses may be recommended based off what pneumonia vaccine you previously received   Hepatitis B Vaccine 3 dose series if at intermediate or high risk (ex: diabetes, end stage renal disease, liver disease)   Tetanus (Td) Vaccine - COST NOT COVERED BY MEDICARE PART B Following completion of primary series, a booster dose should be given every 10 years to maintain immunity against tetanus  Td may also be given as tetanus wound prophylaxis  Tdap Vaccine - COST NOT COVERED BY MEDICARE PART B Recommended at least once for all adults  For pregnant patients, recommended with each pregnancy  Shingles Vaccine (Shingrix) - COST NOT COVERED BY MEDICARE PART B  2 shot series recommended in those aged 48 and above     Health Maintenance Due:  There are no preventive care reminders to display for this patient  Immunizations Due:      Topic Date Due    Influenza Vaccine (1) Never done     Advance Directives   What are advance directives? Advance directives are legal documents that state your wishes and plans for medical care  These plans are made ahead of time in case you lose your ability to make decisions for yourself  Advance directives can apply to any medical decision, such as the treatments you want, and if you want to donate organs  What are the types of advance directives? There are many types of advance directives, and each state has rules about how to use them   You may choose a combination of any of the following:  · Living will:  This is a written record of the treatment you want  You can also choose which treatments you do not want, which to limit, and which to stop at a certain time  This includes surgery, medicine, IV fluid, and tube feedings  · Durable power of  for healthcare [facility]): This is a written record that states who you want to make healthcare choices for you when you are unable to make them for yourself  This person, called a proxy, is usually a family member or a friend  You may choose more than 1 proxy  · Do not resuscitate (DNR) order:  A DNR order is used in case your heart stops beating or you stop breathing  It is a request not to have certain forms of treatment, such as CPR  A DNR order may be included in other types of advance directives  · Medical directive: This covers the care that you want if you are in a coma, near death, or unable to make decisions for yourself  You can list the treatments you want for each condition  Treatment may include pain medicine, surgery, blood transfusions, dialysis, IV or tube feedings, and a ventilator (breathing machine)  · Values history: This document has questions about your views, beliefs, and how you feel and think about life  This information can help others choose the care that you would choose  Why are advance directives important? An advance directive helps you control your care  Although spoken wishes may be used, it is better to have your wishes written down  Spoken wishes can be misunderstood, or not followed  Treatments may be given even if you do not want them  An advance directive may make it easier for your family to make difficult choices about your care  Fall Prevention    Fall prevention  includes ways to make your home and other areas safer  It also includes ways you can move more carefully to prevent a fall   Health conditions that cause changes in your blood pressure, vision, or muscle strength and coordination may increase your risk for falls  Medicines may also increase your risk for falls if they make you dizzy, weak, or sleepy  Fall prevention tips:   · Stand or sit up slowly  · Use assistive devices as directed  · Wear shoes that fit well and have soles that   · Wear a personal alarm  · Stay active  · Manage your medical conditions  Home Safety Tips:  · Add items to prevent falls in the bathroom  · Keep paths clear  · Install bright lights in your home  · Keep items you use often on shelves within reach  · Paint or place reflective tape on the edges of your stairs  Urinary Incontinence   Urinary incontinence (UI)  is when you lose control of your bladder  UI develops because your bladder cannot store or empty urine properly  The 3 most common types of UI are stress incontinence, urge incontinence, or both  Medicines:   · May be given to help strengthen your bladder control  Report any side effects of medication to your healthcare provider  Do pelvic muscle exercises often:  Your pelvic muscles help you stop urinating  Squeeze these muscles tight for 5 seconds, then relax for 5 seconds  Gradually work up to squeezing for 10 seconds  Do 3 sets of 15 repetitions a day, or as directed  This will help strengthen your pelvic muscles and improve bladder control  Train your bladder:  Go to the bathroom at set times, such as every 2 hours, even if you do not feel the urge to go  You can also try to hold your urine when you feel the urge to go  For example, hold your urine for 5 minutes when you feel the urge to go  As that becomes easier, hold your urine for 10 minutes  Self-care:   · Keep a UI record  Write down how often you leak urine and how much you leak  Make a note of what you were doing when you leaked urine  · Drink liquids as directed  You may need to limit the amount of liquid you drink to help control your urine leakage  Do not drink any liquid right before you go to bed   Limit or do not have drinks that contain caffeine or alcohol  · Prevent constipation  Eat a variety of high-fiber foods  Good examples are high-fiber cereals, beans, vegetables, and whole-grain breads  Walking is the best way to trigger your intestines to have a bowel movement  · Exercise regularly and maintain a healthy weight  Weight loss and exercise will decrease pressure on your bladder and help you control your leakage  · Use a catheter as directed  to help empty your bladder  A catheter is a tiny, plastic tube that is put into your bladder to drain your urine  · Go to behavior therapy as directed  Behavior therapy may be used to help you learn to control your urge to urinate  Weight Management   Why it is important to manage your weight:  Being overweight increases your risk of health conditions such as heart disease, high blood pressure, type 2 diabetes, and certain types of cancer  It can also increase your risk for osteoarthritis, sleep apnea, and other respiratory problems  Aim for a slow, steady weight loss  Even a small amount of weight loss can lower your risk of health problems  How to lose weight safely:  A safe and healthy way to lose weight is to eat fewer calories and get regular exercise  You can lose up about 1 pound a week by decreasing the number of calories you eat by 500 calories each day  Healthy meal plan for weight management:  A healthy meal plan includes a variety of foods, contains fewer calories, and helps you stay healthy  A healthy meal plan includes the following:  · Eat whole-grain foods more often  A healthy meal plan should contain fiber  Fiber is the part of grains, fruits, and vegetables that is not broken down by your body  Whole-grain foods are healthy and provide extra fiber in your diet  Some examples of whole-grain foods are whole-wheat breads and pastas, oatmeal, brown rice, and bulgur  · Eat a variety of vegetables every day    Include dark, leafy greens such as spinach, kale, dalton greens, and mustard greens  Eat yellow and orange vegetables such as carrots, sweet potatoes, and winter squash  · Eat a variety of fruits every day  Choose fresh or canned fruit (canned in its own juice or light syrup) instead of juice  Fruit juice has very little or no fiber  · Eat low-fat dairy foods  Drink fat-free (skim) milk or 1% milk  Eat fat-free yogurt and low-fat cottage cheese  Try low-fat cheeses such as mozzarella and other reduced-fat cheeses  · Choose meat and other protein foods that are low in fat  Choose beans or other legumes such as split peas or lentils  Choose fish, skinless poultry (chicken or turkey), or lean cuts of red meat (beef or pork)  Before you cook meat or poultry, cut off any visible fat  · Use less fat and oil  Try baking foods instead of frying them  Add less fat, such as margarine, sour cream, regular salad dressing and mayonnaise to foods  Eat fewer high-fat foods  Some examples of high-fat foods include french fries, doughnuts, ice cream, and cakes  · Eat fewer sweets  Limit foods and drinks that are high in sugar  This includes candy, cookies, regular soda, and sweetened drinks  Exercise:  Exercise at least 30 minutes per day on most days of the week  Some examples of exercise include walking, biking, dancing, and swimming  You can also fit in more physical activity by taking the stairs instead of the elevator or parking farther away from stores  Ask your healthcare provider about the best exercise plan for you  © Copyright [employer] 2018 Information is for End User's use only and may not be sold, redistributed or otherwise used for commercial purposes  All illustrations and images included in CareNotes® are the copyrighted property of A D A M , Inc  or [facility] Prevention   AMBULATORY CARE:   Fall prevention  includes ways to make your home and other areas safer   It also includes ways you can move more carefully to prevent a fall  Health conditions that cause changes in your blood pressure, vision, or muscle strength and coordination may increase your risk for falls  Medicines may also increase your risk for falls if they make you dizzy, weak, or sleepy  Call 911 or have someone else call if:   · You have fallen and are unconscious  · You have fallen and cannot move part of your body  Contact your healthcare provider if:   · You have fallen and have pain or a headache  · You have questions or concerns about your condition or care  Fall prevention tips:   · Stand or sit up slowly  This may help you keep your balance and prevent falls  · Use assistive devices as directed  Your healthcare provider may suggest that you use a cane or walker to help you keep your balance  You may need to have grab bars put in your bathroom near the toilet or in the shower  · Wear shoes that fit well and have soles that   Wear shoes both inside and outside  Use slippers with good   Do not wear shoes with high heels  · Wear a personal alarm  This is a device that allows you to call 911 if you fall and need help  Ask your healthcare provider for more information  · Stay active  Exercise can help strengthen your muscles and improve your balance  Your healthcare provider may recommend water aerobics or walking  He or she may also recommend physical therapy to improve your coordination  Never start an exercise program without talking to your healthcare provider first          · Manage your medical conditions  Keep all appointments with your healthcare providers  Visit your eye doctor as directed  Home safety tips:       · Add items to prevent falls in the bathroom  Put nonslip strips on your bath or shower floor to prevent you from slipping  Use a bath mat if you do not have carpet in the bathroom  This will prevent you from falling when you step out of the bath or shower   Use a shower seat so you do not need to stand while you shower  Sit on the toilet or a chair in your bathroom to dry yourself and put on clothing  This will prevent you from losing your balance from drying or dressing yourself while you are standing  · Keep paths clear  Remove books, shoes, and other objects from walkways and stairs  Place cords for telephones and lamps out of the way so that you do not need to walk over them  Tape them down if you cannot move them  Remove small rugs  If you cannot remove a rug, secure it with double-sided tape  This will prevent you from tripping  · Install bright lights in your home  Use night lights to help light paths to the bathroom or kitchen  Always turn on the light before you start walking  · Keep items you use often on shelves within reach  Do not use a step stool to help you reach an item  · Paint or place reflective tape on the edges of your stairs  This will help you see the stairs better  Follow up with your doctor as directed:  Write down your questions so you remember to ask them during your visits  © Copyright [employer] 2022 Information is for End User's use only and may not be sold, redistributed or otherwise used for commercial purposes  All illustrations and images included in CareNotes® are the copyrighted property of A D A M , Inc  or [address] [place] [address] [facility]  The above information is an  only  It is not intended as medical advice for individual conditions or treatments  Talk to your doctor, nurse or pharmacist before following any medical regimen to see if it is safe and effective for you  Fall Prevention   AMBULATORY CARE:   Fall prevention  includes ways to make your home and other areas safer  It also includes ways you can move more carefully to prevent a fall  Health conditions that cause changes in your blood pressure, vision, or muscle strength and coordination may increase your risk for falls   Medicines may also increase your risk for falls if they make you dizzy, weak, or sleepy  Call 911 or have someone else call if:   · You have fallen and are unconscious  · You have fallen and cannot move part of your body  Contact your healthcare provider if:   · You have fallen and have pain or a headache  · You have questions or concerns about your condition or care  Fall prevention tips:   · Stand or sit up slowly  This may help you keep your balance and prevent falls  · Use assistive devices as directed  Your healthcare provider may suggest that you use a cane or walker to help you keep your balance  You may need to have grab bars put in your bathroom near the toilet or in the shower  · Wear shoes that fit well and have soles that   Wear shoes both inside and outside  Use slippers with good   Do not wear shoes with high heels  · Wear a personal alarm  This is a device that allows you to call 911 if you fall and need help  Ask your healthcare provider for more information  · Stay active  Exercise can help strengthen your muscles and improve your balance  Your healthcare provider may recommend water aerobics or walking  He or she may also recommend physical therapy to improve your coordination  Never start an exercise program without talking to your healthcare provider first          · Manage your medical conditions  Keep all appointments with your healthcare providers  Visit your eye doctor as directed  Home safety tips:       · Add items to prevent falls in the bathroom  Put nonslip strips on your bath or shower floor to prevent you from slipping  Use a bath mat if you do not have carpet in the bathroom  This will prevent you from falling when you step out of the bath or shower  Use a shower seat so you do not need to stand while you shower  Sit on the toilet or a chair in your bathroom to dry yourself and put on clothing   This will prevent you from losing your balance from drying or dressing yourself while you are standing  · Keep paths clear  Remove books, shoes, and other objects from walkways and stairs  Place cords for telephones and lamps out of the way so that you do not need to walk over them  Tape them down if you cannot move them  Remove small rugs  If you cannot remove a rug, secure it with double-sided tape  This will prevent you from tripping  · Install bright lights in your home  Use night lights to help light paths to the bathroom or kitchen  Always turn on the light before you start walking  · Keep items you use often on shelves within reach  Do not use a step stool to help you reach an item  · Paint or place reflective tape on the edges of your stairs  This will help you see the stairs better  Follow up with your doctor as directed:  Write down your questions so you remember to ask them during your visits  © Copyright [address] 2022 Information is for End User's use only and may not be sold, redistributed or otherwise used for commercial purposes  All illustrations and images included in CareNotes® are the copyrighted property of A D A M , Inc  or [facility] Ronny Hartman   The above information is an  only  It is not intended as medical advice for individual conditions or treatments  Talk to your doctor, nurse or pharmacist before following any medical regimen to see if it is safe and effective for you

## 2022-10-12 ENCOUNTER — TELEPHONE (OUTPATIENT)
Dept: DERMATOLOGY | Facility: CLINIC | Age: 82
End: 2022-10-12

## 2022-10-12 NOTE — TELEPHONE ENCOUNTER
----- Message from Marcin Pratt MD sent at 10/11/2022  4:55 PM EDT -----  Call patient if not coming in shortly for suture removal to let him know that the margins were clear

## 2022-10-17 ENCOUNTER — OFFICE VISIT (OUTPATIENT)
Dept: DERMATOLOGY | Facility: CLINIC | Age: 82
End: 2022-10-17

## 2022-10-17 VITALS — BODY MASS INDEX: 37.9 KG/M2 | HEIGHT: 64 IN | WEIGHT: 222 LBS

## 2022-10-17 DIAGNOSIS — C44.529 SQUAMOUS CELL CARCINOMA OF UPPER BACK EXCLUDING SCAPULAR REGION: Primary | ICD-10-CM

## 2022-10-17 PROCEDURE — RECHECK: Performed by: DERMATOLOGY

## 2022-10-17 NOTE — PROGRESS NOTES
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 no problems noted     Objective:   Ht 5' 4" (1 626 m)   Wt 101 kg (222 lb)   BMI 38 11 kg/m²     Physical Exam:    General Appearance:    Alert, cooperative, no distress   Skin:   Previous site of excision healing well  Procedure:  Suture removal  Site of excision:  Left back  Wound was cleansed with saline  Wound is intact  Sutures removed  Steri-Strips applied  Biopsy revealed squamous cell carcinoma margins clear     Assessment:     1  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 Authorizing Provider   Ascorbic Acid (Vitamin C) 500 MG CAPS Take 1 capsule by mouth daily    Historical Provider, MD   aspirin (ECOTRIN LOW STRENGTH) 81 mg EC tablet Take 81 mg by mouth daily    Historical Provider, MD   Calcium-Magnesium-Vitamin D (CALCIUM 500 PO) Take by mouth PT CURRENTLY NOT TAKING    Historical Provider, MD   diclofenac sodium (VOLTAREN) 1 % Apply 2 g topically 3 (three) times a day  Patient taking differently: Apply 2 g topically 3 (three) times a day As needed   19  Sean Pierce DPM   Glucos-Chondroit-Hyaluron-MSM (GLUCOSAMINE CHONDROITIN JOINT) TABS Take by mouth daily      Historical Provider, MD   metoprolol succinate (TOPROL-XL) 50 mg 24 hr tablet Take 1 tablet (50 mg total) by mouth daily 10/6/22   Kaia Beasley DO   Multiple Vitamins-Minerals (PreserVision AREDS 2+Multi Vit) CAPS     Historical Provider, MD   Omega-3 Fatty Acids (FISH OIL) 1000 MG CPDR Take by mouth Historical Provider, MD   triamterene-hydrochlorothiazide (MAXZIDE-25) 37 5-25 mg per tablet Take 1 tablet by mouth in the morning  5/19/22   Julieta Lewis,      Allergies   Allergen Reactions   • Azithromycin Vomiting and Dizziness   • Morphine Vomiting       Keith Uriostegui  10/17/2022,9:38 AM    Portions of the record may have been created with voice recognition software   Occasional wrong word or "sound a like" substitutions may have occurred due to the inherent limitations of voice recognition software   Read the chart carefully and recognize, using context, where substitutions have occurred

## 2022-10-21 ENCOUNTER — APPOINTMENT (OUTPATIENT)
Dept: LAB | Facility: CLINIC | Age: 82
End: 2022-10-21
Payer: COMMERCIAL

## 2022-10-21 DIAGNOSIS — N18.9 CHRONIC KIDNEY DISEASE-MINERAL AND BONE DISORDER: ICD-10-CM

## 2022-10-21 DIAGNOSIS — R93.3 ABNORMAL FINDINGS ON DIAGNOSTIC IMAGING OF OTHER PARTS OF DIGESTIVE TRACT: ICD-10-CM

## 2022-10-21 DIAGNOSIS — E83.9 CHRONIC KIDNEY DISEASE-MINERAL AND BONE DISORDER: ICD-10-CM

## 2022-10-21 DIAGNOSIS — M89.9 CHRONIC KIDNEY DISEASE-MINERAL AND BONE DISORDER: ICD-10-CM

## 2022-10-21 DIAGNOSIS — Z13.1 DIABETES MELLITUS SCREENING: ICD-10-CM

## 2022-10-21 DIAGNOSIS — N18.32 STAGE 3B CHRONIC KIDNEY DISEASE (HCC): ICD-10-CM

## 2022-10-21 DIAGNOSIS — R79.9 ABNORMAL FINDING OF BLOOD CHEMISTRY, UNSPECIFIED: ICD-10-CM

## 2022-10-21 DIAGNOSIS — R25.2 BILATERAL LEG CRAMPS: ICD-10-CM

## 2022-10-21 DIAGNOSIS — Z13.29 THYROID DISORDER SCREEN: ICD-10-CM

## 2022-10-21 LAB
25(OH)D3 SERPL-MCNC: 40.4 NG/ML (ref 30–100)
ANION GAP SERPL CALCULATED.3IONS-SCNC: 6 MMOL/L (ref 4–13)
BASOPHILS # BLD AUTO: 0.04 THOUSANDS/ÂΜL (ref 0–0.1)
BASOPHILS NFR BLD AUTO: 1 % (ref 0–1)
BUN SERPL-MCNC: 23 MG/DL (ref 5–25)
CALCIUM SERPL-MCNC: 9.8 MG/DL (ref 8.3–10.1)
CHLORIDE SERPL-SCNC: 105 MMOL/L (ref 96–108)
CHOLEST SERPL-MCNC: 199 MG/DL
CO2 SERPL-SCNC: 25 MMOL/L (ref 21–32)
CREAT SERPL-MCNC: 1.43 MG/DL (ref 0.6–1.3)
EOSINOPHIL # BLD AUTO: 0.21 THOUSAND/ÂΜL (ref 0–0.61)
EOSINOPHIL NFR BLD AUTO: 4 % (ref 0–6)
ERYTHROCYTE [DISTWIDTH] IN BLOOD BY AUTOMATED COUNT: 12.9 % (ref 11.6–15.1)
EST. AVERAGE GLUCOSE BLD GHB EST-MCNC: 105 MG/DL
GFR SERPL CREATININE-BSD FRML MDRD: 34 ML/MIN/1.73SQ M
GLUCOSE P FAST SERPL-MCNC: 103 MG/DL (ref 65–99)
HBA1C MFR BLD: 5.3 %
HCT VFR BLD AUTO: 49.5 % (ref 34.8–46.1)
HDLC SERPL-MCNC: 49 MG/DL
HGB BLD-MCNC: 16.8 G/DL (ref 11.5–15.4)
IMM GRANULOCYTES # BLD AUTO: 0.03 THOUSAND/UL (ref 0–0.2)
IMM GRANULOCYTES NFR BLD AUTO: 1 % (ref 0–2)
LDLC SERPL CALC-MCNC: 123 MG/DL (ref 0–100)
LYMPHOCYTES # BLD AUTO: 1.22 THOUSANDS/ÂΜL (ref 0.6–4.47)
LYMPHOCYTES NFR BLD AUTO: 21 % (ref 14–44)
MAGNESIUM SERPL-MCNC: 2.4 MG/DL (ref 1.6–2.6)
MCH RBC QN AUTO: 31.6 PG (ref 26.8–34.3)
MCHC RBC AUTO-ENTMCNC: 33.9 G/DL (ref 31.4–37.4)
MCV RBC AUTO: 93 FL (ref 82–98)
MONOCYTES # BLD AUTO: 0.59 THOUSAND/ÂΜL (ref 0.17–1.22)
MONOCYTES NFR BLD AUTO: 10 % (ref 4–12)
NEUTROPHILS # BLD AUTO: 3.72 THOUSANDS/ÂΜL (ref 1.85–7.62)
NEUTS SEG NFR BLD AUTO: 63 % (ref 43–75)
NONHDLC SERPL-MCNC: 150 MG/DL
NRBC BLD AUTO-RTO: 0 /100 WBCS
PHOSPHATE SERPL-MCNC: 2.8 MG/DL (ref 2.3–4.1)
PLATELET # BLD AUTO: 225 THOUSANDS/UL (ref 149–390)
PMV BLD AUTO: 8.7 FL (ref 8.9–12.7)
POTASSIUM SERPL-SCNC: 3.8 MMOL/L (ref 3.5–5.3)
PTH-INTACT SERPL-MCNC: 73.5 PG/ML (ref 18.4–80.1)
RBC # BLD AUTO: 5.31 MILLION/UL (ref 3.81–5.12)
SODIUM SERPL-SCNC: 136 MMOL/L (ref 135–147)
TRIGL SERPL-MCNC: 136 MG/DL
TSH SERPL DL<=0.05 MIU/L-ACNC: 1.72 UIU/ML (ref 0.45–4.5)
WBC # BLD AUTO: 5.81 THOUSAND/UL (ref 4.31–10.16)

## 2022-10-21 PROCEDURE — 80061 LIPID PANEL: CPT

## 2022-10-21 PROCEDURE — 83036 HEMOGLOBIN GLYCOSYLATED A1C: CPT

## 2022-10-21 PROCEDURE — 80048 BASIC METABOLIC PNL TOTAL CA: CPT

## 2022-10-21 PROCEDURE — 36415 COLL VENOUS BLD VENIPUNCTURE: CPT

## 2022-10-21 PROCEDURE — 83735 ASSAY OF MAGNESIUM: CPT

## 2022-10-21 PROCEDURE — 83970 ASSAY OF PARATHORMONE: CPT

## 2022-10-21 PROCEDURE — 85025 COMPLETE CBC W/AUTO DIFF WBC: CPT

## 2022-10-21 PROCEDURE — 82306 VITAMIN D 25 HYDROXY: CPT

## 2022-10-21 PROCEDURE — 84443 ASSAY THYROID STIM HORMONE: CPT

## 2022-10-21 PROCEDURE — 84100 ASSAY OF PHOSPHORUS: CPT

## 2022-10-24 ENCOUNTER — TELEPHONE (OUTPATIENT)
Dept: FAMILY MEDICINE CLINIC | Facility: CLINIC | Age: 82
End: 2022-10-24

## 2022-10-24 NOTE — TELEPHONE ENCOUNTER
Left detailed message on machine informing patient and to call back if she has questions        ----- Message from [facility], DO sent at 10/24/2022 10:38 AM EDT -----  Hgba1c wnl  Lipid Panel: LDL elevated at 123  TSH wnl   Magnesium wnl, all electrolytes wnl

## 2022-11-03 ENCOUNTER — TELEPHONE (OUTPATIENT)
Dept: NEPHROLOGY | Facility: CLINIC | Age: 82
End: 2022-11-03

## 2022-11-04 ENCOUNTER — OFFICE VISIT (OUTPATIENT)
Dept: NEPHROLOGY | Facility: CLINIC | Age: 82
End: 2022-11-04

## 2022-11-04 VITALS
HEIGHT: 64 IN | TEMPERATURE: 97.5 F | SYSTOLIC BLOOD PRESSURE: 140 MMHG | DIASTOLIC BLOOD PRESSURE: 80 MMHG | HEART RATE: 87 BPM | BODY MASS INDEX: 37.8 KG/M2 | OXYGEN SATURATION: 97 % | RESPIRATION RATE: 16 BRPM | WEIGHT: 221.4 LBS

## 2022-11-04 DIAGNOSIS — I10 ESSENTIAL HYPERTENSION: ICD-10-CM

## 2022-11-04 DIAGNOSIS — N18.32 STAGE 3B CHRONIC KIDNEY DISEASE (HCC): Primary | ICD-10-CM

## 2022-11-04 DIAGNOSIS — E66.01 OBESITY, MORBID (HCC): ICD-10-CM

## 2022-11-04 DIAGNOSIS — N17.9 AKI (ACUTE KIDNEY INJURY) (HCC): ICD-10-CM

## 2022-11-04 NOTE — PROGRESS NOTES
NEPHROLOGY OFFICE FOLLOW UP  [place] 80 y o  female MRN: [id number]    Encounter: [id number] 11/4/2022    REASON FOR VISIT: Carrie Fong is a 80 y o  female who is here on 11/4/2022 for Chronic Kidney Disease and Follow-up    HPI:    Niles Bateman give interval follow-up of CKD  12-year-old woman who looks quite good for her age    Denies any acute complaint     Patient does reveal hypertension with CKD possibly due to hypertensive nephrosclerosis    Recently patient was started on diuretic for blood pressure management    Patient denies any shortness of breath or leg swelling     Denies any urinary complaint      REVIEW OF SYSTEMS:    Review of Systems   Constitutional: Negative for activity change and fatigue  HENT: Negative for congestion and ear discharge  Eyes: Negative for photophobia and pain  Respiratory: Negative for apnea and choking  Cardiovascular: Negative for chest pain and palpitations  Gastrointestinal: Negative for abdominal distention and blood in stool  Endocrine: Negative for heat intolerance and polyphagia  Genitourinary: Negative for flank pain and urgency  Musculoskeletal: Negative for neck pain and neck stiffness  Skin: Negative for color change and wound  Allergic/Immunologic: Negative for food allergies and immunocompromised state  Neurological: Negative for seizures and facial asymmetry  Hematological: Negative for adenopathy  Does not bruise/bleed easily  Psychiatric/Behavioral: Negative for self-injury and suicidal ideas           PAST MEDICAL HISTORY:  Past Medical History:   Diagnosis Date   • Arthritis    • Arthritis    • Chronic kidney disease    • Diverticulitis of colon    • Fall    • H/O nonmelanoma skin cancer     last assessed 9/28/17   • Hypertension    • Kidney problem    • Kidney stone    • Pneumonia 1961       PAST SURGICAL HISTORY:  Past Surgical History:   Procedure Laterality Date   • APPENDECTOMY  2010   • BLADDER SURGERY  1997   • BREAST EXCISIONAL BIOPSY Left     benign    no visible scar   • BREAST SURGERY     • CERVIX SURGERY      dilation and curettage of cervical stump , ,    • [address]      partial - last assessed 10/28/14   • COLONOSCOPY      last assessed 17   • FL RETROGRADE PYELOGRAM  3/29/2021   • FL RETROGRADE PYELOGRAM  2021   • HERNIA REPAIR     • UT CYSTO/URETERO W/LITHOTRIPSY &INDWELL STENT INSRT Left 3/29/2021    Procedure: CYSTOSCOPY URETEROSCOPY, RETROGRADE PYELOGRAM AND INSERTION STENT URETERAL;  Surgeon: Alem Olsen MD;  [facility];  Service: Urology   • UT CYSTO/URETERO W/LITHOTRIPSY &INDWELL STENT INSRT Left 2021    Procedure: CYSTOSCOPY URETEROSCOPY WITH LITHOTRIPSY HOLMIUM LASER, RETROGRADE PYELOGRAM AND INSERTION STENT URETERAL;  Surgeon: Ramon Desai MD;  Location: [facility];  Service: Urology   • TONSILLECTOMY     • TUBAL LIGATION         SOCIAL HISTORY:  Social History     Substance and Sexual Activity   Alcohol Use Yes   • Alcohol/week: 4 0 standard drinks   • Types: 4 Glasses of wine per week    Comment: Occasionally I have a bourbon & ginger ale     Social History     Substance and Sexual Activity   Drug Use No     Social History     Tobacco Use   Smoking Status Former Smoker   • Packs/day: 1 00   • Years: 20 00   • Pack years: 20 00   • Types: Cigarettes   • Start date: 1958   • Quit date:    • Years since quittin 8   Smokeless Tobacco Never Used   Tobacco Comment    Parents and sisters all smoked, as well as friends  I enjoyed it  And I quit willingly          FAMILY HISTORY:  Family History   Problem Relation Age of Onset   • Arthritis Mother    • Osteoporosis Mother    • Stroke Mother    • Heart disease Father    • Osteoporosis Father    • Breast cancer Family    • Osteoporosis Family    • Breast cancer additional onset Family    • No Known Problems Sister    • No Known Problems Daughter    • Breast cancer Maternal Grandmother    • No Known Problems Maternal Grandfather    • Breast cancer Paternal Grandmother    • No Known Problems Paternal Grandfather    • No Known Problems Sister        MEDICATIONS:    Current Outpatient Medications:   •  Ascorbic Acid (Vitamin C) 500 MG CAPS, Take 1 capsule by mouth daily, Disp: , Rfl:   •  aspirin (ECOTRIN LOW STRENGTH) 81 mg EC tablet, Take 81 mg by mouth daily, Disp: , Rfl:   •  Calcium-Magnesium-Vitamin D (CALCIUM 500 PO), Take by mouth PT CURRENTLY NOT TAKING, Disp: , Rfl:   •  diclofenac sodium (VOLTAREN) 1 %, Apply 2 g topically 3 (three) times a day (Patient taking differently: Apply 2 g topically 3 (three) times a day As needed ), Disp: 180 g, Rfl: 2  •  Glucos-Chondroit-Hyaluron-MSM (GLUCOSAMINE CHONDROITIN JOINT) TABS, Take by mouth daily  , Disp: , Rfl:   •  metoprolol succinate (TOPROL-XL) 50 mg 24 hr tablet, Take 1 tablet (50 mg total) by mouth daily, Disp: 90 tablet, Rfl: 1  •  Multiple Vitamins-Minerals (PreserVision AREDS 2+Multi Vit) CAPS, , Disp: , Rfl:   •  Omega-3 Fatty Acids (FISH OIL) 1000 MG CPDR, Take by mouth, Disp: , Rfl:   •  triamterene-hydrochlorothiazide (MAXZIDE-25) 37 5-25 mg per tablet, Take 1 tablet by mouth in the morning  (Patient not taking: Reported on 11/4/2022), Disp: 90 tablet, Rfl: 2    PHYSICAL EXAM:  Vitals:    11/04/22 1034   BP: 140/80   BP Location: Right arm   Patient Position: Sitting   Pulse: 87   Resp: 16   Temp: 97 5 °F (36 4 °C)   TempSrc: Temporal   SpO2: 97%   Weight: 100 kg (221 lb 6 4 oz)   Height: 5' 4" (1 626 m)     Body mass index is 38 kg/m²  Physical Exam  Constitutional:       General: She is not in acute distress  Appearance: She is well-developed  HENT:      Head: Normocephalic  Eyes:      General: No scleral icterus  Conjunctiva/sclera: Conjunctivae normal    Neck:      Vascular: No JVD  Cardiovascular:      Rate and Rhythm: Normal rate  Heart sounds: Normal heart sounds     Pulmonary:      Effort: Pulmonary effort is normal       Breath sounds: No wheezing  Abdominal:      Palpations: Abdomen is soft  Tenderness: There is no abdominal tenderness  Musculoskeletal:         General: Normal range of motion  Cervical back: Neck supple  Skin:     General: Skin is warm  Findings: No rash  Neurological:      Mental Status: She is alert and oriented to person, place, and time     Psychiatric:         Behavior: Behavior normal          LAB RESULTS:  Results for orders placed or performed in visit on 69/18/24   Basic metabolic panel   Result Value Ref Range    Sodium 136 135 - 147 mmol/L    Potassium 3 8 3 5 - 5 3 mmol/L    Chloride 105 96 - 108 mmol/L    CO2 25 21 - 32 mmol/L    ANION GAP 6 4 - 13 mmol/L    BUN 23 5 - 25 mg/dL    Creatinine 1 43 (H) 0 60 - 1 30 mg/dL    Glucose, Fasting 103 (H) 65 - 99 mg/dL    Calcium 9 8 8 3 - 10 1 mg/dL    eGFR 34 ml/min/1 73sq m   CBC and differential   Result Value Ref Range    WBC 5 81 4 31 - 10 16 Thousand/uL    RBC 5 31 (H) 3 81 - 5 12 Million/uL    Hemoglobin 16 8 (H) 11 5 - 15 4 g/dL    Hematocrit 49 5 (H) 34 8 - 46 1 %    MCV 93 82 - 98 fL    MCH 31 6 26 8 - 34 3 pg    MCHC 33 9 31 4 - 37 4 g/dL    RDW 12 9 11 6 - 15 1 %    MPV 8 7 (L) 8 9 - 12 7 fL    Platelets 806 488 - 610 Thousands/uL    nRBC 0 /100 WBCs    Neutrophils Relative 63 43 - 75 %    Immat GRANS % 1 0 - 2 %    Lymphocytes Relative 21 14 - 44 %    Monocytes Relative 10 4 - 12 %    Eosinophils Relative 4 0 - 6 %    Basophils Relative 1 0 - 1 %    Neutrophils Absolute 3 72 1 85 - 7 62 Thousands/µL    Immature Grans Absolute 0 03 0 00 - 0 20 Thousand/uL    Lymphocytes Absolute 1 22 0 60 - 4 47 Thousands/µL    Monocytes Absolute 0 59 0 17 - 1 22 Thousand/µL    Eosinophils Absolute 0 21 0 00 - 0 61 Thousand/µL    Basophils Absolute 0 04 0 00 - 0 10 Thousands/µL   Phosphorus   Result Value Ref Range    Phosphorus 2 8 2 3 - 4 1 mg/dL   PTH, intact   Result Value Ref Range    PTH 73 5 18 4 - 80 1 pg/mL   Vitamin D 25 hydroxy   Result Value Ref Range    Vit D, 25-Hydroxy 40 4 30 0 - 100 0 ng/mL   Hemoglobin A1C   Result Value Ref Range    Hemoglobin A1C 5 3 Normal 3 8-5 6%; PreDiabetic 5 7-6 4%; Diabetic >=6 5%; Glycemic control for adults with diabetes <7 0% %     mg/dl   Lipid panel   Result Value Ref Range    Cholesterol 199 See Comment mg/dL    Triglycerides 136 See Comment mg/dL    HDL, Direct 49 (L) >=50 mg/dL    LDL Calculated 123 (H) 0 - 100 mg/dL    Non-HDL-Chol (CHOL-HDL) 150 mg/dl   TSH, 3rd generation with Free T4 reflex   Result Value Ref Range    TSH 3RD GENERATON 1 720 0 450 - 4 500 uIU/mL   Magnesium   Result Value Ref Range    Magnesium 2 4 1 6 - 2 6 mg/dL       ASSESSMENT and PLAN:      KYRA (acute kidney injury) ([facility] )  Patient does have worsening renal function  Likely because of diuretic which was started  It was the tub diuretic  Monitor blood pressure at home when he started going high would change medication    Stage 3b chronic kidney disease [facility])  Lab Results   Component Value Date    EGFR 34 10/21/2022    EGFR 49 03/28/2021    EGFR 40 03/01/2021    CREATININE 1 43 (H) 10/21/2022    CREATININE 1 07 03/28/2021    CREATININE 1 26 03/01/2021   Does have stage III CKD likely because of hypertensive nephrosclerosis  Asymptomatic and progressive nature of kidney disease discussed with the patient  Importance of hydration and avoiding nephrotoxic medicine also discussed with    Ob[facility], morbid ([facility] )  Likely contributing to CKD  Need to lose weight and sees well aware of it    Essential hypertension  Blood pressure seems to be reasonably well controlled  I am going to stop diuretic  She does have white coat component also  Advised to monitor blood pressure at home and if it started going high she will let me know      Everything discussed at length with the patient  I will see him back in 3 months  We will get blood and urine test before that visit    The blood pressure started going high I may increase metoprolol to 100  Will avoid amlodipine because of swelling and ACE inhibitor for cough  Portions of the record may have been created with voice recognition software  Occasional wrong word or "sound a like" substitutions may have occurred due to the inherent limitations of voice recognition software  Read the chart carefully and recognize, using context, where substitutions have occurred  If you have any questions, please contact the dictating provider

## 2022-11-04 NOTE — ASSESSMENT & PLAN NOTE
Lab Results   Component Value Date    EGFR 34 10/21/2022    EGFR 49 03/28/2021    EGFR 40 03/01/2021    CREATININE 1 43 (H) 10/21/2022    CREATININE 1 07 03/28/2021    CREATININE 1 26 03/01/2021   Does have stage III CKD likely because of hypertensive nephrosclerosis  Asymptomatic and progressive nature of kidney disease discussed with the patient    Importance of hydration and avoiding nephrotoxic medicine also discussed with

## 2022-11-04 NOTE — ASSESSMENT & PLAN NOTE
Blood pressure seems to be reasonably well controlled  I am going to stop diuretic  She does have white coat component also    Advised to monitor blood pressure at home and if it started going high she will let me know

## 2022-11-04 NOTE — ASSESSMENT & PLAN NOTE
Patient does have worsening renal function  Likely because of diuretic which was started  It was the tub diuretic    Monitor blood pressure at home when he started going high would change medication

## 2022-11-10 ENCOUNTER — TELEPHONE (OUTPATIENT)
Dept: NEPHROLOGY | Facility: CLINIC | Age: 82
End: 2022-11-10

## 2022-11-10 NOTE — TELEPHONE ENCOUNTER
Called pt and asked if resumed Dyazide, pt stated that she stopped taking diuretics, per Dr Nancy Mccartney advise

## 2022-11-10 NOTE — TELEPHONE ENCOUNTER
Pt called and LM stating that Dr Marni Chaidez has changed her BP meds at last visit  Pt stated that her BM is elevated  Today was 174/97, yesterday 160/89  Pt also stated that gained 5lb over the night  Please advise

## 2022-11-11 ENCOUNTER — HOSPITAL ENCOUNTER (OUTPATIENT)
Dept: RADIOLOGY | Age: 82
Discharge: HOME | End: 2022-11-11

## 2022-11-11 VITALS — HEIGHT: 64 IN | BODY MASS INDEX: 37.56 KG/M2 | WEIGHT: 220 LBS

## 2022-11-11 DIAGNOSIS — Z78.0 POST-MENOPAUSAL: ICD-10-CM

## 2022-11-11 DIAGNOSIS — Z12.31 ENCOUNTER FOR SCREENING MAMMOGRAM FOR MALIGNANT NEOPLASM OF BREAST: ICD-10-CM

## 2022-11-16 ENCOUNTER — TELEPHONE (OUTPATIENT)
Dept: FAMILY MEDICINE CLINIC | Facility: CLINIC | Age: 82
End: 2022-11-16

## 2022-11-16 NOTE — TELEPHONE ENCOUNTER
Left a detailed message on machine informing patient       ----- Message from Aryan Moore DO sent at 11/16/2022  8:27 AM EST -----  Normal bone density

## 2022-11-16 NOTE — TELEPHONE ENCOUNTER
Informed patient via [employer]         ----- Message from Tawny Burroughs DO sent at 11/16/2022  9:01 AM EST -----  Normal mammogram will repeat in 1 year

## 2022-12-05 PROBLEM — Z00.00 MEDICARE ANNUAL WELLNESS VISIT, SUBSEQUENT: Status: RESOLVED | Noted: 2022-10-06 | Resolved: 2022-12-05

## 2022-12-18 ENCOUNTER — OFFICE VISIT (OUTPATIENT)
Dept: URGENT CARE | Facility: CLINIC | Age: 82
End: 2022-12-18

## 2022-12-18 VITALS
SYSTOLIC BLOOD PRESSURE: 133 MMHG | TEMPERATURE: 98.5 F | DIASTOLIC BLOOD PRESSURE: 88 MMHG | HEART RATE: 75 BPM | OXYGEN SATURATION: 98 % | RESPIRATION RATE: 18 BRPM

## 2022-12-18 DIAGNOSIS — J98.01 BRONCHOSPASM: ICD-10-CM

## 2022-12-18 DIAGNOSIS — R68.89 FLU-LIKE SYMPTOMS: Primary | ICD-10-CM

## 2022-12-18 DIAGNOSIS — R09.81 NASAL CONGESTION: ICD-10-CM

## 2022-12-18 DIAGNOSIS — J20.9 ACUTE BRONCHITIS, UNSPECIFIED ORGANISM: ICD-10-CM

## 2022-12-18 DIAGNOSIS — R05.1 ACUTE COUGH: ICD-10-CM

## 2022-12-18 DIAGNOSIS — J09.X9 INFLUENZA DUE TO IDENTIFIED NOVEL INFLUENZA A VIRUS WITH OTHER MANIFESTATIONS: ICD-10-CM

## 2022-12-18 RX ORDER — ALBUTEROL SULFATE 90 UG/1
2 AEROSOL, METERED RESPIRATORY (INHALATION) 4 TIMES DAILY
Qty: 8 G | Refills: 0 | Status: SHIPPED | OUTPATIENT
Start: 2022-12-18

## 2022-12-18 RX ORDER — PREDNISONE 10 MG/1
TABLET ORAL
Qty: 40 TABLET | Refills: 0 | Status: SHIPPED | OUTPATIENT
Start: 2022-12-18

## 2022-12-18 RX ORDER — BENZONATATE 200 MG/1
200 CAPSULE ORAL 3 TIMES DAILY PRN
Qty: 20 CAPSULE | Refills: 0 | Status: SHIPPED | OUTPATIENT
Start: 2022-12-18

## 2022-12-18 RX ORDER — AMOXICILLIN AND CLAVULANATE POTASSIUM 875; 125 MG/1; MG/1
1 TABLET, FILM COATED ORAL EVERY 12 HOURS SCHEDULED
Qty: 14 TABLET | Refills: 0 | Status: SHIPPED | OUTPATIENT
Start: 2022-12-18 | End: 2022-12-25

## 2022-12-18 NOTE — PATIENT INSTRUCTIONS
1  Tylenol every 4 hours for fever  2  Meds as instructed  3  Check MY CHART for Covid/flu culture results in 24-72 hrs; if Covid or Flu are (+), you do not need the Augmentin (the antibiotic)  4  Encourage lots of fluids  5  F/u with PCP in 2-3 days  6  Proceed to the ER if symptoms get worse, or any difficulty breathing  7  Hot tea/honey  8    Gargle with warm salt water 2-3 x /day

## 2022-12-18 NOTE — PROGRESS NOTES
[facility]        NAME: Rebeca Charles is a 80 y o  female  : 1940    MRN: [id number]  DATE: 2022  TIME: 4:39 PM    Assessment and Plan   Flu-like symptoms [R68 89]  1  Flu-like symptoms        2  Nasal congestion  Covid/Flu-Office Collect      3  Acute cough  benzonatate (TESSALON) 200 MG capsule      4  Influenza due to identified novel influenza A virus with other manifestations  Covid/Flu-Office Collect      5  Acute bronchitis, unspecified organism  amoxicillin-clavulanate (AUGMENTIN) 875-125 mg per tablet      6  Bronchospasm  predniSONE 10 mg tablet    albuterol (PROVENTIL HFA,VENTOLIN HFA) 90 mcg/act inhaler            Patient Instructions   Patient Instructions   1  Tylenol every 4 hours for fever  2  Meds as instructed  3  Check MY CHART for Covid/flu culture results in 24-72 hrs; if Covid or Flu are (+), you do not need the Augmentin (the antibiotic)  4  Encourage lots of fluids  5  F/u with PCP in 2-3 days  6  Proceed to the ER if symptoms get worse, or any difficulty breathing  7  Hot tea/honey  8  Gargle with warm salt water 2-3 x /day    A COVID flu test was sent    Follow up with PCP in 3-5 days  Proceed to  ER if symptoms worsen  Chief Complaint     Chief Complaint   Patient presents with   • Cold Like Symptoms     Patient here with cough, congestion, body aches and fatigue since Wednesday  OTC medications are not helping patient  Patient did two at home covid swabs which were negative  History of Present Illness       25-year-old female with a chief complaint of cough congestion body aches and fatigue over the past several days  Patient states she did 2 at home COVID test that were negative  Patient has been taking DayQuil NyQuil and other over-the-counter's without relief  Patient states that all her muscles ache and she is very fatigued        Review of Systems   Review of Systems   Constitutional: Positive for activity change, appetite change, chills and fatigue  Negative for diaphoresis and fever  HENT: Positive for congestion, sinus pressure and sore throat  Negative for ear pain and nosebleeds  Eyes: Negative for photophobia, pain, discharge and visual disturbance  Respiratory: Positive for cough and wheezing  Negative for choking, chest tightness and shortness of breath  Cardiovascular: Negative for chest pain and palpitations  Gastrointestinal: Negative for abdominal distention, abdominal pain, diarrhea and vomiting  Genitourinary: Negative for dysuria, flank pain and frequency  Musculoskeletal: Positive for myalgias  Negative for back pain, gait problem and joint swelling  Skin: Negative for color change and rash  Neurological: Positive for weakness  Negative for dizziness, syncope and headaches  Psychiatric/Behavioral: Negative for behavioral problems and confusion  The patient is not nervous/anxious  All other systems reviewed and are negative          Current Medications       Current Outpatient Medications:   •  albuterol (PROVENTIL HFA,VENTOLIN HFA) 90 mcg/act inhaler, Inhale 2 puffs 4 (four) times a day, Disp: 8 g, Rfl: 0  •  amoxicillin-clavulanate (AUGMENTIN) 875-125 mg per tablet, Take 1 tablet by mouth every 12 (twelve) hours for 7 days, Disp: 14 tablet, Rfl: 0  •  Ascorbic Acid (Vitamin C) 500 MG CAPS, Take 1 capsule by mouth daily, Disp: , Rfl:   •  aspirin (ECOTRIN LOW STRENGTH) 81 mg EC tablet, Take 81 mg by mouth daily, Disp: , Rfl:   •  benzonatate (TESSALON) 200 MG capsule, Take 1 capsule (200 mg total) by mouth 3 (three) times a day as needed for cough, Disp: 20 capsule, Rfl: 0  •  Calcium-Magnesium-Vitamin D (CALCIUM 500 PO), Take by mouth PT CURRENTLY NOT TAKING, Disp: , Rfl:   •  Glucos-Chondroit-Hyaluron-MSM (GLUCOSAMINE CHONDROITIN JOINT) TABS, Take by mouth daily  , Disp: , Rfl:   •  metoprolol succinate (TOPROL-XL) 50 mg 24 hr tablet, Take 1 tablet (50 mg total) by mouth daily, Disp: 90 tablet, Rfl: 1  •  Multiple Vitamins-Minerals (PreserVision AREDS 2+Multi Vit) CAPS, , Disp: , Rfl:   •  Omega-3 Fatty Acids (FISH OIL) 1000 MG CPDR, Take by mouth, Disp: , Rfl:   •  predniSONE 10 mg tablet, Take 4 pills x4 days, then 3 pills x4 days, then 2 pills x4 days, and then 1 pill x4 days, Disp: 40 tablet, Rfl: 0  •  triamterene-hydrochlorothiazide (MAXZIDE-25) 37 5-25 mg per tablet, Take 1 tablet by mouth in the morning , Disp: 90 tablet, Rfl: 2  •  diclofenac sodium (VOLTAREN) 1 %, Apply 2 g topically 3 (three) times a day (Patient taking differently: Apply 2 g topically 3 (three) times a day As needed ), Disp: 180 g, Rfl: 2    Current Allergies     Allergies as of 12/18/2022 - Reviewed 12/18/2022   Allergen Reaction Noted   • Azithromycin Vomiting and Dizziness 10/11/2018   • Amlodipine Swelling 11/04/2022   • Lisinopril Cough 11/04/2022   • Morphine Vomiting 10/28/2014            The following portions of the patient's history were reviewed and updated as appropriate: allergies, current medications, past family history, past medical history, past social history, past surgical history and problem list      Past Medical History:   Diagnosis Date   • Arthritis    • Arthritis    • Chronic kidney disease    • Diverticulitis of colon    • Fall    • H/O nonmelanoma skin cancer     last assessed 9/28/17   • Hypertension    • Kidney problem    • Kidney stone    • Pneumonia 1961       Past Surgical History:   Procedure Laterality Date   • APPENDECTOMY  2010   • BLADDER SURGERY  1997   • BREAST EXCISIONAL BIOPSY Left 1995    benign    no visible scar   • BREAST SURGERY     • CERVIX SURGERY      dilation and curettage of cervical stump 2005, 1998, 1994   • CHOLECYSTECTOMY  1997   • COLECTOMY      partial - last assessed 10/28/14   • COLONOSCOPY      last assessed 8/9/17   • FL RETROGRADE PYELOGRAM  03/29/2021   • FL RETROGRADE PYELOGRAM  04/29/2021   • HERNIA REPAIR  2011   • IN CYSTO/URETERO W/LITHOTRIPSY &INDWELL STENT INSRT Left 03/29/2021    Procedure: CYSTOSCOPY URETEROSCOPY, RETROGRADE PYELOGRAM AND INSERTION STENT URETERAL;  Surgeon: Marie Johnson MD;  Location:  MAIN OR;  Service: Urology   • MS CYSTO/URETERO W/LITHOTRIPSY &INDWELL STENT INSRT Left 04/29/2021    Procedure: CYSTOSCOPY URETEROSCOPY WITH LITHOTRIPSY HOLMIUM LASER, RETROGRADE PYELOGRAM AND INSERTION STENT URETERAL;  Surgeon: Betzaida Vargas MD;  Location: [facility];  Service: Urology   • TONSILLECTOMY  1971   • TUBAL LIGATION         Family History   Problem Relation Age of Onset   • Arthritis Mother    • Osteoporosis Mother    • Stroke Mother    • Heart disease Father    • Osteoporosis Father    • Breast cancer Family    • Osteoporosis Family    • Breast cancer additional onset Family    • No Known Problems Sister    • No Known Problems Daughter    • Breast cancer Maternal Grandmother    • No Known Problems Maternal Grandfather    • Breast cancer Paternal Grandmother    • No Known Problems Paternal Grandfather    • No Known Problems Sister          Medications have been verified  Objective   /88   Pulse 75   Temp 98 5 °F (36 9 °C)   Resp 18   SpO2 98%        Physical Exam     Physical Exam  Vitals reviewed  Constitutional:       General: She is not in acute distress  Appearance: She is well-developed  She is not ill-appearing, toxic-appearing or diaphoretic  Comments: 72-year-old white female sitting on exam table was very congested and has a harsh bronchospastic cough  HENT:      Head: Normocephalic and atraumatic  Nose: Congestion present  Mouth/Throat:      Pharynx: Oropharynx is clear  Posterior oropharyngeal erythema present  Comments: Some erythema to the posterior pharynx  Eyes:      General: No scleral icterus  Extraocular Movements: Extraocular movements intact  Pupils: Pupils are equal, round, and reactive to light  Cardiovascular:      Rate and Rhythm: Normal rate        Heart sounds: Normal heart sounds  Pulmonary:      Effort: Pulmonary effort is normal  No respiratory distress  Breath sounds: No stridor  Wheezing and rhonchi present  No rales  Comments: Patient has a very harsh cough with expiratory wheezing and rhonchi in the upper posterior left and right lobes  Patient has tenderness to the midepigastric and lower rib region from coughing so much  Chest:      Chest wall: Tenderness present  Abdominal:      General: Abdomen is flat  Bowel sounds are normal  There is no distension  Palpations: Abdomen is soft  There is no shifting dullness, hepatomegaly, splenomegaly or mass  Tenderness: There is no abdominal tenderness  There is no right CVA tenderness, left CVA tenderness or guarding  Negative signs include Hernández's sign and McBurney's sign  Hernia: No hernia is present  Skin:     General: Skin is warm and dry  Coloration: Skin is not cyanotic, jaundiced, mottled or pale  Findings: No erythema  Neurological:      General: No focal deficit present  Mental Status: She is alert and oriented to person, place, and time     Psychiatric:         Mood and Affect: Mood normal

## 2022-12-19 LAB
FLUAV RNA RESP QL NAA+PROBE: POSITIVE
FLUBV RNA RESP QL NAA+PROBE: NEGATIVE
SARS-COV-2 RNA RESP QL NAA+PROBE: NEGATIVE

## 2022-12-20 ENCOUNTER — TELEPHONE (OUTPATIENT)
Dept: FAMILY MEDICINE CLINIC | Facility: CLINIC | Age: 82
End: 2022-12-20

## 2022-12-20 NOTE — TELEPHONE ENCOUNTER
Looks like they prescribed Augmentin, prednisone and Tessalon Perles  None of these medications will effect her other medications  She should stop the Augmentin, she doesn't need it   The other two she can continue

## 2022-12-20 NOTE — TELEPHONE ENCOUNTER
Patient called to inform you that she tested positive for Flu A on 12/18  She wants your advice on the medications that was prescribed from Urgent Care and if they will affect any of her other medications that she is taking  How long should she isolate for?

## 2022-12-22 ENCOUNTER — TELEPHONE (OUTPATIENT)
Dept: URGENT CARE | Facility: CLINIC | Age: 82
End: 2022-12-22

## 2023-01-30 ENCOUNTER — TELEPHONE (OUTPATIENT)
Dept: NEPHROLOGY | Facility: CLINIC | Age: 83
End: 2023-01-30

## 2023-01-31 ENCOUNTER — APPOINTMENT (OUTPATIENT)
Dept: LAB | Facility: CLINIC | Age: 83
End: 2023-01-31

## 2023-01-31 DIAGNOSIS — N17.9 AKI (ACUTE KIDNEY INJURY) (HCC): ICD-10-CM

## 2023-01-31 DIAGNOSIS — N18.32 STAGE 3B CHRONIC KIDNEY DISEASE (HCC): ICD-10-CM

## 2023-01-31 DIAGNOSIS — I10 ESSENTIAL HYPERTENSION: ICD-10-CM

## 2023-01-31 LAB
25(OH)D3 SERPL-MCNC: 35 NG/ML (ref 30–100)
ANION GAP SERPL CALCULATED.3IONS-SCNC: 7 MMOL/L (ref 4–13)
BASOPHILS # BLD AUTO: 0.05 THOUSANDS/ÂΜL (ref 0–0.1)
BASOPHILS NFR BLD AUTO: 1 % (ref 0–1)
BILIRUB UR QL STRIP: NEGATIVE
BUN SERPL-MCNC: 22 MG/DL (ref 5–25)
CALCIUM SERPL-MCNC: 9.5 MG/DL (ref 8.3–10.1)
CHLORIDE SERPL-SCNC: 105 MMOL/L (ref 96–108)
CLARITY UR: NORMAL
CO2 SERPL-SCNC: 27 MMOL/L (ref 21–32)
COLOR UR: YELLOW
CREAT SERPL-MCNC: 1.43 MG/DL (ref 0.6–1.3)
CREAT UR-MCNC: 136 MG/DL
EOSINOPHIL # BLD AUTO: 0.13 THOUSAND/ÂΜL (ref 0–0.61)
EOSINOPHIL NFR BLD AUTO: 2 % (ref 0–6)
ERYTHROCYTE [DISTWIDTH] IN BLOOD BY AUTOMATED COUNT: 13.4 % (ref 11.6–15.1)
GFR SERPL CREATININE-BSD FRML MDRD: 34 ML/MIN/1.73SQ M
GLUCOSE P FAST SERPL-MCNC: 106 MG/DL (ref 65–99)
GLUCOSE UR STRIP-MCNC: NEGATIVE MG/DL
HCT VFR BLD AUTO: 45.6 % (ref 34.8–46.1)
HGB BLD-MCNC: 15.6 G/DL (ref 11.5–15.4)
HGB UR QL STRIP.AUTO: NEGATIVE
IMM GRANULOCYTES # BLD AUTO: 0.02 THOUSAND/UL (ref 0–0.2)
IMM GRANULOCYTES NFR BLD AUTO: 0 % (ref 0–2)
KETONES UR STRIP-MCNC: NEGATIVE MG/DL
LEUKOCYTE ESTERASE UR QL STRIP: NEGATIVE
LYMPHOCYTES # BLD AUTO: 1.3 THOUSANDS/ÂΜL (ref 0.6–4.47)
LYMPHOCYTES NFR BLD AUTO: 23 % (ref 14–44)
MCH RBC QN AUTO: 31.6 PG (ref 26.8–34.3)
MCHC RBC AUTO-ENTMCNC: 34.2 G/DL (ref 31.4–37.4)
MCV RBC AUTO: 92 FL (ref 82–98)
MONOCYTES # BLD AUTO: 0.65 THOUSAND/ÂΜL (ref 0.17–1.22)
MONOCYTES NFR BLD AUTO: 11 % (ref 4–12)
NEUTROPHILS # BLD AUTO: 3.59 THOUSANDS/ÂΜL (ref 1.85–7.62)
NEUTS SEG NFR BLD AUTO: 63 % (ref 43–75)
NITRITE UR QL STRIP: NEGATIVE
NRBC BLD AUTO-RTO: 0 /100 WBCS
PH UR STRIP.AUTO: 5.5 [PH]
PHOSPHATE SERPL-MCNC: 2.9 MG/DL (ref 2.3–4.1)
PLATELET # BLD AUTO: 239 THOUSANDS/UL (ref 149–390)
PMV BLD AUTO: 8.8 FL (ref 8.9–12.7)
POTASSIUM SERPL-SCNC: 3.6 MMOL/L (ref 3.5–5.3)
PROT UR STRIP-MCNC: NEGATIVE MG/DL
PROT UR-MCNC: <6 MG/DL
PTH-INTACT SERPL-MCNC: 65.7 PG/ML (ref 18.4–80.1)
RBC # BLD AUTO: 4.94 MILLION/UL (ref 3.81–5.12)
SODIUM SERPL-SCNC: 139 MMOL/L (ref 135–147)
SP GR UR STRIP.AUTO: 1.02 (ref 1–1.03)
UROBILINOGEN UR STRIP-ACNC: <2 MG/DL
WBC # BLD AUTO: 5.74 THOUSAND/UL (ref 4.31–10.16)

## 2023-02-02 ENCOUNTER — OFFICE VISIT (OUTPATIENT)
Dept: DERMATOLOGY | Facility: CLINIC | Age: 83
End: 2023-02-02

## 2023-02-02 VITALS — BODY MASS INDEX: 36.7 KG/M2 | HEIGHT: 64 IN | WEIGHT: 215 LBS

## 2023-02-02 DIAGNOSIS — L30.0 NUMMULAR DERMATITIS: ICD-10-CM

## 2023-02-02 DIAGNOSIS — Z13.89 SCREENING FOR SKIN CONDITION: ICD-10-CM

## 2023-02-02 DIAGNOSIS — Z85.828 HISTORY OF SKIN CANCER: ICD-10-CM

## 2023-02-02 DIAGNOSIS — L82.1 SEBORRHEIC KERATOSIS: Primary | ICD-10-CM

## 2023-02-02 NOTE — PROGRESS NOTES
[facility] Note     Patient Name: [facility]  Encounter Date: February 2, 2023     Have you been cared for by a [place] [id number] Dermatologist in the last 3 years and, if so, which description applies to you? Yes  I have been here within the last 3 years, and my medical history has NOT changed since that time  I am FEMALE/of child-bearing potential     REVIEW OF SYSTEMS:  Have you recently had or currently have any of the following? · No changes in my recent health  PAST MEDICAL HISTORY:  Have you personally ever had or currently have any of the following? If "YES," then please provide more detail  · No changes in my medical history  FAMILY HISTORY:  Any "first degree relatives" (parent, brother, sister, or child) with the following? • No changes in my family's known health  PATIENT EXPERIENCE:    • Do you want the Dermatologist to perform a COMPLETE skin exam today including a clinical examination under the "bra and underwear" areas? Yes  • If necessary, do we have your permission to call and leave a detailed message on your Preferred Phone number that includes your specific medical information?   Yes      Allergies   Allergen Reactions   • Azithromycin Vomiting and Dizziness   • Amlodipine Swelling   • Lisinopril Cough   • Morphine Vomiting      Current Outpatient Medications:   •  Ascorbic Acid (Vitamin C) 500 MG CAPS, Take 1 capsule by mouth daily, Disp: , Rfl:   •  aspirin (ECOTRIN LOW STRENGTH) 81 mg EC tablet, Take 81 mg by mouth daily, Disp: , Rfl:   •  Calcium-Magnesium-Vitamin D (CALCIUM 500 PO), Take by mouth PT CURRENTLY NOT TAKING, Disp: , Rfl:   •  Glucos-Chondroit-Hyaluron-MSM (GLUCOSAMINE CHONDROITIN JOINT) TABS, Take by mouth daily  , Disp: , Rfl:   •  metoprolol succinate (TOPROL-XL) 50 mg 24 hr tablet, Take 1 tablet (50 mg total) by mouth daily, Disp: 90 tablet, Rfl: 1  •  Multiple Vitamins-Minerals (PreserVision AREDS 2+Multi Vit) CAPS, , Disp: , Rfl:   •  Omega-3 Fatty Acids (FISH OIL) 1000 MG CPDR, Take by mouth, Disp: , Rfl:   •  triamterene-hydrochlorothiazide (MAXZIDE-25) 37 5-25 mg per tablet, Take 1 tablet by mouth in the morning , Disp: 90 tablet, Rfl: 2  •  albuterol (PROVENTIL HFA,VENTOLIN HFA) 90 mcg/act inhaler, Inhale 2 puffs 4 (four) times a day, Disp: 8 g, Rfl: 0  •  benzonatate (TESSALON) 200 MG capsule, Take 1 capsule (200 mg total) by mouth 3 (three) times a day as needed for cough, Disp: 20 capsule, Rfl: 0  •  diclofenac sodium (VOLTAREN) 1 %, Apply 2 g topically 3 (three) times a day (Patient taking differently: Apply 2 g topically 3 (three) times a day As needed ), Disp: 180 g, Rfl: 2  •  predniSONE 10 mg tablet, Take 4 pills x4 days, then 3 pills x4 days, then 2 pills x4 days, and then 1 pill x4 days, Disp: 40 tablet, Rfl: 0          • Whom besides the patient is providing clinical information about today's encounter?   o NO ADDITIONAL HISTORIAN (patient alone provided history)    Physical Exam and Assessment/Plan by Diagnosis:

## 2023-02-02 NOTE — PROGRESS NOTES
[facility] DERMATOLOGY  [address] [place] [id number]  [phone]  [phone]     MRN: [id number] : 1940  Encounter: [id number]  Patient Information: Merwin Goldmann Day  Chief complaint: Skin checkup sensitive irritation of the skin    History of present illness: 66-year-old female presents for overall skin check concerns regarding itchy areas on her skin for which she has been using triamcinolone cream given to her previously with her previous history of skin cancer and several spots on her skin    No significant changes  Past Medical History:   Diagnosis Date   • Arthritis    • Arthritis    • Chronic kidney disease    • Diverticulitis of colon    • Fall    • H/O nonmelanoma skin cancer     last assessed 17   • Hypertension    • Kidney problem    • Kidney stone    • Pneumonia      Past Surgical History:   Procedure Laterality Date   • APPENDECTOMY     • BLADDER SURGERY     • BREAST EXCISIONAL BIOPSY Left     benign    no visible scar   • BREAST SURGERY     • CERVIX SURGERY      dilation and curettage of cervical stump , ,    • CHOLECYSTECTOMY     • COLECTOMY      partial - last assessed 10/28/14   • COLONOSCOPY      last assessed 17   • FL RETROGRADE PYELOGRAM  2021   • FL RETROGRADE PYELOGRAM  2021   • HERNIA REPAIR     • TN CYSTO/URETERO W/LITHOTRIPSY &INDWELL STENT INSRT Left 2021    Procedure: CYSTOSCOPY URETEROSCOPY, RETROGRADE PYELOGRAM AND INSERTION STENT URETERAL;  Surgeon: Gilbert Stout MD;  Location: [facility] OR;  Service: Urology   • TN CYSTO/URETERO W/LITHOTRIPSY &INDWELL STENT INSRT Left 2021    Procedure: CYSTOSCOPY URETEROSCOPY WITH LITHOTRIPSY HOLMIUM LASER, RETROGRADE PYELOGRAM AND INSERTION STENT URETERAL;  Surgeon: Salvador Boateng MD;  Location: [facility] OR;  Service: Urology   • TONSILLECTOMY     • TUBAL LIGATION       Social History   Social History Substance and Sexual Activity   Alcohol Use Yes   • Alcohol/week: 4 0 standard drinks   • Types: 4 Glasses of wine per week    Comment: Occasionally I have a bourbon & ginger ale     Social History     Substance and Sexual Activity   Drug Use No     Social History     Tobacco Use   Smoking Status Former   • Packs/day: 1 00   • Years:    • Pack years:    • Types: Cigarettes   • Start date: 1958   • Quit date:    • Years since quittin 1   Smokeless Tobacco Never   Tobacco Comments    Parents and sisters all smoked, as well as friends  I enjoyed it  And I quit willingly   Family History   Problem Relation Age of Onset   • Arthritis Mother    • Osteoporosis Mother    • Stroke Mother    • Heart disease Father    • Osteoporosis Father    • Breast cancer Family    • Osteoporosis Family    • Breast cancer additional onset Family    • No Known Problems Sister    • No Known Problems Daughter    • Breast cancer Maternal Grandmother    • No Known Problems Maternal Grandfather    • Breast cancer Paternal Grandmother    • No Known Problems Paternal Grandfather    • No Known Problems Sister      Meds/Allergies   Allergies   Allergen Reactions   • Azithromycin Vomiting and Dizziness   • Amlodipine Swelling   • Lisinopril Cough   • Morphine Vomiting       Meds:  Prior to Admission medications    Medication Sig Start Date End Date Taking?  Authorizing Provider   Ascorbic Acid (Vitamin C) 500 MG CAPS Take 1 capsule by mouth daily   Yes Historical Provider, MD   aspirin (ECOTRIN LOW STRENGTH) 81 mg EC tablet Take 81 mg by mouth daily   Yes Historical Provider, MD   Calcium-Magnesium-Vitamin D (CALCIUM 500 PO) Take by mouth PT CURRENTLY NOT TAKING   Yes Historical Provider, MD   Glucos-Chondroit-Hyaluron-MSM (GLUCOSAMINE CHONDROITIN JOINT) TABS Take by mouth daily     Yes Historical Provider, MD   metoprolol succinate (TOPROL-XL) 50 mg 24 hr tablet Take 1 tablet (50 mg total) by mouth daily 10/6/22  Yes Jody Jarrell, DO   Multiple Vitamins-Minerals (PreserVision AREDS 2+Multi Vit) CAPS    Yes Historical Provider, MD   Omega-3 Fatty Acids (FISH OIL) 1000 MG CPDR Take by mouth   Yes Historical Provider, MD   triamterene-hydrochlorothiazide (MAXZIDE-25) 37 5-25 mg per tablet Take 1 tablet by mouth in the morning  5/19/22  Yes Luzma Pena, DO   albuterol (PROVENTIL HFA,VENTOLIN HFA) 90 mcg/act inhaler Inhale 2 puffs 4 (four) times a day 12/18/22   Brian Spray, DO   benzonatate (TESSALON) 200 MG capsule Take 1 capsule (200 mg total) by mouth 3 (three) times a day as needed for cough 12/18/22   Brian Spray, DO   diclofenac sodium (VOLTAREN) 1 % Apply 2 g topically 3 (three) times a day  Patient taking differently: Apply 2 g topically 3 (three) times a day As needed   11/14/19 11/4/22  Marbin Res, DPM   predniSONE 10 mg tablet Take 4 pills x4 days, then 3 pills x4 days, then 2 pills x4 days, and then 1 pill x4 days 12/18/22   Brian Spray, DO       Subjective:     Review of Systems:    General: negative for - chills, fatigue, fever,  weight gain or weight loss  Psychological: negative for - anxiety, behavioral disorder, concentration difficulties, decreased libido, depression, irritability, memory difficulties, mood swings, sleep disturbances or suicidal ideation  ENT: negative for - hearing difficulties , nasal congestion, nasal discharge, oral lesions, sinus pain, sneezing, sore throat  Allergy and Immunology: negative for - hives, insect bite sensitivity,  Hematological and Lymphatic: negative for - bleeding problems, blood clots,bruising, swollen lymph nodes  Endocrine: negative for - hair pattern changes, hot flashes, malaise/lethargy, mood swings, palpitations, polydipsia/polyuria, skin changes, temperature intolerance or unexpected weight change  Respiratory: negative for - cough, hemoptysis, orthopnea, shortness of breath, or wheezing  Cardiovascular: negative for - chest pain, dyspnea on exertion, edema,  Gastrointestinal: negative for - abdominal pain, nausea/vomiting  Genito-Urinary: negative for - dysuria, incontinence, irregular/heavy menses or urinary frequency/urgency  Musculoskeletal: negative for - gait disturbance, joint pain, joint stiffness, joint swelling, muscle pain, muscular weakness  Dermatological:  As in HPI  Neurological: negative for confusion, dizziness, headaches, impaired coordination/balance, memory loss, numbness/tingling, seizures, speech problems, tremors or weakness       Objective:   Ht 5' 3 5" (1 613 m)   Wt 97 5 kg (215 lb)   BMI 37 49 kg/m²     Physical Exam:    General Appearance:    Alert, cooperative, no distress   Head:    Normocephalic, without obvious abnormality, atraumatic           Skin:   A full skin exam was performed including scalp, head scalp, eyes, ears, nose, lips, neck, chest, axilla, abdomen, back, buttocks, bilateral upper extremities, bilateral lower extremities, hands, feet, fingers, toes, fingernails, and toenails normal keratotic papules greasy stuck on appearance occasional erythematous scaling keratotic areas as well  Patches noted on areas of the skin some on the vein some on her back nothing else remarkable noted on exam he is sites of skin cancer well-healed without recurrence     Assessment:     1  Seborrheic keratosis        2  Nummular dermatitis        3  Screening for skin condition        4   History of skin cancer              Plan:   Nummular dermatitis patient vies that this is a form of eczema probably flaring little bit with the colder weather would suggest use of moisturizing cream such as CeraVe a or Cetaphil in addition would suggest mild soap like Dove and use of triamcinolone ointment to the irritated areas twice daily until clear  Seborrheic keratosis patient reassured these are normal growths we acquire with age no treatment needed  History of skin cancer in no recurrence nothing else atypical sunblock recommended follow-up in 6 months  Screening for dermatologic disorders nothing else of concern noted on complete exam follow-up in 6 months      Claire Diaz MD  2/2/2023,1:21 PM    Portions of the record may have been created with voice recognition software   Occasional wrong word or "sound a like" substitutions may have occurred due to the inherent limitations of voice recognition software   Read the chart carefully and recognize, using context, where substitutions have occurred

## 2023-02-02 NOTE — PATIENT INSTRUCTIONS
Nummular dermatitis patient vies that this is a form of eczema probably flaring little bit with the colder weather would suggest use of moisturizing cream such as CeraVe a or Cetaphil in addition would suggest mild soap like Dove and use of triamcinolone ointment to the irritated areas twice daily until clear  Seborrheic keratosis patient reassured these are normal growths we acquire with age no treatment needed  History of skin cancer in no recurrence nothing else atypical sunblock recommended follow-up in 6 months  Screening for dermatologic disorders nothing else of concern noted on complete exam follow-up in 6 months

## 2023-02-03 ENCOUNTER — TELEPHONE (OUTPATIENT)
Dept: NEPHROLOGY | Facility: CLINIC | Age: 83
End: 2023-02-03

## 2023-02-03 NOTE — TELEPHONE ENCOUNTER
I called patient and left message  On patients answering machine confirming appt for Monday 02/06/2023 with Dr Shaquille Méndez

## 2023-02-06 ENCOUNTER — OFFICE VISIT (OUTPATIENT)
Dept: NEPHROLOGY | Facility: CLINIC | Age: 83
End: 2023-02-06

## 2023-02-06 VITALS
BODY MASS INDEX: 37.49 KG/M2 | OXYGEN SATURATION: 96 % | TEMPERATURE: 97.8 F | HEIGHT: 64 IN | HEART RATE: 87 BPM | RESPIRATION RATE: 16 BRPM

## 2023-02-06 DIAGNOSIS — E83.9 CHRONIC KIDNEY DISEASE-MINERAL AND BONE DISORDER: ICD-10-CM

## 2023-02-06 DIAGNOSIS — E66.01 OBESITY, MORBID (HCC): ICD-10-CM

## 2023-02-06 DIAGNOSIS — N28.1 RENAL CYST: ICD-10-CM

## 2023-02-06 DIAGNOSIS — N17.9 AKI (ACUTE KIDNEY INJURY) (HCC): ICD-10-CM

## 2023-02-06 DIAGNOSIS — M89.9 CHRONIC KIDNEY DISEASE-MINERAL AND BONE DISORDER: ICD-10-CM

## 2023-02-06 DIAGNOSIS — N13.9 ACUTE UNILATERAL OBSTRUCTIVE UROPATHY: ICD-10-CM

## 2023-02-06 DIAGNOSIS — N18.32 STAGE 3B CHRONIC KIDNEY DISEASE (HCC): Primary | ICD-10-CM

## 2023-02-06 DIAGNOSIS — I10 ESSENTIAL HYPERTENSION: ICD-10-CM

## 2023-02-06 DIAGNOSIS — E66.9 CLASS 2 OBESITY WITHOUT SERIOUS COMORBIDITY WITH BODY MASS INDEX (BMI) OF 35.0 TO 35.9 IN ADULT, UNSPECIFIED OBESITY TYPE: ICD-10-CM

## 2023-02-06 DIAGNOSIS — N18.9 CHRONIC KIDNEY DISEASE-MINERAL AND BONE DISORDER: ICD-10-CM

## 2023-02-06 NOTE — ASSESSMENT & PLAN NOTE
Lab Results   Component Value Date    EGFR 34 01/31/2023    EGFR 34 10/21/2022    EGFR 49 03/28/2021    CREATININE 1 43 (H) 01/31/2023    CREATININE 1 43 (H) 10/21/2022    CREATININE 1 07 03/28/2021   Patient PTH and phosphorus along with vitamin D are within acceptable range and will continue to monitor

## 2023-02-06 NOTE — PROGRESS NOTES
NEPHROLOGY OFFICE FOLLOW UP  [place] R Day 80 y o  female MRN: [id number]    Encounter: [id number] 2/6/2023    REASON FOR VISIT: Rebeca Charles is a 80 y o  female who is here on 2/6/2023 for Chronic Kidney Disease and Follow-up    HPI:    Daisy Kuhn came in today for follow-up of CKD  A2 to live woman who looks quite good for her age  Quite asymptomatic    Since I saw her last she had flu in which she was treated aggressively with steroid and antibiotic    Denies any other acute complaint today    No chest pain or palpitation or shortness of breath    No nausea no vomiting    Denies any urinary complaints      REVIEW OF SYSTEMS:    Review of Systems   Constitutional: Negative for activity change and fatigue  HENT: Negative for congestion and ear discharge  Eyes: Negative for photophobia and pain  Respiratory: Negative for apnea and choking  Cardiovascular: Negative for chest pain and palpitations  Gastrointestinal: Negative for abdominal distention and blood in stool  Endocrine: Negative for heat intolerance and polyphagia  Genitourinary: Negative for flank pain and urgency  Musculoskeletal: Positive for arthralgias  Negative for neck pain and neck stiffness  Skin: Negative for color change and wound  Allergic/Immunologic: Negative for food allergies and immunocompromised state  Neurological: Negative for seizures and facial asymmetry  Hematological: Negative for adenopathy  Does not bruise/bleed easily  Psychiatric/Behavioral: Negative for self-injury and suicidal ideas           PAST MEDICAL HISTORY:  Past Medical History:   Diagnosis Date   • Arthritis    • Arthritis    • Chronic kidney disease    • Diverticulitis of colon    • Fall    • H/O nonmelanoma skin cancer     last assessed 9/28/17   • Hypertension    • Kidney problem    • Kidney stone    • Pneumonia 1961       PAST SURGICAL HISTORY:  Past Surgical History:   Procedure Laterality Date   • APPENDECTOMY  2010   • BLADDER SURGERY     • BREAST EXCISIONAL BIOPSY Left     benign    no visible scar   • BREAST SURGERY     • CERVIX SURGERY      dilation and curettage of cervical stump , ,    • [address] [place] [address]      partial - last assessed 10/28/14   • COLONOSCOPY      last assessed 17   • FL RETROGRADE PYELOGRAM  2021   • FL RETROGRADE PYELOGRAM  2021   • HERNIA REPAIR     • [place] CYSTO/URETERO W/LITHOTRIPSY &INDWELL STENT INSRT Left 2021    Procedure: CYSTOSCOPY URETEROSCOPY, RETROGRADE PYELOGRAM AND INSERTION STENT URETERAL;  Surgeon: Bean Brar MD;  Location: [facility];  Service: Urology   • MO CYSTO/URETERO W/LITHOTRIPSY &INDWELL STENT INSRT Left 2021    Procedure: CYSTOSCOPY URETEROSCOPY WITH LITHOTRIPSY HOLMIUM LASER, RETROGRADE PYELOGRAM AND INSERTION STENT URETERAL;  Surgeon: Gemini Lee MD;  Location: [facility];  Service: Urology   • TONSILLECTOMY     • TUBAL LIGATION         SOCIAL HISTORY:  Social History     Substance and Sexual Activity   Alcohol Use Yes   • Alcohol/week: 4 0 standard drinks   • Types: 4 Glasses of wine per week    Comment: Occasionally I have a bourbon & ginger ale     Social History     Substance and Sexual Activity   Drug Use No     Social History     Tobacco Use   Smoking Status Former   • Packs/day: 1 00   • Years: 20 00   • Pack years: 20 00   • Types: Cigarettes   • Start date: 1958   • Quit date:    • Years since quittin 1   Smokeless Tobacco Never   Tobacco Comments    Parents and sisters all smoked, as well as friends  I enjoyed it  And I quit willingly          FAMILY HISTORY:  Family History   Problem Relation Age of Onset   • Arthritis Mother    • Osteoporosis Mother    • Stroke Mother    • Heart disease Father    • Osteoporosis Father    • Breast cancer Family    • Osteoporosis Family    • Breast cancer additional onset Family    • No Known Problems Sister    • No Known Problems Daughter    • Breast cancer Maternal Grandmother    • No Known Problems Maternal Grandfather    • Breast cancer Paternal Grandmother    • No Known Problems Paternal Grandfather    • No Known Problems Sister        MEDICATIONS:    Current Outpatient Medications:   •  Ascorbic Acid (Vitamin C) 500 MG CAPS, Take 1 capsule by mouth daily, Disp: , Rfl:   •  aspirin (ECOTRIN LOW STRENGTH) 81 mg EC tablet, Take 81 mg by mouth daily, Disp: , Rfl:   •  Calcium-Magnesium-Vitamin D (CALCIUM 500 PO), Take by mouth PT CURRENTLY NOT TAKING, Disp: , Rfl:   •  diclofenac sodium (VOLTAREN) 1 %, Apply 2 g topically 3 (three) times a day (Patient taking differently: Apply 2 g topically 3 (three) times a day As needed ), Disp: 180 g, Rfl: 2  •  Glucos-Chondroit-Hyaluron-MSM (GLUCOSAMINE CHONDROITIN JOINT) TABS, Take by mouth daily  , Disp: , Rfl:   •  metoprolol succinate (TOPROL-XL) 50 mg 24 hr tablet, Take 1 tablet (50 mg total) by mouth daily, Disp: 90 tablet, Rfl: 1  •  Multiple Vitamins-Minerals (PreserVision AREDS 2+Multi Vit) CAPS, , Disp: , Rfl:   •  Omega-3 Fatty Acids (FISH OIL) 1000 MG CPDR, Take by mouth, Disp: , Rfl:   •  triamcinolone (KENALOG) 0 1 % ointment, Apply topically 2 (two) times a day, Disp: 30 g, Rfl: 1  •  triamterene-hydrochlorothiazide (MAXZIDE-25) 37 5-25 mg per tablet, Take 1 tablet by mouth in the morning , Disp: 90 tablet, Rfl: 2    PHYSICAL EXAM:  Vitals:    02/06/23 1037   Pulse: 87   Resp: 16   Temp: 97 8 °F (36 6 °C)   TempSrc: Temporal   SpO2: 96%   Height: 5' 3 5" (1 613 m)     Body mass index is 37 49 kg/m²  Physical Exam  Constitutional:       General: She is not in acute distress  Appearance: She is well-developed  She is obese  HENT:      Head: Normocephalic  Eyes:      General: No scleral icterus  Conjunctiva/sclera: Conjunctivae normal    Neck:      Vascular: No JVD  Cardiovascular:      Rate and Rhythm: Normal rate  Heart sounds: Normal heart sounds     Pulmonary:      Effort: Pulmonary effort is normal       Breath sounds: No wheezing  Abdominal:      Palpations: Abdomen is soft  Tenderness: There is no abdominal tenderness  Musculoskeletal:         General: Normal range of motion  Cervical back: Neck supple  Skin:     General: Skin is warm  Findings: No rash  Neurological:      Mental Status: She is alert and oriented to person, place, and time     Psychiatric:         Behavior: Behavior normal          LAB RESULTS:  Results for orders placed or performed in visit on 61/46/09   Basic metabolic panel   Result Value Ref Range    Sodium 139 135 - 147 mmol/L    Potassium 3 6 3 5 - 5 3 mmol/L    Chloride 105 96 - 108 mmol/L    CO2 27 21 - 32 mmol/L    ANION GAP 7 4 - 13 mmol/L    BUN 22 5 - 25 mg/dL    Creatinine 1 43 (H) 0 60 - 1 30 mg/dL    Glucose, Fasting 106 (H) 65 - 99 mg/dL    Calcium 9 5 8 3 - 10 1 mg/dL    eGFR 34 ml/min/1 73sq m   CBC and differential   Result Value Ref Range    WBC 5 74 4 31 - 10 16 Thousand/uL    RBC 4 94 3 81 - 5 12 Million/uL    Hemoglobin 15 6 (H) 11 5 - 15 4 g/dL    Hematocrit 45 6 34 8 - 46 1 %    MCV 92 82 - 98 fL    MCH 31 6 26 8 - 34 3 pg    MCHC 34 2 31 4 - 37 4 g/dL    RDW 13 4 11 6 - 15 1 %    MPV 8 8 (L) 8 9 - 12 7 fL    Platelets 757 032 - 794 Thousands/uL    nRBC 0 /100 WBCs    Neutrophils Relative 63 43 - 75 %    Immat GRANS % 0 0 - 2 %    Lymphocytes Relative 23 14 - 44 %    Monocytes Relative 11 4 - 12 %    Eosinophils Relative 2 0 - 6 %    Basophils Relative 1 0 - 1 %    Neutrophils Absolute 3 59 1 85 - 7 62 Thousands/µL    Immature Grans Absolute 0 02 0 00 - 0 20 Thousand/uL    Lymphocytes Absolute 1 30 0 60 - 4 47 Thousands/µL    Monocytes Absolute 0 65 0 17 - 1 22 Thousand/µL    Eosinophils Absolute 0 13 0 00 - 0 61 Thousand/µL    Basophils Absolute 0 05 0 00 - 0 10 Thousands/µL   Phosphorus   Result Value Ref Range    Phosphorus 2 9 2 3 - 4 1 mg/dL   Protein / creatinine ratio, urine   Result Value Ref Range Creatinine, Ur 136 0 mg/dL    Protein Urine Random <6 mg/dL    Prot/Creat Ratio, Ur     PTH, intact   Result Value Ref Range    PTH 65 7 18 4 - 80 1 pg/mL   UA (URINE) with reflex to Scope   Result Value Ref Range    Color, UA Yellow     Clarity, UA Cloudy     Specific [place], UA 1 025 1 005 - 1 030    pH, UA 5 5 4 5, 5 0, 5 5, 6 0, 6 5, 7 0, 7 5, 8 0    Leukocytes, UA Negative Negative    Nitrite, UA Negative Negative    Protein, UA Negative Negative mg/dl    Glucose, UA Negative Negative mg/dl    Ketones, UA Negative Negative mg/dl    Urobilinogen, UA <2 0 <2 0 mg/dl mg/dl    Bilirubin, UA Negative Negative    Occult Blood, UA Negative Negative   Vitamin D 25 hydroxy   Result Value Ref Range    Vit D, 25-Hydroxy 35 0 30 0 - 100 0 ng/mL       ASSESSMENT and PLAN:      Stage 3b chronic kidney disease (HCC)  Lab Results   Component Value Date    EGFR 34 01/31/2023    EGFR 34 10/21/2022    EGFR 49 03/28/2021    CREATININE 1 43 (H) 01/31/2023    CREATININE 1 43 (H) 10/21/2022    CREATININE 1 07 03/28/2021   Patient does seems to be a stage IIIb kidney disease  Likely because of hypertensive nephrosclerosis  Obesity is also contributing    Asymptomatic and progressive nature of kidney disease discussed with the patient  Importance of hydration and avoiding nephrotoxic medicine also discussed with her    Chronic kidney disease-mineral and bone disorder  Lab Results   Component Value Date    EGFR 34 01/31/2023    EGFR 34 10/21/2022    EGFR 49 03/28/2021    CREATININE 1 43 (H) 01/31/2023    CREATININE 1 43 (H) 10/21/2022    CREATININE 1 07 03/28/2021   Patient PTH and phosphorus along with vitamin D are within acceptable range and will continue to monitor    Renal cyst  Patient has a complex renal cyst   Last ultrasound was done in 2021  Repeat ultrasound at this point    Essential hypertension  Very well controlled with help of Dyazide which I will continue      Everything discussed at length with the patient    I will see her back in 4 months  We will get blood and urine test before that visit  Patient will also get kidney ultrasound        Portions of the record may have been created with voice recognition software  Occasional wrong word or "sound a like" substitutions may have occurred due to the inherent limitations of voice recognition software  Read the chart carefully and recognize, using context, where substitutions have occurred  If you have any questions, please contact the dictating provider

## 2023-02-06 NOTE — ASSESSMENT & PLAN NOTE
Patient has a complex renal cyst   Last ultrasound was done in 2021    Repeat ultrasound at this point

## 2023-02-06 NOTE — ASSESSMENT & PLAN NOTE
Lab Results   Component Value Date    EGFR 34 01/31/2023    EGFR 34 10/21/2022    EGFR 49 03/28/2021    CREATININE 1 43 (H) 01/31/2023    CREATININE 1 43 (H) 10/21/2022    CREATININE 1 07 03/28/2021   Patient does seems to be a stage IIIb kidney disease  Likely because of hypertensive nephrosclerosis  Obesity is also contributing    Asymptomatic and progressive nature of kidney disease discussed with the patient    Importance of hydration and avoiding nephrotoxic medicine also discussed with her

## 2023-02-20 ENCOUNTER — HOSPITAL ENCOUNTER (OUTPATIENT)
Dept: ULTRASOUND IMAGING | Facility: HOSPITAL | Age: 83
Discharge: HOME | End: 2023-02-20
Attending: INTERNAL MEDICINE

## 2023-02-20 DIAGNOSIS — N28.1 RENAL CYST: ICD-10-CM

## 2023-03-07 DIAGNOSIS — I10 ESSENTIAL HYPERTENSION: ICD-10-CM

## 2023-03-07 RX ORDER — TRIAMTERENE AND HYDROCHLOROTHIAZIDE 37.5; 25 MG/1; MG/1
1 TABLET ORAL DAILY
Qty: 90 TABLET | Refills: 2 | Status: SHIPPED | OUTPATIENT
Start: 2023-03-07

## 2023-03-29 DIAGNOSIS — I10 ESSENTIAL HYPERTENSION: ICD-10-CM

## 2023-03-29 RX ORDER — METOPROLOL SUCCINATE 50 MG/1
50 TABLET, EXTENDED RELEASE ORAL DAILY
Qty: 90 TABLET | Refills: 1 | Status: SHIPPED | OUTPATIENT
Start: 2023-03-29

## 2023-05-18 ENCOUNTER — APPOINTMENT (OUTPATIENT)
Dept: LAB | Facility: CLINIC | Age: 83
End: 2023-05-18

## 2023-05-18 DIAGNOSIS — M89.9 CHRONIC KIDNEY DISEASE-MINERAL AND BONE DISORDER: ICD-10-CM

## 2023-05-18 DIAGNOSIS — N18.9 CHRONIC KIDNEY DISEASE-MINERAL AND BONE DISORDER: ICD-10-CM

## 2023-05-18 DIAGNOSIS — N18.32 STAGE 3B CHRONIC KIDNEY DISEASE (HCC): ICD-10-CM

## 2023-05-18 DIAGNOSIS — E83.9 CHRONIC KIDNEY DISEASE-MINERAL AND BONE DISORDER: ICD-10-CM

## 2023-05-18 DIAGNOSIS — N17.9 AKI (ACUTE KIDNEY INJURY) (HCC): ICD-10-CM

## 2023-05-18 LAB
25(OH)D3 SERPL-MCNC: 43.6 NG/ML (ref 30–100)
ANION GAP SERPL CALCULATED.3IONS-SCNC: 6 MMOL/L (ref 4–13)
BACTERIA UR QL AUTO: ABNORMAL /HPF
BASOPHILS # BLD AUTO: 0.06 THOUSANDS/ÂΜL (ref 0–0.1)
BASOPHILS NFR BLD AUTO: 1 % (ref 0–1)
BILIRUB UR QL STRIP: NEGATIVE
BUN SERPL-MCNC: 22 MG/DL (ref 5–25)
CALCIUM SERPL-MCNC: 9.9 MG/DL (ref 8.3–10.1)
CHLORIDE SERPL-SCNC: 104 MMOL/L (ref 96–108)
CLARITY UR: CLEAR
CO2 SERPL-SCNC: 26 MMOL/L (ref 21–32)
COLOR UR: ABNORMAL
CREAT SERPL-MCNC: 1.36 MG/DL (ref 0.6–1.3)
CREAT UR-MCNC: 108 MG/DL
EOSINOPHIL # BLD AUTO: 0.16 THOUSAND/ÂΜL (ref 0–0.61)
EOSINOPHIL NFR BLD AUTO: 3 % (ref 0–6)
ERYTHROCYTE [DISTWIDTH] IN BLOOD BY AUTOMATED COUNT: 12.7 % (ref 11.6–15.1)
GFR SERPL CREATININE-BSD FRML MDRD: 36 ML/MIN/1.73SQ M
GLUCOSE P FAST SERPL-MCNC: 86 MG/DL (ref 65–99)
GLUCOSE UR STRIP-MCNC: NEGATIVE MG/DL
HCT VFR BLD AUTO: 50.2 % (ref 34.8–46.1)
HGB BLD-MCNC: 16.8 G/DL (ref 11.5–15.4)
HGB UR QL STRIP.AUTO: NEGATIVE
IMM GRANULOCYTES # BLD AUTO: 0.02 THOUSAND/UL (ref 0–0.2)
IMM GRANULOCYTES NFR BLD AUTO: 0 % (ref 0–2)
KETONES UR STRIP-MCNC: NEGATIVE MG/DL
LEUKOCYTE ESTERASE UR QL STRIP: ABNORMAL
LYMPHOCYTES # BLD AUTO: 1.07 THOUSANDS/ÂΜL (ref 0.6–4.47)
LYMPHOCYTES NFR BLD AUTO: 20 % (ref 14–44)
MCH RBC QN AUTO: 30.7 PG (ref 26.8–34.3)
MCHC RBC AUTO-ENTMCNC: 33.5 G/DL (ref 31.4–37.4)
MCV RBC AUTO: 92 FL (ref 82–98)
MONOCYTES # BLD AUTO: 0.6 THOUSAND/ÂΜL (ref 0.17–1.22)
MONOCYTES NFR BLD AUTO: 11 % (ref 4–12)
NEUTROPHILS # BLD AUTO: 3.35 THOUSANDS/ÂΜL (ref 1.85–7.62)
NEUTS SEG NFR BLD AUTO: 65 % (ref 43–75)
NITRITE UR QL STRIP: NEGATIVE
NON-SQ EPI CELLS URNS QL MICRO: ABNORMAL /HPF
NRBC BLD AUTO-RTO: 0 /100 WBCS
PH UR STRIP.AUTO: 6.5 [PH]
PHOSPHATE SERPL-MCNC: 2.9 MG/DL (ref 2.3–4.1)
PLATELET # BLD AUTO: 258 THOUSANDS/UL (ref 149–390)
PMV BLD AUTO: 9 FL (ref 8.9–12.7)
POTASSIUM SERPL-SCNC: 3.6 MMOL/L (ref 3.5–5.3)
PROT UR STRIP-MCNC: NEGATIVE MG/DL
PROT UR-MCNC: <6 MG/DL
PTH-INTACT SERPL-MCNC: 92.8 PG/ML (ref 12–88)
RBC # BLD AUTO: 5.47 MILLION/UL (ref 3.81–5.12)
RBC #/AREA URNS AUTO: ABNORMAL /HPF
SODIUM SERPL-SCNC: 136 MMOL/L (ref 135–147)
SP GR UR STRIP.AUTO: 1.02 (ref 1–1.03)
UROBILINOGEN UR STRIP-ACNC: <2 MG/DL
WBC # BLD AUTO: 5.26 THOUSAND/UL (ref 4.31–10.16)
WBC #/AREA URNS AUTO: ABNORMAL /HPF

## 2023-06-06 ENCOUNTER — TELEPHONE (OUTPATIENT)
Dept: NEPHROLOGY | Facility: CLINIC | Age: 83
End: 2023-06-06

## 2023-06-07 ENCOUNTER — OFFICE VISIT (OUTPATIENT)
Dept: NEPHROLOGY | Facility: CLINIC | Age: 83
End: 2023-06-07
Payer: COMMERCIAL

## 2023-06-07 VITALS
HEART RATE: 65 BPM | SYSTOLIC BLOOD PRESSURE: 120 MMHG | TEMPERATURE: 98 F | WEIGHT: 209.4 LBS | OXYGEN SATURATION: 98 % | BODY MASS INDEX: 35.75 KG/M2 | HEIGHT: 64 IN | DIASTOLIC BLOOD PRESSURE: 80 MMHG | RESPIRATION RATE: 16 BRPM

## 2023-06-07 DIAGNOSIS — E66.01 OBESITY, MORBID (HCC): ICD-10-CM

## 2023-06-07 DIAGNOSIS — N28.1 RENAL CYST: ICD-10-CM

## 2023-06-07 DIAGNOSIS — E83.9 CHRONIC KIDNEY DISEASE-MINERAL AND BONE DISORDER: ICD-10-CM

## 2023-06-07 DIAGNOSIS — M89.9 CHRONIC KIDNEY DISEASE-MINERAL AND BONE DISORDER: ICD-10-CM

## 2023-06-07 DIAGNOSIS — N18.9 CHRONIC KIDNEY DISEASE-MINERAL AND BONE DISORDER: ICD-10-CM

## 2023-06-07 DIAGNOSIS — I10 ESSENTIAL HYPERTENSION: ICD-10-CM

## 2023-06-07 DIAGNOSIS — N18.32 STAGE 3B CHRONIC KIDNEY DISEASE (HCC): Primary | ICD-10-CM

## 2023-06-07 DIAGNOSIS — N17.9 AKI (ACUTE KIDNEY INJURY) (HCC): ICD-10-CM

## 2023-06-07 PROCEDURE — 99214 OFFICE O/P EST MOD 30 MIN: CPT | Performed by: INTERNAL MEDICINE

## 2023-06-07 NOTE — ASSESSMENT & PLAN NOTE
Lab Results   Component Value Date    CREATININE 1 36 (H) 05/18/2023    CREATININE 1 43 (H) 01/31/2023    CREATININE 1 43 (H) 10/21/2022    EGFR 36 05/18/2023    EGFR 34 01/31/2023    EGFR 34 10/21/2022   Renal function is quite stable at this point    Advised hydration and avoiding nephrotoxic medication

## 2023-06-07 NOTE — PROGRESS NOTES
NEPHROLOGY OFFICE FOLLOW UP  [place] 80 y o  female MRN: [id number]    Encounter: [id number] 6/7/2023    REASON FOR VISIT: Nga Cannon is a 80 y o  female who is here on 6/7/2023 for Chronic Kidney Disease and Follow-up    HPI:    Continue cam interval follow-up of CKD  49-year-old woman who looks quite good for age    Denies any acute complaints    No chest pain no palpitation or short of breath    No nausea no vomiting    Denies any urinary complaint      REVIEW OF SYSTEMS:    Review of Systems   Constitutional: Negative for activity change and fatigue  HENT: Negative for congestion and ear discharge  Eyes: Negative for photophobia and pain  Respiratory: Negative for apnea and choking  Cardiovascular: Negative for chest pain and palpitations  Gastrointestinal: Negative for abdominal distention and blood in stool  Endocrine: Negative for heat intolerance and polyphagia  Genitourinary: Negative for flank pain and urgency  Musculoskeletal: Negative for neck pain and neck stiffness  Skin: Negative for color change and wound  Allergic/Immunologic: Negative for food allergies and immunocompromised state  Neurological: Negative for seizures and facial asymmetry  Hematological: Negative for adenopathy  Does not bruise/bleed easily  Psychiatric/Behavioral: Negative for self-injury and suicidal ideas           PAST MEDICAL HISTORY:  Past Medical History:   Diagnosis Date   • Arthritis    • Arthritis    • Chronic kidney disease    • Diverticulitis of colon    • Fall    • H/O nonmelanoma skin cancer     last assessed 9/28/17   • Hypertension    • Kidney problem    • Kidney stone    • Pneumonia 1961       PAST SURGICAL HISTORY:  Past Surgical History:   Procedure Laterality Date   • APPENDECTOMY  2010   • BLADDER SURGERY  1997   • BREAST EXCISIONAL BIOPSY Left 1995    benign    no visible scar   • BREAST SURGERY     • CERVIX SURGERY      dilation and curettage of cervical stump 2005, [address]      partial - last assessed 10/28/14   • COLONOSCOPY      last assessed 17   • FL RETROGRADE PYELOGRAM  2021   • FL RETROGRADE PYELOGRAM  2021   • HERNIA REPAIR     • GA CYSTO/URETERO W/LITHOTRIPSY &INDWELL STENT INSRT Left 2021    Procedure: CYSTOSCOPY URETEROSCOPY, RETROGRADE PYELOGRAM AND INSERTION STENT URETERAL;  Surgeon: Mi Oropeza MD;  Location: [facility];  Service: Urology   • GA CYSTO/URETERO W/LITHOTRIPSY &INDWELL STENT INSRT Left 2021    Procedure: CYSTOSCOPY URETEROSCOPY WITH LITHOTRIPSY HOLMIUM LASER, RETROGRADE PYELOGRAM AND INSERTION STENT URETERAL;  Surgeon: Carina Swenson MD;  Location: [facility];  Service: Urology   • TONSILLECTOMY     • TUBAL LIGATION         SOCIAL HISTORY:  Social History     Substance and Sexual Activity   Alcohol Use Yes   • Alcohol/week: 4 0 standard drinks of alcohol   • Types: 4 Glasses of wine per week    Comment: Occasionally I have a bourbon & ginger ale     Social History     Substance and Sexual Activity   Drug Use No     Social History     Tobacco Use   Smoking Status Former   • Packs/day: 1 00   • Years: 20 00   • Total pack years: 20 00   • Types: Cigarettes   • Start date: 1958   • Quit date:    • Years since quittin 4   Smokeless Tobacco Never   Tobacco Comments    Parents and sisters all smoked, as well as friends  I enjoyed it  And I quit willingly          FAMILY HISTORY:  Family History   Problem Relation Age of Onset   • Arthritis Mother    • Osteoporosis Mother    • Stroke Mother    • Heart disease Father    • Osteoporosis Father    • Breast cancer Family    • Osteoporosis Family    • Breast cancer additional onset Family    • No Known Problems Sister    • No Known Problems Daughter    • Breast cancer Maternal Grandmother    • No Known Problems Maternal Grandfather    • Breast cancer Paternal Grandmother    • No Known Problems Paternal "Grandfather    • No Known Problems Sister        MEDICATIONS:    Current Outpatient Medications:   •  Ascorbic Acid (Vitamin C) 500 MG CAPS, Take 1 capsule by mouth daily, Disp: , Rfl:   •  aspirin (ECOTRIN LOW STRENGTH) 81 mg EC tablet, Take 81 mg by mouth daily, Disp: , Rfl:   •  Calcium-Magnesium-Vitamin D (CALCIUM 500 PO), Take by mouth PT CURRENTLY NOT TAKING, Disp: , Rfl:   •  diclofenac sodium (VOLTAREN) 1 %, Apply 2 g topically 3 (three) times a day (Patient taking differently: Apply 2 g topically 3 (three) times a day As needed ), Disp: 180 g, Rfl: 2  •  Glucos-Chondroit-Hyaluron-MSM (GLUCOSAMINE CHONDROITIN JOINT) TABS, Take by mouth daily  , Disp: , Rfl:   •  metoprolol succinate (TOPROL-XL) 50 mg 24 hr tablet, Take 1 tablet (50 mg total) by mouth daily, Disp: 90 tablet, Rfl: 1  •  Multiple Vitamins-Minerals (PreserVision AREDS 2+Multi Vit) CAPS, , Disp: , Rfl:   •  Omega-3 Fatty Acids (FISH OIL) 1000 MG CPDR, Take by mouth, Disp: , Rfl:   •  triamcinolone (KENALOG) 0 1 % ointment, Apply topically 2 (two) times a day, Disp: 30 g, Rfl: 1  •  triamterene-hydrochlorothiazide (MAXZIDE-25) 37 5-25 mg per tablet, Take 1 tablet by mouth daily, Disp: 90 tablet, Rfl: 2    PHYSICAL EXAM:  Vitals:    06/07/23 1300   BP: 120/80   BP Location: Right arm   Patient Position: Sitting   Pulse: 65   Resp: 16   Temp: 98 °F (36 7 °C)   TempSrc: Temporal   SpO2: 98%   Weight: 95 kg (209 lb 6 4 oz)   Height: 5' 3 5\" (1 613 m)     Body mass index is 36 51 kg/m²  Physical Exam  Constitutional:       General: She is not in acute distress  Appearance: She is well-developed  HENT:      Head: Normocephalic  Eyes:      General: No scleral icterus  Conjunctiva/sclera: Conjunctivae normal    Neck:      Vascular: No JVD  Cardiovascular:      Rate and Rhythm: Normal rate  Heart sounds: Normal heart sounds  Pulmonary:      Effort: Pulmonary effort is normal       Breath sounds: No wheezing     Abdominal:      " Palpations: Abdomen is soft  Tenderness: There is no abdominal tenderness  Musculoskeletal:         General: Normal range of motion  Cervical back: Neck supple  Skin:     General: Skin is warm  Findings: No rash  Neurological:      Mental Status: She is alert and oriented to person, place, and time     Psychiatric:         Behavior: Behavior normal          LAB RESULTS:  Results for orders placed or performed in visit on 28/74/02   Basic metabolic panel   Result Value Ref Range    Sodium 136 135 - 147 mmol/L    Potassium 3 6 3 5 - 5 3 mmol/L    Chloride 104 96 - 108 mmol/L    CO2 26 21 - 32 mmol/L    ANION GAP 6 4 - 13 mmol/L    BUN 22 5 - 25 mg/dL    Creatinine 1 36 (H) 0 60 - 1 30 mg/dL    Glucose, Fasting 86 65 - 99 mg/dL    Calcium 9 9 8 3 - 10 1 mg/dL    eGFR 36 ml/min/1 73sq m   CBC and differential   Result Value Ref Range    WBC 5 26 4 31 - 10 16 Thousand/uL    RBC 5 47 (H) 3 81 - 5 12 Million/uL    Hemoglobin 16 8 (H) 11 5 - 15 4 g/dL    Hematocrit 50 2 (H) 34 8 - 46 1 %    MCV 92 82 - 98 fL    MCH 30 7 26 8 - 34 3 pg    MCHC 33 5 31 4 - 37 4 g/dL    RDW 12 7 11 6 - 15 1 %    MPV 9 0 8 9 - 12 7 fL    Platelets 280 632 - 882 Thousands/uL    nRBC 0 /100 WBCs    Neutrophils Relative 65 43 - 75 %    Immat GRANS % 0 0 - 2 %    Lymphocytes Relative 20 14 - 44 %    Monocytes Relative 11 4 - 12 %    Eosinophils Relative 3 0 - 6 %    Basophils Relative 1 0 - 1 %    Neutrophils Absolute 3 35 1 85 - 7 62 Thousands/µL    Immature Grans Absolute 0 02 0 00 - 0 20 Thousand/uL    Lymphocytes Absolute 1 07 0 60 - 4 47 Thousands/µL    Monocytes Absolute 0 60 0 17 - 1 22 Thousand/µL    Eosinophils Absolute 0 16 0 00 - 0 61 Thousand/µL    Basophils Absolute 0 06 0 00 - 0 10 Thousands/µL   Phosphorus   Result Value Ref Range    Phosphorus 2 9 2 3 - 4 1 mg/dL   Protein / creatinine ratio, urine   Result Value Ref Range    Creatinine, Ur 108 0 mg/dL    Protein Urine Random <6 mg/dL    Prot/Creat Ratio, Ur PTH, intact   Result Value Ref Range    PTH 92 8 (H) 12 0 - 88 0 pg/mL   UA (URINE) with reflex to Scope   Result Value Ref Range    Color, UA Light Yellow     Clarity, UA Clear     Specific [place], UA 1 016 1 003 - 1 030    pH, UA 6 5 4 5, 5 0, 5 5, 6 0, 6 5, 7 0, 7 5, 8 0    Leukocytes, UA Small (A) Negative    Nitrite, UA Negative Negative    Protein, UA Negative Negative mg/dl    Glucose, UA Negative Negative mg/dl    Ketones, UA Negative Negative mg/dl    Urobilinogen, UA <2 0 <2 0 mg/dl mg/dl    Bilirubin, UA Negative Negative    Occult Blood, UA Negative Negative   Vitamin D 25 hydroxy   Result Value Ref Range    Vit D, 25-Hydroxy 43 6 30 0 - 100 0 ng/mL   Urine Microscopic   Result Value Ref Range    RBC, UA 1-2 None Seen, 1-2 /hpf    WBC, UA 4-10 (A) None Seen, 1-2 /hpf    Epithelial Cells Occasional None Seen, Occasional /hpf    Bacteria, UA Occasional None Seen, Occasional /hpf       ASSESSMENT and PLAN:      Stage 3b chronic kidney disease (HCC)  Lab Results   Component Value Date    CREATININE 1 36 (H) 05/18/2023    CREATININE 1 43 (H) 01/31/2023    CREATININE 1 43 (H) 10/21/2022    EGFR 36 05/18/2023    EGFR 34 01/31/2023    EGFR 34 10/21/2022   Renal function is quite stable at this point  Advised hydration and avoiding nephrotoxic medication    Chronic kidney disease-mineral and bone disorder  Lab Results   Component Value Date    CREATININE 1 36 (H) 05/18/2023    CREATININE 1 43 (H) 01/31/2023    CREATININE 1 43 (H) 10/21/2022    EGFR 36 05/18/2023    EGFR 34 01/31/2023    EGFR 34 10/21/2022   PTH and phosphorus along with vitamin D are within acceptable range and will continue to monitor    Obesity, morbid ([address] )  Patient is trying to reduce weight  Encouraged her to do the    Essential hypertension  Quite reasonable well control      Everything discussed with patient at length  I will see her back in 6 months    We will get blood and urine test before that visit        Portions of the record "may have been created with voice recognition software  Occasional wrong word or \"sound a like\" substitutions may have occurred due to the inherent limitations of voice recognition software  Read the chart carefully and recognize, using context, where substitutions have occurred  If you have any questions, please contact the dictating provider     "

## 2023-06-07 NOTE — ASSESSMENT & PLAN NOTE
Lab Results   Component Value Date    CREATININE 1 36 (H) 05/18/2023    CREATININE 1 43 (H) 01/31/2023    CREATININE 1 43 (H) 10/21/2022    EGFR 36 05/18/2023    EGFR 34 01/31/2023    EGFR 34 10/21/2022   PTH and phosphorus along with vitamin D are within acceptable range and will continue to monitor

## 2023-08-24 ENCOUNTER — OFFICE VISIT (OUTPATIENT)
Age: 83
End: 2023-08-24
Payer: COMMERCIAL

## 2023-08-24 VITALS — WEIGHT: 198 LBS | BODY MASS INDEX: 33.8 KG/M2 | HEIGHT: 64 IN

## 2023-08-24 DIAGNOSIS — Z85.89 HISTORY OF SQUAMOUS CELL CARCINOMA: ICD-10-CM

## 2023-08-24 DIAGNOSIS — D18.01 CHERRY ANGIOMA: ICD-10-CM

## 2023-08-24 DIAGNOSIS — B35.3 TINEA PEDIS OF LEFT FOOT: ICD-10-CM

## 2023-08-24 DIAGNOSIS — D22.9 NEVUS: ICD-10-CM

## 2023-08-24 DIAGNOSIS — L82.1 SEBORRHEIC KERATOSES: ICD-10-CM

## 2023-08-24 DIAGNOSIS — Z13.89 SCREENING FOR SKIN CONDITION: ICD-10-CM

## 2023-08-24 DIAGNOSIS — D48.9 NEOPLASM OF UNCERTAIN BEHAVIOR: Primary | ICD-10-CM

## 2023-08-24 PROCEDURE — 99214 OFFICE O/P EST MOD 30 MIN: CPT | Performed by: DERMATOLOGY

## 2023-08-24 PROCEDURE — 88305 TISSUE EXAM BY PATHOLOGIST: CPT | Performed by: PATHOLOGY

## 2023-08-24 PROCEDURE — 11102 TANGNTL BX SKIN SINGLE LES: CPT | Performed by: DERMATOLOGY

## 2023-08-24 NOTE — PATIENT INSTRUCTIONS
HISTORY OF SQUAMOUS CELL CARCINOMA     Assessment and Plan:  Based on a thorough discussion of this condition and the management approach to it (including a comprehensive discussion of the known risks, side effects and potential benefits of treatment), the patient (family) agrees to implement the following specific plan:  When outside we recommend using a wide brim hat, sunglasses, long sleeve and pants, sunscreen with SPF 98+ with reapplication every 2 hours, or SPF specific clothing   Follow up in 6 Months. How can SCC be prevented? The most important way to prevent SCC is to avoid sunburn. This is especially important in childhood and early life. Fair skinned individuals and those with a personal or family history of BCC should protect their skin from sun exposure daily, year-round and lifelong. Stay indoors or under the shade in the middle of the day   Wear covering clothing   Apply high protection factor SPF50+ broad-spectrum sunscreens generously to exposed skin if outdoors   Avoid indoor tanning (sun beds, solaria)      What is the outlook for SCC? Most SCC are cured by treatment. Cure is most likely if treatment is undertaken when the lesion is small. A small percent of SCC's can spread to lymph  nodes and long term monitoring is indicated. They are also at increased risk of other skin cancers, especially melanoma. Regular self-skin examinations and long-term annual skin checks by an experienced health professional are recommended. MELANOCYTIC NEVI ("Moles")    Assessment and Plan:  Based on a thorough discussion of this condition and the management approach to it (including a comprehensive discussion of the known risks, side effects and potential benefits of treatment), the patient (family) agrees to implement the following specific plan:  Provided handout with information regarding the ABCDE's of moles   Recommend routine skin exams every 6 months.    Sun avoidance, protective clothing (known as UPF clothing), and the use of at least SPF 30 sunscreens is advised. Sunscreen should be reapplied every two hours when outside. SEBORRHEIC KERATOSIS; NON-INFLAMED      Assessment and Plan:  Based on a thorough discussion of this condition and the management approach to it (including a comprehensive discussion of the known risks, side effects and potential benefits of treatment), the patient (family) agrees to implement the following specific plan:  Reassured benign      ANGIOMA ("CHERRY ANGIOMA")    Assessment and Plan:  Reassured benign      NEOPLASM OF UNCERTAIN BEHAVIOR OF SKIN    Assessment and Plan:  I have discussed with the patient that a sample of skin via a "skin biopsy” would be potentially helpful to further make a specific diagnosis under the microscope. Based on a thorough discussion of this condition and the management approach to it (including a comprehensive discussion of the known risks, side effects and potential benefits of treatment), the patient (family) agrees to implement the following specific plan:    Procedure:  Skin Biopsy. After a thorough discussion of treatment options and risk/benefits/alternatives (including but not limited to local pain, scarring, dyspigmentation, blistering, possible superinfection, and inability to confirm a diagnosis via histopathology), verbal and written consent were obtained and portion of the rash was biopsied for tissue sample. See below for consent that was obtained from patient and subsequent Procedure Note. PROCEDURE TANGENTIAL (SHAVE) BIOPSY NOTE:        INFORMED CONSENT DISCUSSION AND POST-OPERATIVE INSTRUCTIONS FOR PATIENT    I.  RATIONALE FOR PROCEDURE  I understand that a skin biopsy allows the Dermatologist to test a lesion or rash under the microscope to obtain a diagnosis. It usually involves numbing the area with numbing medication and removing a small piece of skin; sometimes the area will be closed with sutures.  In this specific procedure, sutures are not usually needed. If any sutures are placed, then they are usually need to be removed in 2 weeks or less. I understand that my Dermatologist recommends that a skin "shave" biopsy be performed today. A local anesthetic, similar to the kind that a dentist uses when filling a cavity, will be injected with a very small needle into the skin area to be sampled. The injected skin and tissue underneath "will go to sleep” and become numb so no pain should be felt afterwards. An instrument shaped like a tiny "razor blade" (shave biopsy instrument) will be used to cut a small piece of tissue and skin from the area so that a sample of tissue can be taken and examined more closely under the microscope. A slight amount of bleeding will occur, but it will be stopped with direct pressure and a pressure bandage and any other appropriate methods. I understands that a scar will form where the wound was created. Surgical ointment will be applied to help protect the wound. Sutures are not usually needed. II.  RISKS AND POTENTIAL COMPLICATIONS   I understand the risks and potential complications of a skin biopsy include but are not limited to the following:  Bleeding  Infection  Pain  Scar/keloid  Skin discoloration  Incomplete Removal  Recurrence  Nerve Damage/Numbness/Loss of Function  Allergic Reaction to Anesthesia  Biopsies are diagnostic procedures and based on findings additional treatment or evaluation may be required  Loss or destruction of specimen resulting in no additional findings    My Dermatologist has explained to me the nature of the condition, the nature of the procedure, and the benefits to be reasonably expected compared with alternative approaches. My Dermatologist has discussed the likelihood of major risks or complications of this procedure including the specific risks listed above, such as bleeding, infection, and scarring/keloid.   I understand that a scar is expected after this procedure. I understand that my physician cannot predict if the scar will form a "keloid," which extends beyond the borders of the wound that is created. A keloid is a thick, painful, and bumpy scar. A keloid can be difficult to treat, as it does not always respond well to therapy, which includes injecting cortisone directly into the keloid every few weeks. While this usually reduces the pain and size of the scar, it does not eliminate it. I understand that photographs may be taken before and after the procedure. These will be maintained as part of the medical providers confidential records and may not be made available to me. I further authorize the medical provider to use the photographs for teaching purposes or to illustrate scientific papers, books, or lectures if in his/her judgment, medical research, education, or science may benefit from its use. I have had an opportunity to fully inquire about the risks and benefits of this procedure and its alternatives. I have been given ample time and opportunity to ask questions and to seek a second opinion if I wished to do so. I acknowledge that there have specifically been no guarantees as to the cosmetic results from the procedure. I am aware that with any procedure there is always the possibility of an unexpected complication. III. POST-PROCEDURAL CARE (WHAT YOU WILL NEED TO DO "AFTER THE BIOPSY" TO OPTIMIZE HEALING)    Keep the area clean and dry. Try NOT to remove the bandage or get it wet for the first 24 hours. Gently clean the area and apply surgical ointment (such as Vaseline petrolatum ointment, which is available "over the counter" and not a prescription) to the biopsy site for up to 2 weeks straight. This acts to protect the wound from the outside world. Sutures are not usually placed in this procedure.   If any sutures were placed, return for suture removal as instructed (generally 1 week for the face, 2 weeks for the body). Take Acetaminophen (Tylenol) for discomfort, if no contraindications. Ibuprofen or aspirin could make bleeding worse. Call our office immediately for signs of infection: fever, chills, increased redness, warmth, tenderness, discomfort/pain, or pus or foul smell coming from the wound. WHAT TO DO IF THERE IS ANY BLEEDING? If a small amount of bleeding is noticed, place a clean cloth over the area and apply firm pressure for ten minutes. Check the wound after 10 minutes of direct pressure. If bleeding persists, try one more time for an additional 10 minutes of direct pressure on the area. If the bleeding becomes heavier or does not stop after the second attempt, or if you have any other questions about this procedure, then please call your [facility] by calling [phone] (SKIN). I hereby acknowledge that I have reviewed and verified the site with my Dermatologist and have requested and authorized my Dermatologist to proceed with the procedure. TINEA PEDIS ("ATHLETE'S FOOT")    Assessment and Plan:  Based on a thorough discussion of this condition and the management approach to it (including a comprehensive discussion of the known risks, side effects and potential benefits of treatment), the patient (family) agrees to implement the following specific plan:  Lamisil Cream Twice Daily for 3 weeks. This can be purchased over the counter. Tinea Pedis  Tinea pedis is a fungal infection of the foot and is in fact the most common fungal infection. Tinea pedis is caused by dermatophyte fungi with the three most common being Trichophyton (T.) rubrum, T. interdigitale and Epidermophyton floccosum. Tinea pedis most commonly involves the interdigital spaces, known as "athlete's foot." Other typical sites include the toenails, groin, and palms of the hands.   There are four major manifestations of tinea pedis including chronic hyperkeratotic, chronic intertriginous, acute ulcerative and vesicobullous. Signs and symptoms include:   Itchiness, redness, and scaling between the toes  Scales covering the soles and sides of the feet  Blisters over the inner aspect of the feet    It is particularly common in hot, tropical, and urban environments where sweating in the feet facilitate fungal growth. Risk factors for development include:  Occlusive footwear  Excessive swearing  Diabetes or other underlying immunosuppression   Poor peripheral circulation     The diagnosis of tinea pedis can usually be made via good history and physical exam due to its characteristic clinical features. Diagnosis can be confirmed by examining skin scrapings under the microscope. Cultures are occasionally done but may not be necessary if fungi are seen under microscopy. Other diagnoses to consider if patients do not respond to therapy include psoriasis, contact dermatitis, and eczema. Tinea pedis can be treated with topical antifungal drugs applied to affected areas on a repeated basis (usually 2 twice a day) for 2 to 4 weeks. Common topical medications include topical ketoconazole, allylamines, butenafine, ciclopirox, and tolnaftate. In cases that do not respond to topical therapy, oral antifungal agents may be used which include terbinafine, itraconazole, fluconazole and griseofulvin. These oral agents are also used to treat tinea capitis (fungal infection of the scalp) and onychomycosis (fungal infection of the nails). Those with pre-existing liver problems are usually screened for liver function prior to starting oral terbinafine. Tinea pedis can be prevented by making sure feet are clean and dry with protective footwear worn in communal facilities.  Other recommendations are:  Using drying foot powders when wearing occlusive shoes   Thoroughly dry shoes and boots prior to wearing them   Making sure to clean contaminated bathroom floors with bleach   Treatment of family members and other close contacts

## 2023-08-24 NOTE — PROGRESS NOTES
[facility] Note     Patient Name: Georgia Day  Encounter Date: August 24, 2023     Have you been cared for by a [facility]ologist in the last 3 years and, if so, which description applies to you? Yes. I have been here within the last 3 years, and my medical history has NOT changed since that time. I am FEMALE/of child-bearing potential.    REVIEW OF SYSTEMS:  Have you recently had or currently have any of the following? · No changes in my recent health. PAST MEDICAL HISTORY:  Have you personally ever had or currently have any of the following? If "YES," then please provide more detail. · No changes in my medical history. FAMILY HISTORY:  Any "first degree relatives" (parent, brother, sister, or child) with the following? • No changes in my family's known health. PATIENT EXPERIENCE:    • Do you want the Dermatologist to perform a COMPLETE skin exam today including a clinical examination under the "bra and underwear" areas? Yes  • If necessary, do we have your permission to call and leave a detailed message on your Preferred Phone number that includes your specific medical information?   Yes      Allergies   Allergen Reactions   • Azithromycin Vomiting and Dizziness   • Amlodipine Swelling   • Lisinopril Cough   • Morphine Vomiting      Current Outpatient Medications:   •  Ascorbic Acid (Vitamin C) 500 MG CAPS, Take 1 capsule by mouth daily, Disp: , Rfl:   •  Calcium-Magnesium-Vitamin D (CALCIUM 500 PO), Take by mouth PT CURRENTLY NOT TAKING, Disp: , Rfl:   •  Cyanocobalamin (VITAMIN B-12 PO), Take by mouth daily, Disp: , Rfl:   •  diclofenac sodium (VOLTAREN) 1 %, Apply 2 g topically 3 (three) times a day (Patient taking differently: Apply 2 g topically 3 (three) times a day As needed.), Disp: 180 g, Rfl: 2  •  Glucos-Chondroit-Hyaluron-MSM (GLUCOSAMINE CHONDROITIN JOINT) TABS, Take by mouth daily  , Disp: , Rfl:   •  metoprolol succinate (TOPROL-XL) 50 mg 24 hr tablet, Take 1 tablet (50 mg total) by mouth daily, Disp: 90 tablet, Rfl: 1  •  Multiple Vitamins-Minerals (PreserVision AREDS 2+Multi Vit) CAPS, , Disp: , Rfl:   •  Omega-3 Fatty Acids (FISH OIL) 1000 MG CPDR, Take by mouth, Disp: , Rfl:   •  triamcinolone (KENALOG) 0.1 % ointment, Apply topically 2 (two) times a day, Disp: 30 g, Rfl: 1  •  triamterene-hydrochlorothiazide (MAXZIDE-25) 37.5-25 mg per tablet, Take 1 tablet by mouth daily, Disp: 90 tablet, Rfl: 2  •  aspirin (ECOTRIN LOW STRENGTH) 81 mg EC tablet, Take 81 mg by mouth daily, Disp: , Rfl:           • Whom besides the patient is providing clinical information about today's encounter?   o NO ADDITIONAL HISTORIAN (patient alone provided history)    Physical Exam and Assessment/Plan by Diagnosis:    CHIEF COMPLAINT    80year old male or female patient presents today for 6 Month Check Up. Patient has a History of skin cancer. Patient is concerned about a rash on her left foot that she would like to have the doctor to look at and two spots on her face that she would like to have frozen off. HISTORY OF SQUAMOUS CELL CARCINOMA     Physical Exam:  • Anatomic Location Affected:  2019 - Right Chest, Right Wrist; 2022 - Mid Upper Back  • Morphological Description of Scar:  Healed  • Suspected Recurrence: no  • Regional adenopathy: no      Assessment and Plan:  Based on a thorough discussion of this condition and the management approach to it (including a comprehensive discussion of the known risks, side effects and potential benefits of treatment), the patient (family) agrees to implement the following specific plan:  • When outside we recommend using a wide brim hat, sunglasses, long sleeve and pants, sunscreen with SPF 81+ with reapplication every 2 hours, or SPF specific clothing   • Follow up in 6 Months. How can SCC be prevented? The most important way to prevent SCC is to avoid sunburn. This is especially important in childhood and early life.  Fair skinned individuals and those with a personal or family history of BCC should protect their skin from sun exposure daily, year-round and lifelong. • Stay indoors or under the shade in the middle of the day   • Wear covering clothing   • Apply high protection factor SPF50+ broad-spectrum sunscreens generously to exposed skin if outdoors   • Avoid indoor tanning (sun beds, solaria)      What is the outlook for SCC? Most SCC are cured by treatment. Cure is most likely if treatment is undertaken when the lesion is small. A small percent of SCC's can spread to lymph  nodes and long term monitoring is indicated. They are also at increased risk of other skin cancers, especially melanoma. Regular self-skin examinations and long-term annual skin checks by an experienced health professional are recommended. MELANOCYTIC NEVI ("Moles")    Physical Exam:  • Anatomic Location Affected: Mostly on sun-exposed areas of the body. • Morphological Description:  Scattered, 1-4mm round to ovoid, symmetrical-appearing, even bordered, skin colored to dark brown macules/papules, mostly in sun-exposed areas  • Pertinent Positives:  • Pertinent Negatives: Additional History of Present Condition:     Assessment and Plan:  Based on a thorough discussion of this condition and the management approach to it (including a comprehensive discussion of the known risks, side effects and potential benefits of treatment), the patient (family) agrees to implement the following specific plan:  • Provided handout with information regarding the ABCDE's of moles   • Recommend routine skin exams every 6 months. • Sun avoidance, protective clothing (known as UPF clothing), and the use of at least SPF 30 sunscreens is advised. Sunscreen should be reapplied every two hours when outside.        SEBORRHEIC KERATOSIS; NON-INFLAMED    Physical Exam:  • Anatomic Location Affected:  scattered across trunk, extremities, face  • Morphological Description:  Flat and raised, waxy, smooth to warty textured, yellow to brownish-grey to dark brown to blackish, discrete, "stuck-on" appearing papules. • Pertinent Positives:  • Pertinent Negatives: Additional History of Present Condition:  Patient reports new bumps on the skin. Denies itch, burn, pain, bleeding or ulceration. Present constantly; nothing seems to make it worse or better. No prior treatment. Assessment and Plan:  Based on a thorough discussion of this condition and the management approach to it (including a comprehensive discussion of the known risks, side effects and potential benefits of treatment), the patient (family) agrees to implement the following specific plan:  • Reassured benign      ANGIOMA ("CHERRY ANGIOMA")    Physical Exam:  • Anatomic Location: scattered across sun exposed areas of the trunk and extremities   • Morphologic Description: Firm red to reddish-blue discrete papules  • Pertinent Positives:  • Pertinent Negatives: Additional History of Present Condition:  Present on exam.    Assessment and Plan:  • Reassured benign      NEOPLASM OF UNCERTAIN BEHAVIOR OF SKIN    Physical Exam:  • (Anatomic Location); (Size and Morphological Description); (Differential Diagnosis):  o Left Cheek; 6 mm pink, pearly macule; Diff. Dx:  Rule Out Basal Cell Carcinoma  • Pertinent Positives:  • Pertinent Negatives:  •       Additional History of Present Condition: Patient said that she noticed the area about a month ago. Assessment and Plan:  • I have discussed with the patient that a sample of skin via a "skin biopsy” would be potentially helpful to further make a specific diagnosis under the microscope. • Based on a thorough discussion of this condition and the management approach to it (including a comprehensive discussion of the known risks, side effects and potential benefits of treatment), the patient (family) agrees to implement the following specific plan:    o Procedure:  Skin Biopsy.   After a thorough discussion of treatment options and risk/benefits/alternatives (including but not limited to local pain, scarring, dyspigmentation, blistering, possible superinfection, and inability to confirm a diagnosis via histopathology), verbal and written consent were obtained and portion of the rash was biopsied for tissue sample. See below for consent that was obtained from patient and subsequent Procedure Note. PROCEDURE TANGENTIAL (SHAVE) BIOPSY NOTE:    • Performing Physician:   • Anatomic Location; Clinical Description with size (cm); Pre-Op Diagnosis: Left Cheek; 6 mm pink, pearly macule; Diff. Dx:  Rule Out Basal Cell Carcinoma  • Post-op diagnosis: Same     • Local anesthesia: 1% xylocaine with epi      • Topical anesthesia: None    • Hemostasis: Aluminum chloride       After obtaining informed consent  at which time there was a discussion about the purpose of biopsy  and low risks of infection and bleeding. The area was prepped and draped in the usual fashion. Anesthesia was obtained with 1% lidocaine with epinephrine. A shave biopsy to an appropriate sampling depth was obtained by Shave (Dermablade or 15 blade) The resulting wound was covered with surgical ointment and bandaged appropriately. The patient tolerated the procedure well without complications and was without signs of functional compromise. Specimen has been sent for review by Dermatopathology. Standard post-procedure care has been explained and has been included in written form within the patient's copy of Informed Consent. INFORMED CONSENT DISCUSSION AND POST-OPERATIVE INSTRUCTIONS FOR PATIENT    I.  RATIONALE FOR PROCEDURE  I understand that a skin biopsy allows the Dermatologist to test a lesion or rash under the microscope to obtain a diagnosis. It usually involves numbing the area with numbing medication and removing a small piece of skin; sometimes the area will be closed with sutures.  In this specific procedure, sutures are not usually needed. If any sutures are placed, then they are usually need to be removed in 2 weeks or less. I understand that my Dermatologist recommends that a skin "shave" biopsy be performed today. A local anesthetic, similar to the kind that a dentist uses when filling a cavity, will be injected with a very small needle into the skin area to be sampled. The injected skin and tissue underneath "will go to sleep” and become numb so no pain should be felt afterwards. An instrument shaped like a tiny "razor blade" (shave biopsy instrument) will be used to cut a small piece of tissue and skin from the area so that a sample of tissue can be taken and examined more closely under the microscope. A slight amount of bleeding will occur, but it will be stopped with direct pressure and a pressure bandage and any other appropriate methods. I understands that a scar will form where the wound was created. Surgical ointment will be applied to help protect the wound. Sutures are not usually needed. II.  RISKS AND POTENTIAL COMPLICATIONS   I understand the risks and potential complications of a skin biopsy include but are not limited to the following:  • Bleeding  • Infection  • Pain  • Scar/keloid  • Skin discoloration  • Incomplete Removal  • Recurrence  • Nerve Damage/Numbness/Loss of Function  • Allergic Reaction to Anesthesia  • Biopsies are diagnostic procedures and based on findings additional treatment or evaluation may be required  • Loss or destruction of specimen resulting in no additional findings    My Dermatologist has explained to me the nature of the condition, the nature of the procedure, and the benefits to be reasonably expected compared with alternative approaches. My Dermatologist has discussed the likelihood of major risks or complications of this procedure including the specific risks listed above, such as bleeding, infection, and scarring/keloid.   I understand that a scar is expected after this procedure. I understand that my physician cannot predict if the scar will form a "keloid," which extends beyond the borders of the wound that is created. A keloid is a thick, painful, and bumpy scar. A keloid can be difficult to treat, as it does not always respond well to therapy, which includes injecting cortisone directly into the keloid every few weeks. While this usually reduces the pain and size of the scar, it does not eliminate it. I understand that photographs may be taken before and after the procedure. These will be maintained as part of the medical providers confidential records and may not be made available to me. I further authorize the medical provider to use the photographs for teaching purposes or to illustrate scientific papers, books, or lectures if in his/her judgment, medical research, education, or science may benefit from its use. I have had an opportunity to fully inquire about the risks and benefits of this procedure and its alternatives. I have been given ample time and opportunity to ask questions and to seek a second opinion if I wished to do so. I acknowledge that there have specifically been no guarantees as to the cosmetic results from the procedure. I am aware that with any procedure there is always the possibility of an unexpected complication. III. POST-PROCEDURAL CARE (WHAT YOU WILL NEED TO DO "AFTER THE BIOPSY" TO OPTIMIZE HEALING)    • Keep the area clean and dry. Try NOT to remove the bandage or get it wet for the first 24 hours. • Gently clean the area and apply surgical ointment (such as Vaseline petrolatum ointment, which is available "over the counter" and not a prescription) to the biopsy site for up to 2 weeks straight. This acts to protect the wound from the outside world. • Sutures are not usually placed in this procedure.   If any sutures were placed, return for suture removal as instructed (generally 1 week for the face, 2 weeks for the body). • Take Acetaminophen (Tylenol) for discomfort, if no contraindications. Ibuprofen or aspirin could make bleeding worse. • Call our office immediately for signs of infection: fever, chills, increased redness, warmth, tenderness, discomfort/pain, or pus or foul smell coming from the wound. WHAT TO DO IF THERE IS ANY BLEEDING? If a small amount of bleeding is noticed, place a clean cloth over the area and apply firm pressure for ten minutes. Check the wound after 10 minutes of direct pressure. If bleeding persists, try one more time for an additional 10 minutes of direct pressure on the area. If the bleeding becomes heavier or does not stop after the second attempt, or if you have any other questions about this procedure, then please call your [address] by calling [phone] (SKIN). I hereby acknowledge that I have reviewed and verified the site with my Dermatologist and have requested and authorized my Dermatologist to proceed with the procedure. TINEA PEDIS ("ATHLETE'S FOOT")    Physical Exam:  • Anatomic Location Affected:  Left Foot  • Morphological Description:  Annulare scaling patch  • Pertinent Positives:  • Pertinent Negatives: Additional History of Present Condition:  KOH prep was performed and was negative. Patient has been using triamcinolone on the area. Assessment and Plan:  Based on a thorough discussion of this condition and the management approach to it (including a comprehensive discussion of the known risks, side effects and potential benefits of treatment), the patient (family) agrees to implement the following specific plan:  • Lamisil Cream Twice Daily for 3 weeks. This can be purchased over the counter. Tinea Pedis  Tinea pedis is a fungal infection of the foot and is in fact the most common fungal infection.  Tinea pedis is caused by dermatophyte fungi with the three most common being Trichophyton (T.) rubrum, T. interdigitale and Epidermophyton floccosum. Tinea pedis most commonly involves the interdigital spaces, known as "athlete's foot." Other typical sites include the toenails, groin, and palms of the hands. There are four major manifestations of tinea pedis including chronic hyperkeratotic, chronic intertriginous, acute ulcerative and vesicobullous. Signs and symptoms include:   • Itchiness, redness, and scaling between the toes  • Scales covering the soles and sides of the feet  • Blisters over the inner aspect of the feet    It is particularly common in hot, tropical, and urban environments where sweating in the feet facilitate fungal growth. Risk factors for development include:  • Occlusive footwear  • Excessive swearing  • Diabetes or other underlying immunosuppression   • Poor peripheral circulation     The diagnosis of tinea pedis can usually be made via good history and physical exam due to its characteristic clinical features. Diagnosis can be confirmed by examining skin scrapings under the microscope. Cultures are occasionally done but may not be necessary if fungi are seen under microscopy. Other diagnoses to consider if patients do not respond to therapy include psoriasis, contact dermatitis, and eczema. Tinea pedis can be treated with topical antifungal drugs applied to affected areas on a repeated basis (usually 2 twice a day) for 2 to 4 weeks. Common topical medications include topical ketoconazole, allylamines, butenafine, ciclopirox, and tolnaftate. In cases that do not respond to topical therapy, oral antifungal agents may be used which include terbinafine, itraconazole, fluconazole and griseofulvin. These oral agents are also used to treat tinea capitis (fungal infection of the scalp) and onychomycosis (fungal infection of the nails). Those with pre-existing liver problems are usually screened for liver function prior to starting oral terbinafine.      Tinea pedis can be prevented by making sure feet are clean and dry with protective footwear worn in communal facilities.  Other recommendations are:  • Using drying foot powders when wearing occlusive shoes   • Thoroughly dry shoes and boots prior to wearing them   • Making sure to clean contaminated bathroom floors with bleach   • Treatment of family members and other close contacts        Scribe Attestation    I,:  Guy Barajas MA am acting as a scribe while in the presence of the attending physician.:       I,:  Gisselle Soriano MD personally performed the services described in this documentation    as scribed in my presence.:

## 2023-08-29 PROCEDURE — 88305 TISSUE EXAM BY PATHOLOGIST: CPT | Performed by: PATHOLOGY

## 2023-08-30 ENCOUNTER — TELEPHONE (OUTPATIENT)
Age: 83
End: 2023-08-30

## 2023-09-20 ENCOUNTER — PROCEDURE VISIT (OUTPATIENT)
Age: 83
End: 2023-09-20
Payer: COMMERCIAL

## 2023-09-20 VITALS — BODY MASS INDEX: 33.8 KG/M2 | HEIGHT: 64 IN | WEIGHT: 198 LBS

## 2023-09-20 DIAGNOSIS — D04.39 SQUAMOUS CELL CARCINOMA IN SITU (SCCIS) OF SKIN OF LEFT CHEEK: Primary | ICD-10-CM

## 2023-09-20 PROCEDURE — 88305 TISSUE EXAM BY PATHOLOGIST: CPT | Performed by: STUDENT IN AN ORGANIZED HEALTH CARE EDUCATION/TRAINING PROGRAM

## 2023-09-20 PROCEDURE — 11642 EXC F/E/E/N/L MAL+MRG 1.1-2: CPT | Performed by: DERMATOLOGY

## 2023-09-20 PROCEDURE — 12052 INTMD RPR FACE/MM 2.6-5.0 CM: CPT | Performed by: DERMATOLOGY

## 2023-09-20 NOTE — PATIENT INSTRUCTIONS
POSTOP DISCUSSION DISCUSSION AND INSTRUCTIONS FOR PATIENT      Rationale for Procedure  A skin excision allows the dermatologist to remove a lesion. The procedure involves a local numbing medication and removing the entire lesion. Typically, the lesion is being removed because it is atypical, traumatized, or for significant appearance reasons. The area will be open like a brush burn and allowed to heal.   There will be no sutures. Tissue is sent to pathologist who will reconfirm diagnosis and verify completeness of lesion removal.    Description of Procedure  We would like to perform a skin excision today. A local anesthetic, similar to the kind that a dentist uses when filling a cavity, will be injected with a very small needle into the skin area to be sampled. The injected skin and tissue underneath should “go to sleep” and become numbed so that no further pain should be felt. A scalpel will be used to cut around the lesion and tissue will be submitted to pathology for examination. Depending on the diagnosis the lesion will be excised with a certain amount of normal skin to help assure completeness of lesion removal.  The physician will discuss in advance the anticipated size and extent of removal.   Bleeding will occur, but it will stopped with direct pressure, sutures, and electrocautery. Surgical “Vaseline-type” ointment will also applied after the procedure to help create a barrier between the wound and the outside world. Risks and Potential Complications  The advantage of a skin excision is that it allows us to remove a problem lesion quickly. Although this usually permits the lesion to heal as soon as possible with the least scarring, there are some risks and potential complications that include but are not limited to the following:  Some bleeding is normal at time of procedure and some bleeding on gauze is normal  the first few days after surgery.   Profuse bleeding and bleeding with swelling and pain should be reported as detailed  below  Infection is uncommon in skin surgery. Infection should be reported and is indicated by pain, redness, and discharge of purulent material.  Some dull to at time sharp pain could occur initially the day after surgery. Persistent pain or increasing pain days after surgery is not expected and should be reported. Every effort is made to minimize scar, but location, size, and genetics do play a role in scar appearance. A surgical wound does not achieve its optimal appearance until 6 months. There are several treatments available if scarring would be problematic including scar creams, silicone pad, laser and scar revision. Skin discoloration can occur especially in people of color. Its important to avoid sun on wound in first 6 months after surgery. Treatment is available if pigment is problematic. Incomplete removal of the lesion or recurrence of lesion can occur and this would then require further treatment and more surgery. Nerve Damage/Numbness/Loss of Function is very rare, but is most likely to occur if lesion is large or if it is in a high risk location  Allergic Reaction to lidocaine is rare. More commonly,  epinephrine is used  with the lidocaine. Occasionally, epinephrine (aka adrenalin) may cause a brief  feeling of anxiety or jitteriness. The person at the microscope  (pathologist) may provide additional information that was unexpected. This unexpected finding could provoke the need for additional treatment or evaluation. What You Will Need to Do After the Procedure  Keep the area clean and dry the first day. Try NOT to remove the bandage for the first day. Gently clean the area with soap and water and apply Vaseline ointment (this is over the counter and not a prescription) to the excision  site for up to 2 weeks. Apply a clean appropriately sized bandage to area. Gauze and paper tape are recommended for sensitive skin.   Return for suture removal as instructed (generally 1 week for the face, 2 weeks for the body). Take Acetaminophen (Tylenol) for discomfort, if no contraindications. Do NOT take Ibuprofen or aspirin unless specifically told to do so by your Dermatologist because these medications can make bleeding worse. Call our office immediately for signs of infection: fever, chills, increased redness, warmth, tenderness, discomfort/pain, or pus or foul smell coming from the wound. If bleeding is noticed, place a clean cloth over the area and apply firm pressure for thirty minutes. Check the wound ONLY after 30 minutes of direct pressure; do not cheat and sneak a peak, as that does not count. If bleeding persists after 30 minutes of legitimate direct pressure, then try one more round of direct pressure for an additional 10 minutes to the area. Should the bleeding become heavier or not stop after the second attempt, call [facility] directly at [phone]) or, if after hours, go to your local Emergency Room/Emergency Department. Dr. Selene Martinez - Surgery with Sutures After Care Instructions    WOUND CARE AFTER SURGERY:    Do NOT to remove the pressure bandage for 48 hours. Keep the area clean and dry while this bandage is on. After removing the bandage for the first time, gently clean the area with soap and water. If the bandage is difficult to remove, getting the bandage wet in the shower will sometimes help soften the adhesive and allow it to be removed more easily. You will now need to cleanse this area daily in the shower with gentle soap. There is no need to scrub the area. Apply plain Vaseline ointment (this is over the counter and not a prescription) to the site followed by a clean appropriately sized bandage to area. Non stick dressing and paper tape (or Hypafix) are recommended for sensitive skin but a bandaid is fine if it covers the area well.   DO NOT USE NEOSPORIN, BACITRACIN OR ANY TOPICAL ANTIBIOTIC UNLESS INSTRUCTED BY THE DOCTOR. You will need to continue the above daily wound care until you return for suture removal in 7 days (generally 7 days for the face, 10-14 days off the face)      RESTRICTIONS:     For two DAYS:   - You will need to take it very easy as this time is highest risk for bleeding. Being a "couch potato" during these two days is generally recommended. - For surgeries on the face/neck/scalp: Avoid leaning down to pick things up off the floor as this brings blood up to your head. Instead, squat down to pick things up. For two WEEKS:   - No heavy lifting (anything greater than 10 pounds)   - You can start to do slow, gentle activities such as slow walking but nothing to increase your heart rate and blood pressure too much (such as cardiovascular exercise). It is important to take it easy as there is still a risk for bleeding and a high risk popping of stitches open during this time. If we did surgery near the eyes (including the nose, forehead, front part of your scalp, cheeks): It is VERY common to get a large amount of swelling around your eyes (puffy eyes). Although less frequent, this can be enough to swell your eyes shut and can also come along with bruising. This should not hurt and is very expected and normal. It is typically worst at ~ 3 days out from your surgery and dramatically better 1 week post-operatively. If we did surgery around your nose: No blowing your nose as this puts you at higher risk of popping stitches durign this time. Instead dab under your nose with a tissue or use a Q-tip inside your nose. If we did surgery on the skin above or bellow your lip or your lip itself: No sipping from straws as this uses a lot of the muscles around your mouth and increases the risk of popping stitches during this time. MANAGING YOUR PAIN AFTER SURGERY     You can expect to have some pain after surgery.  This is normal. The pain is typically worse the first two days after surgery, and quickly begins to get better. The best strategy for controlling your pain after surgery is around the clock pain control. You can take over the counter Acetaminophen (Tylenol) for discomfort, if no contraindications. If you are taking this at the maximum dose, you can alternate this with Motrin (ibuprofen or Advil) as well. Alternating these medications with each other allows you to maximize your pain control. In addition to Tylenol and Motrin, you can use heating pads or ice packs on your incisions to help reduce your pain. How will I alternate your regular strength over-the-counter pain medication? You will take a dose of pain medication every three hours. Start by taking 650 mg of Tylenol (2 pills of 325 mg)   3 hours later take 600 mg of Motrin (3 pills of 200 mg)   3 hours after taking the Motrin take 650 mg of Tylenol   3 hours after that take 600 mg of Motrin. See example - if your first dose of Tylenol is at 12:00 PM     12:00 PM  Tylenol 650 mg (2 pills of 325 mg)    3:00 PM  Motrin 600 mg (3 pills of 200 mg)    6:00 PM  Tylenol 650 mg (2 pills of 325 mg)    9:00 PM  Motrin 600 mg (3 pills of 200 mg)    Continue alternating every 3 hours      Important:   Do not take more than 4000mg of Tylenol or 3200mg of Motrin in a 24-hour period. CALL OUR OFFICE IMMEDIATELY FOR ANY SIGNS OF INFECTION:    This includes fever, chills, increased redness, warmth, tenderness, severe discomfort/pain, or pus or foul smell coming from the wound. Call [facility] directly at   [phone]    IF BLEEDING IS NOTICED:    Place a clean cloth over the area and apply firm pressure for thirty minutes. Check the wound ONLY after 30 minutes of direct pressure; do not cheat and sneak a peak, as that does not count. If bleeding persists after 30 minutes of legitimate direct pressure, then try one more round of direct pressure to the area.   Should the bleeding become heavier or not stop after the second attempt, call [facility] directly at [phone]).

## 2023-09-20 NOTE — PROGRESS NOTES
[facility] Note     Patient Name: Georgia Day  Encounter Date: September 20, 2023     Have you been cared for by a [facility] Dermatologist in the last 3 years and, if so, which description applies to you? Yes. I have been here within the last 3 years, and my medical history has NOT changed since that time. I am FEMALE/of child-bearing potential.    REVIEW OF SYSTEMS:  Have you recently had or currently have any of the following? · No changes in my recent health. PAST MEDICAL HISTORY:  Have you personally ever had or currently have any of the following? If "YES," then please provide more detail. · No changes in my medical history. FAMILY HISTORY:  Any "first degree relatives" (parent, brother, sister, or child) with the following? • No changes in my family's known health. PATIENT EXPERIENCE:    • Do you want the Dermatologist to perform a COMPLETE skin exam today including a clinical examination under the "bra and underwear" areas? NO  • If necessary, do we have your permission to call and leave a detailed message on your Preferred Phone number that includes your specific medical information?   Yes      Allergies   Allergen Reactions   • Azithromycin Vomiting and Dizziness   • Amlodipine Swelling   • Lisinopril Cough   • Morphine Vomiting      Current Outpatient Medications:   •  Ascorbic Acid (Vitamin C) 500 MG CAPS, Take 1 capsule by mouth daily, Disp: , Rfl:   •  aspirin (ECOTRIN LOW STRENGTH) 81 mg EC tablet, Take 81 mg by mouth daily, Disp: , Rfl:   •  Calcium-Magnesium-Vitamin D (CALCIUM 500 PO), Take by mouth PT CURRENTLY NOT TAKING, Disp: , Rfl:   •  Cyanocobalamin (VITAMIN B-12 PO), Take by mouth daily, Disp: , Rfl:   •  diclofenac sodium (VOLTAREN) 1 %, Apply 2 g topically 3 (three) times a day (Patient taking differently: Apply 2 g topically 3 (three) times a day As needed.), Disp: 180 g, Rfl: 2  •  Glucos-Chondroit-Hyaluron-MSM (GLUCOSAMINE CHONDROITIN JOINT) TABS, Take by mouth daily  , Disp: , Rfl:   •  metoprolol succinate (TOPROL-XL) 50 mg 24 hr tablet, Take 1 tablet (50 mg total) by mouth daily, Disp: 90 tablet, Rfl: 1  •  Multiple Vitamins-Minerals (PreserVision AREDS 2+Multi Vit) CAPS, , Disp: , Rfl:   •  Omega-3 Fatty Acids (FISH OIL) 1000 MG CPDR, Take by mouth, Disp: , Rfl:   •  triamcinolone (KENALOG) 0.1 % ointment, Apply topically 2 (two) times a day, Disp: 30 g, Rfl: 1  •  triamterene-hydrochlorothiazide (MAXZIDE-25) 37.5-25 mg per tablet, Take 1 tablet by mouth daily, Disp: 90 tablet, Rfl: 2          • Whom besides the patient is providing clinical information about today's encounter?   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 I discussed that excision is a method of removing lesions both benign and malignant lesions. A portion of normal skin is often taken to ensure completeness of removal.  I reviewed that procedure will include numbing the area,  cutting around and under defect, undermining tissue, and closing the wound with sutures both inside and out. These sutures are usually removed in 7 to 14 days. Risks (bleeding, pain, infection, scarring, recurrence) and benefits discussed.  It was discussed with patient that every effort is made to minimize scar, but scarring is influenced also by extrinsic factor such as location, age and genetics. Time Out: performed:  yes  Correct patient: yes. Correct site per Clinic Report: yes  Correct site per Patient Report: yes    LOCAL ANESTHESIA: 1% xylocaine with epi     DESCRIPTION OF PROCEDURE: The patient was brought back into the procedure room, anesthetized locally, prepped and draped in the usual fashion. Using a #15 blade with a scalpel, the lesion was excised in elliptical fashion. The wound was  undermined in the  fascial plane. Hemostasis was achieved with light electrocoagulation. Purpose of undermining was to decrease wound tension and facilitate closure. The wound was closed with subcutaneous sutures as follows:    Deep suture:5-0 Monocryl 3 sutures      Epidermal edge closure was accomplished with superficial sutures as follows:    Superficial suture: 5-0 Monocryl  Superficial suture type: Interrupted 7 sutures    Estimated blood loss less than 3ml. The patient tolerated the procedure well without any complications. The wound was cleaned with sterile saline, dried off, surgical vaseline ointment was applied, and the wound was covered. A pressure dressing was applied for stabilization and light pressure. The patient was given detailed oral and written instructions on postoperative care as detailed in consent. The patient left in good medical condition.       Scribe Attestation    I,:  Bianca Pena am acting as a scribe while in the presence of the attending physician.:       I,:  Soraya Medeiros MD personally performed the services described in this documentation    as scribed in my presence.:

## 2023-09-25 DIAGNOSIS — I10 ESSENTIAL HYPERTENSION: ICD-10-CM

## 2023-09-25 RX ORDER — METOPROLOL SUCCINATE 50 MG/1
50 TABLET, EXTENDED RELEASE ORAL DAILY
Qty: 90 TABLET | Refills: 1 | Status: SHIPPED | OUTPATIENT
Start: 2023-09-25

## 2023-09-25 NOTE — TELEPHONE ENCOUNTER
Patient would like a refill of metoprolol succinate (TOPROL-XL) 50 mg 24 hr tablet sent to [facility] in [place].

## 2023-09-26 ENCOUNTER — TELEPHONE (OUTPATIENT)
Age: 83
End: 2023-09-26

## 2023-09-26 PROCEDURE — 88305 TISSUE EXAM BY PATHOLOGIST: CPT | Performed by: STUDENT IN AN ORGANIZED HEALTH CARE EDUCATION/TRAINING PROGRAM

## 2023-09-26 NOTE — TELEPHONE ENCOUNTER
----- Message from Aidan Ambrocio MD sent at 9/26/2023  2:32 PM EDT -----  Call patient if not coming in shortly for suture removal to let him know that the margins were clear

## 2023-09-27 ENCOUNTER — OFFICE VISIT (OUTPATIENT)
Age: 83
End: 2023-09-27

## 2023-09-27 DIAGNOSIS — Z48.02 ENCOUNTER FOR REMOVAL OF SUTURES: Primary | ICD-10-CM

## 2023-09-27 PROCEDURE — 99024 POSTOP FOLLOW-UP VISIT: CPT | Performed by: DERMATOLOGY

## 2023-09-27 NOTE — PROGRESS NOTES
Suture removal    Date/Time: 9/27/2023 12:30 PM    Performed by: Dudley Jain  Authorized by: Keri Mckenzie MD  Universal Protocol:  Consent: Verbal consent obtained. Written consent not obtained. Risks and benefits: risks, benefits and alternatives were discussed  Consent given by: patient  Timeout called at: 9/27/2023 12:34 PM.  Patient understanding: patient states understanding of the procedure being performed  Patient consent: the patient's understanding of the procedure matches consent given  Procedure consent: procedure consent matches procedure scheduled  Relevant documents: relevant documents not present or verified  Test results: test results not available  Site marked: the operative site was not marked  Radiology Images displayed and confirmed. If images not available, report reviewed: imaging studies not available  Patient identity confirmed: verbally with patient        Patient location:  Clinic  Location:     Laterality:  Left    Location:  [address] [place] [address] location:  Cheek  Procedure details: Tools used:  Suture removal kit    Wound appearance:  No sign(s) of infection, good wound healing and clean  Post-procedure details:     Post-removal:  Steri-Strips applied    Patient tolerance of procedure:   Tolerated well, no immediate complications

## 2023-10-02 ENCOUNTER — RA CDI HCC (OUTPATIENT)
Dept: OTHER | Facility: HOSPITAL | Age: 83
End: 2023-10-02

## 2023-10-02 NOTE — PROGRESS NOTES
[address] [facility] coding opportunities       Chart reviewed, no opportunity found: CHART REVIEWED, NO OPPORTUNITY FOUND        Patients Insurance     Medicare [facility]

## 2023-10-09 ENCOUNTER — OFFICE VISIT (OUTPATIENT)
Dept: FAMILY MEDICINE CLINIC | Facility: CLINIC | Age: 83
End: 2023-10-09
Payer: COMMERCIAL

## 2023-10-09 VITALS
TEMPERATURE: 97.9 F | HEART RATE: 64 BPM | DIASTOLIC BLOOD PRESSURE: 80 MMHG | OXYGEN SATURATION: 98 % | BODY MASS INDEX: 34.06 KG/M2 | HEIGHT: 64 IN | SYSTOLIC BLOOD PRESSURE: 136 MMHG | WEIGHT: 199.5 LBS

## 2023-10-09 DIAGNOSIS — Z12.31 ENCOUNTER FOR SCREENING MAMMOGRAM FOR MALIGNANT NEOPLASM OF BREAST: ICD-10-CM

## 2023-10-09 DIAGNOSIS — Z00.00 MEDICARE ANNUAL WELLNESS VISIT, SUBSEQUENT: Primary | ICD-10-CM

## 2023-10-09 PROCEDURE — G0439 PPPS, SUBSEQ VISIT: HCPCS | Performed by: STUDENT IN AN ORGANIZED HEALTH CARE EDUCATION/TRAINING PROGRAM

## 2023-10-09 PROCEDURE — 99214 OFFICE O/P EST MOD 30 MIN: CPT | Performed by: STUDENT IN AN ORGANIZED HEALTH CARE EDUCATION/TRAINING PROGRAM

## 2023-10-09 NOTE — PATIENT INSTRUCTIONS
Medicare Preventive Visit Patient Instructions  Thank you for completing your Welcome to Medicare Visit or Medicare Annual Wellness Visit today. Your next wellness visit will be due in one year (10/9/2024). The screening/preventive services that you may require over the next 5-10 years are detailed below. Some tests may not apply to you based off risk factors and/or age. Screening tests ordered at today's visit but not completed yet may show as past due. Also, please note that scanned in results may not display below. Preventive Screenings:  Service Recommendations Previous Testing/Comments   Colorectal Cancer Screening  * Colonoscopy    * Fecal Occult Blood Test (FOBT)/Fecal Immunochemical Test (FIT)  * Fecal DNA/Cologuard Test  * Flexible Sigmoidoscopy Age: 43-73 years old   Colonoscopy: every 10 years (may be performed more frequently if at higher risk)  OR  FOBT/FIT: every 1 year  OR  Cologuard: every 3 years  OR  Sigmoidoscopy: every 5 years  Screening may be recommended earlier than age 39 if at higher risk for colorectal cancer. Also, an individualized decision between you and your healthcare provider will decide whether screening between the ages of 77-80 would be appropriate. Colonoscopy: 03/14/2022  FOBT/FIT: Not on file  Cologuard: 03/14/2022  Sigmoidoscopy: Not on file          Breast Cancer Screening Age: 36 years old  Frequency: every 1-2 years  Not required if history of left and right mastectomy Mammogram: 11/11/2022    Screening Current   Cervical Cancer Screening Between the ages of 21-29, pap smear recommended once every 3 years. Between the ages of 32-69, can perform pap smear with HPV co-testing every 5 years.    Recommendations may differ for women with a history of total hysterectomy, cervical cancer, or abnormal pap smears in past. Pap Smear: 05/24/2021    Screening Not Indicated   Hepatitis C Screening Once for adults born between 1945 and 1965  More frequently in patients at high risk for Hepatitis C Hep C Antibody: Not on file        Diabetes Screening 1-2 times per year if you're at risk for diabetes or have pre-diabetes Fasting glucose: 86 mg/dL (5/18/2023)  A1C: 5.3 % (10/21/2022)  Screening Current   Cholesterol Screening Once every 5 years if you don't have a lipid disorder. May order more often based on risk factors. Lipid panel: 10/21/2022    Screening Not Indicated  History Lipid Disorder     Other Preventive Screenings Covered by Medicare:  1. Abdominal Aortic Aneurysm (AAA) Screening: covered once if your at risk. You're considered to be at risk if you have a family history of AAA. 2. Lung Cancer Screening: covers low dose CT scan once per year if you meet all of the following conditions: (1) Age 48-67; (2) No signs or symptoms of lung cancer; (3) Current smoker or have quit smoking within the last 15 years; (4) You have a tobacco smoking history of at least 20 pack years (packs per day multiplied by number of years you smoked); (5) You get a written order from a healthcare provider. 3. Glaucoma Screening: covered annually if you're considered high risk: (1) You have diabetes OR (2) Family history of glaucoma OR (3)  aged 48 and older OR (3)  American aged 72 and older  3. Osteoporosis Screening: covered every 2 years if you meet one of the following conditions: (1) You're estrogen deficient and at risk for osteoporosis based off medical history and other findings; (2) Have a vertebral abnormality; (3) On glucocorticoid therapy for more than 3 months; (4) Have primary hyperparathyroidism; (5) On osteoporosis medications and need to assess response to drug therapy. · Last bone density test (DXA Scan): 11/11/2022.  5. HIV Screening: covered annually if you're between the age of 15-65. Also covered annually if you are younger than 13 and older than 72 with risk factors for HIV infection.  For pregnant patients, it is covered up to 3 times per pregnancy. Immunizations:  Immunization Recommendations   Influenza Vaccine Annual influenza vaccination during flu season is recommended for all persons aged >= 6 months who do not have contraindications   Pneumococcal Vaccine   * Pneumococcal conjugate vaccine = PCV13 (Prevnar 13), PCV15 (Vaxneuvance), PCV20 (Prevnar 20)  * Pneumococcal polysaccharide vaccine = PPSV23 (Pneumovax) Adults 20-63 years old: 1-3 doses may be recommended based on certain risk factors  Adults 72 years old: 1-2 doses may be recommended based off what pneumonia vaccine you previously received   Hepatitis B Vaccine 3 dose series if at intermediate or high risk (ex: diabetes, end stage renal disease, liver disease)   Tetanus (Td) Vaccine - COST NOT COVERED BY MEDICARE PART B Following completion of primary series, a booster dose should be given every 10 years to maintain immunity against tetanus. Td may also be given as tetanus wound prophylaxis. Tdap Vaccine - COST NOT COVERED BY MEDICARE PART B Recommended at least once for all adults. For pregnant patients, recommended with each pregnancy. Shingles Vaccine (Shingrix) - COST NOT COVERED BY MEDICARE PART B  2 shot series recommended in those aged 48 and above     Health Maintenance Due:  There are no preventive care reminders to display for this patient. Immunizations Due:      Topic Date Due   • COVID-19 Vaccine (5 - Moderna series) 10/01/2022   • Influenza Vaccine (1) Never done     Advance Directives   What are advance directives? Advance directives are legal documents that state your wishes and plans for medical care. These plans are made ahead of time in case you lose your ability to make decisions for yourself. Advance directives can apply to any medical decision, such as the treatments you want, and if you want to donate organs. What are the types of advance directives? There are many types of advance directives, and each state has rules about how to use them.  You may choose a combination of any of the following:  · Living will: This is a written record of the treatment you want. You can also choose which treatments you do not want, which to limit, and which to stop at a certain time. This includes surgery, medicine, IV fluid, and tube feedings. · Durable power of  for healthcare [facility]): This is a written record that states who you want to make healthcare choices for you when you are unable to make them for yourself. This person, called a proxy, is usually a family member or a friend. You may choose more than 1 proxy. · Do not resuscitate (DNR) order:  A DNR order is used in case your heart stops beating or you stop breathing. It is a request not to have certain forms of treatment, such as CPR. A DNR order may be included in other types of advance directives. · Medical directive: This covers the care that you want if you are in a coma, near death, or unable to make decisions for yourself. You can list the treatments you want for each condition. Treatment may include pain medicine, surgery, blood transfusions, dialysis, IV or tube feedings, and a ventilator (breathing machine). · Values history: This document has questions about your views, beliefs, and how you feel and think about life. This information can help others choose the care that you would choose. Why are advance directives important? An advance directive helps you control your care. Although spoken wishes may be used, it is better to have your wishes written down. Spoken wishes can be misunderstood, or not followed. Treatments may be given even if you do not want them. An advance directive may make it easier for your family to make difficult choices about your care. Urinary Incontinence   Urinary incontinence (UI)  is when you lose control of your bladder. UI develops because your bladder cannot store or empty urine properly.  The 3 most common types of UI are stress incontinence, urge incontinence, or both. Medicines:   · May be given to help strengthen your bladder control. Report any side effects of medication to your healthcare provider. Do pelvic muscle exercises often:  Your pelvic muscles help you stop urinating. Squeeze these muscles tight for 5 seconds, then relax for 5 seconds. Gradually work up to squeezing for 10 seconds. Do 3 sets of 15 repetitions a day, or as directed. This will help strengthen your pelvic muscles and improve bladder control. Train your bladder:  Go to the bathroom at set times, such as every 2 hours, even if you do not feel the urge to go. You can also try to hold your urine when you feel the urge to go. For example, hold your urine for 5 minutes when you feel the urge to go. As that becomes easier, hold your urine for 10 minutes. Self-care:   · Keep a UI record. Write down how often you leak urine and how much you leak. Make a note of what you were doing when you leaked urine. · Drink liquids as directed. You may need to limit the amount of liquid you drink to help control your urine leakage. Do not drink any liquid right before you go to bed. Limit or do not have drinks that contain caffeine or alcohol. · Prevent constipation. Eat a variety of high-fiber foods. Good examples are high-fiber cereals, beans, vegetables, and whole-grain breads. Walking is the best way to trigger your intestines to have a bowel movement. · Exercise regularly and maintain a healthy weight. Weight loss and exercise will decrease pressure on your bladder and help you control your leakage. · Use a catheter as directed  to help empty your bladder. A catheter is a tiny, plastic tube that is put into your bladder to drain your urine. · Go to behavior therapy as directed. Behavior therapy may be used to help you learn to control your urge to urinate.     Weight Management   Why it is important to manage your weight:  Being overweight increases your risk of health conditions such as heart disease, high blood pressure, type 2 diabetes, and certain types of cancer. It can also increase your risk for osteoarthritis, sleep apnea, and other respiratory problems. Aim for a slow, steady weight loss. Even a small amount of weight loss can lower your risk of health problems. How to lose weight safely:  A safe and healthy way to lose weight is to eat fewer calories and get regular exercise. You can lose up about 1 pound a week by decreasing the number of calories you eat by 500 calories each day. Healthy meal plan for weight management:  A healthy meal plan includes a variety of foods, contains fewer calories, and helps you stay healthy. A healthy meal plan includes the following:  · Eat whole-grain foods more often. A healthy meal plan should contain fiber. Fiber is the part of grains, fruits, and vegetables that is not broken down by your body. Whole-grain foods are healthy and provide extra fiber in your diet. Some examples of whole-grain foods are whole-wheat breads and pastas, oatmeal, brown rice, and bulgur. · Eat a variety of vegetables every day. Include dark, leafy greens such as spinach, kale, dalton greens, and mustard greens. Eat yellow and orange vegetables such as carrots, sweet potatoes, and winter squash. · Eat a variety of fruits every day. Choose fresh or canned fruit (canned in its own juice or light syrup) instead of juice. Fruit juice has very little or no fiber. · Eat low-fat dairy foods. Drink fat-free (skim) milk or 1% milk. Eat fat-free yogurt and low-fat cottage cheese. Try low-fat cheeses such as mozzarella and other reduced-fat cheeses. · Choose meat and other protein foods that are low in fat. Choose beans or other legumes such as split peas or lentils. Choose fish, skinless poultry (chicken or turkey), or lean cuts of red meat (beef or pork). Before you cook meat or poultry, cut off any visible fat. · Use less fat and oil.   Try baking foods instead of frying them. Add less fat, such as margarine, sour cream, regular salad dressing and mayonnaise to foods. Eat fewer high-fat foods. Some examples of high-fat foods include french fries, doughnuts, ice cream, and cakes. · Eat fewer sweets. Limit foods and drinks that are high in sugar. This includes candy, cookies, regular soda, and sweetened drinks. Exercise:  Exercise at least 30 minutes per day on most days of the week. Some examples of exercise include walking, biking, dancing, and swimming. You can also fit in more physical activity by taking the stairs instead of the elevator or parking farther away from stores. Ask your healthcare provider about the best exercise plan for you. © Copyright [address] 2018 Information is for End User's use only and may not be sold, redistributed or otherwise used for commercial purposes.  All illustrations and images included in CareNotes® are the copyrighted property of [employer] or [address] [place] [address]

## 2023-10-09 NOTE — PROGRESS NOTES
Assessment and Plan:     Problem List Items Addressed This Visit        Other    Medicare annual wellness visit, subsequent - Primary     Screenings up-to-date with exception of breast cancer screening, patient has mammogram scheduled for next month and an order has been placed. Patient follows with nephrology and has repeat blood work upcoming in December, will follow-up pending results. Other Visit Diagnoses     Encounter for screening mammogram for malignant neoplasm of breast        Relevant Orders    Mammo screening bilateral w 3d & cad        BMI Counseling: Body mass index is 34.79 kg/m². The BMI is above normal. Nutrition recommendations include moderation in carbohydrate intake. Exercise recommendations include exercising 3-5 times per week. Rationale for BMI follow-up plan is due to patient being overweight or obese. Depression Screening and Follow-up Plan: Patient was screened for depression during today's encounter. They screened negative with a PHQ-2 score of 0. Urinary Incontinence Plan of Care: counseling topics discussed: use restroom every 2 hours, limiting fluid intake 3-4 hours before bed and limiting fluid intake to 60 oz. per day. Preventive health issues were discussed with patient, and age appropriate screening tests were ordered as noted in patient's After Visit Summary. Personalized health advice and appropriate referrals for health education or preventive services given if needed, as noted in patient's After Visit Summary. History of Present Illness:     Patient presents for a Medicare Wellness Visit    Patient presents today for AWV visit. 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 No acute concerns today.   Dr Hellen Burroughs removed [address] [facility]       Patient Care Team:  Rasta Levin DO as PCP - General (Family Medicine)  Luis Fernando Vásquez DO as PCP - [address] [place] [address] (RT)  MD Beatriz Toribio Res Robert Craig MD (Nephrology)     Review of Systems:     Review of Systems   Constitutional: Negative for chills and fever. HENT: Negative for congestion and rhinorrhea. Respiratory: Negative for cough and shortness of breath. Cardiovascular: Negative for chest pain and palpitations. Gastrointestinal: Negative for abdominal pain, constipation, diarrhea, nausea and vomiting. Neurological: Negative for dizziness and headaches.         Problem List:     Patient Active Problem List   Diagnosis   • Hyperlipidemia   • Obesity   • Essential hypertension   • Seborrheic keratosis   • History of skin cancer   • Screening for skin condition   • Bronchitis   • Acute unilateral obstructive uropathy   • Stage 3b chronic kidney disease ([address])   • Abnormal kidney function   • Hematuria   • Obesity, morbid ([address])   • Medicare annual wellness visit, subsequent   • Bilateral leg cramps   • KYRA (acute kidney injury) ([address])   • Chronic kidney disease-mineral and bone disorder   • Renal cyst      Past Medical and Surgical History:     Past Medical History:   Diagnosis Date   • Allergic see chart   • Arthritis    • Arthritis    • Cancer ([address]) skin   • Chronic kidney disease    • Diverticulitis of colon    • Fall    • H/O nonmelanoma skin cancer     last assessed 9/28/17   • Hypertension    • Kidney problem    • Kidney stone    • Pneumonia 1961   • 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     Past Surgical History:   Procedure Laterality Date   • APPENDECTOMY  2010   • BLADDER SURGERY  1997   • BREAST EXCISIONAL BIOPSY Left 1995    benign    no visible scar   • BREAST SURGERY     • CERVIX SURGERY      dilation and curettage of cervical stump 2005, 1998, 1994   • CHOLECYSTECTOMY  1997   • COLECTOMY      partial - last assessed 10/28/14   • COLONOSCOPY      last assessed 8/9/17   • FL RETROGRADE PYELOGRAM  03/29/2021   • FL RETROGRADE PYELOGRAM  04/29/2021   • HERNIA REPAIR  2011   • [place] CYSTO/URETERO W/LITHOTRIPSY &INDWELL STENT INSRT Left 2021    Procedure: CYSTOSCOPY URETEROSCOPY, RETROGRADE PYELOGRAM AND INSERTION STENT URETERAL;  Surgeon: Herman Almodovar MD;  Location:  MAIN OR;  Service: Urology   • [place] CYSTO/URETERO W/LITHOTRIPSY &INDWELL STENT INSRT Left 2021    Procedure: CYSTOSCOPY URETEROSCOPY WITH LITHOTRIPSY HOLMIUM LASER, RETROGRADE PYELOGRAM AND INSERTION STENT URETERAL;  Surgeon: Kaylen Cook MD;  Location: [facility];  Service: Urology   • TONSILLECTOMY     • TUBAL LIGATION        Family History:     Family History   Problem Relation Age of Onset   • Arthritis Mother    • Osteoporosis Mother    • Stroke Mother    • Heart disease Father    • Osteoporosis Father    • Breast cancer Family    • Osteoporosis Family    • Breast cancer additional onset Family    • No Known Problems Sister    • No Known Problems Daughter    • Breast cancer Maternal Grandmother         typo - she is paternal   • No Known Problems Maternal Grandfather    • Breast cancer Paternal Grandmother    • No Known Problems Paternal Grandfather    • No Known Problems Sister       Social History:     Social History     Socioeconomic History   • Marital status:      Spouse name: None   • Number of children: None   • Years of education: None   • Highest education level: None   Occupational History   • None   Tobacco Use   • Smoking status: Former     Packs/day: 1.00     Years: 20.00     Total pack years: 20.00     Types: Cigarettes     Start date: 1958     Quit date: 1979     Years since quittin.3     Passive exposure: Past   • Smokeless tobacco: Never   • Tobacco comments:     Parents and sisters all smoked, as well as friends. I enjoyed it. And I quit willingly . Vaping Use   • Vaping Use: Never used   Substance and Sexual Activity   • Alcohol use:  Yes     Alcohol/week: 1.0 standard drink of alcohol     Types: 1 Glasses of wine per week     Comment: Occasionally I have a bourbon & ginger ale   • Drug use: No   • Sexual activity: Not Currently     Partners: Male     Birth control/protection: Diaphragm, Spermicide, Female Sterilization   Other Topics Concern   • None   Social History Narrative    Caffeine use     Social Determinants of Health     Financial Resource Strain: Low Risk  (10/4/2023)    Overall Financial Resource Strain (CARDIA)    • Difficulty of Paying Living Expenses: Not hard at all   Food Insecurity: Not on file   Transportation Needs: No Transportation Needs (10/4/2023)    PRAPARE - Transportation    • Lack of Transportation (Medical): No    • Lack of Transportation (Non-Medical): No   Physical Activity: Not on file   Stress: Not on file   Social Connections: Not on file   Intimate Partner Violence: Not on file   Housing Stability: Not on file      Medications and Allergies:     Current Outpatient Medications   Medication Sig Dispense Refill   • Ascorbic Acid (Vitamin C) 500 MG CAPS Take 1 capsule by mouth daily     • Calcium-Magnesium-Vitamin D (CALCIUM 500 PO) Take by mouth PT CURRENTLY NOT TAKING     • Cyanocobalamin (VITAMIN B-12 PO) Take by mouth daily     • Glucos-Chondroit-Hyaluron-MSM (GLUCOSAMINE CHONDROITIN JOINT) TABS Take by mouth daily       • metoprolol succinate (TOPROL-XL) 50 mg 24 hr tablet Take 1 tablet (50 mg total) by mouth daily 90 tablet 1   • Multiple Vitamins-Minerals (PreserVision AREDS 2+Multi Vit) CAPS      • Omega-3 Fatty Acids (FISH OIL) 1000 MG CPDR Take by mouth     • triamcinolone (KENALOG) 0.1 % ointment Apply topically 2 (two) times a day 30 g 1   • triamterene-hydrochlorothiazide (MAXZIDE-25) 37.5-25 mg per tablet Take 1 tablet by mouth daily 90 tablet 2   • diclofenac sodium (VOLTAREN) 1 % Apply 2 g topically 3 (three) times a day (Patient taking differently: Apply 2 g topically 3 (three) times a day As needed.) 180 g 2     No current facility-administered medications for this visit.      Allergies   Allergen Reactions   • Azithromycin Vomiting and Dizziness   • Amlodipine Swelling   • Lisinopril Cough   • Morphine Vomiting      Immunizations:     Immunization History   Administered Date(s) Administered   • COVID-19 MODERNA VACC 0.25 ML IM BOOSTER 10/27/2021   • COVID-19 MODERNA VACC 0.5 ML IM 02/03/2021, 03/01/2021, 08/06/2022   • Pneumococcal Conjugate 13-Valent 07/27/2017   • Pneumococcal Conjugate Vaccine 20-valent (Pcv20), Polysace 10/06/2022   • Pneumococcal Polysaccharide PPV23 10/28/2014   • Tdap 06/09/2017   • Zoster 03/13/2017      Health Maintenance: There are no preventive care reminders to display for this patient. Topic Date Due   • COVID-19 Vaccine (5 - Moderna series) 10/01/2022      Medicare Screening Tests and Risk Assessments:     [place] is here for her Subsequent Wellness visit. Health Risk Assessment:   Patient rates overall health as very good. Patient feels that their physical health rating is same. Patient is satisfied with their life. Eyesight was rated as slightly worse. Hearing was rated as same. Patient feels that their emotional and mental health rating is same. Patients states they are never, rarely angry. Patient states they are sometimes unusually tired/fatigued. Pain experienced in the last 7 days has been none. Patient states that she has experienced no weight loss or gain in last 6 months. Using the [employer] program, I have lost over 20 pounds since March. This is quite intentional!    Depression Screening:   PHQ-2 Score: 0      Fall Risk Screening: In the past year, patient has experienced: no history of falling in past year      Urinary Incontinence Screening:   Patient has leaked urine accidently in the last six months. I leak, I wear protection. Home Safety:  Patient does not have trouble with stairs inside or outside of their home. Patient has working smoke alarms and has no working carbon monoxide detector. Home safety hazards include: none. Nutrition:   Current diet is Regular and No Added Salt.  [employer] has taught me a lot about intentional food choices    Medications:   Patient is currently taking over-the-counter supplements. OTC medications include: All are listed. Patient is able to manage medications. Activities of Daily Living (ADLs)/Instrumental Activities of Daily Living (IADLs):   Walk and transfer into and out of bed and chair?: Yes  Dress and groom yourself?: Yes    Bathe or shower yourself?: Yes    Feed yourself? Yes  Do your laundry/housekeeping?: Yes  Manage your money, pay your bills and track your expenses?: Yes  Make your own meals?: Yes    Do your own shopping?: Yes    Durable Medical Equipment Suppliers  none    Previous Hospitalizations:   Any hospitalizations or ED visits within the last 12 months?: No      Advance Care Planning:   Living will: Yes    Durable POA for healthcare: Yes    Advanced directive: Yes      PREVENTIVE SCREENINGS      Cardiovascular Screening:    General: History Lipid Disorder and Risks and Benefits Discussed    Due for: Lipid Panel      Diabetes Screening:     General: Risks and Benefits Discussed    Due for: Blood Glucose      Breast Cancer Screening:     General: Screening Current      Cervical Cancer Screening:    General: Screening Not Indicated      Osteoporosis Screening:    General: Screening Current      Abdominal Aortic Aneurysm (AAA) Screening:        General: Screening Not Indicated      Lung Cancer Screening:     General: Screening Not Indicated      Hepatitis C Screening:    General: Screening Not Indicated    Screening, Brief Intervention, and Referral to Treatment (SBIRT)    Screening  Typical number of drinks in a day: 0  Typical number of drinks in a week: 1  Interpretation: Low risk drinking behavior. AUDIT-C Screenin) How often did you have a drink containing alcohol in the past year? 2 to 4 times a month  2) How many drinks did you have on a typical day when you were drinking in the past year?  1 to 2  3) How often did you have 6 or more drinks on one occasion in the past year? never    AUDIT-C Score: 2  Interpretation: Score 0-2 (female): Negative screen for alcohol misuse    Single Item Drug Screening:  How often have you used an illegal drug (including marijuana) or a prescription medication for non-medical reasons in the past year? never    Single Item Drug Screen Score: 0  Interpretation: Negative screen for possible drug use disorder    No results found. Physical Exam:     /80 (BP Location: Left arm, Patient Position: Sitting, Cuff Size: Large)   Pulse 64   Temp 97.9 °F (36.6 °C) (Temporal)   Ht 5' 3.5" (1.613 m)   Wt 90.5 kg (199 lb 8 oz)   SpO2 98%   BMI 34.79 kg/m²     Physical Exam  Vitals reviewed. Constitutional:       Appearance: Normal appearance. HENT:      Head: Normocephalic and atraumatic. Mouth/Throat:      Mouth: Mucous membranes are moist.      Pharynx: No oropharyngeal exudate or posterior oropharyngeal erythema. Eyes:      Extraocular Movements: Extraocular movements intact. Cardiovascular:      Rate and Rhythm: Normal rate and regular rhythm. Heart sounds: Normal heart sounds. Pulmonary:      Effort: Pulmonary effort is normal.      Breath sounds: Normal breath sounds. Neurological:      General: No focal deficit present. Mental Status: She is alert and oriented to person, place, and time.    Psychiatric:         Mood and Affect: Mood normal.         Behavior: Behavior normal.          Zee Lloyd,

## 2023-11-13 ENCOUNTER — HOSPITAL ENCOUNTER (OUTPATIENT)
Dept: RADIOLOGY | Age: 83
Discharge: HOME | End: 2023-11-13
Payer: COMMERCIAL

## 2023-11-13 VITALS — BODY MASS INDEX: 35.26 KG/M2 | HEIGHT: 63 IN | WEIGHT: 199 LBS

## 2023-11-13 DIAGNOSIS — Z12.31 VISIT FOR SCREENING MAMMOGRAM: ICD-10-CM

## 2023-11-13 PROCEDURE — 77063 BREAST TOMOSYNTHESIS BI: CPT

## 2023-11-13 PROCEDURE — 77067 SCR MAMMO BI INCL CAD: CPT

## 2023-11-27 ENCOUNTER — OFFICE VISIT (OUTPATIENT)
Dept: URGENT CARE | Facility: CLINIC | Age: 83
End: 2023-11-27
Payer: COMMERCIAL

## 2023-11-27 ENCOUNTER — APPOINTMENT (OUTPATIENT)
Dept: LAB | Facility: CLINIC | Age: 83
End: 2023-11-27
Payer: COMMERCIAL

## 2023-11-27 VITALS — OXYGEN SATURATION: 98 % | HEART RATE: 60 BPM | TEMPERATURE: 97.3 F | RESPIRATION RATE: 16 BRPM

## 2023-11-27 DIAGNOSIS — E83.9 CHRONIC KIDNEY DISEASE-MINERAL AND BONE DISORDER: ICD-10-CM

## 2023-11-27 DIAGNOSIS — N18.9 CHRONIC KIDNEY DISEASE-MINERAL AND BONE DISORDER: ICD-10-CM

## 2023-11-27 DIAGNOSIS — M54.42 ACUTE LEFT-SIDED LOW BACK PAIN WITH LEFT-SIDED SCIATICA: Primary | ICD-10-CM

## 2023-11-27 DIAGNOSIS — M89.9 CHRONIC KIDNEY DISEASE-MINERAL AND BONE DISORDER: ICD-10-CM

## 2023-11-27 DIAGNOSIS — N18.32 STAGE 3B CHRONIC KIDNEY DISEASE (HCC): ICD-10-CM

## 2023-11-27 LAB
25(OH)D3 SERPL-MCNC: 37 NG/ML (ref 30–100)
ANION GAP SERPL CALCULATED.3IONS-SCNC: 6 MMOL/L
BACTERIA UR QL AUTO: ABNORMAL /HPF
BASOPHILS # BLD AUTO: 0.04 THOUSANDS/ÂΜL (ref 0–0.1)
BASOPHILS NFR BLD AUTO: 1 % (ref 0–1)
BILIRUB UR QL STRIP: NEGATIVE
BUN SERPL-MCNC: 18 MG/DL (ref 5–25)
CALCIUM SERPL-MCNC: 9.2 MG/DL (ref 8.4–10.2)
CHLORIDE SERPL-SCNC: 102 MMOL/L (ref 96–108)
CLARITY UR: ABNORMAL
CO2 SERPL-SCNC: 30 MMOL/L (ref 21–32)
COLOR UR: ABNORMAL
CREAT SERPL-MCNC: 1.17 MG/DL (ref 0.6–1.3)
CREAT UR-MCNC: 97.9 MG/DL
EOSINOPHIL # BLD AUTO: 0.15 THOUSAND/ÂΜL (ref 0–0.61)
EOSINOPHIL NFR BLD AUTO: 3 % (ref 0–6)
ERYTHROCYTE [DISTWIDTH] IN BLOOD BY AUTOMATED COUNT: 13.7 % (ref 11.6–15.1)
GFR SERPL CREATININE-BSD FRML MDRD: 43 ML/MIN/1.73SQ M
GLUCOSE P FAST SERPL-MCNC: 91 MG/DL (ref 65–99)
GLUCOSE UR STRIP-MCNC: NEGATIVE MG/DL
HCT VFR BLD AUTO: 49 % (ref 34.8–46.1)
HGB BLD-MCNC: 15.8 G/DL (ref 11.5–15.4)
HGB UR QL STRIP.AUTO: NEGATIVE
IMM GRANULOCYTES # BLD AUTO: 0.01 THOUSAND/UL (ref 0–0.2)
IMM GRANULOCYTES NFR BLD AUTO: 0 % (ref 0–2)
KETONES UR STRIP-MCNC: NEGATIVE MG/DL
LEUKOCYTE ESTERASE UR QL STRIP: ABNORMAL
LYMPHOCYTES # BLD AUTO: 1.16 THOUSANDS/ÂΜL (ref 0.6–4.47)
LYMPHOCYTES NFR BLD AUTO: 23 % (ref 14–44)
MCH RBC QN AUTO: 30.7 PG (ref 26.8–34.3)
MCHC RBC AUTO-ENTMCNC: 32.2 G/DL (ref 31.4–37.4)
MCV RBC AUTO: 95 FL (ref 82–98)
MONOCYTES # BLD AUTO: 0.35 THOUSAND/ÂΜL (ref 0.17–1.22)
MONOCYTES NFR BLD AUTO: 7 % (ref 4–12)
MUCOUS THREADS UR QL AUTO: ABNORMAL
NEUTROPHILS # BLD AUTO: 3.29 THOUSANDS/ÂΜL (ref 1.85–7.62)
NEUTS SEG NFR BLD AUTO: 66 % (ref 43–75)
NITRITE UR QL STRIP: NEGATIVE
NON-SQ EPI CELLS URNS QL MICRO: ABNORMAL /HPF
NRBC BLD AUTO-RTO: 0 /100 WBCS
PH UR STRIP.AUTO: 6 [PH]
PHOSPHATE SERPL-MCNC: 2.9 MG/DL (ref 2.3–4.1)
PLATELET # BLD AUTO: 229 THOUSANDS/UL (ref 149–390)
PMV BLD AUTO: 9.1 FL (ref 8.9–12.7)
POTASSIUM SERPL-SCNC: 3.9 MMOL/L (ref 3.5–5.3)
PROT UR STRIP-MCNC: ABNORMAL MG/DL
PROT UR-MCNC: 8 MG/DL
PROT/CREAT UR: 0.08 MG/G{CREAT} (ref 0–0.1)
PTH-INTACT SERPL-MCNC: 56.3 PG/ML (ref 12–88)
RBC # BLD AUTO: 5.15 MILLION/UL (ref 3.81–5.12)
RBC #/AREA URNS AUTO: ABNORMAL /HPF
SODIUM SERPL-SCNC: 138 MMOL/L (ref 135–147)
SP GR UR STRIP.AUTO: 1.02 (ref 1–1.03)
UROBILINOGEN UR STRIP-ACNC: <2 MG/DL
WBC # BLD AUTO: 5 THOUSAND/UL (ref 4.31–10.16)
WBC #/AREA URNS AUTO: ABNORMAL /HPF

## 2023-11-27 PROCEDURE — 36415 COLL VENOUS BLD VENIPUNCTURE: CPT

## 2023-11-27 PROCEDURE — 82306 VITAMIN D 25 HYDROXY: CPT

## 2023-11-27 PROCEDURE — 81001 URINALYSIS AUTO W/SCOPE: CPT

## 2023-11-27 PROCEDURE — 99213 OFFICE O/P EST LOW 20 MIN: CPT | Performed by: ORTHOPAEDIC SURGERY

## 2023-11-27 PROCEDURE — 82570 ASSAY OF URINE CREATININE: CPT

## 2023-11-27 PROCEDURE — 85025 COMPLETE CBC W/AUTO DIFF WBC: CPT

## 2023-11-27 PROCEDURE — 84100 ASSAY OF PHOSPHORUS: CPT

## 2023-11-27 PROCEDURE — 80048 BASIC METABOLIC PNL TOTAL CA: CPT

## 2023-11-27 PROCEDURE — 83970 ASSAY OF PARATHORMONE: CPT

## 2023-11-27 PROCEDURE — 84156 ASSAY OF PROTEIN URINE: CPT

## 2023-11-27 RX ORDER — PREDNISONE 10 MG/1
TABLET ORAL
Qty: 18 TABLET | Refills: 0 | Status: SHIPPED | OUTPATIENT
Start: 2023-11-27 | End: 2023-12-08

## 2023-11-27 RX ORDER — LIDOCAINE 50 MG/G
1 PATCH TOPICAL DAILY
Qty: 20 PATCH | Refills: 0 | Status: SHIPPED | OUTPATIENT
Start: 2023-11-27

## 2023-11-27 NOTE — PROGRESS NOTES
[facility]        NAME: Rubi Desai is a 80 y.o. female  : 1940    MRN: [id number]  DATE: 2023  TIME: 9:08 AM    Assessment and Plan   Acute left-sided low back pain with left-sided sciatica [M54.42]  1. Acute left-sided low back pain with left-sided sciatica  predniSONE 10 mg tablet    lidocaine (Lidoderm) 5 %        History and exam consistent with acute left-sided sciatica. Patient Instructions     Take prednisone as prescribed  Stretching exercises  Alternate ice and heat   Follow up with PCP, consider referral to PT or Spine and Pain Management if symptoms do not improve    Chief Complaint     Chief Complaint   Patient presents with    Leg Pain     Pt c/o pain that radiates from left buttock down left leg. Pt denies any lower back pain at this time. Denies any injury. History of Present Illness       12-year-old female presents the urgent care for evaluation of acute left-sided low back pain with radiation down the left leg. Patient complains of numbness and tingling that radiates down the leg into the foot. She notes that she has had some mild back pain in the past, but has never had sciatica. She denies any falls, slips, trips, or change in activity may have caused her symptoms. She does have a history of arthritis and osteopenia. The patient notes that her symptoms are not terrible while sitting, though lying down and ambulation makes the pain worse. For symptom relief the patient has been taking Tylenol, though it is of minimal abuse. She has never seen a back specialist.  Patient denies any shortness of breath, dizziness, headache, urinary incontinence, saddle paresthesia, urinary frequency/ burning, flank pain. Review of Systems   Review of Systems   Constitutional:  Negative for chills and fever. HENT:  Negative for ear pain and sore throat. Eyes:  Negative for pain and visual disturbance.    Respiratory:  Negative for cough and shortness of breath. Cardiovascular:  Negative for chest pain and palpitations. Gastrointestinal:  Negative for abdominal pain, diarrhea, nausea and vomiting. Genitourinary:  Negative for dysuria, flank pain, frequency, hematuria and urgency. Musculoskeletal:  Positive for back pain and gait problem. Negative for arthralgias. Skin:  Negative for color change and rash. Neurological:  Positive for numbness. Negative for dizziness, seizures, syncope and headaches. All other systems reviewed and are negative.         Current Medications       Current Outpatient Medications:     Ascorbic Acid (Vitamin C) 500 MG CAPS, Take 1 capsule by mouth daily, Disp: , Rfl:     Calcium-Magnesium-Vitamin D (CALCIUM 500 PO), Take by mouth PT CURRENTLY NOT TAKING, Disp: , Rfl:     Cyanocobalamin (VITAMIN B-12 PO), Take by mouth daily, Disp: , Rfl:     Glucos-Chondroit-Hyaluron-MSM (GLUCOSAMINE CHONDROITIN JOINT) TABS, Take by mouth daily  , Disp: , Rfl:     lidocaine (Lidoderm) 5 %, Apply 1 patch topically over 12 hours daily Remove & Discard patch within 12 hours or as directed by MD, Disp: 20 patch, Rfl: 0    metoprolol succinate (TOPROL-XL) 50 mg 24 hr tablet, Take 1 tablet (50 mg total) by mouth daily, Disp: 90 tablet, Rfl: 1    Multiple Vitamins-Minerals (PreserVision AREDS 2+Multi Vit) CAPS, , Disp: , Rfl:     Omega-3 Fatty Acids (FISH OIL) 1000 MG CPDR, Take by mouth, Disp: , Rfl:     predniSONE 10 mg tablet, 4 x 3 days, 3 x 1, 2 x 1, 1 x 1, Disp: 18 tablet, Rfl: 0    triamcinolone (KENALOG) 0.1 % ointment, Apply topically 2 (two) times a day, Disp: 30 g, Rfl: 1    triamterene-hydrochlorothiazide (MAXZIDE-25) 37.5-25 mg per tablet, Take 1 tablet by mouth daily, Disp: 90 tablet, Rfl: 2    diclofenac sodium (VOLTAREN) 1 %, Apply 2 g topically 3 (three) times a day (Patient taking differently: Apply 2 g topically 3 (three) times a day As needed.), Disp: 180 g, Rfl: 2    Current Allergies     Allergies as of 11/27/2023 - Reviewed 11/27/2023   Allergen Reaction Noted    Azithromycin Vomiting and Dizziness 10/11/2018    Amlodipine Swelling 11/04/2022    Lisinopril Cough 11/04/2022    Morphine Vomiting 10/28/2014            The following portions of the patient's history were reviewed and updated as appropriate: allergies, current medications, past family history, past medical history, past social history, past surgical history and problem list.     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       Past Surgical History:   Procedure Laterality Date    APPENDECTOMY  2010    BLADDER SURGERY  1997    BREAST EXCISIONAL BIOPSY Left 1995    benign    no visible scar    BREAST SURGERY      CERVIX SURGERY      dilation and curettage of cervical stump 2005, 1998, [facility]      partial - last assessed 10/28/14    COLONOSCOPY      last assessed 8/9/17    FL RETROGRADE PYELOGRAM  03/29/2021    FL RETROGRADE PYELOGRAM  04/29/2021    HERNIA REPAIR  2011    [place] CYSTO/URETERO W/LITHOTRIPSY &INDWELL STENT INSRT Left 03/29/2021    Procedure: CYSTOSCOPY URETEROSCOPY, RETROGRADE PYELOGRAM AND INSERTION STENT URETERAL;  Surgeon: Junior Mckeon MD;  Location:  MAIN OR;  Service: Urology    [place] CYSTO/URETERO W/LITHOTRIPSY &INDWELL STENT INSRT Left 04/29/2021    Procedure: CYSTOSCOPY URETEROSCOPY WITH LITHOTRIPSY HOLMIUM LASER, RETROGRADE PYELOGRAM AND INSERTION STENT URETERAL;  Surgeon: Zoey Hightower MD;  Location: [facility];  Service: Urology    TONSILLECTOMY  1971    TUBAL LIGATION         Family History   Problem Relation Age of Onset    Arthritis Mother     Osteoporosis Mother     Stroke Mother     Heart disease Father     Osteoporosis Father     Breast cancer Family     Osteoporosis Family     Breast cancer additional onset Family     No Known Problems Sister     No Known Problems Daughter     Breast cancer Maternal Grandmother         typo - she is paternal    No Known Problems Maternal Grandfather     Breast cancer Paternal Grandmother     No Known Problems Paternal Grandfather     No Known Problems Sister          Medications have been verified. Objective   Pulse 60   Temp (!) 97.3 °F (36.3 °C) (Temporal)   Resp 16   SpO2 98%        Physical Exam     Physical Exam  Vitals and nursing note reviewed. Constitutional:       General: She is not in acute distress. Appearance: Normal appearance. She is normal weight. She is not ill-appearing. HENT:      Head: Normocephalic and atraumatic. Nose: Nose normal.      Mouth/Throat:      Pharynx: Oropharynx is clear. Eyes:      Extraocular Movements: Extraocular movements intact. Pupils: Pupils are equal, round, and reactive to light. Cardiovascular:      Rate and Rhythm: Normal rate and regular rhythm. Pulses: Normal pulses. Heart sounds: Normal heart sounds. Pulmonary:      Effort: Pulmonary effort is normal. No respiratory distress. Breath sounds: Normal breath sounds. Abdominal:      Palpations: Abdomen is soft. Musculoskeletal:      Cervical back: Normal range of motion. Comments: Low back:  Skin without lesions, ecchymosis, erythema, warmth, swelling. The patient has palpable tenderness along the sciatic notch. No tenderness lumbar midline or paraspinal musculature. Bilateral negative straight leg raise. 5/5 strength resisted knee flexion/extension, sitting hip flexion, abduction, adduction. Soft lower EXTR and compartments. Sensation intact light touch distally. Lower extremities are well-perfused   Skin:     General: Skin is warm and dry. Capillary Refill: Capillary refill takes less than 2 seconds. Neurological:      General: No focal deficit present. Mental Status: She is alert and oriented to person, place, and time.    Psychiatric:         Mood and Affect: Mood normal.         Behavior: Behavior normal.

## 2023-11-27 NOTE — PATIENT INSTRUCTIONS
Take prednisone as prescribed  Stretching exercises  Alternate ice and heat   Follow up with PCP, consider referral to PT or Spine and Pain Management if symptoms do not improve

## 2023-12-01 DIAGNOSIS — I10 ESSENTIAL HYPERTENSION: ICD-10-CM

## 2023-12-01 RX ORDER — TRIAMTERENE AND HYDROCHLOROTHIAZIDE 37.5; 25 MG/1; MG/1
1 TABLET ORAL DAILY
Qty: 90 TABLET | Refills: 2 | Status: SHIPPED | OUTPATIENT
Start: 2023-12-01

## 2023-12-04 ENCOUNTER — OFFICE VISIT (OUTPATIENT)
Dept: FAMILY MEDICINE CLINIC | Facility: CLINIC | Age: 83
End: 2023-12-04
Payer: COMMERCIAL

## 2023-12-04 VITALS
HEART RATE: 62 BPM | WEIGHT: 196.13 LBS | OXYGEN SATURATION: 97 % | HEIGHT: 63 IN | BODY MASS INDEX: 34.75 KG/M2 | SYSTOLIC BLOOD PRESSURE: 124 MMHG | TEMPERATURE: 97.9 F | DIASTOLIC BLOOD PRESSURE: 78 MMHG

## 2023-12-04 DIAGNOSIS — M54.32 SCIATICA OF LEFT SIDE: Primary | ICD-10-CM

## 2023-12-04 PROCEDURE — 99213 OFFICE O/P EST LOW 20 MIN: CPT | Performed by: STUDENT IN AN ORGANIZED HEALTH CARE EDUCATION/TRAINING PROGRAM

## 2023-12-04 NOTE — ASSESSMENT & PLAN NOTE
Seen in urgent care, completed course of prednisone now using lidocaine patches to left lower back. Recommend conservative treatment as this is the first episode. Discussed with patient if recurrence would consider physical therapy, she does currently go to chair yoga twice per week and I have encouraged her to continue this as tolerable. Patient to continue lidocaine patches as needed.

## 2023-12-04 NOTE — PROGRESS NOTES
Name: Georgia Day      : 1940      MRN: [id number]  Encounter Provider: Nitza Bertrand DO  Encounter Date: 2023   Encounter department: [address] [facility]     1. Sciatica of left side  Assessment & Plan:  Seen in urgent care, completed course of prednisone now using lidocaine patches to left lower back. Recommend conservative treatment as this is the first episode. Discussed with patient if recurrence would consider physical therapy, she does currently go to chair yoga twice per week and I have encouraged her to continue this as tolerable. Patient to continue lidocaine patches as needed. Subjective      Back Pain  This is a new problem. The current episode started in the past 7 days. The problem occurs constantly. The problem has been gradually improving since onset. The pain is present in the lumbar spine. The pain radiates to the left knee and left thigh. The pain is moderate. Pertinent negatives include no chest pain or headaches. Review of Systems   HENT:  Negative for congestion and rhinorrhea. Respiratory:  Negative for cough and shortness of breath. Cardiovascular:  Negative for chest pain and palpitations. Musculoskeletal:  Positive for back pain. Neurological:  Negative for dizziness and headaches.        Current Outpatient Medications on File Prior to Visit   Medication Sig    Ascorbic Acid (Vitamin C) 500 MG CAPS Take 1 capsule by mouth daily    Calcium-Magnesium-Vitamin D (CALCIUM 500 PO) Take by mouth PT CURRENTLY NOT TAKING    Cyanocobalamin (VITAMIN B-12 PO) Take by mouth daily    diclofenac sodium (VOLTAREN) 1 % Apply 2 g topically 3 (three) times a day (Patient taking differently: Apply 2 g topically 3 (three) times a day As needed.)    Glucos-Chondroit-Hyaluron-MSM (GLUCOSAMINE CHONDROITIN JOINT) TABS Take by mouth daily      lidocaine (Lidoderm) 5 % Apply 1 patch topically over 12 hours daily Remove & Discard patch within 12 hours or as directed by MD    metoprolol succinate (TOPROL-XL) 50 mg 24 hr tablet Take 1 tablet (50 mg total) by mouth daily    Multiple Vitamins-Minerals (PreserVision AREDS 2+Multi Vit) CAPS     Omega-3 Fatty Acids (FISH OIL) 1000 MG CPDR Take by mouth    predniSONE 10 mg tablet 4 x 3 days, 3 x 1, 2 x 1, 1 x 1    triamcinolone (KENALOG) 0.1 % ointment Apply topically 2 (two) times a day    triamterene-hydrochlorothiazide (MAXZIDE-25) 37.5-25 mg per tablet Take 1 tablet by mouth daily       Objective     /78 (BP Location: Left arm, Patient Position: Sitting, Cuff Size: Large)   Pulse 62   Temp 97.9 °F (36.6 °C) (Temporal)   Ht 5' 3" (1.6 m)   Wt 89 kg (196 lb 2 oz)   SpO2 97%   BMI 34.74 kg/m²     Physical Exam  Vitals reviewed. Constitutional:       Appearance: Normal appearance. HENT:      Head: Normocephalic and atraumatic. Mouth/Throat:      Mouth: Mucous membranes are moist.      Pharynx: No oropharyngeal exudate or posterior oropharyngeal erythema. Eyes:      Extraocular Movements: Extraocular movements intact. Cardiovascular:      Rate and Rhythm: Normal rate and regular rhythm. Heart sounds: Normal heart sounds. Pulmonary:      Effort: Pulmonary effort is normal.      Breath sounds: Normal breath sounds. Musculoskeletal:      Cervical back: Neck supple. No tenderness. Neurological:      General: No focal deficit present. Mental Status: She is alert and oriented to person, place, and time.    Psychiatric:         Mood and Affect: Mood normal.         Behavior: Behavior normal.       Elvi Christie DO

## 2023-12-07 ENCOUNTER — TELEPHONE (OUTPATIENT)
Dept: NEPHROLOGY | Facility: CLINIC | Age: 83
End: 2023-12-07

## 2023-12-08 ENCOUNTER — OFFICE VISIT (OUTPATIENT)
Dept: NEPHROLOGY | Facility: CLINIC | Age: 83
End: 2023-12-08
Payer: COMMERCIAL

## 2023-12-08 VITALS
BODY MASS INDEX: 34.55 KG/M2 | RESPIRATION RATE: 16 BRPM | WEIGHT: 195 LBS | HEIGHT: 63 IN | HEART RATE: 64 BPM | OXYGEN SATURATION: 97 % | DIASTOLIC BLOOD PRESSURE: 70 MMHG | SYSTOLIC BLOOD PRESSURE: 120 MMHG | TEMPERATURE: 96.9 F

## 2023-12-08 DIAGNOSIS — M89.9 CHRONIC KIDNEY DISEASE-MINERAL AND BONE DISORDER: ICD-10-CM

## 2023-12-08 DIAGNOSIS — N18.9 CHRONIC KIDNEY DISEASE-MINERAL AND BONE DISORDER: ICD-10-CM

## 2023-12-08 DIAGNOSIS — E83.9 CHRONIC KIDNEY DISEASE-MINERAL AND BONE DISORDER: ICD-10-CM

## 2023-12-08 DIAGNOSIS — I10 ESSENTIAL HYPERTENSION: ICD-10-CM

## 2023-12-08 DIAGNOSIS — E66.01 OBESITY, MORBID (HCC): ICD-10-CM

## 2023-12-08 DIAGNOSIS — N18.32 STAGE 3B CHRONIC KIDNEY DISEASE (HCC): Primary | ICD-10-CM

## 2023-12-08 PROBLEM — Z00.00 MEDICARE ANNUAL WELLNESS VISIT, SUBSEQUENT: Status: RESOLVED | Noted: 2022-10-06 | Resolved: 2023-12-08

## 2023-12-08 PROCEDURE — 99214 OFFICE O/P EST MOD 30 MIN: CPT | Performed by: INTERNAL MEDICINE

## 2023-12-08 NOTE — ASSESSMENT & PLAN NOTE
Lab Results   Component Value Date    EGFR 43 11/27/2023    EGFR 36 05/18/2023    EGFR 34 01/31/2023    CREATININE 1.17 11/27/2023    CREATININE 1.36 (H) 05/18/2023    CREATININE 1.43 (H) 01/31/2023   PTH and phosphorus along with vitamin D are within acceptable range and will continue to monitor

## 2023-12-08 NOTE — ASSESSMENT & PLAN NOTE
Lab Results   Component Value Date    EGFR 43 11/27/2023    EGFR 36 05/18/2023    EGFR 34 01/31/2023    CREATININE 1.17 11/27/2023    CREATININE 1.36 (H) 05/18/2023    CREATININE 1.43 (H) 01/31/2023   Patient renal function is quite stable for now.   Advised continue hydration and avoiding nephrotoxic medications

## 2023-12-08 NOTE — PROGRESS NOTES
NEPHROLOGY OFFICE FOLLOW UP  Isabella Menard 80 y.o. female MRN: [id number]    Encounter: [id number] 12/8/2023    REASON FOR VISIT: Santino Menard is a 80 y.o. female who is here on 12/8/2023 for Chronic Kidney Disease and Follow-up  . HPI:    Ronit Tejadaties came in today for follow-up of CKD. 80-year-old woman who looks quite good for age and feeling quite well    Denies any acute complaint    Chest pain no palpitation no shortness of breath    No nausea no vomiting    Denies any urinary complaint        REVIEW OF SYSTEMS:    Review of Systems   Constitutional:  Negative for activity change and fatigue. HENT:  Negative for congestion and ear discharge. Eyes:  Negative for photophobia and pain. Respiratory:  Negative for apnea and choking. Cardiovascular:  Negative for chest pain and palpitations. Gastrointestinal:  Negative for abdominal distention and blood in stool. Endocrine: Negative for heat intolerance and polyphagia. Genitourinary:  Negative for flank pain and urgency. Musculoskeletal:  Negative for neck pain and neck stiffness. Skin:  Negative for color change and wound. Allergic/Immunologic: Negative for food allergies and immunocompromised state. Neurological:  Negative for seizures and facial asymmetry. Hematological:  Negative for adenopathy. Does not bruise/bleed easily. Psychiatric/Behavioral:  Negative for self-injury and suicidal ideas.           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       PAST SURGICAL HISTORY:  Past Surgical History:   Procedure Laterality Date    APPENDECTOMY  2010    [address] [facility]    BREAST EXCISIONAL BIOPSY Left     benign    no visible scar    BREAST SURGERY      CERVIX SURGERY      dilation and curettage of cervical stump 2005, 1998, [address]      partial - last assessed 10/28/14    COLONOSCOPY      last assessed 17    FL RETROGRADE PYELOGRAM  2021    FL RETROGRADE PYELOGRAM  2021    HERNIA REPAIR  2011    ME CYSTO/URETERO W/LITHOTRIPSY &INDWELL STENT INSRT Left 2021    Procedure: CYSTOSCOPY URETEROSCOPY, RETROGRADE PYELOGRAM AND INSERTION STENT URETERAL;  Surgeon: Moris Richardson MD;  Location: [facility];  Service: Urology    ME CYSTO/URETERO W/LITHOTRIPSY &INDWELL STENT INSRT Left 2021    Procedure: CYSTOSCOPY URETEROSCOPY WITH LITHOTRIPSY HOLMIUM LASER, RETROGRADE PYELOGRAM AND INSERTION STENT URETERAL;  Surgeon: Solomon Conway MD;  Location: [facility];  Service: Urology    TONSILLECTOMY  1971    TUBAL LIGATION         SOCIAL HISTORY:  Social History     Substance and Sexual Activity   Alcohol Use Yes    Alcohol/week: 1.0 standard drink of alcohol    Types: 1 Glasses of wine per week    Comment: Occasionally I have a bourbon & ginger ale     Social History     Substance and Sexual Activity   Drug Use No     Social History     Tobacco Use   Smoking Status Former    Packs/day: 1.00    Years: 20.00    Total pack years: 20.00    Types: Cigarettes    Start date: 1958    Quit date: 1979    Years since quittin.5    Passive exposure: Past   Smokeless Tobacco Never   Tobacco Comments    Parents and sisters all smoked, as well as friends. I enjoyed it. And I quit willingly .        FAMILY HISTORY:  Family History   Problem Relation Age of Onset    Arthritis Mother     Osteoporosis Mother     Stroke Mother     Heart disease Father     Osteoporosis Father     Breast cancer Family     Osteoporosis Family     Breast cancer additional onset Family     No Known Problems Sister     No Known Problems Daughter     Breast cancer Maternal Grandmother typo - she is paternal    No Known Problems Maternal Grandfather     Breast cancer Paternal Grandmother     No Known Problems Paternal Grandfather     No Known Problems Sister        MEDICATIONS:    Current Outpatient Medications:     Ascorbic Acid (Vitamin C) 500 MG CAPS, Take 1 capsule by mouth daily, Disp: , Rfl:     Calcium-Magnesium-Vitamin D (CALCIUM 500 PO), Take by mouth PT CURRENTLY NOT TAKING, Disp: , Rfl:     Cyanocobalamin (VITAMIN B-12 PO), Take by mouth daily, Disp: , Rfl:     diclofenac sodium (VOLTAREN) 1 %, Apply 2 g topically 3 (three) times a day (Patient taking differently: Apply 2 g topically 3 (three) times a day As needed.), Disp: 180 g, Rfl: 2    Glucos-Chondroit-Hyaluron-MSM (GLUCOSAMINE CHONDROITIN JOINT) TABS, Take by mouth daily  , Disp: , Rfl:     lidocaine (Lidoderm) 5 %, Apply 1 patch topically over 12 hours daily Remove & Discard patch within 12 hours or as directed by MD, Disp: 20 patch, Rfl: 0    metoprolol succinate (TOPROL-XL) 50 mg 24 hr tablet, Take 1 tablet (50 mg total) by mouth daily, Disp: 90 tablet, Rfl: 1    Multiple Vitamins-Minerals (PreserVision AREDS 2+Multi Vit) CAPS, , Disp: , Rfl:     Omega-3 Fatty Acids (FISH OIL) 1000 MG CPDR, Take by mouth, Disp: , Rfl:     triamterene-hydrochlorothiazide (MAXZIDE-25) 37.5-25 mg per tablet, Take 1 tablet by mouth daily, Disp: 90 tablet, Rfl: 2    PHYSICAL EXAM:  Vitals:    12/08/23 1025   BP: 120/70   BP Location: Right arm   Patient Position: Sitting   Pulse: 64   Resp: 16   Temp: (!) 96.9 °F (36.1 °C)   TempSrc: Temporal   SpO2: 97%   Weight: 88.5 kg (195 lb)   Height: 5' 3" (1.6 m)     Body mass index is 34.54 kg/m². Physical Exam  Constitutional:       General: She is not in acute distress. Appearance: She is well-developed. HENT:      Head: Normocephalic. Mouth/Throat:      Mouth: Mucous membranes are moist.   Eyes:      General: No scleral icterus.      Conjunctiva/sclera: Conjunctivae normal.   Neck: Vascular: No JVD. Cardiovascular:      Rate and Rhythm: Normal rate. Heart sounds: Normal heart sounds. Pulmonary:      Effort: Pulmonary effort is normal.      Breath sounds: No wheezing. Abdominal:      Palpations: Abdomen is soft. Tenderness: There is no abdominal tenderness. Musculoskeletal:         General: Normal range of motion. Cervical back: Neck supple. Skin:     General: Skin is warm. Findings: No rash. Neurological:      Mental Status: She is alert and oriented to person, place, and time.    Psychiatric:         Behavior: Behavior normal.         LAB RESULTS:  Results for orders placed or performed in visit on 23/05/56   Basic metabolic panel   Result Value Ref Range    Sodium 138 135 - 147 mmol/L    Potassium 3.9 3.5 - 5.3 mmol/L    Chloride 102 96 - 108 mmol/L    CO2 30 21 - 32 mmol/L    ANION GAP 6 mmol/L    BUN 18 5 - 25 mg/dL    Creatinine 1.17 0.60 - 1.30 mg/dL    Glucose, Fasting 91 65 - 99 mg/dL    Calcium 9.2 8.4 - 10.2 mg/dL    eGFR 43 ml/min/1.73sq m   CBC and differential   Result Value Ref Range    WBC 5.00 4.31 - 10.16 Thousand/uL    RBC 5.15 (H) 3.81 - 5.12 Million/uL    Hemoglobin 15.8 (H) 11.5 - 15.4 g/dL    Hematocrit 49.0 (H) 34.8 - 46.1 %    MCV 95 82 - 98 fL    MCH 30.7 26.8 - 34.3 pg    MCHC 32.2 31.4 - 37.4 g/dL    RDW 13.7 11.6 - 15.1 %    MPV 9.1 8.9 - 12.7 fL    Platelets 294 083 - 743 Thousands/uL    nRBC 0 /100 WBCs    Neutrophils Relative 66 43 - 75 %    Immat GRANS % 0 0 - 2 %    Lymphocytes Relative 23 14 - 44 %    Monocytes Relative 7 4 - 12 %    Eosinophils Relative 3 0 - 6 %    Basophils Relative 1 0 - 1 %    Neutrophils Absolute 3.29 1.85 - 7.62 Thousands/µL    Immature Grans Absolute 0.01 0.00 - 0.20 Thousand/uL    Lymphocytes Absolute 1.16 0.60 - 4.47 Thousands/µL    Monocytes Absolute 0.35 0.17 - 1.22 Thousand/µL    Eosinophils Absolute 0.15 0.00 - 0.61 Thousand/µL    Basophils Absolute 0.04 0.00 - 0.10 Thousands/µL   Phosphorus Result Value Ref Range    Phosphorus 2.9 2.3 - 4.1 mg/dL   Protein / creatinine ratio, urine   Result Value Ref Range    Creatinine, Ur 97.9 Reference range not established. mg/dL    Protein Urine Random 8 Reference range not established. mg/dL    Prot/Creat Ratio, Ur 0.08 0.00 - 0.10   PTH, intact   Result Value Ref Range    PTH 56.3 12.0 - 88.0 pg/mL   UA (URINE) with reflex to Scope   Result Value Ref Range    Color, UA Light Yellow     Clarity, UA Turbid     Specific Gravity, UA 1.020 1.003 - 1.030    pH, UA 6.0 4.5, 5.0, 5.5, 6.0, 6.5, 7.0, 7.5, 8.0    Leukocytes, UA Small (A) Negative    Nitrite, UA Negative Negative    Protein, UA Trace (A) Negative mg/dl    Glucose, UA Negative Negative mg/dl    Ketones, UA Negative Negative mg/dl    Urobilinogen, UA <2.0 <2.0 mg/dl mg/dl    Bilirubin, UA Negative Negative    Occult Blood, UA Negative Negative   Vitamin D 25 hydroxy   Result Value Ref Range    Vit D, 25-Hydroxy 37.0 30.0 - 100.0 ng/mL   Urine Microscopic   Result Value Ref Range    RBC, UA None Seen None Seen, 1-2 /hpf    WBC, UA 4-10 (A) None Seen, 1-2 /hpf    Epithelial Cells Occasional None Seen, Occasional /hpf    Bacteria, UA Moderate (A) None Seen, Occasional /hpf    MUCUS THREADS Occasional (A) None Seen       ASSESSMENT and PLAN:      Stage 3b chronic kidney disease (HCC)  Lab Results   Component Value Date    EGFR 43 11/27/2023    EGFR 36 05/18/2023    EGFR 34 01/31/2023    CREATININE 1.17 11/27/2023    CREATININE 1.36 (H) 05/18/2023    CREATININE 1.43 (H) 01/31/2023   Patient renal function is quite stable for now.   Advised continue hydration and avoiding nephrotoxic medications    Chronic kidney disease-mineral and bone disorder  Lab Results   Component Value Date    EGFR 43 11/27/2023    EGFR 36 05/18/2023    EGFR 34 01/31/2023    CREATININE 1.17 11/27/2023    CREATININE 1.36 (H) 05/18/2023    CREATININE 1.43 (H) 01/31/2023   PTH and phosphorus along with vitamin D are within acceptable range and will continue to monitor    Essential hypertension  Very well-controlled    Obesity, morbid ([address])  Patient is losing weight which is quite good. I encouraged her to do that      Everything discussed with the patient at length. I will see her back in 6 months. We will get blood and urine test before that visit        Portions of the record may have been created with voice recognition software. Occasional wrong word or "sound a like" substitutions may have occurred due to the inherent limitations of voice recognition software. Read the chart carefully and recognize, using context, where substitutions have occurred. If you have any questions, please contact the dictating provider.

## 2024-01-12 ENCOUNTER — TELEPHONE (OUTPATIENT)
Dept: NEPHROLOGY | Facility: CLINIC | Age: 84
End: 2024-01-12

## 2024-01-22 ENCOUNTER — OFFICE VISIT (OUTPATIENT)
Dept: URGENT CARE | Facility: CLINIC | Age: 84
End: 2024-01-22
Payer: COMMERCIAL

## 2024-01-22 VITALS
TEMPERATURE: 97.8 F | OXYGEN SATURATION: 96 % | RESPIRATION RATE: 16 BRPM | SYSTOLIC BLOOD PRESSURE: 148 MMHG | HEART RATE: 68 BPM | DIASTOLIC BLOOD PRESSURE: 87 MMHG

## 2024-01-22 DIAGNOSIS — M65.4 RADIAL STYLOID TENOSYNOVITIS (DE QUERVAIN): ICD-10-CM

## 2024-01-22 DIAGNOSIS — M54.2 NECK PAIN ON LEFT SIDE: Primary | ICD-10-CM

## 2024-01-22 PROCEDURE — 99214 OFFICE O/P EST MOD 30 MIN: CPT | Performed by: PHYSICIAN ASSISTANT

## 2024-01-22 RX ORDER — PREDNISONE 10 MG/1
TABLET ORAL DAILY
Qty: 18 TABLET | Refills: 0 | Status: SHIPPED | OUTPATIENT
Start: 2024-01-22 | End: 2024-01-29

## 2024-01-22 NOTE — PROGRESS NOTES
[facility]        NAME: Krystyna Menard is a 83 y.o. female  : 1940    MRN: [id number]  DATE: 2024  TIME: 11:54 AM      Assessment and Plan     Neck pain on left side [M54.2]  1. Neck pain on left side  predniSONE 10 mg tablet      2. Radial styloid tenosynovitis (de quervain)  predniSONE 10 mg tablet        Note:   Suspect MSK etiology of neck pain   Gave thumb spica brace for Dequervain's left side and will give prednisone, which patient tolerated well previously.     Patient Instructions     Patient Instructions   Please wear the brace as much as possible for 2 weeks, possibly longer.   Please take the prednisone as prescribed  You may also continue with over-the-counter tylenol and lidocaine patches   If symptoms persist beyond 7-10 days, please follow up with your primary care provider or orthopedics      Follow up with PCP in 3-5 days.  Go to ER if symptoms worsen.    Chief Complaint     Chief Complaint   Patient presents with    Pain     Pt c/o Left-sided thumb pain for 10 days, described as sharp pain with movement. Pt also c/o left sided neck pain for 1 week also described as sharp pain but its constant. OTC: tylenol taken last night around 0200. Does help a bit but pain returns when wears off.          History of Present Illness     Patient presents with left sided neck pain x 1 week and left thumb pain x 10 days. She denies known injury. She has been taking tylenol with mild relief and using lidocaine patches OTC on the neck with some relief. She denies chest pain, SOB, and dizziness.         Review of Systems     Review of Systems   Constitutional:  Negative for chills, fatigue and fever.   HENT:  Negative for congestion, ear pain, postnasal drip, rhinorrhea, sinus pressure, sinus pain and sore throat.    Eyes:  Negative for pain and visual disturbance.   Respiratory:  Negative for cough, chest tightness and shortness of breath.    Cardiovascular:  Negative for chest pain  and palpitations.   Gastrointestinal:  Negative for abdominal pain, diarrhea, nausea and vomiting.   Genitourinary:  Negative for dysuria and hematuria.   Musculoskeletal:  Positive for arthralgias and neck pain. Negative for back pain and myalgias.   Skin:  Negative for rash.   Neurological:  Negative for dizziness, seizures, syncope, numbness and headaches.   All other systems reviewed and are negative.        Current Medications       Current Outpatient Medications:     Ascorbic Acid (Vitamin C) 500 MG CAPS, Take 1 capsule by mouth daily, Disp: , Rfl:     Calcium-Magnesium-Vitamin D (CALCIUM 500 PO), Take by mouth PT CURRENTLY NOT TAKING, Disp: , Rfl:     Cyanocobalamin (VITAMIN B-12 PO), Take by mouth daily, Disp: , Rfl:     Glucos-Chondroit-Hyaluron-MSM (GLUCOSAMINE CHONDROITIN JOINT) TABS, Take by mouth daily  , Disp: , Rfl:     lidocaine (Lidoderm) 5 %, Apply 1 patch topically over 12 hours daily Remove & Discard patch within 12 hours or as directed by MD, Disp: 20 patch, Rfl: 0    metoprolol succinate (TOPROL-XL) 50 mg 24 hr tablet, Take 1 tablet (50 mg total) by mouth daily, Disp: 90 tablet, Rfl: 1    Multiple Vitamins-Minerals (PreserVision AREDS 2+Multi Vit) CAPS, , Disp: , Rfl:     Omega-3 Fatty Acids (FISH OIL) 1000 MG CPDR, Take by mouth, Disp: , Rfl:     predniSONE 10 mg tablet, Take 4 tablets (40 mg total) by mouth daily for 3 days, THEN 2 tablets (20 mg total) daily for 2 days, THEN 1 tablet (10 mg total) daily for 2 days., Disp: 18 tablet, Rfl: 0    triamterene-hydrochlorothiazide (MAXZIDE-25) 37.5-25 mg per tablet, Take 1 tablet by mouth daily, Disp: 90 tablet, Rfl: 2    diclofenac sodium (VOLTAREN) 1 %, Apply 2 g topically 3 (three) times a day (Patient taking differently: Apply 2 g topically 3 (three) times a day As needed.), Disp: 180 g, Rfl: 2    Current Allergies     Allergies as of 01/22/2024 - Reviewed 01/22/2024   Allergen Reaction Noted    Azithromycin Vomiting and Dizziness 10/11/2018     Amlodipine Swelling 11/04/2022    Lisinopril Cough 11/04/2022    Morphine Vomiting 10/28/2014              The following portions of the patient's history were reviewed and updated as appropriate: allergies, current medications, past family history, past medical history, past social history, past surgical history, and problem list.     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       Past Surgical History:   Procedure Laterality Date    APPENDECTOMY  2010    BLADDER SURGERY  1997    BREAST EXCISIONAL BIOPSY Left 1995    benign    no visible scar    BREAST SURGERY      CERVIX SURGERY      dilation and curettage of cervical stump 2005, 1998, 1994    CHOLECYSTECTOMY  1997    COLECTOMY      partial - last assessed 10/28/14    COLONOSCOPY      last assessed 8/9/17    FL RETROGRADE PYELOGRAM  03/29/2021    FL RETROGRADE PYELOGRAM  04/29/2021    HERNIA REPAIR  2011    DC CYSTO/URETERO W/LITHOTRIPSY &INDWELL STENT INSRT Left 03/29/2021    Procedure: CYSTOSCOPY URETEROSCOPY, RETROGRADE PYELOGRAM AND INSERTION STENT URETERAL;  Surgeon: Marv Nunn MD;  Location:  MAIN OR;  Service: Urology    DC CYSTO/URETERO W/LITHOTRIPSY &INDWELL STENT INSRT Left 04/29/2021    Procedure: CYSTOSCOPY URETEROSCOPY WITH LITHOTRIPSY HOLMIUM LASER, RETROGRADE PYELOGRAM AND INSERTION STENT URETERAL;  Surgeon: Marcell Yoon MD;  Location: [facility];  Service: Urology    TONSILLECTOMY  1971    TUBAL LIGATION         Family History   Problem Relation Age of Onset    Arthritis Mother     Osteoporosis Mother     Stroke Mother     Heart disease Father     Osteoporosis Father     Breast cancer Family     Osteoporosis Family      Breast cancer additional onset Family     No Known Problems Sister     No Known Problems Daughter     Breast cancer Maternal Grandmother         typo - she is paternal    No Known Problems Maternal Grandfather     Breast cancer Paternal Grandmother     No Known Problems Paternal Grandfather     No Known Problems Sister          Medications have been verified.        Objective     /87   Pulse 68   Temp 97.8 °F (36.6 °C)   Resp 16   SpO2 96%   No LMP recorded. Patient is postmenopausal.         Physical Exam     Physical Exam  Vitals and nursing note reviewed.   Constitutional:       Appearance: Normal appearance. She is normal weight.   HENT:      Head: Normocephalic and atraumatic.   Cardiovascular:      Rate and Rhythm: Normal rate and regular rhythm.      Heart sounds: Normal heart sounds.   Pulmonary:      Effort: Pulmonary effort is normal.      Breath sounds: Normal breath sounds.   Musculoskeletal:      Cervical back: Tenderness present. No swelling, rigidity, spasms or bony tenderness. Normal range of motion.      Comments: Mild tenderness to palpation of paraspinal muscles of left cervical region    Left wrist with minimal tenderness to palpation thumb side and positive Finkelstein test.    Skin:     General: Skin is warm and dry.   Neurological:      General: No focal deficit present.      Mental Status: She is alert and oriented to person, place, and time.   Psychiatric:         Mood and Affect: Mood normal.         Behavior: Behavior normal.

## 2024-01-22 NOTE — PATIENT INSTRUCTIONS
Please wear the brace as much as possible for 2 weeks, possibly longer.   Please take the prednisone as prescribed  You may also continue with over-the-counter tylenol and lidocaine patches   If symptoms persist beyond 7-10 days, please follow up with your primary care provider or orthopedics

## 2024-02-21 PROBLEM — Z13.89 SCREENING FOR SKIN CONDITION: Status: RESOLVED | Noted: 2018-03-29 | Resolved: 2024-02-21

## 2024-03-06 DIAGNOSIS — I10 ESSENTIAL HYPERTENSION: ICD-10-CM

## 2024-03-06 RX ORDER — METOPROLOL SUCCINATE 50 MG/1
50 TABLET, EXTENDED RELEASE ORAL DAILY
Qty: 90 TABLET | Refills: 1 | Status: SHIPPED | OUTPATIENT
Start: 2024-03-06

## 2024-04-25 ENCOUNTER — OFFICE VISIT (OUTPATIENT)
Age: 84
End: 2024-04-25
Payer: COMMERCIAL

## 2024-04-25 VITALS — HEIGHT: 63 IN | WEIGHT: 193.2 LBS | BODY MASS INDEX: 34.23 KG/M2

## 2024-04-25 DIAGNOSIS — D18.01 CHERRY ANGIOMA: ICD-10-CM

## 2024-04-25 DIAGNOSIS — D22.9 MULTIPLE MELANOCYTIC NEVI: ICD-10-CM

## 2024-04-25 DIAGNOSIS — L82.1 SEBORRHEIC KERATOSIS: ICD-10-CM

## 2024-04-25 DIAGNOSIS — L57.0 KERATOSIS, ACTINIC: ICD-10-CM

## 2024-04-25 DIAGNOSIS — Z85.89 HISTORY OF SQUAMOUS CELL CARCINOMA: Primary | ICD-10-CM

## 2024-04-25 PROCEDURE — 99213 OFFICE O/P EST LOW 20 MIN: CPT | Performed by: DERMATOLOGY

## 2024-04-25 PROCEDURE — 17000 DESTRUCT PREMALG LESION: CPT | Performed by: DERMATOLOGY

## 2024-04-25 NOTE — PATIENT INSTRUCTIONS
"MELANOCYTIC NEVI (\"Moles\")        Melanocytic nevi (\"moles\") are tan or brown, raised or flat areas of the skin which have an increased number of melanocytes. Melanocytes are the cells in our body which make pigment and account for skin color.    Some moles are present at birth (I.e., \"congenital nevi\"), while others come up later in life (i.e., \"acquired nevi\").  The sun can stimulate the body to make more moles.  Sunburns are not the only thing that triggers more moles.  Chronic sun exposure can do it too.     Clinically distinguishing a healthy mole from melanoma may be difficult, even for experienced dermatologists. The \"ABCDE's\" of moles have been suggested as a means of helping to alert a person to a suspicious mole and the possible increased risk of melanoma.  The suggestions for raising alert are as follows:    Asymmetry: Healthy moles tend to be symmetric, while melanomas are often asymmetric.  Asymmetry means if you draw a line through the mole, the two halves do not match in color, size, shape, or surface texture. Asymmetry can be a result of rapid enlargement of a mole, the development of a raised area on a previously flat lesion, scaling, ulceration, bleeding or scabbing within the mole.  Any mole that starts to demonstrate \"asymmetry\" should be examined promptly by a board certified dermatologist.     Border: Healthy moles tend to have discrete, even borders.  The border of a melanoma often blends into the normal skin and does not sharply delineate the mole from normal skin.  Any mole that starts to demonstrate \"uneven borders\" should be examined promptly by a board certified dermatologist.     Color: Healthy moles tend to be one color throughout.  Melanomas tend to be made up of different colors ranging from dark black, blue, white, or red.  Any mole that demonstrates a color change should be examined promptly by a board certified dermatologist.     Diameter: Healthy moles tend to be smaller than 0.6 " "cm in size; an exception are \"congenital nevi\" that can be larger.  Melanomas tend to grow and can often be greater than 0.6 cm (1/4 of an inch, or the size of a pencil eraser). This is only a guideline, and many normal moles may be larger than 0.6 cm without being unhealthy.  Any mole that starts to change in size (small to bigger or bigger to smaller) should be examined promptly by a board certified dermatologist.     Evolving: Healthy moles tend to \"stay the same.\"  Melanomas may often show signs of change or evolution such as a change in size, shape, color, or elevation.  Any mole that starts to itch, bleed, crust, burn, hurt, or ulcerate or demonstrate a change or evolution should be examined promptly by a board certified dermatologist.      Dysplastic Nevi  Dysplastic moles are moles that fit the ABCDE rules of melanoma but are not identified as melanomas when examined under the microscope.  They may indicate an increased risk of melanoma in that person. If there is a family history of melanoma, most experts agree that the person may be at an increased risk for developing a melanoma.  Experts still do not agree on what dysplastic moles mean in patients without a personal or family history of melanoma.  Dysplastic moles are usually larger than common moles and have different colors within it with irregular borders. The appearance can be very similar to a melanoma. Biopsies of dysplastic moles may show abnormalities which are different from a regular mole.      Melanoma  Malignant melanoma is a type of skin cancer that can be deadly if it spreads throughout the body. The incidence of melanoma in the [place] is growing faster than any other cancer. Melanoma usually grows near the surface of the skin for a period of time, and then begins to grow deeper into the skin. Once it grows deeper into the skin, the risk of spread to other organs greatly increases. Therefore, early detection and removal of a malignant " "melanoma may result in a better chance at a complete cure; removal after the tumor has spread may not be as effective, leading to worse clinical outcomes such as death.    The true rate of nevus transformation into a melanoma is unknown. It has been estimated that the lifetime risk for any acquired melanocytic nevus on any 20-year-old individual transforming into melanoma by age 80 is 0.03% (1 in 3,164) for men and 0.009% (1 in 10,800) for women.     The appearance of a \"new mole\" remains one of the most reliable methods for identifying a malignant melanoma.  Occasionally, melanomas appear as rapidly growing, blue-black, dome-shaped bumps within a previous mole or previous area of normal skin.  Other times, melanomas are suspected when a mole suddenly appears or changes. Itching, burning, or pain in a pigmented lesion should increase suspicion, but most patients with early melanoma have no skin discomfort whatsoever.  Melanoma can occur anywhere on the skin, including areas that are difficult for self-examination. Many melanomas are first noticed by other family members.  Suspicious-looking moles may be removed for microscopic examination.       You may be able to prevent death from melanoma by doing two simple things:    Try to avoid unnecessary sun exposure and protect your skin when it is exposed to the sun.  People who live near the equator, people who have intermittent exposures to large amounts of sun, and people who have had sunburns in childhood or adolescence have an increased risk for melanoma. Sun sense and vigilant sun protection may be keys to helping to prevent melanoma.  We recommend wearing UPF-rated sun protective clothing and sunglasses whenever possible and applying a moisturizer-sunscreen combination product (SPF 50+) such as Neutrogena Daily Defense to sun exposed areas of skin at least three times a day.    Have your moles regularly examined by a board certified dermatologist AND by yourself or " "a family member/friend at home.  We recommend that you have your moles examined at least once a year by a board certified dermatologist.  Use your birthday as an annual reminder to have your \"Birthday Suit\" (I.e., your skin) examined; it is a nice birthday gift to yourself to know that your skin is healthy appearing!  Additionally, at-home self examinations may be helpful for detecting a possible melanoma.  Use the ABCDEs we discussed and check your moles once a month at home.        SEBORRHEIC KERATOSIS  A seborrheic keratosis is a harmless warty spot that appears during adult life as a common sign of skin aging.  Seborrheic keratoses can arise on any area of skin, covered or uncovered, with the usual exception of the palms and soles. They do not arise from mucous membranes. Seborrheic keratoses can have highly variable appearance.      Seborrheic keratoses are extremely common. It has been estimated that over 90% of adults over the age of 60 years have one or more of them. They occur in males and females of all races, typically beginning to erupt in the 30s or 40s. They are uncommon under the age of 20 years.  The precise cause of seborrhoeic keratoses is not known.  Seborrhoeic keratoses are considered degenerative in nature. As time goes by, seborrheic keratoses tend to become more numerous. Some people inherit a tendency to develop a very large number of them; some people may have hundreds of them.    The name \"seborrheic keratosis\" is misleading, because these lesions are not limited to a seborrhoeic distribution (scalp, mid-face, chest, upper back), nor are they formed from sebaceous glands, nor are they associated with sebum -- which is greasy.  Seborrheic keratosis may also be called \"SK,\" \"Seb K,\" \"basal cell papilloma,\" \"senile wart,\" or \"barnacle.\"      There is no easy way to remove multiple lesions on a single occasion.  Unless a specific lesion is \"inflamed\" and is causing pain or stinging/burning or " "is bleeding, most insurance companies do not authorize treatment.      ANGIOMA (\"CHERRY ANGIOMA\")  Talbot angiomas markedly increase in number from about the age of 40, so it has been estimated that 75% of people over 75 years of age have them. Although they also called \"senile angiomas,\" they can occur in young people too - 5% of adolescents have been found to have them.     Cherry angiomas are very common in males and females of any age or race, with no difference in sexes or races affected. They are however more noticeable in white skin than in skin of colour.  There may be a family history of similar lesions. Eruptive (very large number appearing in a short period of time) cherry angiomas have been rarely reported to be associated with internal malignancy and pregnancy.   "

## 2024-04-25 NOTE — PROGRESS NOTES
"[facility] Dermatology Clinic Note     Patient Name: Krystyna Menard  Encounter Date: 4/25/2024    Have you been cared for by a [facility] Dermatologist in the last 3 years and, if so, which description applies to you?    Yes.  I have been here within the last 3 years, and my medical history has NOT changed since that time.  I am FEMALE/of child-bearing potential.    REVIEW OF SYSTEMS:  Have you recently had or currently have any of the following? No changes in my recent health.   PAST MEDICAL HISTORY:  Have you personally ever had or currently have any of the following?  If \"YES,\" then please provide more detail. No changes in my medical history.   HISTORY OF IMMUNOSUPPRESSION: Do you have a history of any of the following:  Systemic Immunosuppression such as Diabetes, Biologic or Immunotherapy, Chemotherapy, Organ Transplantation, Bone Marrow Transplantation?  No     Answering \"YES\" requires the addition of the dotphrase \"IMMUNOSUPPRESSED\" as the first diagnosis of the patient's visit.   FAMILY HISTORY:  Any \"first degree relatives\" (parent, brother, sister, or child) with the following?    No changes in my family's known health.   PATIENT EXPERIENCE:    Do you want the Dermatologist to perform a COMPLETE skin exam today including a clinical examination under the \"bra and underwear\" areas?  Yes  If necessary, do we have your permission to call and leave a detailed message on your Preferred Phone number that includes your specific medical information?  Yes      Allergies   Allergen Reactions    Azithromycin Vomiting and Dizziness    Amlodipine Swelling    Lisinopril Cough    Morphine Vomiting      Current Outpatient Medications:     Ascorbic Acid (Vitamin C) 500 MG CAPS, Take 1 capsule by mouth daily, Disp: , Rfl:     Calcium-Magnesium-Vitamin D (CALCIUM 500 PO), Take by mouth PT CURRENTLY NOT TAKING, Disp: , Rfl:     Cyanocobalamin (VITAMIN B-12 PO), Take by mouth daily, Disp: , Rfl:     diclofenac sodium " (VOLTAREN) 1 %, Apply 2 g topically 3 (three) times a day (Patient taking differently: Apply 2 g topically 3 (three) times a day As needed.), Disp: 180 g, Rfl: 2    Glucos-Chondroit-Hyaluron-MSM (GLUCOSAMINE CHONDROITIN JOINT) TABS, Take by mouth daily  , Disp: , Rfl:     metoprolol succinate (TOPROL-XL) 50 mg 24 hr tablet, take 1 tablet by mouth once daily, Disp: 90 tablet, Rfl: 1    Multiple Vitamins-Minerals (PreserVision AREDS 2+Multi Vit) CAPS, , Disp: , Rfl:     Omega-3 Fatty Acids (FISH OIL) 1000 MG CPDR, Take by mouth, Disp: , Rfl:     triamterene-hydrochlorothiazide (MAXZIDE-25) 37.5-25 mg per tablet, Take 1 tablet by mouth daily, Disp: 90 tablet, Rfl: 2    lidocaine (Lidoderm) 5 %, Apply 1 patch topically over 12 hours daily Remove & Discard patch within 12 hours or as directed by MD, Disp: 20 patch, Rfl: 0          Whom besides the patient is providing clinical information about today's encounter?   NO ADDITIONAL HISTORIAN (patient alone provided history)    Physical Exam and Assessment/Plan by Diagnosis:    CHIEF COMPLAINT    83 year old male or female patient presents today for 6 Month Check Up.  Patient has a History of squamous cell carcinoma. Patient has concerns in left ear and on nose.     HISTORY OF SQUAMOUS CELL CARCINOMA     Physical Exam:  Anatomic Location Affected:  2019 - Right Chest, Right Wrist; 2022 - Mid Upper Back 2023- left cheek   Morphological Description of Scar:  well healed   Suspected Recurrence: no  Regional adenopathy: no        Assessment and Plan:  Based on a thorough discussion of this condition and the management approach to it (including a comprehensive discussion of the known risks, side effects and potential benefits of treatment), the patient (family) agrees to implement the following specific plan:  Skin checks every 6 months     ACTINIC KERATOSIS    Physical Exam:  Anatomic Location: Nose   Morphologic Description: Scaly pink papules  Pertinent Positives:  Pertinent  "Negatives:  Physical Exam  HENT:      Head:          Additional History of Present Condition:    History of bleeding from this lesion? NO  History of pain, tenderness, and/or itching within this lesion? NO  Personal history of skin cancer? YES, SCC  Family history of skin cancer? NO  Previous treatment to the same site? YES, Cryotherapy     Plan:  Cryotherapy performed in the office (See Procedure Note). We discussed that a hypopigmentation or scar may form in the region following cryotherapy.  We discussed the pre-cancerous nature of the condition. Actinic keratosis is found on sun-damaged skin and there is small risk that the condition could turn into a skin cancer called squamous cell carcinoma. There is no risk of actinic keratosis turning into melanoma.  We discussed sun protection measures, including using sunscreen with an SPF 50+ year round, avoiding the sun, and wearing protective clothing such as long sleeves and pants when out in the sun.  Continue to monitor clinically for signs of recurrence. Discussed with patient the importance of keeping up to date with full body skin exams.     PROCEDURES PERFORMED TODAY ASSOCIATED WITH THIS CONDITION:          Cryotherapy: PROCEDURE:  DESTRUCTION OF PRE-MALIGNANT LESIONS  After a thorough discussion of treatment options and risk/benefits/alternatives (including but not limited to local pain, scarring, dyspigmentation, blistering, and possible superinfection), verbal and written consent were obtained and the aforementioned lesions were treated on with cryotherapy using liquid nitrogen x 1 cycle for 5-10 seconds.    TOTAL NUMBER of 1 pre-malignant lesions were treated today on the ANATOMIC LOCATION: Nose.     The patient tolerated the procedure well, and after-care instructions were provided.          MELANOCYTIC NEVI (\"Moles\")    Physical Exam:  Anatomic Location Affected: Mostly on sun-exposed areas of the trunk and extremities   Morphological Description:  " "Scattered, 1-4mm round to ovoid, symmetrical-appearing, even bordered, skin colored to dark brown macules/papules, mostly in sun-exposed areas  Pertinent Positives:  Pertinent Negatives:    Additional History of Present Condition:  Present on exam     Assessment and Plan:  Based on a thorough discussion of this condition and the management approach to it (including a comprehensive discussion of the known risks, side effects and potential benefits of treatment), the patient (family) agrees to implement the following specific plan:  Provided handout with information regarding the ABCDE's of moles   Recommend routine skin exams every 6 months   Sun avoidance, protective clothing (known as UPF clothing), and the use of at least SPF 30 sunscreens is advised. Sunscreen should be reapplied every two hours when outside.       SEBORRHEIC KERATOSIS; NON-INFLAMED    Physical Exam:  Anatomic Location Affected:  scattered across trunk, extremities,  face  Morphological Description:  Flat and raised, waxy, smooth to warty textured, yellow to brownish-grey to dark brown to blackish, discrete, \"stuck-on\" appearing papules.  Pertinent Positives:  Pertinent Negatives:    Additional History of Present Condition:  Patient reports new bumps on the skin.  Denies itch, burn, pain, bleeding or ulceration.  Present constantly; nothing seems to make it worse or better.  No prior treatment.      Assessment and Plan:  Based on a thorough discussion of this condition and the management approach to it (including a comprehensive discussion of the known risks, side effects and potential benefits of treatment), the patient (family) agrees to implement the following specific plan:  Reassured benign      ANGIOMA (\"CHERRY ANGIOMA\")    Physical Exam:  Anatomic Location: scattered across sun exposed areas of the trunk and extremities   Morphologic Description: Firm red to reddish-blue discrete papules  Pertinent Positives:  Pertinent " Negatives:    Additional History of Present Condition:  Present on exam.     Assessment and Plan:  Reassured benign    Scribe Attestation      I,:  Kathy Dallas MA am acting as a scribe while in the presence of the attending physician.:       I,:  Markus Cavazos MD personally performed the services described in this documentation    as scribed in my presence.:

## 2024-05-09 ENCOUNTER — RA CDI HCC (OUTPATIENT)
Dept: OTHER | Facility: HOSPITAL | Age: 84
End: 2024-05-09

## 2024-05-13 ENCOUNTER — OFFICE VISIT (OUTPATIENT)
Dept: FAMILY MEDICINE CLINIC | Facility: CLINIC | Age: 84
End: 2024-05-13
Payer: COMMERCIAL

## 2024-05-13 ENCOUNTER — APPOINTMENT (OUTPATIENT)
Dept: LAB | Facility: CLINIC | Age: 84
End: 2024-05-13
Payer: COMMERCIAL

## 2024-05-13 VITALS
HEART RATE: 54 BPM | BODY MASS INDEX: 34.82 KG/M2 | TEMPERATURE: 98.1 F | OXYGEN SATURATION: 98 % | WEIGHT: 196.5 LBS | SYSTOLIC BLOOD PRESSURE: 124 MMHG | HEIGHT: 63 IN | DIASTOLIC BLOOD PRESSURE: 70 MMHG

## 2024-05-13 DIAGNOSIS — R20.2 NUMBNESS AND TINGLING: ICD-10-CM

## 2024-05-13 DIAGNOSIS — G56.01 CARPAL TUNNEL SYNDROME OF RIGHT WRIST: ICD-10-CM

## 2024-05-13 DIAGNOSIS — R20.0 NUMBNESS AND TINGLING: Primary | ICD-10-CM

## 2024-05-13 DIAGNOSIS — R20.2 NUMBNESS AND TINGLING: Primary | ICD-10-CM

## 2024-05-13 DIAGNOSIS — R20.0 NUMBNESS AND TINGLING: ICD-10-CM

## 2024-05-13 LAB — VIT B12 SERPL-MCNC: 700 PG/ML (ref 180–914)

## 2024-05-13 PROCEDURE — 82607 VITAMIN B-12: CPT

## 2024-05-13 PROCEDURE — G2211 COMPLEX E/M VISIT ADD ON: HCPCS | Performed by: STUDENT IN AN ORGANIZED HEALTH CARE EDUCATION/TRAINING PROGRAM

## 2024-05-13 PROCEDURE — 99213 OFFICE O/P EST LOW 20 MIN: CPT | Performed by: STUDENT IN AN ORGANIZED HEALTH CARE EDUCATION/TRAINING PROGRAM

## 2024-05-13 PROCEDURE — 36415 COLL VENOUS BLD VENIPUNCTURE: CPT

## 2024-05-13 NOTE — ASSESSMENT & PLAN NOTE
Recommend conservative management with Wrist brace qhs  Voltaren gel prn  Patient does knit daily and I have discussed with her limiting repetitive activity in order to avoid exacerbation.   Will monitor and will also check Vit B12 level

## 2024-05-13 NOTE — PROGRESS NOTES
Name: Krystyna Menard      : 1940      MRN: [id number]  Encounter Provider: Margot Barron DO  Encounter Date: 2024   Encounter department: [facility] PRIMARY [facility]ARE    Assessment & Plan     1. Numbness and tingling  -     Vitamin B12; Future    2. Carpal tunnel syndrome of right wrist  Assessment & Plan:  Recommend conservative management with Wrist brace qhs  Voltaren gel prn  Patient does knit daily and I have discussed with her limiting repetitive activity in order to avoid exacerbation.   Will monitor and will also check Vit B12 level             Subjective      Hand Pain   The incident occurred more than 1 week ago. The incident occurred at home. The injury mechanism was repetitive motion. The pain is present in the right hand. The quality of the pain is described as burning. The pain does not radiate. The pain is at a severity of 5/10. The patient is experiencing no pain. The pain has been Constant since the incident. Pertinent negatives include no chest pain. The symptoms are aggravated by movement.         Review of Systems   Constitutional:  Negative for chills and fever.   HENT:  Negative for congestion and rhinorrhea.    Respiratory:  Negative for cough and shortness of breath.    Cardiovascular:  Negative for chest pain and palpitations.   Gastrointestinal:  Negative for abdominal pain, constipation, diarrhea, nausea and vomiting.   Neurological:  Negative for dizziness and headaches.       Current Outpatient Medications on File Prior to Visit   Medication Sig   • Ascorbic Acid (Vitamin C) 500 MG CAPS Take 1 capsule by mouth daily   • Calcium-Magnesium-Vitamin D (CALCIUM 500 PO) Take by mouth PT CURRENTLY NOT TAKING   • Cyanocobalamin (VITAMIN B-12 PO) Take by mouth daily   • diclofenac sodium (VOLTAREN) 1 % Apply 2 g topically 3 (three) times a day (Patient taking differently: Apply 2 g topically 3 (three) times a day As needed.)   • Glucos-Chondroit-Hyaluron-MSM  "(GLUCOSAMINE CHONDROITIN JOINT) TABS Take by mouth daily     • metoprolol succinate (TOPROL-XL) 50 mg 24 hr tablet take 1 tablet by mouth once daily   • Multiple Vitamins-Minerals (PreserVision AREDS 2+Multi Vit) CAPS    • Omega-3 Fatty Acids (FISH OIL) 1000 MG CPDR Take by mouth   • triamterene-hydrochlorothiazide (MAXZIDE-25) 37.5-25 mg per tablet Take 1 tablet by mouth daily   • [DISCONTINUED] lidocaine (Lidoderm) 5 % Apply 1 patch topically over 12 hours daily Remove & Discard patch within 12 hours or as directed by MD       Objective     /70 (BP Location: Left arm, Patient Position: Sitting, Cuff Size: Adult)   Pulse (!) 54   Temp 98.1 °F (36.7 °C) (Temporal)   Ht 5' 3\" (1.6 m)   Wt 89.1 kg (196 lb 8 oz)   SpO2 98%   BMI 34.81 kg/m²     Physical Exam  Vitals reviewed.   Constitutional:       Appearance: Normal appearance.   HENT:      Head: Normocephalic and atraumatic.      Mouth/Throat:      Mouth: Mucous membranes are moist.      Pharynx: No oropharyngeal exudate or posterior oropharyngeal erythema.   Eyes:      Extraocular Movements: Extraocular movements intact.   Cardiovascular:      Rate and Rhythm: Normal rate and regular rhythm.      Heart sounds: Normal heart sounds.   Pulmonary:      Effort: Pulmonary effort is normal.      Breath sounds: Normal breath sounds.   Musculoskeletal:      Comments: No joint synovitis in bilateral hands, pain reproducible with flexion of right wrist   Neurological:      General: No focal deficit present.      Mental Status: She is alert and oriented to person, place, and time.   Psychiatric:         Mood and Affect: Mood normal.         Behavior: Behavior normal.       Margot Barron, DO    "

## 2024-06-21 ENCOUNTER — APPOINTMENT (OUTPATIENT)
Dept: LAB | Facility: CLINIC | Age: 84
End: 2024-06-21
Payer: COMMERCIAL

## 2024-06-21 DIAGNOSIS — N18.32 STAGE 3B CHRONIC KIDNEY DISEASE (HCC): ICD-10-CM

## 2024-06-21 LAB
25(OH)D3 SERPL-MCNC: 38.5 NG/ML (ref 30–100)
ANION GAP SERPL CALCULATED.3IONS-SCNC: 11 MMOL/L (ref 4–13)
BUN SERPL-MCNC: 25 MG/DL (ref 5–25)
CALCIUM SERPL-MCNC: 9.8 MG/DL (ref 8.4–10.2)
CHLORIDE SERPL-SCNC: 102 MMOL/L (ref 96–108)
CO2 SERPL-SCNC: 29 MMOL/L (ref 21–32)
CREAT SERPL-MCNC: 1.27 MG/DL (ref 0.6–1.3)
CREAT UR-MCNC: 116.8 MG/DL
ERYTHROCYTE [DISTWIDTH] IN BLOOD BY AUTOMATED COUNT: 13.2 % (ref 11.6–15.1)
GFR SERPL CREATININE-BSD FRML MDRD: 39 ML/MIN/1.73SQ M
GLUCOSE P FAST SERPL-MCNC: 95 MG/DL (ref 65–99)
HCT VFR BLD AUTO: 47.7 % (ref 34.8–46.1)
HGB BLD-MCNC: 16.2 G/DL (ref 11.5–15.4)
MCH RBC QN AUTO: 31.2 PG (ref 26.8–34.3)
MCHC RBC AUTO-ENTMCNC: 34 G/DL (ref 31.4–37.4)
MCV RBC AUTO: 92 FL (ref 82–98)
PHOSPHATE SERPL-MCNC: 3.1 MG/DL (ref 2.3–4.1)
PLATELET # BLD AUTO: 237 THOUSANDS/UL (ref 149–390)
PMV BLD AUTO: 8.7 FL (ref 8.9–12.7)
POTASSIUM SERPL-SCNC: 4.1 MMOL/L (ref 3.5–5.3)
PROT UR-MCNC: 6 MG/DL
PROT/CREAT UR: 0.05 MG/G{CREAT} (ref 0–0.1)
PTH-INTACT SERPL-MCNC: 60.4 PG/ML (ref 12–88)
RBC # BLD AUTO: 5.19 MILLION/UL (ref 3.81–5.12)
SODIUM SERPL-SCNC: 142 MMOL/L (ref 135–147)
WBC # BLD AUTO: 5.56 THOUSAND/UL (ref 4.31–10.16)

## 2024-06-21 PROCEDURE — 82570 ASSAY OF URINE CREATININE: CPT

## 2024-06-21 PROCEDURE — 84156 ASSAY OF PROTEIN URINE: CPT

## 2024-06-21 PROCEDURE — 82306 VITAMIN D 25 HYDROXY: CPT

## 2024-06-21 PROCEDURE — 83970 ASSAY OF PARATHORMONE: CPT

## 2024-06-21 PROCEDURE — 85027 COMPLETE CBC AUTOMATED: CPT

## 2024-06-21 PROCEDURE — 84100 ASSAY OF PHOSPHORUS: CPT

## 2024-06-21 PROCEDURE — 36415 COLL VENOUS BLD VENIPUNCTURE: CPT

## 2024-06-21 PROCEDURE — 80048 BASIC METABOLIC PNL TOTAL CA: CPT

## 2024-07-15 ENCOUNTER — OFFICE VISIT (OUTPATIENT)
Dept: NEPHROLOGY | Facility: CLINIC | Age: 84
End: 2024-07-15
Payer: COMMERCIAL

## 2024-07-15 VITALS
HEIGHT: 63 IN | TEMPERATURE: 96.9 F | RESPIRATION RATE: 16 BRPM | WEIGHT: 197 LBS | OXYGEN SATURATION: 97 % | HEART RATE: 70 BPM | BODY MASS INDEX: 34.91 KG/M2

## 2024-07-15 DIAGNOSIS — N18.9 CHRONIC KIDNEY DISEASE-MINERAL AND BONE DISORDER: ICD-10-CM

## 2024-07-15 DIAGNOSIS — N18.32 STAGE 3B CHRONIC KIDNEY DISEASE (HCC): Primary | ICD-10-CM

## 2024-07-15 DIAGNOSIS — E83.9 CHRONIC KIDNEY DISEASE-MINERAL AND BONE DISORDER: ICD-10-CM

## 2024-07-15 DIAGNOSIS — M89.9 CHRONIC KIDNEY DISEASE-MINERAL AND BONE DISORDER: ICD-10-CM

## 2024-07-15 DIAGNOSIS — E66.01 OBESITY, MORBID (HCC): ICD-10-CM

## 2024-07-15 DIAGNOSIS — I10 ESSENTIAL HYPERTENSION: ICD-10-CM

## 2024-07-15 PROCEDURE — 99214 OFFICE O/P EST MOD 30 MIN: CPT | Performed by: INTERNAL MEDICINE

## 2024-07-15 NOTE — ASSESSMENT & PLAN NOTE
Lab Results   Component Value Date    EGFR 39 06/21/2024    EGFR 43 11/27/2023    EGFR 36 05/18/2023    CREATININE 1.27 06/21/2024    CREATININE 1.17 11/27/2023    CREATININE 1.36 (H) 05/18/2023   Renal function stable at this point.  Advise hydration and avoiding nephrotoxic medication

## 2024-07-15 NOTE — ASSESSMENT & PLAN NOTE
Lab Results   Component Value Date    EGFR 39 06/21/2024    EGFR 43 11/27/2023    EGFR 36 05/18/2023    CREATININE 1.27 06/21/2024    CREATININE 1.17 11/27/2023    CREATININE 1.36 (H) 05/18/2023   PTH and phosphorus along with vitamin D are within acceptable range and will continue to monitor

## 2024-07-15 NOTE — PROGRESS NOTES
NEPHROLOGY OFFICE FOLLOW UP  Krystyna Menard 83 y.o. female MRN: [id number]    Encounter: [id number] 7/15/2024    REASON FOR VISIT: Krystyna Menard is a 83 y.o. female who is here on 7/15/2024 for Chronic Kidney Disease and Follow-up  .    HPI:    Minna came in today for follow-up of CKD.  83 woman who looks quite good for age    Denies any acute complaint except joint pain    No chest pain no palpitation or shortness of breath    No nausea no vomiting    Denies any urinary complaint        REVIEW OF SYSTEMS:    Review of Systems   Constitutional:  Negative for fatigue.   HENT:  Negative for congestion.    Eyes:  Negative for photophobia and pain.   Respiratory:  Negative for chest tightness and shortness of breath.    Cardiovascular:  Negative for chest pain and palpitations.   Gastrointestinal:  Negative for abdominal distention, abdominal pain and blood in stool.   Endocrine: Negative for polydipsia.   Genitourinary:  Negative for difficulty urinating, dysuria, flank pain, hematuria and urgency.   Musculoskeletal:  Positive for back pain. Negative for arthralgias.   Skin:  Negative for rash.   Neurological:  Negative for dizziness, light-headedness and headaches.   Hematological:  Does not bruise/bleed easily.   Psychiatric/Behavioral:  Negative for behavioral problems. The patient is not nervous/anxious.          PAST MEDICAL HISTORY:  Past Medical History:   Diagnosis Date    Allergic see chart    Arthritis     Arthritis     Cancer (HCC) skin    Chronic kidney disease     Diverticulitis of colon     Fall     H/O nonmelanoma skin cancer     last assessed 9/28/17    Hypertension     Kidney problem     Kidney stone     Pneumonia 1961    Squamous cell skin cancer     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       PAST SURGICAL HISTORY:  Past Surgical History:   Procedure Laterality Date    APPENDECTOMY  2010    BLADDER SURGERY  1997    BREAST EXCISIONAL BIOPSY Left 1995    benign    no visible scar     BREAST SURGERY      CERVIX SURGERY      dilation and curettage of cervical stump , ,     CHOLECYSTECTOMY  1997    COLECTOMY      partial - last assessed 10/28/14    COLONOSCOPY      last assessed 17    FL RETROGRADE PYELOGRAM  2021    FL RETROGRADE PYELOGRAM  2021    HERNIA REPAIR  2011    WI CYSTO/URETERO W/LITHOTRIPSY &INDWELL STENT INSRT Left 2021    Procedure: CYSTOSCOPY URETEROSCOPY, RETROGRADE PYELOGRAM AND INSERTION STENT URETERAL;  Surgeon: Marv Nunn MD;  [facility];  Service: Urology    WI CYSTO/URETERO W/LITHOTRIPSY &INDWELL STENT INSRT Left 2021    Procedure: CYSTOSCOPY URETEROSCOPY WITH LITHOTRIPSY HOLMIUM LASER, RETROGRADE PYELOGRAM AND INSERTION STENT URETERAL;  Surgeon: Marcell Yoon MD;  Location: [facility];  Service: Urology    SKIN CANCER EXCISION      TONSILLECTOMY  1971    TUBAL LIGATION         SOCIAL HISTORY:  Social History     Substance and Sexual Activity   Alcohol Use Yes    Alcohol/week: 1.0 standard drink of alcohol    Types: 1 Glasses of wine per week    Comment: Occasionally I have a bourbon & ginger ale     Social History     Substance and Sexual Activity   Drug Use No     Social History     Tobacco Use   Smoking Status Former    Current packs/day: 0.00    Average packs/day: 1 pack/day for 20.8 years (20.8 ttl pk-yrs)    Types: Cigarettes    Start date: 1958    Quit date: 1979    Years since quittin.1    Passive exposure: Past   Smokeless Tobacco Never   Tobacco Comments    Parents and sisters all smoked, as well as friends. I enjoyed it. And I quit willingly .       FAMILY HISTORY:  Family History   Problem Relation Age of Onset    Arthritis Mother     Osteoporosis Mother     Stroke Mother     Heart disease Father     Osteoporosis Father     Breast cancer Family     Osteoporosis Family     Breast cancer additional onset Family     No Known Problems Sister     No Known Problems Daughter     Breast cancer  "Maternal Grandmother         typo - she is paternal    No Known Problems Maternal Grandfather     Breast cancer Paternal Grandmother     No Known Problems Paternal Grandfather     No Known Problems Sister        MEDICATIONS:    Current Outpatient Medications:     Ascorbic Acid (Vitamin C) 500 MG CAPS, Take 1 capsule by mouth daily, Disp: , Rfl:     Calcium-Magnesium-Vitamin D (CALCIUM 500 PO), Take by mouth PT CURRENTLY NOT TAKING, Disp: , Rfl:     Cyanocobalamin (VITAMIN B-12 PO), Take by mouth daily, Disp: , Rfl:     diclofenac sodium (VOLTAREN) 1 %, Apply 2 g topically 3 (three) times a day (Patient taking differently: Apply 2 g topically 3 (three) times a day As needed.), Disp: 180 g, Rfl: 2    Glucos-Chondroit-Hyaluron-MSM (GLUCOSAMINE CHONDROITIN JOINT) TABS, Take by mouth daily  , Disp: , Rfl:     metoprolol succinate (TOPROL-XL) 50 mg 24 hr tablet, take 1 tablet by mouth once daily, Disp: 90 tablet, Rfl: 1    Multiple Vitamins-Minerals (PreserVision AREDS 2+Multi Vit) CAPS, , Disp: , Rfl:     Omega-3 Fatty Acids (FISH OIL) 1000 MG CPDR, Take by mouth, Disp: , Rfl:     triamterene-hydrochlorothiazide (MAXZIDE-25) 37.5-25 mg per tablet, Take 1 tablet by mouth daily, Disp: 90 tablet, Rfl: 2    PHYSICAL EXAM:  Vitals:    07/15/24 1014   Pulse: 70   Resp: 16   Temp: (!) 96.9 °F (36.1 °C)   TempSrc: Temporal   SpO2: 97%   Weight: 89.4 kg (197 lb)   Height: 5' 3\" (1.6 m)     Body mass index is 34.9 kg/m².    Physical Exam  Constitutional:       General: She is not in acute distress.     Appearance: She is well-developed.   HENT:      Head: Normocephalic.      Mouth/Throat:      Mouth: Mucous membranes are moist.   Eyes:      General: No scleral icterus.     Conjunctiva/sclera: Conjunctivae normal.   Neck:      Vascular: No JVD.   Cardiovascular:      Rate and Rhythm: Normal rate.      Heart sounds: Normal heart sounds.   Pulmonary:      Effort: Pulmonary effort is normal.      Breath sounds: No wheezing. "   Abdominal:      Palpations: Abdomen is soft.      Tenderness: There is no abdominal tenderness.   Musculoskeletal:         General: Normal range of motion.      Cervical back: Neck supple.   Skin:     General: Skin is warm.      Findings: No rash.   Neurological:      Mental Status: She is alert and oriented to person, place, and time.   Psychiatric:         Behavior: Behavior normal.         LAB RESULTS:  Results for orders placed or performed in visit on 06/21/24   Basic metabolic panel   Result Value Ref Range    Sodium 142 135 - 147 mmol/L    Potassium 4.1 3.5 - 5.3 mmol/L    Chloride 102 96 - 108 mmol/L    CO2 29 21 - 32 mmol/L    ANION GAP 11 4 - 13 mmol/L    BUN 25 5 - 25 mg/dL    Creatinine 1.27 0.60 - 1.30 mg/dL    Glucose, Fasting 95 65 - 99 mg/dL    Calcium 9.8 8.4 - 10.2 mg/dL    eGFR 39 ml/min/1.73sq m   CBC and Platelet   Result Value Ref Range    WBC 5.56 4.31 - 10.16 Thousand/uL    RBC 5.19 (H) 3.81 - 5.12 Million/uL    Hemoglobin 16.2 (H) 11.5 - 15.4 g/dL    Hematocrit 47.7 (H) 34.8 - 46.1 %    MCV 92 82 - 98 fL    MCH 31.2 26.8 - 34.3 pg    MCHC 34.0 31.4 - 37.4 g/dL    RDW 13.2 11.6 - 15.1 %    Platelets 237 149 - 390 Thousands/uL    MPV 8.7 (L) 8.9 - 12.7 fL   Phosphorus   Result Value Ref Range    Phosphorus 3.1 2.3 - 4.1 mg/dL   Protein / creatinine ratio, urine   Result Value Ref Range    Creatinine, Ur 116.8 Reference range not established. mg/dL    Protein Urine Random 6 Reference range not established. mg/dL    Prot/Creat Ratio, Ur 0.05 0.00 - 0.10   PTH, intact   Result Value Ref Range    PTH 60.4 12.0 - 88.0 pg/mL   Vitamin D 25 hydroxy   Result Value Ref Range    Vit D, 25-Hydroxy 38.5 30.0 - 100.0 ng/mL       ASSESSMENT and PLAN:      Stage 3b chronic kidney disease (HCC)  Lab Results   Component Value Date    EGFR 39 06/21/2024    EGFR 43 11/27/2023    EGFR 36 05/18/2023    CREATININE 1.27 06/21/2024    CREATININE 1.17 11/27/2023    CREATININE 1.36 (H) 05/18/2023   Renal function  "stable at this point.  Advise hydration and avoiding nephrotoxic medication    Chronic kidney disease-mineral and bone disorder  Lab Results   Component Value Date    EGFR 39 06/21/2024    EGFR 43 11/27/2023    EGFR 36 05/18/2023    CREATININE 1.27 06/21/2024    CREATININE 1.17 11/27/2023    CREATININE 1.36 (H) 05/18/2023   PTH and phosphorus along with vitamin D are within acceptable range and will continue to monitor    Essential hypertension  Blood pressure is very well-controlled    Obesity, morbid (HCC)  Need to reduce weight and she is well aware of      Everything discussed at length with the patient.  I will see her back in 6 months.  Will get blood and urine test before that visit        Portions of the record may have been created with voice recognition software. Occasional wrong word or \"sound a like\" substitutions may have occurred due to the inherent limitations of voice recognition software. Read the chart carefully and recognize, using context, where substitutions have occurred.If you have any questions, please contact the dictating provider.   "

## 2024-07-31 ENCOUNTER — OFFICE VISIT (OUTPATIENT)
Dept: URGENT CARE | Facility: CLINIC | Age: 84
End: 2024-07-31
Payer: COMMERCIAL

## 2024-07-31 VITALS
RESPIRATION RATE: 18 BRPM | OXYGEN SATURATION: 96 % | DIASTOLIC BLOOD PRESSURE: 80 MMHG | SYSTOLIC BLOOD PRESSURE: 140 MMHG | TEMPERATURE: 96.9 F | HEART RATE: 62 BPM

## 2024-07-31 DIAGNOSIS — M54.42 ACUTE LEFT-SIDED LOW BACK PAIN WITH LEFT-SIDED SCIATICA: Primary | ICD-10-CM

## 2024-07-31 PROCEDURE — 99213 OFFICE O/P EST LOW 20 MIN: CPT | Performed by: PHYSICIAN ASSISTANT

## 2024-07-31 RX ORDER — PREDNISONE 10 MG/1
TABLET ORAL
Qty: 24 TABLET | Refills: 0 | Status: SHIPPED | OUTPATIENT
Start: 2024-07-31 | End: 2024-08-12

## 2024-07-31 NOTE — PATIENT INSTRUCTIONS
Recommended oral steroid.     Patient can continue using topical roll on medication as needed for relief.       Follow up with PCP in 3-5 days.  Proceed to  ER if symptoms worsen.    If tests are performed, our office will contact you with results only if changes need to made to the care plan discussed with you at the visit. You can review your full results on [facility].

## 2024-07-31 NOTE — PROGRESS NOTES
[facility]        NAME: Krystyna Menard is a 83 y.o. female  : 1940    MRN: [id number]  DATE: 2024  TIME: 3:31 PM    Assessment and Plan   Acute left-sided low back pain with left-sided sciatica [M54.42]  1. Acute left-sided low back pain with left-sided sciatica  predniSONE 10 mg tablet            Patient Instructions     Patient Instructions   Recommended oral steroid.     Patient can continue using topical roll on medication as needed for relief.       Follow up with PCP in 3-5 days.  Proceed to  ER if symptoms worsen.    If tests are performed, our office will contact you with results only if changes need to made to the care plan discussed with you at the visit. You can review your full results on [facility].      Chief Complaint     Chief Complaint   Patient presents with    Back Pain     Sciatic down left side into leg. Was here in December with the same thing.This flare up started a few weeks ago.          History of Present Illness       Patient presents for evaluation of back pain with shooting pain down into the left leg. She states she has sciatica and the pain is flaring up. She was seen for this issue in the winter and the medication helped. She has been using a roll on pain relief medication that helps better than lidocaine patches.         Review of Systems   Review of Systems   Musculoskeletal:  Positive for back pain and gait problem.   Skin:  Negative for color change, pallor, rash and wound.   All other systems reviewed and are negative.        Current Medications       Current Outpatient Medications:     Ascorbic Acid (Vitamin C) 500 MG CAPS, Take 1 capsule by mouth daily, Disp: , Rfl:     Calcium-Magnesium-Vitamin D (CALCIUM 500 PO), Take by mouth PT CURRENTLY NOT TAKING, Disp: , Rfl:     Cyanocobalamin (VITAMIN B-12 PO), Take by mouth daily, Disp: , Rfl:     Glucos-Chondroit-Hyaluron-MSM (GLUCOSAMINE CHONDROITIN JOINT) TABS, Take by mouth daily  , Disp: , Rfl:      metoprolol succinate (TOPROL-XL) 50 mg 24 hr tablet, take 1 tablet by mouth once daily, Disp: 90 tablet, Rfl: 1    Multiple Vitamins-Minerals (PreserVision AREDS 2+Multi Vit) CAPS, , Disp: , Rfl:     Omega-3 Fatty Acids (FISH OIL) 1000 MG CPDR, Take by mouth, Disp: , Rfl:     predniSONE 10 mg tablet, Take 3 tablets (30 mg total) by mouth daily for 4 days, THEN 2 tablets (20 mg total) daily for 4 days, THEN 1 tablet (10 mg total) daily for 4 days., Disp: 24 tablet, Rfl: 0    triamterene-hydrochlorothiazide (MAXZIDE-25) 37.5-25 mg per tablet, Take 1 tablet by mouth daily, Disp: 90 tablet, Rfl: 2    diclofenac sodium (VOLTAREN) 1 %, Apply 2 g topically 3 (three) times a day (Patient taking differently: Apply 2 g topically 3 (three) times a day As needed.), Disp: 180 g, Rfl: 2    Current Allergies     Allergies as of 07/31/2024 - Reviewed 07/31/2024   Allergen Reaction Noted    Azithromycin Vomiting and Dizziness 10/11/2018    Amlodipine Swelling 11/04/2022    Lisinopril Cough 11/04/2022    Morphine Vomiting 10/28/2014            The following portions of the patient's history were reviewed and updated as appropriate: allergies, current medications, past family history, past medical history, past social history, past surgical history and problem list.     Past Medical History:   Diagnosis Date    Allergic see chart    Arthritis     Arthritis     Cancer (HCC) skin    Chronic kidney disease     Diverticulitis of colon     Fall     H/O nonmelanoma skin cancer     last assessed 9/28/17    Hypertension     Kidney problem     Kidney stone     Pneumonia 1961    Squamous cell skin cancer     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       Past Surgical History:   Procedure Laterality Date    APPENDECTOMY  2010    BLADDER SURGERY  1997    BREAST EXCISIONAL BIOPSY Left 1995    benign    no visible scar    BREAST SURGERY      CERVIX SURGERY      dilation and curettage of cervical stump 2005, 1998, 1994     CHOLECYSTECTOMY  1997    COLECTOMY      partial - last assessed 10/28/14    COLONOSCOPY      last assessed 8/9/17    FL RETROGRADE PYELOGRAM  03/29/2021    FL RETROGRADE PYELOGRAM  04/29/2021    HERNIA REPAIR  2011    [place] CYSTO/URETERO W/LITHOTRIPSY &INDWELL STENT INSRT Left 03/29/2021    Procedure: CYSTOSCOPY URETEROSCOPY, RETROGRADE PYELOGRAM AND INSERTION STENT URETERAL;  Surgeon: Marv Nunn MD;  [facility];  Service: Urology    VT CYSTO/URETERO W/LITHOTRIPSY &INDWELL STENT INSRT Left 04/29/2021    Procedure: CYSTOSCOPY URETEROSCOPY WITH LITHOTRIPSY HOLMIUM LASER, RETROGRADE PYELOGRAM AND INSERTION STENT URETERAL;  Surgeon: Marcell Yoon MD;  [facility];  Service: Urology    SKIN CANCER EXCISION      TONSILLECTOMY  1971    TUBAL LIGATION         Family History   Problem Relation Age of Onset    Arthritis Mother     Osteoporosis Mother     Stroke Mother     Heart disease Father     Osteoporosis Father     Breast cancer Family     Osteoporosis Family     Breast cancer additional onset Family     No Known Problems Sister     No Known Problems Daughter     Breast cancer Maternal Grandmother         typo - she is paternal    No Known Problems Maternal Grandfather     Breast cancer Paternal Grandmother     No Known Problems Paternal Grandfather     No Known Problems Sister          Medications have been verified.        Objective   /80   Pulse 62   Temp (!) 96.9 °F (36.1 °C)   Resp 18   SpO2 96%        Physical Exam     Physical Exam  Vitals and nursing note reviewed.   Constitutional:       Appearance: Normal appearance.   Musculoskeletal:      Thoracic back: Normal.      Lumbar back: Normal.      Comments: Nonspecific tenderness throughout the left hip thigh and knee following the distribution of the sciatic nerve.   No lower extremity weakness. No calf tenderness.    Skin:     General: Skin is warm and dry.   Neurological:      General: No focal deficit present.      Mental  Status: She is alert and oriented to person, place, and time.   Psychiatric:         Mood and Affect: Mood normal.         Behavior: Behavior normal.

## 2024-08-06 DIAGNOSIS — I10 ESSENTIAL HYPERTENSION: ICD-10-CM

## 2024-08-06 RX ORDER — TRIAMTERENE AND HYDROCHLOROTHIAZIDE 37.5; 25 MG/1; MG/1
1 TABLET ORAL DAILY
Qty: 100 TABLET | Refills: 1 | Status: SHIPPED | OUTPATIENT
Start: 2024-08-06

## 2024-08-07 ENCOUNTER — OFFICE VISIT (OUTPATIENT)
Dept: FAMILY MEDICINE CLINIC | Facility: CLINIC | Age: 84
End: 2024-08-07
Payer: COMMERCIAL

## 2024-08-07 VITALS
TEMPERATURE: 97.9 F | BODY MASS INDEX: 34.99 KG/M2 | DIASTOLIC BLOOD PRESSURE: 72 MMHG | WEIGHT: 197.5 LBS | HEART RATE: 64 BPM | HEIGHT: 63 IN | OXYGEN SATURATION: 96 % | SYSTOLIC BLOOD PRESSURE: 128 MMHG

## 2024-08-07 DIAGNOSIS — I10 ESSENTIAL HYPERTENSION: ICD-10-CM

## 2024-08-07 DIAGNOSIS — N18.32 STAGE 3B CHRONIC KIDNEY DISEASE (HCC): ICD-10-CM

## 2024-08-07 DIAGNOSIS — Z13.220 LIPID SCREENING: Primary | ICD-10-CM

## 2024-08-07 PROBLEM — I77.1 STRICTURE OF ARTERY (HCC): Status: ACTIVE | Noted: 2024-08-07

## 2024-08-07 PROCEDURE — 99213 OFFICE O/P EST LOW 20 MIN: CPT | Performed by: NURSE PRACTITIONER

## 2024-08-07 PROCEDURE — G2211 COMPLEX E/M VISIT ADD ON: HCPCS | Performed by: NURSE PRACTITIONER

## 2024-08-07 NOTE — PROGRESS NOTES
"Ambulatory Visit  Name: Krystyna Menard      : 1940      MRN: [id number]  Encounter Provider: RYAN Raymond  Encounter Date: 2024   Encounter department: [facility]    Assessment & Plan   1. Lipid screening  Assessment & Plan:  Patient has been taking fish oil.  Lipid panel ordered.  Continue heart healthy diet  Orders:  -     Lipid panel; Future; Expected date: 2024  2. Essential hypertension  Assessment & Plan:  Blood pressure is at goal.  Continue medication.  Continue heart healthy diet increase exercise activity as tolerated  3. Stage 3b chronic kidney disease (HCC)  Assessment & Plan:  Lab Results   Component Value Date    EGFR 39 2024    EGFR 43 2023    EGFR 36 2023    CREATININE 1.27 2024    CREATININE 1.17 2023    CREATININE 1.36 (H) 2023   Patient has stage IIIb kidney disease.  Follows with nephrology.     History of Present Illness     Patient is here for follow-up on chronic health condition        Review of Systems   Constitutional: Negative.    HENT: Negative.     Eyes: Negative.    Respiratory: Negative.     Cardiovascular: Negative.    Gastrointestinal: Negative.    Endocrine: Negative.    Genitourinary: Negative.    Musculoskeletal: Negative.    Skin: Negative.    Allergic/Immunologic: Negative.    Neurological: Negative.    Psychiatric/Behavioral: Negative.         Objective     /72 (BP Location: Right arm, Patient Position: Sitting, Cuff Size: Large)   Pulse 64   Temp 97.9 °F (36.6 °C) (Temporal)   Ht 5' 3\" (1.6 m)   Wt 89.6 kg (197 lb 8 oz)   SpO2 96%   BMI 34.99 kg/m²     Physical Exam  Constitutional:       General: She is not in acute distress.     Appearance: Normal appearance. She is obese. She is not ill-appearing.   HENT:      Head: Normocephalic and atraumatic.      Nose: Nose normal.      Mouth/Throat:      Mouth: Mucous membranes are moist.   Eyes:      Pupils: Pupils are equal, round, and " reactive to light.   Cardiovascular:      Rate and Rhythm: Normal rate and regular rhythm.      Pulses: Normal pulses.   Pulmonary:      Effort: Pulmonary effort is normal. No respiratory distress.      Breath sounds: Normal breath sounds.   Chest:      Chest wall: No tenderness.   Abdominal:      General: Abdomen is flat. Bowel sounds are normal. There is no distension.      Palpations: There is no mass.      Tenderness: There is no abdominal tenderness.   Musculoskeletal:         General: Normal range of motion.      Cervical back: Normal range of motion and neck supple.   Skin:     General: Skin is warm and dry.   Neurological:      General: No focal deficit present.      Mental Status: She is alert and oriented to person, place, and time.   Psychiatric:         Mood and Affect: Mood normal.         Behavior: Behavior normal.         Thought Content: Thought content normal.         Judgment: Judgment normal.       Administrative Statements

## 2024-08-07 NOTE — PATIENT INSTRUCTIONS
Patient Education     Sciatica Discharge Instructions   About this topic   You may have pain, weakness, numbness, and tingling that runs from your buttocks down the back of your leg to your feet. This is called sciatica. Your sciatic nerve is a large nerve that starts in your lower back. It runs all the way down the back of your leg. You may have something like a disc or bone spur pressing on this nerve. When something is pressing on or bothering this nerve, it can cause sciatica. This is the medical name for pain, weakness, numbness, or tingling that goes from your buttock down your leg towards your heel. You can have sciatic pain on one side or on both sides. Most of the time, it will get better without surgery.       What care is needed at home?   Ask the doctor what you need to do when you go home. Make sure you ask questions if you do not understand what the doctor says. This way you will know what to do.  Stay as active as you can without causing too much pain. It is OK to rest your back for a day or so. Be sure to get up and move around gently during the day as you are able. After a few days, slowly start to increase your activity level as you are able to. If something causes your pain to come back or get worse, stop and go back to doing easier activities that did not hurt.  Do not sit or  one position for a long time. You may want to sleep with a pillow under or between your knees if this eases your pain.  You may want to take medicines like ibuprofen or naproxen for swelling and pain. These are nonsteroidal anti-inflammatory drugs (NSAIDS).  What follow-up care is needed?   Your doctor may ask you to make visits to the office to check on your progress. Be sure to keep these visits.  Your doctor may send you to physical therapy (PT) or a chiropractor for treatments to lessen pain and to learn the right exercises to do.  Your doctor may also send you to a neurologist. This is a doctor who specializes  in treating nerve problems.  If you do not get better with treatment, your doctor may need to send you to an orthopedic surgeon.  What drugs may be needed?   The doctor may order drugs to:  Help with pain and swelling  The doctor may give you a shot of an anti-inflammatory drug called a corticosteroid. This will help with swelling. Talk with your doctor about the risks of this shot.  Will physical activity be limited?   You may need to rest for a while. You should not do physical activity that makes your health problem worse. Talk to your doctor if you run, work out, or play sports. You may not be able to do those things until your health problem get better.  What problems could happen?   Long-term back pain  Loss of feeling or movement in the legs or feet  Weight gain, less muscle strength and flexibility, weaker bones  Need for surgery  Infection  Loss of bowel and bladder function  What can be done to prevent this health problem?   Stay active and work out to keep your muscles strong and flexible. Warm up slowly and stretch before you exercise.  Use good posture.  Use proper ways to lift and bend:  Spread your feet apart so you have a good base of support. Then, bend with your knees when you  something from the ground.  When lifting and moving an object, keep your back straight. Keep the object as close to your body as possible. Do not twist. Instead, move your feet to the direction you are going.  Take breaks often when seated for long periods of time. Get up and walk around from time to time.  If you stand for long periods, put one leg up on a small stool for a while. Then, change legs.  If you sleep on your side, put a pillow in between your knees to keep your back and legs in a good position.  Use good supportive footwear. Avoid high heels.  Keep a healthy weight.  When do I need to call the doctor?   You are unable to walk or start having trouble controlling your bowels or bladder.  You get new or  worsening pain, numbness, or weakness that spreads to both legs.  Your pain is getting worse, even with medicines and rest.  You are not able do your normal activities because of the pain.  Helpful tips   Water exercise or biking may help you stay in shape without making your problem worse.  The right exercises for sciatica will depend on what the problem is that is causing the pain. Talk with your doctor about which stretches are best for you.  Stretching may be slightly painful but should never give sharp pains. If it is painful, ease up until you only feel mild stretching. All stretching exercises should be held for 20 to 30 seconds to be most helpful. Repeat 2 to 3 times. Do 2 to 3 times each day to get the best results.  Lie on your back. Bend up the knee of the painful side until your foot is even with the other knee. Keeping your shoulders down, slowly drop the bent knee across the other leg. Do this until you feel a stretch in your buttocks.  Lie on your back with your knees bent and feet flat on the ground. If the problem is on your right leg, cross your right ankle onto your left thigh just above the knee. Reach your right arm in between your thighs and clasp your hands around your left thigh. Slowly pull the left thigh up towards your chest until you feel stretching in the right buttock.  Teach Back: Helping You Understand   The Teach Back Method helps you understand the information we are giving you. After you talk with the staff, tell them in your own words what you learned. This helps to make sure the staff has described each thing clearly. It also helps to explain things that may have been confusing. Before going home, make sure you can do these:  I can tell you about my condition.  I can tell you what may help ease my pain.  I can tell you what I will do if I have more pain or numbness in my leg or foot.  Last Reviewed Date   2021-06-21  Consumer Information Use and Disclaimer   This generalized  information is a limited summary of diagnosis, treatment, and/or medication information. It is not meant to be comprehensive and should be used as a tool to help the user understand and/or assess potential diagnostic and treatment options. It does NOT include all information about conditions, treatments, medications, side effects, or risks that may apply to a specific patient. It is not intended to be medical advice or a substitute for the medical advice, diagnosis, or treatment of a health care provider based on the health care provider's examination and assessment of a patient’s specific and unique circumstances. Patients must speak with a health care provider for complete information about their health, medical questions, and treatment options, including any risks or benefits regarding use of medications. This information does not endorse any treatments or medications as safe, effective, or approved for treating a specific patient. [employer] and its affiliates disclaim any warranty or liability relating to this information or the use thereof. The use of this information is governed by the Terms of Use, available at https://www.wolterskluwer.com/en/know/clinical-effectiveness-terms   Copyright   Copyright © 2024 [employer] and its affiliates and/or licensors. All rights reserved.

## 2024-08-14 PROBLEM — Z13.220 LIPID SCREENING: Status: ACTIVE | Noted: 2024-08-14

## 2024-08-14 PROBLEM — Z13.220 LIPID SCREENING: Status: ACTIVE | Noted: 2024-08-08

## 2024-08-14 NOTE — ASSESSMENT & PLAN NOTE
Blood pressure is at goal.  Continue medication.  Continue heart healthy diet increase exercise activity as tolerated

## 2024-08-14 NOTE — ASSESSMENT & PLAN NOTE
Lab Results   Component Value Date    EGFR 39 06/21/2024    EGFR 43 11/27/2023    EGFR 36 05/18/2023    CREATININE 1.27 06/21/2024    CREATININE 1.17 11/27/2023    CREATININE 1.36 (H) 05/18/2023   Patient has stage IIIb kidney disease.  Follows with nephrology.

## 2024-08-16 DIAGNOSIS — I10 ESSENTIAL HYPERTENSION: ICD-10-CM

## 2024-08-16 RX ORDER — METOPROLOL SUCCINATE 50 MG/1
50 TABLET, EXTENDED RELEASE ORAL DAILY
Qty: 90 TABLET | Refills: 1 | Status: SHIPPED | OUTPATIENT
Start: 2024-08-16

## 2024-08-22 ENCOUNTER — TELEPHONE (OUTPATIENT)
Age: 84
End: 2024-08-22

## 2024-08-22 DIAGNOSIS — M54.30 SCIATICA, UNSPECIFIED LATERALITY: Primary | ICD-10-CM

## 2024-08-22 RX ORDER — METHYLPREDNISOLONE 4 MG
TABLET, DOSE PACK ORAL
Qty: 21 EACH | Refills: 0 | Status: SHIPPED | OUTPATIENT
Start: 2024-08-22

## 2024-08-22 NOTE — TELEPHONE ENCOUNTER
Patient called in stating her sciatica has flared up. Patient has been taking tylenol and following the exercises given at her last apt 8/7.    Patient is going out of town on Sunday 8/25 for 3 weeks. Patient is requesting for prednisone to be sent to [facility] #[id number]    Please advise.  Patient would like a return call  Thank you

## 2024-09-13 PROBLEM — Z13.220 LIPID SCREENING: Status: RESOLVED | Noted: 2024-08-08 | Resolved: 2024-09-13

## 2024-09-19 ENCOUNTER — APPOINTMENT (OUTPATIENT)
Dept: RADIOLOGY | Facility: CLINIC | Age: 84
End: 2024-09-19
Payer: COMMERCIAL

## 2024-09-19 ENCOUNTER — OFFICE VISIT (OUTPATIENT)
Dept: FAMILY MEDICINE CLINIC | Facility: CLINIC | Age: 84
End: 2024-09-19
Payer: COMMERCIAL

## 2024-09-19 VITALS
OXYGEN SATURATION: 95 % | WEIGHT: 202 LBS | BODY MASS INDEX: 35.79 KG/M2 | HEIGHT: 63 IN | RESPIRATION RATE: 18 BRPM | SYSTOLIC BLOOD PRESSURE: 128 MMHG | DIASTOLIC BLOOD PRESSURE: 84 MMHG | HEART RATE: 58 BPM

## 2024-09-19 DIAGNOSIS — M54.42 ACUTE LEFT-SIDED LOW BACK PAIN WITH LEFT-SIDED SCIATICA: ICD-10-CM

## 2024-09-19 DIAGNOSIS — M54.42 ACUTE LEFT-SIDED LOW BACK PAIN WITH LEFT-SIDED SCIATICA: Primary | ICD-10-CM

## 2024-09-19 DIAGNOSIS — M54.32 SCIATICA OF LEFT SIDE: Primary | ICD-10-CM

## 2024-09-19 DIAGNOSIS — M54.32 SCIATICA OF LEFT SIDE: ICD-10-CM

## 2024-09-19 PROCEDURE — 73502 X-RAY EXAM HIP UNI 2-3 VIEWS: CPT

## 2024-09-19 PROCEDURE — 72110 X-RAY EXAM L-2 SPINE 4/>VWS: CPT

## 2024-09-19 PROCEDURE — G2211 COMPLEX E/M VISIT ADD ON: HCPCS | Performed by: STUDENT IN AN ORGANIZED HEALTH CARE EDUCATION/TRAINING PROGRAM

## 2024-09-19 PROCEDURE — 99213 OFFICE O/P EST LOW 20 MIN: CPT | Performed by: STUDENT IN AN ORGANIZED HEALTH CARE EDUCATION/TRAINING PROGRAM

## 2024-09-19 NOTE — PROGRESS NOTES
"Ambulatory Visit  Name: Krystyna Menard      : 1940      MRN: [id number]  Encounter Provider: Margot Barron DO  Encounter Date: 2024   Encounter department: [facility] PRIMARY CARE    Assessment & Plan  Sciatica of left side  Worsening pain down left leg, Patient ambulating with cane. Will refer to PT, Will refer tp Pain Management to evaluate for further treatment. Will check Lumbar spine and Hip xray. Will f/u pending results, if abnormal will check hip MRI.Patient to continue tylenol 1000mg daily. Limited options for medication management due to patient poor kidney function.   Orders:    Ambulatory referral to Spine & Pain Management; Future    Ambulatory Referral to Physical Therapy; Future    XR spine lumbar minimum 4 views non injury; Future    Acute left-sided low back pain with left-sided sciatica    Orders:    XR spine lumbar minimum 4 views non injury; Future       History of Present Illness         Leg Pain   The incident occurred more than 1 week ago. There was no injury mechanism. The pain is present in the left leg. The quality of the pain is described as aching. The pain is at a severity of 7/10. The pain is moderate. The pain has been Constant since onset. Associated symptoms include an inability to bear weight. The symptoms are aggravated by movement. She has tried acetaminophen for the symptoms. The treatment provided mild relief.           Review of Systems   Constitutional:  Negative for chills and fever.   Respiratory:  Negative for cough and shortness of breath.    Cardiovascular:  Negative for chest pain and palpitations.   Musculoskeletal:  Positive for back pain.        Left leg pain                Objective     /84 (BP Location: Left arm, Patient Position: Sitting, Cuff Size: Standard)   Pulse 58   Resp 18   Ht 5' 3\" (1.6 m)   Wt 91.6 kg (202 lb)   SpO2 95%   BMI 35.78 kg/m²     Physical Exam  Vitals reviewed.   Constitutional:       " Appearance: Normal appearance.   HENT:      Head: Normocephalic and atraumatic.      Mouth/Throat:      Mouth: Mucous membranes are moist.      Pharynx: No oropharyngeal exudate or posterior oropharyngeal erythema.   Cardiovascular:      Rate and Rhythm: Normal rate and regular rhythm.      Heart sounds: Normal heart sounds.   Pulmonary:      Effort: Pulmonary effort is normal.      Breath sounds: Normal breath sounds.   Neurological:      Mental Status: She is alert and oriented to person, place, and time.      Gait: Gait abnormal.      Comments: Ambulating with cane   Psychiatric:         Mood and Affect: Mood normal.         Behavior: Behavior normal.

## 2024-09-19 NOTE — ASSESSMENT & PLAN NOTE
Worsening pain down left leg, Patient ambulating with cane. Will refer to PT, Will refer tp Pain Management to evaluate for further treatment. Will check Lumbar spine and Hip xray. Will f/u pending results, if abnormal will check hip MRI.Patient to continue tylenol 1000mg daily. Limited options for medication management due to patient poor kidney function.   Orders:    Ambulatory referral to Spine & Pain Management; Future    Ambulatory Referral to Physical Therapy; Future    XR spine lumbar minimum 4 views non injury; Future

## 2024-09-20 ENCOUNTER — TELEPHONE (OUTPATIENT)
Age: 84
End: 2024-09-20

## 2024-09-20 NOTE — TELEPHONE ENCOUNTER
Patient saw Dr. Barron yesterday. She just wanted to let the Dr know that she tested positive for COVID.     [place]: [phone]

## 2024-10-14 ENCOUNTER — TELEPHONE (OUTPATIENT)
Dept: PAIN MEDICINE | Facility: CLINIC | Age: 84
End: 2024-10-14

## 2024-10-15 ENCOUNTER — OFFICE VISIT (OUTPATIENT)
Dept: FAMILY MEDICINE CLINIC | Facility: CLINIC | Age: 84
End: 2024-10-15
Payer: COMMERCIAL

## 2024-10-15 VITALS
TEMPERATURE: 97.6 F | DIASTOLIC BLOOD PRESSURE: 68 MMHG | HEART RATE: 64 BPM | HEIGHT: 63 IN | BODY MASS INDEX: 36.14 KG/M2 | SYSTOLIC BLOOD PRESSURE: 120 MMHG | OXYGEN SATURATION: 97 % | WEIGHT: 204 LBS

## 2024-10-15 DIAGNOSIS — H93.8X2 IRRITATION OF LEFT EAR: ICD-10-CM

## 2024-10-15 DIAGNOSIS — Z12.31 ENCOUNTER FOR SCREENING MAMMOGRAM FOR MALIGNANT NEOPLASM OF BREAST: ICD-10-CM

## 2024-10-15 DIAGNOSIS — M54.32 SCIATICA OF LEFT SIDE: ICD-10-CM

## 2024-10-15 DIAGNOSIS — R32 URINARY INCONTINENCE, UNSPECIFIED TYPE: ICD-10-CM

## 2024-10-15 DIAGNOSIS — H61.22 IMPACTED CERUMEN OF LEFT EAR: ICD-10-CM

## 2024-10-15 DIAGNOSIS — Z23 NEED FOR COVID-19 VACCINE: ICD-10-CM

## 2024-10-15 DIAGNOSIS — Z78.0 POST-MENOPAUSAL: ICD-10-CM

## 2024-10-15 DIAGNOSIS — Z00.00 MEDICARE ANNUAL WELLNESS VISIT, SUBSEQUENT: Primary | ICD-10-CM

## 2024-10-15 PROBLEM — I77.1 STRICTURE OF ARTERY (HCC): Status: RESOLVED | Noted: 2024-08-07 | Resolved: 2024-10-15

## 2024-10-15 PROCEDURE — 91320 SARSCV2 VAC 30MCG TRS-SUC IM: CPT

## 2024-10-15 PROCEDURE — G0439 PPPS, SUBSEQ VISIT: HCPCS | Performed by: STUDENT IN AN ORGANIZED HEALTH CARE EDUCATION/TRAINING PROGRAM

## 2024-10-15 PROCEDURE — 99214 OFFICE O/P EST MOD 30 MIN: CPT | Performed by: STUDENT IN AN ORGANIZED HEALTH CARE EDUCATION/TRAINING PROGRAM

## 2024-10-15 PROCEDURE — 90480 ADMN SARSCOV2 VAC 1/ONLY CMP: CPT

## 2024-10-15 RX ORDER — NEOMYCIN SULFATE, POLYMYXIN B SULFATE AND HYDROCORTISONE 10; 3.5; 1 MG/ML; MG/ML; [USP'U]/ML
4 SUSPENSION/ DROPS AURICULAR (OTIC) EVERY 8 HOURS SCHEDULED
Qty: 10 ML | Refills: 0 | Status: SHIPPED | OUTPATIENT
Start: 2024-10-15

## 2024-10-15 RX ORDER — CYCLOBENZAPRINE HCL 10 MG
10 TABLET ORAL DAILY PRN
Qty: 30 TABLET | Refills: 0 | Status: SHIPPED | OUTPATIENT
Start: 2024-10-15

## 2024-10-15 NOTE — PROGRESS NOTES
Ambulatory Visit  Name: Krystyna Menard      : 1940      MRN: [id number]  Encounter Provider: Margot Barron DO  Encounter Date: 10/15/2024   Encounter department: [facility] PRIMARY CARE    Assessment & Plan  Medicare annual wellness visit, subsequent  Medicare annual wellness visit completed today, patient is due for DEXA as well as mammogram.  She already has a mammogram scheduled and will call to make appointment for DEXA scan.  Patient also has some blood work that is pending which she will complete when she does have nephrology blood work in December.       Urinary incontinence, unspecified type         Post-menopausal    Orders:    DXA bone density spine hip and pelvis; Future    Sciatica of left side  Will trial Flexeril as needed for low back pain.  Orders:    cyclobenzaprine (FLEXERIL) 10 mg tablet; Take 1 tablet (10 mg total) by mouth daily as needed for muscle spasms    Encounter for screening mammogram for malignant neoplasm of breast         Irritation of left ear  Middle ear appears erythematous with peeling skin, also has impacted cerumen.  Will start Cortisporin otic solution 4 drops to left ear every 8 hours.  Referral to ENT has also been given.  Orders:    neomycin-polymyxin-hydrocortisone (CORTISPORIN) 0.35%-10,000 units/mL-1% otic suspension; Administer 4 drops into the left ear every 8 (eight) hours    Impacted cerumen of left ear  Referral to ENT has been placed for left-sided impacted cerumen.  Orders:    Ambulatory Referral to Otolaryngology; Future      Depression Screening and Follow-up Plan: Patient was screened for depression during today's encounter. They screened negative with a PHQ-2 score of 0.    Urinary Incontinence Plan of Care: counseling topics discussed: practice Kegel (pelvic floor strengthening) exercises, use restroom every 2 hours, limit alcohol, caffeine, spicy foods, and acidic foods, keeping a bladder diary and limiting fluid intake 3-4  hours before bed.       Preventive health issues were discussed with patient, and age appropriate screening tests were ordered as noted in patient's After Visit Summary. Personalized health advice and appropriate referrals for health education or preventive services given if needed, as noted in patient's After Visit Summary.    History of Present Illness       Patient presents today for AWV visit.         Patient Care Team:  Margot Barron DO as PCP - General (Family Medicine)  Margot Barron DO as PCP - [facility]CP-[facility] ([facility]  MD Omi Alcantar MD (Nephrology)    Review of Systems   Constitutional:  Negative for chills and fever.   HENT:  Negative for ear pain, rhinorrhea and sore throat.         Peeling in left ear   Eyes:  Negative for pain and visual disturbance.   Respiratory:  Negative for cough and shortness of breath.    Cardiovascular:  Negative for chest pain and palpitations.   Gastrointestinal:  Negative for abdominal pain and vomiting.   Genitourinary:  Negative for dysuria and hematuria.   Musculoskeletal:  Negative for arthralgias and back pain.   Skin:  Negative for color change and rash.   Neurological:  Negative for seizures and syncope.   All other systems reviewed and are negative.    Medical History Reviewed by provider this encounter:       Annual Wellness Visit Questionnaire   [place] is here for her Subsequent Wellness visit.     Health Risk Assessment:   Patient rates overall health as good. Patient feels that their physical health rating is same. Patient is satisfied with their life. Eyesight was rated as slightly worse. Hearing was rated as same. Patient feels that their emotional and mental health rating is same. Patients states they are never, rarely angry. Patient states they are never, rarely unusually tired/fatigued. Pain experienced in the last 7 days has been a lot. Patient's pain rating has been 9/10. Patient states that she has experienced no  weight loss or gain in last 6 months.     Depression Screening:   PHQ-2 Score: 0      Fall Risk Screening:   In the past year, patient has experienced: no history of falling in past year      Urinary Incontinence Screening:   Patient has leaked urine accidently in the last six months.     Home Safety:  Patient does not have trouble with stairs inside or outside of their home. Patient has working smoke alarms and has no working carbon monoxide detector. Home safety hazards include: none.     Nutrition:   Current diet is Regular.     Medications:   Patient is currently taking over-the-counter supplements. OTC medications include: see medication list. Patient is able to manage medications.     Activities of Daily Living (ADLs)/Instrumental Activities of Daily Living (IADLs):   Walk and transfer into and out of bed and chair?: Yes  Dress and groom yourself?: Yes    Bathe or shower yourself?: Yes    Feed yourself? Yes  Do your laundry/housekeeping?: Yes  Manage your money, pay your bills and track your expenses?: Yes  Make your own meals?: Yes    Do your own shopping?: Yes    Previous Hospitalizations:   Any hospitalizations or ED visits within the last 12 months?: Yes    How many hospitalizations have you had in the last year?: 1-2    Hospitalization Comments: [facility] - wrnt to Urgent Care    Advance Care Planning:   Living will: Yes    Durable POA for healthcare: Yes    Advanced directive: Yes      PREVENTIVE SCREENINGS      Cardiovascular Screening:    General: Risks and Benefits Discussed and History Lipid Disorder    Due for: Lipid Panel      Diabetes Screening:     General: Risks and Benefits Discussed    Due for: Blood Glucose      Colorectal Cancer Screening:     General: Screening Not Indicated      Breast Cancer Screening:     General: Screening Current      Cervical Cancer Screening:    General: Screening Not Indicated      Osteoporosis Screening:    General: Screening Not Indicated      Abdominal Aortic  Aneurysm (AAA) Screening:        General: Screening Not Indicated      Lung Cancer Screening:     General: Screening Not Indicated      Hepatitis C Screening:    General: Screening Not Indicated    Screening, Brief Intervention, and Referral to Treatment (SBIRT)    Screening  Typical number of drinks in a day: 0  Typical number of drinks in a week: 1  Interpretation: Low risk drinking behavior.    AUDIT-C Screenin) How often did you have a drink containing alcohol in the past year? 2 to 4 times a month  2) How many drinks did you have on a typical day when you were drinking in the past year? 1 to 2  3) How often did you have 6 or more drinks on one occasion in the past year? never    AUDIT-C Score: 2  Interpretation: Score 0-2 (female): Negative screen for alcohol misuse    Single Item Drug Screening:  How often have you used an illegal drug (including marijuana) or a prescription medication for non-medical reasons in the past year? never    Single Item Drug Screen Score: 0  Interpretation: Negative screen for possible drug use disorder    Social Determinants of Health     Financial Resource Strain: Low Risk  (10/4/2023)    Overall Financial Resource Strain (CARDIA)     Difficulty of Paying Living Expenses: Not hard at all   Food Insecurity: No Food Insecurity (10/11/2024)    Hunger Vital Sign     Worried About Running Out of Food in the Last Year: Never true     Ran Out of Food in the Last Year: Never true   Transportation Needs: No Transportation Needs (10/11/2024)    PRAPARE - Transportation     Lack of Transportation (Medical): No     Lack of Transportation (Non-Medical): No   Housing Stability: Low Risk  (10/11/2024)    Housing Stability Vital Sign     Unable to Pay for Housing in the Last Year: No     Number of Times Moved in the Last Year: 0     Homeless in the Last Year: No   Utilities: Not At Risk (10/11/2024)    [facility] Utilities     Threatened with loss of utilities: No     No results found.    Objective  "    Ht 5' 3\" (1.6 m)   BMI 35.78 kg/m²     Physical Exam  Vitals and nursing note reviewed.   Constitutional:       General: She is not in acute distress.     Appearance: She is well-developed.   HENT:      Head: Normocephalic and atraumatic.      Left Ear: There is impacted cerumen.   Eyes:      Conjunctiva/sclera: Conjunctivae normal.   Musculoskeletal:         General: No swelling.   Skin:     General: Skin is dry.      Comments: Left ear erythematous and middle canal with different stages of peeling skin and scabbing.   Neurological:      Mental Status: She is alert.   Psychiatric:         Mood and Affect: Mood normal.         "

## 2024-10-15 NOTE — ASSESSMENT & PLAN NOTE
Medicare annual wellness visit completed today, patient is due for DEXA as well as mammogram.  She already has a mammogram scheduled and will call to make appointment for DEXA scan.  Patient also has some blood work that is pending which she will complete when she does have nephrology blood work in December.

## 2024-10-15 NOTE — ASSESSMENT & PLAN NOTE
Referral to ENT has been placed for left-sided impacted cerumen.  Orders:    Ambulatory Referral to Otolaryngology; Future

## 2024-10-15 NOTE — ASSESSMENT & PLAN NOTE
Will trial Flexeril as needed for low back pain.  Orders:    cyclobenzaprine (FLEXERIL) 10 mg tablet; Take 1 tablet (10 mg total) by mouth daily as needed for muscle spasms

## 2024-10-15 NOTE — PATIENT INSTRUCTIONS
Medicare Preventive Visit Patient Instructions  Thank you for completing your Welcome to Medicare Visit or Medicare Annual Wellness Visit today. Your next wellness visit will be due in one year (10/16/2025).  The screening/preventive services that you may require over the next 5-10 years are detailed below. Some tests may not apply to you based off risk factors and/or age. Screening tests ordered at today's visit but not completed yet may show as past due. Also, please note that scanned in results may not display below.  Preventive Screenings:  Service Recommendations Previous Testing/Comments   Colorectal Cancer Screening  * Colonoscopy    * Fecal Occult Blood Test (FOBT)/Fecal Immunochemical Test (FIT)  * Fecal DNA/Cologuard Test  * Flexible Sigmoidoscopy Age: 45-75 years old   Colonoscopy: every 10 years (may be performed more frequently if at higher risk)  OR  FOBT/FIT: every 1 year  OR  Cologuard: every 3 years  OR  Sigmoidoscopy: every 5 years  Screening may be recommended earlier than age 45 if at higher risk for colorectal cancer. Also, an individualized decision between you and your healthcare provider will decide whether screening between the ages of 76-85 would be appropriate. Colonoscopy: 12/13/2010  FOBT/FIT: Not on file  Cologuard: Not on file  Sigmoidoscopy: Not on file    Screening Not Indicated     Breast Cancer Screening Age: 40+ years old  Frequency: every 1-2 years  Not required if history of left and right mastectomy Mammogram: 11/13/2023    Screening Current   Cervical Cancer Screening Between the ages of 21-29, pap smear recommended once every 3 years.   Between the ages of 30-65, can perform pap smear with HPV co-testing every 5 years.   Recommendations may differ for women with a history of total hysterectomy, cervical cancer, or abnormal pap smears in past. Pap Smear: 05/24/2021    Screening Not Indicated   Hepatitis C Screening Once for adults born between 1945 and 1965  More frequently in  patients at high risk for Hepatitis C Hep C Antibody: Not on file    Screening Not Indicated   Diabetes Screening 1-2 times per year if you're at risk for diabetes or have pre-diabetes Fasting glucose: 95 mg/dL (6/21/2024)  A1C: 5.3 % (10/21/2022)  Risks and Benefits Discussed  Due for Blood Glucose   Cholesterol Screening Once every 5 years if you don't have a lipid disorder. May order more often based on risk factors. Lipid panel: 10/21/2022    Risks and Benefits Discussed  History Lipid Disorder  Due for Lipid Panel     Other Preventive Screenings Covered by Medicare:  Abdominal Aortic Aneurysm (AAA) Screening: covered once if your at risk. You're considered to be at risk if you have a family history of AAA.  Lung Cancer Screening: covers low dose CT scan once per year if you meet all of the following conditions: (1) Age 55-77; (2) No signs or symptoms of lung cancer; (3) Current smoker or have quit smoking within the last 15 years; (4) You have a tobacco smoking history of at least 20 pack years (packs per day multiplied by number of years you smoked); (5) You get a written order from a healthcare provider.  Glaucoma Screening: covered annually if you're considered high risk: (1) You have diabetes OR (2) Family history of glaucoma OR (3)  aged 50 and older OR (4)  American aged 65 and older  Osteoporosis Screening: covered every 2 years if you meet one of the following conditions: (1) You're estrogen deficient and at risk for osteoporosis based off medical history and other findings; (2) Have a vertebral abnormality; (3) On glucocorticoid therapy for more than 3 months; (4) Have primary hyperparathyroidism; (5) On osteoporosis medications and need to assess response to drug therapy.   Last bone density test (DXA Scan): 11/11/2022.  HIV Screening: covered annually if you're between the age of 15-65. Also covered annually if you are younger than 15 and older than 65 with risk factors for  HIV infection. For pregnant patients, it is covered up to 3 times per pregnancy.    Immunizations:  Immunization Recommendations   Influenza Vaccine Annual influenza vaccination during flu season is recommended for all persons aged >= 6 months who do not have contraindications   Pneumococcal Vaccine   * Pneumococcal conjugate vaccine = PCV13 (Prevnar 13), PCV15 (Vaxneuvance), PCV20 (Prevnar 20)  * Pneumococcal polysaccharide vaccine = PPSV23 (Pneumovax) Adults 19-63 yo with certain risk factors or if 65+ yo  If never received any pneumonia vaccine: recommend Prevnar 20 (PCV20)  Give PCV20 if previously received 1 dose of PCV13 or PPSV23   Hepatitis B Vaccine 3 dose series if at intermediate or high risk (ex: diabetes, end stage renal disease, liver disease)   Respiratory syncytial virus (RSV) Vaccine - COVERED BY MEDICARE PART D  * RSVPreF3 (Arexvy) CDC recommends that adults 60 years of age and older may receive a single dose of RSV vaccine using shared clinical decision-making (SCDM)   Tetanus (Td) Vaccine - COST NOT COVERED BY MEDICARE PART B Following completion of primary series, a booster dose should be given every 10 years to maintain immunity against tetanus. Td may also be given as tetanus wound prophylaxis.   Tdap Vaccine - COST NOT COVERED BY MEDICARE PART B Recommended at least once for all adults. For pregnant patients, recommended with each pregnancy.   Shingles Vaccine (Shingrix) - COST NOT COVERED BY MEDICARE PART B  2 shot series recommended in those 19 years and older who have or will have weakened immune systems or those 50 years and older     Health Maintenance Due:  There are no preventive care reminders to display for this patient.  Immunizations Due:      Topic Date Due   • Influenza Vaccine (1) Never done     Advance Directives   What are advance directives?  Advance directives are legal documents that state your wishes and plans for medical care. These plans are made ahead of time in case  you lose your ability to make decisions for yourself. Advance directives can apply to any medical decision, such as the treatments you want, and if you want to donate organs.   What are the types of advance directives?  There are many types of advance directives, and each state has rules about how to use them. You may choose a combination of any of the following:  Living will:  This is a written record of the treatment you want. You can also choose which treatments you do not want, which to limit, and which to stop at a certain time. This includes surgery, medicine, IV fluid, and tube feedings.   Durable power of  for healthcare (DPAHC):  This is a written record that states who you want to make healthcare choices for you when you are unable to make them for yourself. This person, called a proxy, is usually a family member or a friend. You may choose more than 1 proxy.  Do not resuscitate (DNR) order:  A DNR order is used in case your heart stops beating or you stop breathing. It is a request not to have certain forms of treatment, such as CPR. A DNR order may be included in other types of advance directives.  Medical directive:  This covers the care that you want if you are in a coma, near death, or unable to make decisions for yourself. You can list the treatments you want for each condition. Treatment may include pain medicine, surgery, blood transfusions, dialysis, IV or tube feedings, and a ventilator (breathing machine).  Values history:  This document has questions about your views, beliefs, and how you feel and think about life. This information can help others choose the care that you would choose.  Why are advance directives important?  An advance directive helps you control your care. Although spoken wishes may be used, it is better to have your wishes written down. Spoken wishes can be misunderstood, or not followed. Treatments may be given even if you do not want them. An advance directive may  make it easier for your family to make difficult choices about your care.   Urinary Incontinence   Urinary incontinence (UI)  is when you lose control of your bladder. UI develops because your bladder cannot store or empty urine properly. The 3 most common types of UI are stress incontinence, urge incontinence, or both.  Medicines:   May be given to help strengthen your bladder control. Report any side effects of medication to your healthcare provider.  Do pelvic muscle exercises often:  Your pelvic muscles help you stop urinating. Squeeze these muscles tight for 5 seconds, then relax for 5 seconds. Gradually work up to squeezing for 10 seconds. Do 3 sets of 15 repetitions a day, or as directed. This will help strengthen your pelvic muscles and improve bladder control.  Train your bladder:  Go to the bathroom at set times, such as every 2 hours, even if you do not feel the urge to go. You can also try to hold your urine when you feel the urge to go. For example, hold your urine for 5 minutes when you feel the urge to go. As that becomes easier, hold your urine for 10 minutes.   Self-care:   Keep a UI record.  Write down how often you leak urine and how much you leak. Make a note of what you were doing when you leaked urine.  Drink liquids as directed. You may need to limit the amount of liquid you drink to help control your urine leakage. Do not drink any liquid right before you go to bed. Limit or do not have drinks that contain caffeine or alcohol.   Prevent constipation.  Eat a variety of high-fiber foods. Good examples are high-fiber cereals, beans, vegetables, and whole-grain breads. Walking is the best way to trigger your intestines to have a bowel movement.  Exercise regularly and maintain a healthy weight.  Weight loss and exercise will decrease pressure on your bladder and help you control your leakage.   Use a catheter as directed  to help empty your bladder. A catheter is a tiny, plastic tube that is put  into your bladder to drain your urine.   Go to behavior therapy as directed.  Behavior therapy may be used to help you learn to control your urge to urinate.    Weight Management   Why it is important to manage your weight:  Being overweight increases your risk of health conditions such as heart disease, high blood pressure, type 2 diabetes, and certain types of cancer. It can also increase your risk for osteoarthritis, sleep apnea, and other respiratory problems. Aim for a slow, steady weight loss. Even a small amount of weight loss can lower your risk of health problems.  How to lose weight safely:  A safe and healthy way to lose weight is to eat fewer calories and get regular exercise. You can lose up about 1 pound a week by decreasing the number of calories you eat by 500 calories each day.   Healthy meal plan for weight management:  A healthy meal plan includes a variety of foods, contains fewer calories, and helps you stay healthy. A healthy meal plan includes the following:  Eat whole-grain foods more often.  A healthy meal plan should contain fiber. Fiber is the part of grains, fruits, and vegetables that is not broken down by your body. Whole-grain foods are healthy and provide extra fiber in your diet. Some examples of whole-grain foods are whole-wheat breads and pastas, oatmeal, brown rice, and bulgur.  Eat a variety of vegetables every day.  Include dark, leafy greens such as spinach, kale, dalton greens, and mustard greens. Eat yellow and orange vegetables such as carrots, sweet potatoes, and winter squash.   Eat a variety of fruits every day.  Choose fresh or canned fruit (canned in its own juice or light syrup) instead of juice. Fruit juice has very little or no fiber.  Eat low-fat dairy foods.  Drink fat-free (skim) milk or 1% milk. Eat fat-free yogurt and low-fat cottage cheese. Try low-fat cheeses such as mozzarella and other reduced-fat cheeses.  Choose meat and other protein foods that are low  "in fat.  Choose beans or other legumes such as split peas or lentils. Choose fish, skinless poultry (chicken or turkey), or lean cuts of red meat (beef or pork). Before you cook meat or poultry, cut off any visible fat.   Use less fat and oil.  Try baking foods instead of frying them. Add less fat, such as margarine, sour cream, regular salad dressing and mayonnaise to foods. Eat fewer high-fat foods. Some examples of high-fat foods include french fries, doughnuts, ice cream, and cakes.  Eat fewer sweets.  Limit foods and drinks that are high in sugar. This includes candy, cookies, regular soda, and sweetened drinks.  Exercise:  Exercise at least 30 minutes per day on most days of the week. Some examples of exercise include walking, biking, dancing, and swimming. You can also fit in more physical activity by taking the stairs instead of the elevator or parking farther away from stores. Ask your healthcare provider about the best exercise plan for you.      © Copyright [employer] 2018 Information is for End User's use only and may not be sold, redistributed or otherwise used for commercial purposes. All illustrations and images included in CareNotes® are the copyrighted property of [employer] or [employer]      Patient Education     Urinary incontinence in females   The Basics   Written by the doctors and editors at [facility]   What is urinary incontinence? -- Urinary incontinence is the medical term for when a person leaks urine or loses bladder control.  Incontinence is a very common problem, but it is not a normal part of aging. If you have this problem, there are treatments that can help. There are also things that you can do on your own to stop or reduce urine leakage so you don't have to \"just live with it.\"  What are the symptoms of incontinence? -- There are different types of incontinence. Each causes different symptoms. The 3 most common types are:   Stress incontinence - With stress " "incontinence, you leak urine when you laugh, cough, sneeze, or do anything that \"stresses\" the belly. Stress incontinence is most common in females, especially those who have had a baby.   Urgency incontinence - With urgency incontinence, you feel a strong need to urinate all of a sudden. This is also known as \"urge incontinence.\" Often, the \"urge\" is so strong that you can't make it to the bathroom in time. \"Overactive bladder\" is another term for having a sudden, frequent urge to urinate. People with overactive bladder might or might not actually leak urine.   Mixed incontinence - With mixed incontinence, you have symptoms of both stress and urgency incontinence.  Is there anything I can do on my own to feel better? -- Yes. Here are some things that can help reduce urine leaks:   Reduce the amount of liquid that you drink, especially a few hours before bed.   Cut down on any foods or drinks that make your symptoms worse. Some people find that alcohol, caffeine, or spicy or acidic foods irritate the bladder.   Try to lose weight, if you are overweight. Your doctor or nurse can help you do this in a healthy way.   If you have diabetes, keep your blood sugar as close to your goal level as possible.   If you take medicines called diuretics, plan ahead. These medicines increase the need to urinate. Try to take them when you know you will be near a bathroom for a few hours. If you keep having problems with leakage because of diuretics, ask your doctor if you can take a lower dose or switch to a different medicine.  These techniques can also help improve bladder control:   Bladder retraining - During bladder retraining, you go to the bathroom at scheduled times. For instance, you might decide that you will go every hour. Make yourself go every hour, even if you don't feel like you need to. Try to wait the whole hour, even if you need to go sooner. Then, once you get used to going every hour, increase the amount of time " "you wait in between bathroom visits. Over time, you might be able to \"retrain\" your bladder to wait 3 or 4 hours between bathroom visits.   Pelvic floor muscle training - This involves learning exercises to strengthen and relax your pelvic muscles. These include the muscles that control the flow of urine and bowel movements. When done right, these exercises can help. But people often do them wrong. Ask your doctor or nurse how to do them right. Your doctor might suggest working with a physical therapist who has special training in these exercises.  Should I see my doctor or nurse? -- Yes. Your doctor or nurse can find out what might be causing your incontinence. They can also suggest ways to relieve the problem.  When you speak to your doctor or nurse, ask if any of the medicines you take could be causing your symptoms. Some medicines can cause incontinence or make it worse.  Some people choose to wear pads or special underwear. These can help if you accidentally leak urine once in a while. But they can also cause skin irritation if you use them a lot. If you have incontinence, ask your doctor or nurse how to treat it.  How is incontinence treated? -- The treatment options differ depending on what type of incontinence you have. Some of the options include:   Medicines to relax the bladder   Surgery to repair the tissues that support the bladder or to improve the flow of urine   Electrical stimulation of the nerves that relax the bladder  Urinary incontinence is more common in people who have been through menopause. (Menopause is when you stop having monthly periods). Some people have vaginal dryness after menopause. If this is the case for you, a treatment called vaginal estrogen might help.  What will my life be like? -- Many people with incontinence can regain bladder control or at least reduce the amount of leakage they have. The most important thing is to tell your doctor or nurse. Then, work with them to find " an approach that helps you.  All topics are updated as new evidence becomes available and our peer review process is complete.  This topic retrieved from [employer] on: Feb 26, 2024.  Topic 37127 Version 18.0  Release: 32.2.4 - C32.56  © 2024 UpToDate, Inc. and/or its affiliates. All rights reserved.  figure 1: Location of the bladder     This drawing shows the side view of a woman's body. The bladder is in front of the vagina. The urethra is the tube that carries urine from the bladder out of the body.  Graphic 761495 Version 1.0  Consumer Information Use and Disclaimer   Disclaimer: This generalized information is a limited summary of diagnosis, treatment, and/or medication information. It is not meant to be comprehensive and should be used as a tool to help the user understand and/or assess potential diagnostic and treatment options. It does NOT include all information about conditions, treatments, medications, side effects, or risks that may apply to a specific patient. It is not intended to be medical advice or a substitute for the medical advice, diagnosis, or treatment of a health care provider based on the health care provider's examination and assessment of a patient's specific and unique circumstances. Patients must speak with a health care provider for complete information about their health, medical questions, and treatment options, including any risks or benefits regarding use of medications. This information does not endorse any treatments or medications as safe, effective, or approved for treating a specific patient. UpToDate, Inc. and its affiliates disclaim any warranty or liability relating to this information or the use thereof.The use of this information is governed by the Terms of Use, available at https://www.wolterskluwer.com/en/know/clinical-effectiveness-terms. 2024© UpToDate, Inc. and its affiliates and/or licensors. All rights reserved.  Copyright   © 2024 UpToDate, Inc. and/or its  "affiliates. All rights reserved.    Patient Education     Pelvic floor muscle exercises   The Basics   Written by the doctors and editors at [facility]   What is the pelvic floor? -- The \"pelvic floor\" is the name for the muscles that support the organs in the pelvis. These organs include the bladder and rectum. In the female pelvis, they also include the uterus (figure 1).  What are pelvic floor muscle exercises? -- These are exercises that can make your pelvic floor muscles stronger. They involve learning ways to tighten and relax specific muscles.  Pelvic floor muscle exercises can help keep you from leaking urine, gas, or bowel movements. They can also help with a condition called \"pelvic organ prolapse.\" This is when the organs in the lower belly drop down and press against or bulge into the vagina.  One way to strengthen your pelvic floor muscles is to do exercises. These are also known as \"Kegel\" exercises.  How do I do pelvic floor muscle exercises? -- If you want to try pelvic floor muscle exercises, start by talking to your doctor or nurse. They can talk to you about whether these exercises can help you. They can also teach you how to do them correctly.  You will need to learn which muscles to tighten and relax. It is sometimes hard to figure out the right muscles.  Some ways you can practice:   People with female or male anatomy - Squeeze the muscles you would use to avoid passing gas.   People with female anatomy - Put a finger inside your vagina and squeeze the muscles around your finger. Or you can imagine that you are sitting on a marble and have to pick it up using your vagina.   People with male anatomy - Squeeze the muscles that control the flow of urine. These exercises might help reduce urine leaks in people who have had surgery to treat prostate cancer or an enlarged prostate.  No matter how you learn to do pelvic floor muscle exercises, know that the muscles involved are not in your belly, " "thighs, or buttocks.  After you learn which muscles to tighten and relax, you can do the exercises in any position (standing, sitting, or lying down).  Should I see a physical therapist? -- Your doctor or nurse might suggest working with a physical therapist who has special training in pelvic floor issues. They can check your muscle strength and teach you specific exercises.  How often should I do the exercises? -- A common approach is to try to do a set of the exercises 3 times a day.  For each set, do the following about 10 times:   Squeeze your pelvic muscles.   Hold the muscles tight for about 10 seconds.   Relax the muscles completely.  Keep up this routine for at least a few months. You will probably notice results, but it might take a few weeks to several months.  How do pelvic floor muscle exercises help? -- Pelvic floor muscle exercises can help:   Prevent urine leaks in people who have \"stress incontinence\" - This means that they leak urine when they cough, laugh, sneeze, or strain.   Control sudden urges to urinate - These happen to people with \"urinary urgency\" or \"urge incontinence.\"   Control the release of gas or bowel movements   Improve symptoms caused by pelvic organ prolapse - These can include pressure or bulging in the vagina. If you have these symptoms, see your doctor or nurse to find out the cause.  It might take a few months of doing the exercises regularly before you notice them working. If you have been doing pelvic floor muscle exercises for several months and they don't seem to be making a difference, tell your doctor or nurse. They might suggest seeing a physical therapist or trying other treatments for your condition.  All topics are updated as new evidence becomes available and our peer review process is complete.  This topic retrieved from [employer] on: Feb 26, 2024.  Topic 28105 Version 15.0  Release: 32.2.4 - C32.56  © 2024 UpToDate, Inc. and/or its affiliates. All rights " "reserved.  figure 1: Pelvic floor muscles     The \"pelvic floor\" is a group of muscles that support the organs in the pelvis. In females, these organs include the uterus, bladder, and rectum.  Graphic [id number] Version 2.0  Consumer Information Use and Disclaimer   Disclaimer: This generalized information is a limited summary of diagnosis, treatment, and/or medication information. It is not meant to be comprehensive and should be used as a tool to help the user understand and/or assess potential diagnostic and treatment options. It does NOT include all information about conditions, treatments, medications, side effects, or risks that may apply to a specific patient. It is not intended to be medical advice or a substitute for the medical advice, diagnosis, or treatment of a health care provider based on the health care provider's examination and assessment of a patient's specific and unique circumstances. Patients must speak with a health care provider for complete information about their health, medical questions, and treatment options, including any risks or benefits regarding use of medications. This information does not endorse any treatments or medications as safe, effective, or approved for treating a specific patient. [employer] and its affiliates disclaim any warranty or liability relating to this information or the use thereof.The use of this information is governed by the Terms of Use, available at https://www.wolterskluwer.com/en/know/clinical-effectiveness-terms. 2024© [employer] and its affiliates and/or licensors. All rights reserved.  Copyright   © 2024 [employer] and/or its affiliates. All rights reserved.    "

## 2024-10-16 ENCOUNTER — TELEPHONE (OUTPATIENT)
Age: 84
End: 2024-10-16

## 2024-10-16 NOTE — TELEPHONE ENCOUNTER
Pt called in wanting to inform Dr. Barron, that she attempted to schedule an appt to see: TIFFANIE BARRIOS - but was told he is an  and that they are no longer booking new appts there.     Pt was able to schedule with: Barrera Hopper MD at [facility], for 10/23.

## 2024-10-24 NOTE — H&P (VIEW-ONLY)
Assessment:  1. Degeneration of intervertebral disc of lumbar region with lower extremity pain    2. Lumbar radiculopathy    3. Lumbar facet arthropathy        Plan:  Orders Placed This Encounter   Procedures    FL spine and pain procedure     Standing Status:   Future     Standing Expiration Date:   10/25/2028     Scheduling Instructions:      Needs to get MRI done first before determining injection type and level.     Order Specific Question:   Reason for Exam:     Answer:   ROCHELLE FAIR     Order Specific Question:   Anticoagulant hold needed?     Answer:   no    Ambulatory referral to Physical Therapy     Standing Status:   Future     Standing Expiration Date:   10/25/2025     Referral Priority:   Routine     Referral Type:   Physical Therapy     Referral Reason:   Specialty Services Required     Requested Specialty:   Physical Therapy     Number of Visits Requested:   1     Expiration Date:   10/25/2025       No orders of the defined types were placed in this encounter.      My impressions and treatment recommendations were discussed in detail with the patient, who verbalized understanding and had no further questions.    84-year-old female presents her office with 3 months of left lower extremity leg pain, likely secondary to lumbar radiculopathy.  Pain travels in roughly an L5/S1 dermatomal distribution.  X-ray lumbar spine notable for severe degenerative disease of the facet joints and advanced DDD.  She is likely clinically symptomatic from pathology at the L5-S1 level.  Requires MRI to determine degree of compressive pathology.  She may be candidate for epidural steroid injection which was discussed today.  Given high probability that her symptoms are secondary lumbar radiculopathy, we will preemptively schedule her for epidural injection.  She needs to first complete MRI that was ordered by Dr. Barron.  Once this study has resulted, she will let us know and we will specify the level and approach to the  procedure.    [place] Prescription Drug Monitoring Program report was reviewed and was appropriate     Complete risks and benefits including bleeding, infection, tissue reaction, nerve injury and allergic reaction were discussed. The approach was demonstrated using models and literature was provided. Verbal and written consent was obtained.    Discharge instructions were provided. I personally saw and examined the patient and I agree with the above discussed plan of care.    History of Present Illness:    Krystyna eMnard is a 84 y.o. female who presents to [facility] for initial evaluation of the above stated pain complaints. The patient has a past medical and chronic pain history as outlined in the assessment section. She was referred by Margot Barron DO  [address] [place] [address] .    Patient here with chief complaint left lower extremity sciatica.  3 months.  Undetermined cause.  Severe intensity.  8-10 out of 10 pain.  Nearly constant.  Sharp.  Pain travels down the posterior thigh, posterior lateral calf    Pain is increased with standing, exercise.  No change with relaxation, coughing, sneezing.  Patient reports moderate leaf with PT, exercise, heat/ice therapy.    No tobacco or marijuana use.  Not allergic to contrast dye.  Next  Currently using lidocaine patch with relief.  In the past is used Voltaren gel.  In the past is also used for kidney stone with relief.  Currently using Flexeril with relief as well as acetaminophen.      Review of Systems:    Review of Systems   Respiratory:  Positive for shortness of breath.    Endocrine: Positive for polyuria.   Musculoskeletal:  Positive for arthralgias and back pain.   Neurological:  Positive for dizziness.           Past Medical History:   Diagnosis Date    Allergic see chart    Arthritis     Arthritis     Cancer (HCC) skin    Chronic kidney disease     Diverticulitis of colon     Fall     H/O nonmelanoma  skin cancer     last assessed 9/28/17    Hypertension     Kidney problem     Kidney stone     Pneumonia 1961    Squamous cell skin cancer     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       Past Surgical History:   Procedure Laterality Date    APPENDECTOMY  2010    BLADDER SURGERY  1997    BREAST EXCISIONAL BIOPSY Left 1995    benign    no visible scar    BREAST SURGERY      CERVIX SURGERY      dilation and curettage of cervical stump 2005, 1998, 1994    CHOLECYSTECTOMY  1997    COLECTOMY      partial - last assessed 10/28/14    COLONOSCOPY      last assessed 8/9/17    FL RETROGRADE PYELOGRAM  03/29/2021    FL RETROGRADE PYELOGRAM  04/29/2021    HERNIA REPAIR  2011    PA CYSTO/URETERO W/LITHOTRIPSY &INDWELL STENT INSRT Left 03/29/2021    Procedure: CYSTOSCOPY URETEROSCOPY, RETROGRADE PYELOGRAM AND INSERTION STENT URETERAL;  Surgeon: Marv Nunn MD;  [facility];  Service: Urology    PA CYSTO/URETERO W/LITHOTRIPSY &INDWELL STENT INSRT Left 04/29/2021    Procedure: CYSTOSCOPY URETEROSCOPY WITH LITHOTRIPSY HOLMIUM LASER, RETROGRADE PYELOGRAM AND INSERTION STENT URETERAL;  Surgeon: Marcell Yoon MD;  Location[facility];  Service: Urology    SKIN CANCER EXCISION      TONSILLECTOMY  1971    TUBAL LIGATION         Family History   Problem Relation Age of Onset    Arthritis Mother     Osteoporosis Mother     Stroke Mother     Heart disease Father     Osteoporosis Father     Breast cancer Family     Osteoporosis Family     Breast cancer additional onset Family     No Known Problems Sister     No Known Problems Daughter     Breast cancer Maternal Grandmother         typo - she is paternal    No Known Problems Maternal Grandfather     Breast cancer Paternal Grandmother     No Known Problems Paternal Grandfather     No Known Problems Sister        Social History     Occupational History    Not on file   Tobacco Use    Smoking status: Former     Current packs/day: 0.00     Average packs/day: 1  pack/day for 20.8 years (20.8 ttl pk-yrs)     Types: Cigarettes     Start date: 1958     Quit date: 1979     Years since quittin.3     Passive exposure: Past    Smokeless tobacco: Never    Tobacco comments:     Parents and sisters all smoked, as well as friends. I enjoyed it. And I quit willingly .   Vaping Use    Vaping status: Never Used   Substance and Sexual Activity    Alcohol use: Yes     Alcohol/week: 1.0 standard drink of alcohol     Types: 1 Glasses of wine per week     Comment: Occasionally I have a bourbon & ginger ale    Drug use: No    Sexual activity: Not Currently     Partners: Male     Birth control/protection: Diaphragm, Spermicide, Female Sterilization         Current Outpatient Medications:     Ascorbic Acid (Vitamin C) 500 MG CAPS, Take 1 capsule by mouth daily, Disp: , Rfl:     Calcium-Magnesium-Vitamin D (CALCIUM 500 PO), Take by mouth PT CURRENTLY NOT TAKING, Disp: , Rfl:     Cyanocobalamin (VITAMIN B-12 PO), Take by mouth daily, Disp: , Rfl:     cyclobenzaprine (FLEXERIL) 10 mg tablet, Take 1 tablet (10 mg total) by mouth daily as needed for muscle spasms, Disp: 30 tablet, Rfl: 0    Glucos-Chondroit-Hyaluron-MSM (GLUCOSAMINE CHONDROITIN JOINT) TABS, Take by mouth daily  , Disp: , Rfl:     metoprolol succinate (TOPROL-XL) 50 mg 24 hr tablet, take 1 tablet by mouth once daily, Disp: 90 tablet, Rfl: 1    Multiple Vitamins-Minerals (PreserVision AREDS 2+Multi Vit) CAPS, , Disp: , Rfl:     neomycin-polymyxin-hydrocortisone (CORTISPORIN) 0.35%-10,000 units/mL-1% otic suspension, Administer 4 drops into the left ear every 8 (eight) hours, Disp: 10 mL, Rfl: 0    Omega-3 Fatty Acids (FISH OIL) 1000 MG CPDR, Take by mouth, Disp: , Rfl:     triamterene-hydrochlorothiazide (MAXZIDE-25) 37.5-25 mg per tablet, take 1 tablet by mouth once daily, Disp: 100 tablet, Rfl: 1    Allergies   Allergen Reactions    Azithromycin Vomiting and Dizziness    Amlodipine Swelling    Lisinopril Cough     "Morphine Vomiting       Physical Exam:    /84   Pulse 72   Ht 5' 3\" (1.6 m)   Wt 92.1 kg (203 lb)   BMI 35.96 kg/m²     Constitutional: normal, well developed, well nourished, alert, in no distress and non-toxic and no overt pain behavior.  Eyes: anicteric  HEENT: grossly intact  Neck: supple, symmetric, trachea midline and no masses   Pulmonary:even and unlabored  Cardiovascular:No edema or pitting edema present  Skin:Normal without rashes or lesions and well hydrated  Psychiatric:Mood and affect appropriate  Neurologic:Cranial Nerves II-XII grossly intact  Musculoskeletal:normal    Lumbar Spine Exam    Appearance:  Normal lordosis  Sensory:  no sensory deficits noted  Motor Strength:  Left hip flexion:  5/5  Left hip extension:  5/5  Right hip flexion:  5/5  Right hip extension:  5/5  Left knee flexion:  5/5  Left knee extension:  5/5  Right knee flexion:  5/5  Right knee extension:  5/5  Left foot dorsiflexion:  5/5  Left foot plantar flexion:  5/5  Right foot dorsiflexion:  5/5  Right foot plantar flexion:  5/5  Reflexes:  Left Patellar:  2+   Right Patellar:  2+   Left Achilles:  2+   Right Achilles:  2+   Special Tests:  Left Straight Leg Test:  negative  Right Straight Leg Test:  negative        Imaging    LUMBAR SPINE        9/19/24     INDICATION:   Sciatica, left side. Lumbago with sciatica, left side.      COMPARISON:  None.     VIEWS:  XR SPINE LUMBAR MINIMUM 4 VIEWS NON INJURY  Images: 5     FINDINGS:     There are 5 non rib bearing lumbar vertebral bodies.      There is no evidence of acute fracture or destructive osseous lesion.     There is mild anterolisthesis of L5 on S1. There is mild anterolisthesis of L3 on L4..      There is severe facet disease in the mid to lower lumbar spine worse at L5-S1. There is moderate to severe multilevel disc space narrowing with osteophytosis throughout the lower thoracic and entire lumbar spine worse at L4-5 and L5-S1     Soft tissues are unremarkable.   "   IMPRESSION:        Severe facet disease worse in the lower lumbar spine. Moderate-severe multilevel spondylosis also worse in the lower lumbar spine.  Mild anterolisthesis of L3 on L4 and L5 on S1  FL spine and pain procedure    (Results Pending)       Orders Placed This Encounter   Procedures    FL spine and pain procedure    Ambulatory referral to Physical Therapy

## 2024-10-24 NOTE — PROGRESS NOTES
Assessment:  1. Degeneration of intervertebral disc of lumbar region with lower extremity pain    2. Lumbar radiculopathy    3. Lumbar facet arthropathy        Plan:  Orders Placed This Encounter   Procedures    FL spine and pain procedure     Standing Status:   Future     Standing Expiration Date:   10/25/2028     Scheduling Instructions:      Needs to get MRI done first before determining injection type and level.     Order Specific Question:   Reason for Exam:     Answer:   ROCHELLE FAIR     Order Specific Question:   Anticoagulant hold needed?     Answer:   no    Ambulatory referral to Physical Therapy     Standing Status:   Future     Standing Expiration Date:   10/25/2025     Referral Priority:   Routine     Referral Type:   Physical Therapy     Referral Reason:   Specialty Services Required     Requested Specialty:   Physical Therapy     Number of Visits Requested:   1     Expiration Date:   10/25/2025       No orders of the defined types were placed in this encounter.      My impressions and treatment recommendations were discussed in detail with the patient, who verbalized understanding and had no further questions.    84-year-old female presents her office with 3 months of left lower extremity leg pain, likely secondary to lumbar radiculopathy.  Pain travels in roughly an L5/S1 dermatomal distribution.  X-ray lumbar spine notable for severe degenerative disease of the facet joints and advanced DDD.  She is likely clinically symptomatic from pathology at the L5-S1 level.  Requires MRI to determine degree of compressive pathology.  She may be candidate for epidural steroid injection which was discussed today.  Given high probability that her symptoms are secondary lumbar radiculopathy, we will preemptively schedule her for epidural injection.  She needs to first complete MRI that was ordered by Dr. Barron.  Once this study has resulted, she will let us know and we will specify the level and approach to the  procedure.    [place] Prescription Drug Monitoring Program report was reviewed and was appropriate     Complete risks and benefits including bleeding, infection, tissue reaction, nerve injury and allergic reaction were discussed. The approach was demonstrated using models and literature was provided. Verbal and written consent was obtained.    Discharge instructions were provided. I personally saw and examined the patient and I agree with the above discussed plan of care.    History of Present Illness:    Krystyna Menard is a 84 y.o. female who presents to [facility] for initial evaluation of the above stated pain complaints. The patient has a past medical and chronic pain history as outlined in the assessment section. She was referred by Margot Barron DO  [address] [place] [address] .    Patient here with chief complaint left lower extremity sciatica.  3 months.  Undetermined cause.  Severe intensity.  8-10 out of 10 pain.  Nearly constant.  Sharp.  Pain travels down the posterior thigh, posterior lateral calf    Pain is increased with standing, exercise.  No change with relaxation, coughing, sneezing.  Patient reports moderate leaf with PT, exercise, heat/ice therapy.    No tobacco or marijuana use.  Not allergic to contrast dye.  Next  Currently using lidocaine patch with relief.  In the past is used Voltaren gel.  In the past is also used for kidney stone with relief.  Currently using Flexeril with relief as well as acetaminophen.      Review of Systems:    Review of Systems   Respiratory:  Positive for shortness of breath.    Endocrine: Positive for polyuria.   Musculoskeletal:  Positive for arthralgias and back pain.   Neurological:  Positive for dizziness.           Past Medical History:   Diagnosis Date    Allergic see chart    Arthritis     Arthritis     Cancer (HCC) skin    Chronic kidney disease     Diverticulitis of colon     Fall     H/O nonmelanoma  skin cancer     last assessed 9/28/17    Hypertension     Kidney problem     Kidney stone     Pneumonia 1961    Squamous cell skin cancer     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       Past Surgical History:   Procedure Laterality Date    APPENDECTOMY  2010    BLADDER SURGERY  1997    BREAST EXCISIONAL BIOPSY Left 1995    benign    no visible scar    BREAST SURGERY      CERVIX SURGERY      dilation and curettage of cervical stump 2005, 1998, 1994    CHOLECYSTECTOMY  1997    COLECTOMY      partial - last assessed 10/28/14    COLONOSCOPY      last assessed 8/9/17    FL RETROGRADE PYELOGRAM  03/29/2021    FL RETROGRADE PYELOGRAM  04/29/2021    HERNIA REPAIR  2011    MT CYSTO/URETERO W/LITHOTRIPSY &INDWELL STENT INSRT Left 03/29/2021    Procedure: CYSTOSCOPY URETEROSCOPY, RETROGRADE PYELOGRAM AND INSERTION STENT URETERAL;  Surgeon: Marv Nunn MD;  [facility];  Service: Urology    MT CYSTO/URETERO W/LITHOTRIPSY &INDWELL STENT INSRT Left 04/29/2021    Procedure: CYSTOSCOPY URETEROSCOPY WITH LITHOTRIPSY HOLMIUM LASER, RETROGRADE PYELOGRAM AND INSERTION STENT URETERAL;  Surgeon: Marcell Yoon MD;  Location[facility];  Service: Urology    SKIN CANCER EXCISION      TONSILLECTOMY  1971    TUBAL LIGATION         Family History   Problem Relation Age of Onset    Arthritis Mother     Osteoporosis Mother     Stroke Mother     Heart disease Father     Osteoporosis Father     Breast cancer Family     Osteoporosis Family     Breast cancer additional onset Family     No Known Problems Sister     No Known Problems Daughter     Breast cancer Maternal Grandmother         typo - she is paternal    No Known Problems Maternal Grandfather     Breast cancer Paternal Grandmother     No Known Problems Paternal Grandfather     No Known Problems Sister        Social History     Occupational History    Not on file   Tobacco Use    Smoking status: Former     Current packs/day: 0.00     Average packs/day: 1  pack/day for 20.8 years (20.8 ttl pk-yrs)     Types: Cigarettes     Start date: 1958     Quit date: 1979     Years since quittin.3     Passive exposure: Past    Smokeless tobacco: Never    Tobacco comments:     Parents and sisters all smoked, as well as friends. I enjoyed it. And I quit willingly .   Vaping Use    Vaping status: Never Used   Substance and Sexual Activity    Alcohol use: Yes     Alcohol/week: 1.0 standard drink of alcohol     Types: 1 Glasses of wine per week     Comment: Occasionally I have a bourbon & ginger ale    Drug use: No    Sexual activity: Not Currently     Partners: Male     Birth control/protection: Diaphragm, Spermicide, Female Sterilization         Current Outpatient Medications:     Ascorbic Acid (Vitamin C) 500 MG CAPS, Take 1 capsule by mouth daily, Disp: , Rfl:     Calcium-Magnesium-Vitamin D (CALCIUM 500 PO), Take by mouth PT CURRENTLY NOT TAKING, Disp: , Rfl:     Cyanocobalamin (VITAMIN B-12 PO), Take by mouth daily, Disp: , Rfl:     cyclobenzaprine (FLEXERIL) 10 mg tablet, Take 1 tablet (10 mg total) by mouth daily as needed for muscle spasms, Disp: 30 tablet, Rfl: 0    Glucos-Chondroit-Hyaluron-MSM (GLUCOSAMINE CHONDROITIN JOINT) TABS, Take by mouth daily  , Disp: , Rfl:     metoprolol succinate (TOPROL-XL) 50 mg 24 hr tablet, take 1 tablet by mouth once daily, Disp: 90 tablet, Rfl: 1    Multiple Vitamins-Minerals (PreserVision AREDS 2+Multi Vit) CAPS, , Disp: , Rfl:     neomycin-polymyxin-hydrocortisone (CORTISPORIN) 0.35%-10,000 units/mL-1% otic suspension, Administer 4 drops into the left ear every 8 (eight) hours, Disp: 10 mL, Rfl: 0    Omega-3 Fatty Acids (FISH OIL) 1000 MG CPDR, Take by mouth, Disp: , Rfl:     triamterene-hydrochlorothiazide (MAXZIDE-25) 37.5-25 mg per tablet, take 1 tablet by mouth once daily, Disp: 100 tablet, Rfl: 1    Allergies   Allergen Reactions    Azithromycin Vomiting and Dizziness    Amlodipine Swelling    Lisinopril Cough     "Morphine Vomiting       Physical Exam:    /84   Pulse 72   Ht 5' 3\" (1.6 m)   Wt 92.1 kg (203 lb)   BMI 35.96 kg/m²     Constitutional: normal, well developed, well nourished, alert, in no distress and non-toxic and no overt pain behavior.  Eyes: anicteric  HEENT: grossly intact  Neck: supple, symmetric, trachea midline and no masses   Pulmonary:even and unlabored  Cardiovascular:No edema or pitting edema present  Skin:Normal without rashes or lesions and well hydrated  Psychiatric:Mood and affect appropriate  Neurologic:Cranial Nerves II-XII grossly intact  Musculoskeletal:normal    Lumbar Spine Exam    Appearance:  Normal lordosis  Sensory:  no sensory deficits noted  Motor Strength:  Left hip flexion:  5/5  Left hip extension:  5/5  Right hip flexion:  5/5  Right hip extension:  5/5  Left knee flexion:  5/5  Left knee extension:  5/5  Right knee flexion:  5/5  Right knee extension:  5/5  Left foot dorsiflexion:  5/5  Left foot plantar flexion:  5/5  Right foot dorsiflexion:  5/5  Right foot plantar flexion:  5/5  Reflexes:  Left Patellar:  2+   Right Patellar:  2+   Left Achilles:  2+   Right Achilles:  2+   Special Tests:  Left Straight Leg Test:  negative  Right Straight Leg Test:  negative        Imaging    LUMBAR SPINE        9/19/24     INDICATION:   Sciatica, left side. Lumbago with sciatica, left side.      COMPARISON:  None.     VIEWS:  XR SPINE LUMBAR MINIMUM 4 VIEWS NON INJURY  Images: 5     FINDINGS:     There are 5 non rib bearing lumbar vertebral bodies.      There is no evidence of acute fracture or destructive osseous lesion.     There is mild anterolisthesis of L5 on S1. There is mild anterolisthesis of L3 on L4..      There is severe facet disease in the mid to lower lumbar spine worse at L5-S1. There is moderate to severe multilevel disc space narrowing with osteophytosis throughout the lower thoracic and entire lumbar spine worse at L4-5 and L5-S1     Soft tissues are unremarkable.   "   IMPRESSION:        Severe facet disease worse in the lower lumbar spine. Moderate-severe multilevel spondylosis also worse in the lower lumbar spine.  Mild anterolisthesis of L3 on L4 and L5 on S1  FL spine and pain procedure    (Results Pending)       Orders Placed This Encounter   Procedures    FL spine and pain procedure    Ambulatory referral to Physical Therapy

## 2024-10-25 ENCOUNTER — CONSULT (OUTPATIENT)
Dept: PAIN MEDICINE | Facility: CLINIC | Age: 84
End: 2024-10-25
Payer: COMMERCIAL

## 2024-10-25 VITALS
WEIGHT: 203 LBS | SYSTOLIC BLOOD PRESSURE: 125 MMHG | HEART RATE: 72 BPM | DIASTOLIC BLOOD PRESSURE: 84 MMHG | HEIGHT: 63 IN | BODY MASS INDEX: 35.97 KG/M2

## 2024-10-25 DIAGNOSIS — M51.361 DEGENERATION OF INTERVERTEBRAL DISC OF LUMBAR REGION WITH LOWER EXTREMITY PAIN: Primary | ICD-10-CM

## 2024-10-25 DIAGNOSIS — M54.16 LUMBAR RADICULOPATHY: ICD-10-CM

## 2024-10-25 DIAGNOSIS — M47.816 LUMBAR FACET ARTHROPATHY: ICD-10-CM

## 2024-10-25 PROCEDURE — 99204 OFFICE O/P NEW MOD 45 MIN: CPT | Performed by: STUDENT IN AN ORGANIZED HEALTH CARE EDUCATION/TRAINING PROGRAM

## 2024-10-25 NOTE — PATIENT INSTRUCTIONS
"Patient Education     Epidural injection   The Basics   Written by the doctors and editors at [facility]   What is an epidural injection? -- An epidural injection can be used to treat a condition called \"radiculopathy.\" This is the medical term for the pain, weakness, numbness, or tingling that happens when nerves coming from the spinal cord get pinched or damaged.  The doctor injects medicines into the space outside the covering of the spinal cord (figure 1). This is similar to an \"epidural\" that is used for pain relief during labor and childbirth.  Epidural injections can be given into different parts of your back:   Cervical epidural injection - Used to help with pain in the head or arms.   Thoracic epidural injection - Used to help with pain in the upper or middle back.   Lumbar epidural injection - Used to help with pain in the lower back or legs.  How do I prepare for an epidural injection? -- The doctor or nurse will tell you if you need to do anything special to prepare. Before your procedure, your doctor will do an exam. They might send you to get tests, such as:   X-ray, ultrasound, or other imaging tests - Imaging tests create pictures of the inside of the body.  Your doctor will also ask you about your \"health history.\" This involves asking you questions about any health problems you have or had in the past, past surgeries, and any medicines you take. Tell them about:   Any medicines you are taking - This includes any prescription or \"over-the-counter\" medicines you use, plus any herbal supplements you take. It helps to write down and bring a list of any medicines you take, or bring a bag with all of your medicines with you.   Any allergies you have   Any bleeding problems you have - Certain medicines, including some herbs and supplements, can increase the risk of bleeding. Some health conditions also increase this risk.  You will also get information about:   Eating and drinking before your procedure - In " "some cases, you might need to \"fast\" before surgery. This means not eating or drinking anything for a period of time. In other cases, you might be allowed to have liquids until a short time before the procedure. Whether you need to fast, and for how long, depends on the procedure you are having.   What help you will need when you go home - For example, you might need to have someone else bring you home or stay with you for some time while you recover.  Ask the doctor or nurse if you have questions or if there is anything you do not understand.  What happens during an epidural injection? -- When it is time for the procedure:   You might get an \"IV,\" which is a thin tube that goes into a vein. This can be used to give you fluids and medicines.   You will get anesthesia medicines to numb the area where the doctor will give the injection. This is to make sure that you do not feel pain during the procedure. You might also get medicines to make you relax and feel sleepy, called \"sedatives.\"   The doctors and nurses will monitor your breathing, blood pressure, and heart rate during the procedure.   The doctor might use a continuous X-ray called \"fluoroscopy.\" This is to help make sure that the medicines are injected into the right place. The doctor might also inject a dye to see where to give the medicine.   The doctor will place a needle through your skin and inject the medicine into a space near your spine. Then, they will remove the needle and cover the area with a clean bandage.   The procedure takes 15 to 30 minutes.  What happens after an epidural injection? -- After your procedure, the staff will watch you closely for a short time. It might take a few days before you feel the effects of the epidural injection.  Before you go home, make sure that you know what problems to look out for and when to call the doctor. Make sure that you understand your doctor's or nurse's instructions. Ask questions about anything you do " "not understand.  For the rest of the day after your procedure:   Try to rest. Limit activities like exercise or driving.   The doctor might recommend an over-the-counter pain medicine. These include acetaminophen (sample brand name: Tylenol), ibuprofen (sample brand names: Advil, Motrin), and naproxen (sample brand name: Aleve).   Ice can help with pain and swelling. Put a cold gel pack, bag of ice, or bag of frozen vegetables on the injection site area every 1 to 2 hours, for 15 minutes each time. Put a thin towel between the ice (or other cold object) and the skin.  What are the risks of an epidural injection? -- Your doctor will talk to you about all of the possible risks, and answer your questions. Possible risks include:   Bleeding   Infection   Headache   Nerve injury  When should I call the doctor? -- Call for emergency help right away (in the [place] and [place], call 9-1-1) if:   You can't move your arms or legs.  Call for advice if:   You have a fever of 100.4°F (38°C) or higher, or chills.   You have redness or swelling around the injection site.   You have a headache.   Your arms or legs are numb, weak, or tingly.  All topics are updated as new evidence becomes available and our peer review process is complete.  This topic retrieved from [employer] on: May 15, 2024.  Topic 502276 Version 1.0  Release: 32.4.3 - C32.134  © 2024 Up[employer], Inc. and/or its affiliates. All rights reserved.  figure 1: Epidural injection     Duringan epidural injection, the doctor inserts a needle between 2 of the bones thatmake up the spine. Then, they inject medicines into the area around the spinalcord. This is an illustration of a \"lumbar\" epidural injection, which is given into the low back. The doctor can place the needle in other areas to treat other types of pain.  Graphic 810704 Version 1.0  Consumer Information Use and Disclaimer   Disclaimer: This generalized information is a limited summary of diagnosis, treatment, and/or " medication information. It is not meant to be comprehensive and should be used as a tool to help the user understand and/or assess potential diagnostic and treatment options. It does NOT include all information about conditions, treatments, medications, side effects, or risks that may apply to a specific patient. It is not intended to be medical advice or a substitute for the medical advice, diagnosis, or treatment of a health care provider based on the health care provider's examination and assessment of a patient's specific and unique circumstances. Patients must speak with a health care provider for complete information about their health, medical questions, and treatment options, including any risks or benefits regarding use of medications. This information does not endorse any treatments or medications as safe, effective, or approved for treating a specific patient. Up[employer]Date, Inc. and its affiliates disclaim any warranty or liability relating to this information or the use thereof.The use of this information is governed by the Terms of Use, available at https://www.[employer].com/en/know/clinical-effectiveness-terms. 2024© Up[employer]Date, Inc. and its affiliates and/or licensors. All rights reserved.  Copyright   © 2024 Up[employer]Date, Inc. and/or its affiliates. All rights reserved.

## 2024-10-28 ENCOUNTER — PATIENT MESSAGE (OUTPATIENT)
Dept: RADIOLOGY | Facility: CLINIC | Age: 84
End: 2024-10-28

## 2024-10-28 NOTE — PATIENT COMMUNICATION
Pt is scheduled for ROCHELLE (tenatively), levels pending MRI, with Dr Rajan on 11/21/24    Pt is not diabetic and does not take prescription blood thinners or aspirin    Pt given instructions in office and via myc message    Have you completed PT/HEP/Chiro in the past 6 months for dedicated area? Pt reported on 10/25/24 that PT was completed in past and provided moderate relief -- pt was given new referral for PT @ same appt  If yes, how long did you complete?  What was the frequency?  Did it provide relief?  If no, reason therapy was not completed?

## 2024-10-29 ENCOUNTER — OFFICE VISIT (OUTPATIENT)
Age: 84
End: 2024-10-29
Payer: COMMERCIAL

## 2024-10-29 VITALS
DIASTOLIC BLOOD PRESSURE: 78 MMHG | TEMPERATURE: 97.8 F | WEIGHT: 200 LBS | HEART RATE: 71 BPM | BODY MASS INDEX: 35.44 KG/M2 | HEIGHT: 63 IN | OXYGEN SATURATION: 98 % | SYSTOLIC BLOOD PRESSURE: 116 MMHG

## 2024-10-29 DIAGNOSIS — Z85.828 HISTORY OF SKIN CANCER: ICD-10-CM

## 2024-10-29 DIAGNOSIS — D22.9 MULTIPLE NEVI: ICD-10-CM

## 2024-10-29 DIAGNOSIS — L82.1 SEBORRHEIC KERATOSIS: ICD-10-CM

## 2024-10-29 DIAGNOSIS — L82.0 INFLAMED SEBORRHEIC KERATOSIS: ICD-10-CM

## 2024-10-29 DIAGNOSIS — Z13.89 SCREENING FOR SKIN CONDITION: Primary | ICD-10-CM

## 2024-10-29 DIAGNOSIS — D18.01 CHERRY ANGIOMA: ICD-10-CM

## 2024-10-29 PROCEDURE — 17110 DESTRUCTION B9 LES UP TO 14: CPT | Performed by: DERMATOLOGY

## 2024-10-29 PROCEDURE — 99214 OFFICE O/P EST MOD 30 MIN: CPT | Performed by: DERMATOLOGY

## 2024-10-29 NOTE — PATIENT INSTRUCTIONS
"Treatment with Cryotherapy    The doctor has treated your skin with nitrogen, which is 320 degrees Fahrenheit below zero.  He has given the treated area \"frostbite.\"    Stinging should subside within a few hours.  You can take Tylenol for pain, if needed.    Over the next few days, it is normal if the area becomes reddened, a blood blister, or swollen with fluid.  If the lesion treated was near the eye - you could get a swollen eye over the next few days.  Do not panic!  This is all temporary, and will resolve with time.    There is no special treatment - just keep the area clean.  Makeup and BandAids can be used, if preferred.    When the area starts to dry up and peel off, using Vaseline can help healing.    It usually takes up to a month for it to heal.  Some lesions are recurrent and may require repeat treatments.  If a lesion has not healed in one month, please don't hesitate to contact us.      If you have any further questions that are not answered here, please call us.  [phone].    Thank you for allowing us to care for you.    ACTINIC KERATOSIS    Actinic keratoses are very common on sites repeatedly exposed to the sun, especially the backs of the hands and the face, most often affecting the ears, nose, cheeks, upper lip, vermilion of the lower lip, temples, forehead and balding scalp. In severely chronically sun-damaged individuals, they may also be found on the upper trunk, upper and lower limbs, and dorsum of feet.    We discussed the theoretical premalignant (“pre-cancerous”) nature and etiology of these growths.  We discussed the prevailing notion that actinic keratoses are a reflection of abnormal skin cell development due to DNA damage by short wavelength UVB.  They are more likely to appear if the immune function is poor, due to aging, recent sun exposure, predisposing disease or certain drugs.    We discussed that the main concern is that actinic keratoses may predispose to squamous cell " carcinoma. It is rare for a solitary actinic keratosis to evolve to squamous cell carcinoma (SCC), but the risk of SCC occurring at some stage in a patient with more than 10 actinic keratoses is thought to be about 10 to 15%. A tender, thickened, ulcerated or enlarging actinic keratosis is suspicious of SCC.    Actinic keratoses may be prevented by strict sun protection. If already present, keratoses may improve with a very high sun protection factor (50+) broad-spectrum sunscreen applied at least daily to affected areas, year-round.  We recommend that UPF-rated clothing and hats and sunglasses be worn whenever possible and that a sunscreen-moisturizer combination product such as Neutrogena Daily Defense be applied at least three times a day.    We performed a thorough discussion of treatment options and specific risk/benefits/alternatives including but not limited to medical “field” treatment with medications such as the following:    Topical “field area” medications such as 5-fluorouracil or Aldara (specifically, the trouble with long-term compliance, blistering and local skin reaction versus the convenience of at-home therapy and that field therapy “gets what is not yet seen”).    Cryotherapy (specifically, local pain, scarring, dyspigmentation, blistering, possible superinfection, and treats “only what we see” versus directed treatment today).    Photodynamic therapy (specifically, local pain, scarring, dyspigmentation, blistering, possible superinfection, need to schedule for a later date, and time spent in the office versus field therapy that “gets what is not yet seen”).      BASAL CELL CARCINOMA    What is basal cell carcinoma?  Basal cell carcinoma (BCC) is a common, locally invasive, keratinocytic, or non-melanoma, skin cancer. It is also known as rodent ulcer and basalioma. Patients with BCC often develop multiple primary tumours over time.    Who gets basal cell carcinoma?  Risk factors for BCC  include:  Age and sex: BCCs are particularly prevalent in elderly males. However, they also affect females and younger adults   Previous BCC or other form of skin cancer (squamous cell carcinoma, melanoma)   Sun damage (photoaging, actinic keratoses)   Repeated prior episodes of sunburn   Fair skin, blue eyes and blond or red hair--note; BCC can also affect darker skin types   Previous cutaneous injury, thermal burn, disease (eg cutaneous lupus, sebaceous naevus)   Inherited syndromes: BCC is a particular problem for families with basal cell naevus syndrome (Gorlin syndrome), Fnteu-Abuqé-Egtdoifp syndrome, Rombo syndrome, Oley syndrome and xeroderma pigmentosum   Other risk factors include ionising radiation, exposure to arsenic, and immune suppression due to disease or medicines    What causes basal cell carcinoma?  The cause of BCC is multifactorial.  Most often, there are DNA mutations in the patched (PTCH) tumour suppressor gene, part of hedgehog signaling pathway   These may be triggered by exposure to ultraviolet radiation   Various spontaneous and inherited gene defects predispose to BCC    What are the clinical features of basal cell carcinoma?  BCC is a locally invasive skin tumour. The main characteristics are:  Slowly growing plaque or nodule   Skin coloured, pink or pigmented   Varies in size from a few millimetres to several centimetres in diameter   Spontaneous bleeding or ulceration  BCC is very rarely a threat to life. A tiny proportion of BCCs grow rapidly, invade deeply, and/or metastasise to local lymph nodes.    Types of basal cell carcinoma  There are several distinct clinical types of BCC, and over 20 histological growth patterns of BCC.  Nodular BCC  Most common type of facial BCC   Shiny or pearly nodule with a smooth surface   May have central depression or ulceration, so its edges appear rolled   Blood vessels cross its surface   Cystic variant is soft, with jelly-like contents    Micronodular, microcystic and infiltrative types are potentially aggressive subtypes   Also known as nodulocystic carcinoma  Superficial BCC  Most common type in younger adults   Most common type on upper trunk and shoulders   Slightly scaly, irregular plaque   Thin, translucent rolled border   Multiple microerosions  Morphoeaform BCC  Usually found in mid-facial sites   Waxy, scar-like plaque with indistinct borders   Wide and deep subclinical extension   May infiltrate cutaneous nerves (perineural spread)   Also known as morpheic, morphoeiform or sclerosing BCC  Basosquamous carcinoma  Mixed basal cell carcinoma (BCC) and squamous cell carcinoma (SCC)   Infiltrative growth pattern   Potentially more aggressive than other forms of BCC   Also known as basisquamous carcinoma and mixed basal-squamous cell carcinoma       Complications of basal cell carcinoma    Recurrent BCC  Recurrence of BCC after initial treatment is not uncommon. Characteristics of recurrent BCC often include:  Incomplete excision or narrow margins at primary excision   Morphoeic, micronodular, and infiltrative subtypes   Location on head and neck    Advanced BCC  Advanced BCCs are large, often neglected tumours.  They may be several centimetres in diameter   They may be deeply infiltrating into tissues below the skin   They are difficult or impossible to treat surgically    Metastatic BCC  Very rare   Primary tumour is often large, neglected or recurrent, located on head and neck, with aggressive subtype   May have had multiple prior treatments   May arise in site exposed to ionising radiation   Can be fatal    How is basal cell carcinoma diagnosed?  BCC is diagnosed clinically by the presence of a slowly enlarging skin lesion with typical appearance. The diagnosis and  by a diagnostic biopsy or following excision.  Some typical superficial BCCs on trunk and limbs are clinically diagnosed and have non-surgical treatment without  histology.    What is the treatment for primary basal cell carcinoma?  The treatment for a BCC depends on its type, size and location, the number to be treated, patient factors, and the preference or expertise of the doctor. Most BCCs are treated surgically. Long-term follow-up is recommended to check for new lesions and recurrence; the latter may be unnecessary if histology has reported wide clear margins.    Excision biopsy  Excision means the lesion is cut out and the skin stitched up.  Most appropriate treatment for nodular, infiltrative and morphoeic BCCs   Should include 3 to 5 mm margin of normal skin around the tumour   Very large lesions may require flap or skin graft to repair the defect   Pathologist will report deep and lateral margins   Further surgery is recommended for lesions that are incompletely excised    Mohs micrographically controlled excision  Mohs micrographically controlled surgery involves examining carefully marked excised tissue under the microscope, layer by layer, to ensure complete excision.  Very high cure rates achieved by trained Mohs surgeons   Used in high-risk areas of the face around eyes, lips and nose   Suitable for ill-defined, morphoeic, infiltrative and recurrent subtypes   Large defects are repaired by flap or skin graft    Superficial skin surgery  Superficial skin surgery comprises shave, curettage, and electrocautery. It is a rapid technique using local anaesthesia and does not require sutures.  Suitable for small, well-defined nodular or superficial BCCs   Lesions are usually located on trunk or limbs   Wound is left open to heal by secondary intention   Moist wound dressings lead to healing within a few weeks   Eventual scar quality variable    Cryotherapy  Cryotherapy is the treatment of a superficial skin lesion by freezing it, usually with liquid nitrogen.  Suitable for small superficial BCCs on covered areas of trunk and limbs   Best avoided for BCCs on head and  neck, and distal to knees   Double freeze-thaw technique   Results in a blister that crusts over and heals within several weeks.   Leaves permanent white carla    Photodynamic therapy  Photodynamic therapy (PDT) refers to a technique in which BCC is treated with a photosensitising chemical, and exposed to light several hours later.  Topical photosensitisers include aminolevulinic acid lotion and methyl aminolevulinate cream   Suitable for low-risk small, superficial BCCs   Best avoided if tumour in site at high risk of recurrence   Results in inflammatory reaction, maximal 3-4 days after procedure   Treatment repeated 7 days after initial treatment   Excellent cosmetic results    Imiquimod cream  Imiquimod is an immune response modifier.  Best used for superficial BCCs less than 2 cm diameter   Applied three to five times each week, for 6-16 weeks   Results in a variable inflammatory reaction, maximal at three weeks   Minimal scarring is usual    Fluorouracil cream  5-Fluorouracil cream is a topical cytotoxic agent.  Used to treat small superficial basal cell carcinomas   Requires prolonged course, eg twice daily for 6-12 weeks   Causes inflammatory reaction   Has high recurrence rates    Radiotherapy  Radiotherapy or X-ray treatment can be used to treat primary BCCs or as adjunctive treatment if margins are incomplete.  Mainly used if surgery is not suitable   Best avoided in young patients and in genetic conditions predisposing to skin cancer   Best cosmetic results achieved using multiple fractions   Typically, patient attends once-weekly for several weeks   Causes inflammatory reaction followed by scar   Risk of radiodermatitis, late recurrence, and new tumours    What is the treatment for advanced or metastatic basal cell carcinoma?  Locally advanced primary, recurrent or metastatic BCC requires multidisciplinary consultation. Often a combination of treatments is used.  Surgery   Radiotherapy   Targeted  therapy  Targeted therapy refers to the hedgehog signalling pathway inhibitors, vismodegib and sonidegib. These drugs have some important risks and side effects.    How can basal cell carcinoma be prevented?  The most important way to prevent BCC is to avoid sunburn. This is especially important in childhood and early life. Fair skinned individuals and those with a personal or family history of BCC should protect their skin from sun exposure daily, year-round and lifelong.  Stay indoors or under the shade in the middle of the day   Wear covering clothing   Apply high protection factor SPF50+ broad-spectrum sunscreens generously to exposed skin if outdoors   Avoid indoor tanning (sun beds, solaria)  Oral nicotinamide (vitamin B3) in a dose of 500 mg twice daily may reduce the number and severity of BCCs.    What is the outlook for basal cell carcinoma?  Most BCCs are cured by treatment. Cure is most likely if treatment is undertaken when the lesion is small.  About 50% of people with BCC develop a second one within 3 years of the first. They are also at increased risk of other skin cancers, especially melanoma. Regular self-skin examinations and long-term annual skin checks by an experienced health professional are recommended.        SQUAMOUS CELL CARCINOMA    What is cutaneous squamous cell carcinoma?  Cutaneous squamous cell carcinoma (SCC) is a common type of keratinocyte or non-melanoma skin cancer. It is derived from cells within the epidermis that make keratin -- the horny protein that makes up skin, hair and nails.  Cutaneous SCC is an invasive disease, referring to cancer cells that have grown beyond the epidermis. SCC can sometimes metastasise and may prove fatal.  Intraepidermal carcinoma (cutaneous SCC in situ) and mucosal SCC are considered elsewhere.    Who gets cutaneous squamous cell carcinoma?  Risk factors for cutaneous SCC include:  Age and sex: SCCs are particularly prevalent in elderly males.  However, they also affect females and younger adults.   Previous SCC or another form of skin cancer (basal cell carcinoma, melanoma) are a strong predictor for further skin cancers.   Actinic keratoses   Outdoor occupation or recreation   Smoking   Fair skin, blue eyes and blond or red hair   Previous cutaneous injury, thermal burn, disease (eg cutaneous lupus, epidermolysis bullosa, leg ulcer)   Inherited syndromes: SCC is a particular problem for families with xeroderma pigmentosum and albinism   Other risk factors include ionising radiation, exposure to arsenic, and immune suppression due to disease (eg chronic lymphocytic leukaemia) or medicines. Organ transplant recipients have a massively increased risk of developing SCC.    What causes cutaneous squamous cell carcinoma?  More than 90% of cases of SCC are associated with numerous DNA mutations in multiple somatic genes. Mutations in the p53 tumour suppressor gene are caused by exposure to ultraviolet radiation (UV), especially UVB (known as signature 7). Other signature mutations relate to cigarette smoking, ageing and immune suppression (eg, to drugs such as azathioprine). Mutations in signalling pathways affect the epidermal growth factor receptor, ALESIA, Fyn, and [id number] signalling.   Beta-genus human papillomaviruses (wart virus) are thought to play a role in SCC arising in immune-suppressed populations. ?-HPV and HPV subtypes 5, 8, 17, 20, 24, and 38 have also been associated with an increased risk of cutaneous SCC in immunocompetent individuals.     What are the clinical features of cutaneous squamous cell carcinoma?  Cutaneous SCCs present as enlarging scaly or crusted lumps. They usually arise within pre-existing actinic keratosis or intraepidermal carcinoma.  They grow over weeks to months   They may ulcerate   They are often tender or painful   Located on sun-exposed sites, particularly the face, lips, ears, hands, forearms and lower legs   Size  varies from a few millimetres to several centimetres in diameter.    Types of cutaneous squamous cell carcinoma  Distinct clinical types of invasive cutaneous SCC include:  Cutaneous horn -- the horn is due to excessive production of keratin   Keratoacanthoma (KA) -- a rapidly growing keratinising nodule that may resolve without treatment   Carcinoma cuniculatum (‘verrucous carcinoma’), a slow-growing, warty tumour on the sole of the foot.   Multiple eruptive SCC/KA-like lesions arising in syndromes, such as multiple self-healing squamous epitheliomas of Moreno-Smith and Grzybowski syndrome  The pathologist may classify a tumour as well differentiated, moderately well differentiated, poorly differentiated or anaplastic cutaneous SCC. There are other variants.    Classification of squamous cell carcinoma by risk  Cutaneous SCC is classified as low-risk or high-risk, depending on the chance of tumour recurrence and metastasis. Characteristics of high-risk SCC include:  High-risk cutaneous squamous cell carcinoma has the following characteristics:  Diameter greater than or equal to 2 cm   Location on the ear, vermilion of the lip, central face, hands, feet, genitalia   Arising in elderly or immune suppressed patient   Histological thickness greater than 2 mm, poorly differentiated histology, or with the invasion of the subcutaneous tissue, nerves and blood vessels  Metastatic SCC is found in regional lymph nodes (80%), lungs, liver, brain, bones and skin.    Staging cutaneous squamous cell carcinoma  In 2011, the American Joint Committee on Cancer (AJCC) published a new staging systemic for cutaneous SCC for the 7th Edition of the AJCC manual. This evaluates the dimensions of the original primary tumour (T) and its metastases to lymph nodes (N).    Tumour staging for cutaneous SCC  TX: Th Primary tumour cannot be assessed  T0: No evidence of a primary tumour  Tis: Carcinoma in situ  T1: Tumour <= 2cm without  high-risk features  T2: Tumour >= 2cm; or; Tumour <= 2 cm with high-risk features  T3: Tumour with the invasion of maxilla, mandible, orbit or temporal bone  T4: Tumour with the invasion of axial or appendicular skeleton or perineural invasion of skull base    Sophie staging for cutaneous SCC  NX: Regional lymph nodes cannot be assessed  N0: No regional lymph node metastasis  N1: Metastasis in one local lymph node <= 3cm  N2: Metastasis in one local lymph node >= 3cm; or; Metastasis in >1 local lymph node <= 6cm  N3: Metastasis in lymph node >= 6cm    How is squamous cell carcinoma diagnosed?  Diagnosis of cutaneous SCC is based on clinical features. The diagnosis and histological subtype are confirmed pathologically by diagnostic biopsy or following excision. See squamous cell carcinoma - pathology.  Patients with high-risk SCC may also undergo staging investigations to determine whether it has spread to lymph nodes or elsewhere. These may include:  Imaging using ultrasound scan, X-rays, CT scans, MRI scans   Lymph node or other tissue biopsies    What is the treatment for cutaneous squamous cell carcinoma?  Cutaneous SCC is nearly always treated surgically. Most cases are excised with a 3-10 mm margin of normal tissue around a visible tumour. A flap or skin graft may be needed to repair the defect.  Other methods of removal include:  Shave, curettage, and electrocautery for low-risk tumours on trunk and limbs   Aggressive cryotherapy for very small, thin, low-risk tumours   Mohs micrographic surgery for large facial lesions with indistinct margins or recurrent tumours   Radiotherapy for an inoperable tumour, patients unsuitable for surgery, or as adjuvant    What is the treatment for advanced or metastatic squamous cell carcinoma?  Locally advanced primary, recurrent or metastatic SCC requires multidisciplinary consultation. Often a combination of treatments is used.  Surgery   Radiotherapy   Cemiplimab    Experimental targeted therapy using epidermal growth factor receptor inhibitors    How can cutaneous squamous cell carcinoma be prevented?  There is a great deal of evidence to show that very careful sun protection at any time of life reduces the number of SCCs. This is particularly important in ageing, sun-damaged, fair skin; in patients that are immune suppressed; and in those who already have actinic keratoses or previous SCC.  Stay indoors or under the shade in the middle of the day   Wear covering clothing   Apply high protection factor SPF50+ broad-spectrum sunscreens generously to exposed skin if outdoors   Avoid indoor tanning (sun beds, solaria)    Oral nicotinamide (vitamin B3) in a dose of 500 mg twice daily may reduce the number and severity of SCCs in people at high risk.  Patients with multiple squamous cell carcinomas may be prescribed an oral retinoid (acitretin or isotretinoin). These reduce the number of tumours but have some nuisance side effects.    What is the outlook for cutaneous squamous cell carcinoma?  Most SCCs are cured by treatment. A cure is most likely if treatment is undertaken when the lesion is small. The risk of recurrence or disease-associated death is greater for tumours that are > 20 mm in diameter and/or > 2 mm in thickness at the time of surgical excision.  About 50% of people at high risk of SCC develop a second one within 5 years of the first. They are also at increased risk of other skin cancers, especially melanoma. Regular self-skin examinations and long-term annual skin checks by an experienced health professional are recommended.

## 2024-10-29 NOTE — PROGRESS NOTES
" [facility] Dermatology Clinic Note     Patient Name: Krystyna Menard  Encounter Date: October 29, 2024     Have you been cared for by a [facility] Dermatologist in the last 3 years and, if so, which description applies to you?    Yes.  I have been here within the last 3 years, and my medical history has NOT changed since that time.  I am FEMALE/of child-bearing potential.    REVIEW OF SYSTEMS:  Have you recently had or currently have any of the following? No changes in my recent health.   PAST MEDICAL HISTORY:  Have you personally ever had or currently have any of the following?  If \"YES,\" then please provide more detail. No changes in my medical history.   HISTORY OF IMMUNOSUPPRESSION: Do you have a history of any of the following:  Systemic Immunosuppression such as Diabetes, Biologic or Immunotherapy, Chemotherapy, Organ Transplantation, Bone Marrow Transplantation or Prednisone?  No     Answering \"YES\" requires the addition of the dotphrase \"IMMUNOSUPPRESSED\" as the first diagnosis of the patient's visit.   FAMILY HISTORY:  Any \"first degree relatives\" (parent, brother, sister, or child) with the following?    No changes in my family's known health.   PATIENT EXPERIENCE:    Do you want the Dermatologist to perform a COMPLETE skin exam today including a clinical examination under the \"bra and underwear\" areas?  Yes  If necessary, do we have your permission to call and leave a detailed message on your Preferred Phone number that includes your specific medical information?  Yes      Allergies   Allergen Reactions    Azithromycin Vomiting and Dizziness    Amlodipine Swelling    Lisinopril Cough    Morphine Vomiting      Current Outpatient Medications:     Ascorbic Acid (Vitamin C) 500 MG CAPS, Take 1 capsule by mouth daily, Disp: , Rfl:     Calcium-Magnesium-Vitamin D (CALCIUM 500 PO), Take by mouth PT CURRENTLY NOT TAKING, Disp: , Rfl:     Cyanocobalamin (VITAMIN B-12 PO), Take by mouth daily, Disp: , Rfl:     " cyclobenzaprine (FLEXERIL) 10 mg tablet, Take 1 tablet (10 mg total) by mouth daily as needed for muscle spasms, Disp: 30 tablet, Rfl: 0    Glucos-Chondroit-Hyaluron-MSM (GLUCOSAMINE CHONDROITIN JOINT) TABS, Take by mouth daily  , Disp: , Rfl:     metoprolol succinate (TOPROL-XL) 50 mg 24 hr tablet, take 1 tablet by mouth once daily, Disp: 90 tablet, Rfl: 1    Multiple Vitamins-Minerals (PreserVision AREDS 2+Multi Vit) CAPS, , Disp: , Rfl:     neomycin-polymyxin-hydrocortisone (CORTISPORIN) 0.35%-10,000 units/mL-1% otic suspension, Administer 4 drops into the left ear every 8 (eight) hours, Disp: 10 mL, Rfl: 0    Omega-3 Fatty Acids (FISH OIL) 1000 MG CPDR, Take by mouth, Disp: , Rfl:     triamterene-hydrochlorothiazide (MAXZIDE-25) 37.5-25 mg per tablet, take 1 tablet by mouth once daily, Disp: 100 tablet, Rfl: 1          Whom besides the patient is providing clinical information about today's encounter?   NO ADDITIONAL HISTORIAN (patient alone provided history)    Physical Exam and Assessment/Plan by Diagnosis:    Chief complaint: Patient is a 83 y/o female present for a routine skin exam with history of non-melanoma skin cancer and has one spot of concern on the Left Jaw line.    HISTORY OF BASAL CELL CARCINOMA    Physical Exam:  Anatomic Location Affected:  Left Upper Arm - 2015  Morphological Description of scar:  well healed  Suspected Recurrence: No  Pertinent Positives:  Pertinent Negatives:    Additional History of Present Condition:  History of basal cell carcinoma with no sign of recurrence    Assessment and Plan:  Based on a thorough discussion of this condition and the management approach to it (including a comprehensive discussion of the known risks, side effects and potential benefits of treatment), the patient (family) agrees to implement the following specific plan:  Monitor for change    HISTORY OF SQUAMOUS CELL CARCINOMA     Physical Exam:  Anatomic Location Affected:  Left Cheek - 08/2023  Mid  "Upper Back - 2022  Right Wrist, Right Chest - 2019  Left Elbow - 2015  Morphological Description of Scar:  well healed  Suspected Recurrence: no  Regional adenopathy: no    Additional History of Present Condition:  History of Squamous Cell Carcinoma treated    Assessment and Plan:  Based on a thorough discussion of this condition and the management approach to it (including a comprehensive discussion of the known risks, side effects and potential benefits of treatment), the patient (family) agrees to implement the following specific plan:  Monitor for changes    MELANOCYTIC NEVI (\"Moles\")    Physical Exam:  Anatomic Location Affected: Mostly on sun-exposed areas of the trunk and extremities  Morphological Description:  Scattered, 1-4mm round to ovoid, symmetrical-appearing, even bordered, skin colored to dark brown macules/papules, mostly in sun-exposed areas  Pertinent Positives:  Pertinent Negatives:    Additional History of Present Condition:  present on exam    Assessment and Plan:  Based on a thorough discussion of this condition and the management approach to it (including a comprehensive discussion of the known risks, side effects and potential benefits of treatment), the patient (family) agrees to implement the following specific plan:  Provided handout with information regarding the ABCDE's of moles   Recommend routine skin exams every 6 months   Sun avoidance, protective clothing (known as UPF clothing), and the use of at least SPF 30 sunscreens is advised. Sunscreen should be reapplied every two hours when outside.     SEBORRHEIC KERATOSIS; NON-INFLAMED    Physical Exam:  Anatomic Location Affected:  scattered across trunk, extremities, face  Morphological Description:  Flat and raised, waxy, smooth to warty textured, yellow to brownish-grey to dark brown to blackish, discrete, \"stuck-on\" appearing papules.  Pertinent Positives:  Pertinent Negatives:    Additional History of Present Condition:  Patient " "reports new bumps on the skin.  Denies itch, burn, pain, bleeding or ulceration.  Present constantly; nothing seems to make it worse or better.  No prior treatment.      Assessment and Plan:  Based on a thorough discussion of this condition and the management approach to it (including a comprehensive discussion of the known risks, side effects and potential benefits of treatment), the patient (family) agrees to implement the following specific plan:  Reassured benign    ANGIOMA (\"CHERRY ANGIOMA\")    Physical Exam:  Anatomic Location: scattered across sun exposed areas of the trunk and extremities   Morphologic Description: Firm red to reddish-blue discrete papules  Pertinent Positives:  Pertinent Negatives:    Additional History of Present Condition:  Present on exam.     Assessment and Plan:  Reassured benign    SEBORRHEIC KERATOSIS; INFLAMED    Physical Exam:  Anatomic Location Affected:  Left Jaw Line and Left Frontal Hairline  Morphological Description:  keratotic papule    Additional History of Present Condition:  Patient reports irritation     Assessment and Plan:  Based on a thorough discussion of this condition and the management approach to it (including a comprehensive discussion of the known risks, side effects and potential benefits of treatment), the patient (family) agrees to implement the following specific plan:  PROCEDURE:  DESTRUCTION OF BENIGN LESIONS WITH CRYOTHERAPY  After a thorough discussion of treatment options and risk/benefits/alternatives (including but not limited to local pain, scarring, dyspigmentation, blistering, and possible superinfection), verbal and written consent were obtained and the aforementioned lesions were treated on with cryotherapy using liquid nitrogen x 1 cycle for 5-10 seconds.    TOTAL NUMBER of 2 lesions were treated today on the ANATOMIC LOCATION: Left Jaw Line and Left Frontal Hair Line    The patient tolerated the procedure well, and after-care instructions were " provided.    Scribe Attestation      I,:  Lauren Bean MA am acting as a scribe while in the presence of the attending physician.:       I,:  Markus Cavazos MD personally performed the services described in this documentation    as scribed in my presence.:

## 2024-10-30 ENCOUNTER — TELEPHONE (OUTPATIENT)
Age: 84
End: 2024-10-30

## 2024-10-30 NOTE — TELEPHONE ENCOUNTER
Caller: kassandra Buitrago     Doctor: Mohini     Reason for call: pt needs the aqua therapy order to be faxed over to [facility]   [address] [place] [address] Conner Yuan  [phone]    Pt does not know the fax number      Call back#:[phone]

## 2024-10-31 NOTE — TELEPHONE ENCOUNTER
They did not receive fax could you please retry     Fax # [phone] she reconfirmed this is correct fax     Thank you

## 2024-11-05 ENCOUNTER — HOSPITAL ENCOUNTER (OUTPATIENT)
Dept: MRI IMAGING | Facility: HOSPITAL | Age: 84
Discharge: HOME | End: 2024-11-05
Attending: STUDENT IN AN ORGANIZED HEALTH CARE EDUCATION/TRAINING PROGRAM
Payer: COMMERCIAL

## 2024-11-05 DIAGNOSIS — M54.42 ACUTE LEFT-SIDED LOW BACK PAIN WITH LEFT-SIDED SCIATICA: ICD-10-CM

## 2024-11-05 PROCEDURE — 72148 MRI LUMBAR SPINE W/O DYE: CPT

## 2024-11-06 ENCOUNTER — TELEPHONE (OUTPATIENT)
Age: 84
End: 2024-11-06

## 2024-11-06 NOTE — TELEPHONE ENCOUNTER
Caller: pt    Doctor: isma    Reason for call: pt is calling to say the her mri is now in the chart. Dr ashby wanted to know.    Call back#: [phone]

## 2024-11-07 DIAGNOSIS — M54.32 SCIATICA OF LEFT SIDE: ICD-10-CM

## 2024-11-08 RX ORDER — CYCLOBENZAPRINE HCL 10 MG
TABLET ORAL
Qty: 30 TABLET | Refills: 0 | Status: SHIPPED | OUTPATIENT
Start: 2024-11-08

## 2024-11-14 PROBLEM — Z00.00 MEDICARE ANNUAL WELLNESS VISIT, SUBSEQUENT: Status: RESOLVED | Noted: 2022-10-06 | Resolved: 2024-11-14

## 2024-11-14 PROBLEM — H61.22 IMPACTED CERUMEN OF LEFT EAR: Status: RESOLVED | Noted: 2024-10-15 | Resolved: 2024-11-14

## 2024-11-18 ENCOUNTER — RESULTS FOLLOW-UP (OUTPATIENT)
Dept: FAMILY MEDICINE CLINIC | Facility: CLINIC | Age: 84
End: 2024-11-18

## 2024-11-18 ENCOUNTER — HOSPITAL ENCOUNTER (OUTPATIENT)
Dept: MAMMOGRAPHY | Facility: CLINIC | Age: 84
Discharge: HOME | End: 2024-11-18
Payer: COMMERCIAL

## 2024-11-18 VITALS — BODY MASS INDEX: 35.44 KG/M2 | HEIGHT: 63 IN | WEIGHT: 200 LBS

## 2024-11-18 DIAGNOSIS — Z12.31 ENCOUNTER FOR SCREENING MAMMOGRAM FOR MALIGNANT NEOPLASM OF BREAST: ICD-10-CM

## 2024-11-18 PROCEDURE — 77063 BREAST TOMOSYNTHESIS BI: CPT

## 2024-11-18 PROCEDURE — 77067 SCR MAMMO BI INCL CAD: CPT

## 2024-11-21 ENCOUNTER — HOSPITAL ENCOUNTER (OUTPATIENT)
Dept: RADIOLOGY | Facility: CLINIC | Age: 84
Discharge: HOME | End: 2024-11-21
Admitting: STUDENT IN AN ORGANIZED HEALTH CARE EDUCATION/TRAINING PROGRAM
Payer: COMMERCIAL

## 2024-11-21 VITALS
DIASTOLIC BLOOD PRESSURE: 99 MMHG | RESPIRATION RATE: 20 BRPM | HEART RATE: 74 BPM | OXYGEN SATURATION: 99 % | SYSTOLIC BLOOD PRESSURE: 157 MMHG

## 2024-11-21 DIAGNOSIS — M54.16 LUMBAR RADICULOPATHY: ICD-10-CM

## 2024-11-21 PROCEDURE — 62323 NJX INTERLAMINAR LMBR/SAC: CPT | Performed by: STUDENT IN AN ORGANIZED HEALTH CARE EDUCATION/TRAINING PROGRAM

## 2024-11-21 RX ORDER — METHYLPREDNISOLONE ACETATE 80 MG/ML
80 INJECTION, SUSPENSION INTRA-ARTICULAR; INTRALESIONAL; INTRAMUSCULAR; PARENTERAL; SOFT TISSUE ONCE
Status: COMPLETED | OUTPATIENT
Start: 2024-11-21 | End: 2024-11-21

## 2024-11-21 RX ADMIN — IOHEXOL 1 ML: 300 INJECTION, SOLUTION INTRAVENOUS at 11:13

## 2024-11-21 RX ADMIN — METHYLPREDNISOLONE ACETATE 80 MG: 80 INJECTION, SUSPENSION INTRA-ARTICULAR; INTRALESIONAL; INTRAMUSCULAR; SOFT TISSUE at 11:14

## 2024-11-21 NOTE — DISCHARGE INSTR - LAB
Epidural Steroid Injection   WHAT YOU NEED TO KNOW:   An epidural steroid injection (JAM) is a procedure to inject steroid medicine into the epidural space. The epidural space is between your spinal cord and vertebrae. Steroids reduce inflammation and fluid buildup in your spine that may be causing pain. You may be given pain medicine along with the steroids.          ACTIVITY  Do not drive or operate machinery today.  No strenuous activity today - bending, lifting, etc.  You may resume normal activites starting tomorrow - start slowly and as tolerated.  You may shower today, but no tub baths or hot tubs.  You may have numbness for several hours from the local anesthetic. Please use caution and common sense, especially with weight-bearing activities.    CARE OF THE INJECTION SITE  If you have soreness or pain, apply ice to the area today (20 minutes on/20 minutes off).  Starting tomorrow, you may use warm, moist heat or ice if needed.  You may have an increase or change in your discomfort for 36-48 hours after your treatment.  Apply ice and continue with any pain medication you have been prescribed.  Notify the [facility] if you have any of the following: redness, drainage, swelling, headache, stiff neck or fever above 100°F.    SPECIAL INSTRUCTIONS  Our office will contact you in approximately 14 days for a progress report.    MEDICATIONS  Continue to take all routine medications.  Our office may have instructed you to hold some medications.    As no general anesthesia was used in today's procedure, you should not experience any side effects related to anesthesia.     If you are diabetic, the steroids used in today's injection may temporarily increase your blood sugar levels after the first few days after your injection. Please keep a close eye on your sugars and alert the doctor who manages your diabetes if your sugars are significantly high from your baseline or you are symptomatic.     If you have a  problem specifically related to your procedure, please call our office at [phone].  Problems not related to your procedure should be directed to your primary care physician.

## 2024-11-21 NOTE — INTERVAL H&P NOTE
Update: (This section must be completed if the H&P was completed greater than 24 hrs to procedure or admission)    H&P reviewed. After examining the patient, I find no changed to the H&P since it had been written.    Patient re-evaluated. Accept as history and physical.    Erik Rajan MD/November 21, 2024/11:02 AM

## 2024-12-06 DIAGNOSIS — M54.32 SCIATICA OF LEFT SIDE: ICD-10-CM

## 2024-12-06 RX ORDER — CYCLOBENZAPRINE HCL 10 MG
TABLET ORAL
Qty: 30 TABLET | Refills: 0 | Status: SHIPPED | OUTPATIENT
Start: 2024-12-06

## 2024-12-16 ENCOUNTER — DOCUMENTATION (OUTPATIENT)
Dept: PAIN MEDICINE | Facility: CLINIC | Age: 84
End: 2024-12-16

## 2025-01-03 NOTE — ASSESSMENT & PLAN NOTE
Screenings up-to-date with exception of breast cancer screening, patient has mammogram scheduled for next month and an order has been placed. Patient follows with nephrology and has repeat blood work upcoming in December, will follow-up pending results.
No

## 2025-01-06 ENCOUNTER — APPOINTMENT (OUTPATIENT)
Dept: LAB | Facility: CLINIC | Age: 85
End: 2025-01-06
Payer: COMMERCIAL

## 2025-01-06 DIAGNOSIS — N18.32 STAGE 3B CHRONIC KIDNEY DISEASE (HCC): ICD-10-CM

## 2025-01-06 DIAGNOSIS — M54.32 SCIATICA OF LEFT SIDE: ICD-10-CM

## 2025-01-06 DIAGNOSIS — N18.9 CHRONIC KIDNEY DISEASE-MINERAL AND BONE DISORDER: ICD-10-CM

## 2025-01-06 DIAGNOSIS — M89.9 CHRONIC KIDNEY DISEASE-MINERAL AND BONE DISORDER: ICD-10-CM

## 2025-01-06 DIAGNOSIS — E83.9 CHRONIC KIDNEY DISEASE-MINERAL AND BONE DISORDER: ICD-10-CM

## 2025-01-06 DIAGNOSIS — Z13.220 LIPID SCREENING: ICD-10-CM

## 2025-01-06 LAB
25(OH)D3 SERPL-MCNC: 36.8 NG/ML (ref 30–100)
ANION GAP SERPL CALCULATED.3IONS-SCNC: 11 MMOL/L (ref 4–13)
BACTERIA UR QL AUTO: ABNORMAL /HPF
BASOPHILS # BLD AUTO: 0.07 THOUSANDS/ΜL (ref 0–0.1)
BASOPHILS NFR BLD AUTO: 1 % (ref 0–1)
BILIRUB UR QL STRIP: NEGATIVE
BUN SERPL-MCNC: 23 MG/DL (ref 5–25)
CALCIUM SERPL-MCNC: 9.9 MG/DL (ref 8.4–10.2)
CHLORIDE SERPL-SCNC: 102 MMOL/L (ref 96–108)
CHOLEST SERPL-MCNC: 228 MG/DL (ref ?–200)
CLARITY UR: ABNORMAL
CO2 SERPL-SCNC: 26 MMOL/L (ref 21–32)
COLOR UR: ABNORMAL
CREAT SERPL-MCNC: 1.3 MG/DL (ref 0.6–1.3)
CREAT UR-MCNC: 134.4 MG/DL
EOSINOPHIL # BLD AUTO: 0.26 THOUSAND/ΜL (ref 0–0.61)
EOSINOPHIL NFR BLD AUTO: 4 % (ref 0–6)
ERYTHROCYTE [DISTWIDTH] IN BLOOD BY AUTOMATED COUNT: 13.3 % (ref 11.6–15.1)
GFR SERPL CREATININE-BSD FRML MDRD: 37 ML/MIN/1.73SQ M
GLUCOSE P FAST SERPL-MCNC: 91 MG/DL (ref 65–99)
GLUCOSE UR STRIP-MCNC: NEGATIVE MG/DL
GRAN CASTS #/AREA URNS LPF: ABNORMAL /[LPF]
HCT VFR BLD AUTO: 49.8 % (ref 34.8–46.1)
HDLC SERPL-MCNC: 49 MG/DL
HGB BLD-MCNC: 16.4 G/DL (ref 11.5–15.4)
HGB UR QL STRIP.AUTO: NEGATIVE
IMM GRANULOCYTES # BLD AUTO: 0.03 THOUSAND/UL (ref 0–0.2)
IMM GRANULOCYTES NFR BLD AUTO: 1 % (ref 0–2)
KETONES UR STRIP-MCNC: NEGATIVE MG/DL
LDLC SERPL CALC-MCNC: 142 MG/DL (ref 0–100)
LEUKOCYTE ESTERASE UR QL STRIP: NEGATIVE
LYMPHOCYTES # BLD AUTO: 1.29 THOUSANDS/ΜL (ref 0.6–4.47)
LYMPHOCYTES NFR BLD AUTO: 22 % (ref 14–44)
MCH RBC QN AUTO: 31.1 PG (ref 26.8–34.3)
MCHC RBC AUTO-ENTMCNC: 32.9 G/DL (ref 31.4–37.4)
MCV RBC AUTO: 94 FL (ref 82–98)
MONOCYTES # BLD AUTO: 0.56 THOUSAND/ΜL (ref 0.17–1.22)
MONOCYTES NFR BLD AUTO: 10 % (ref 4–12)
MUCOUS THREADS UR QL AUTO: ABNORMAL
NEUTROPHILS # BLD AUTO: 3.65 THOUSANDS/ΜL (ref 1.85–7.62)
NEUTS SEG NFR BLD AUTO: 62 % (ref 43–75)
NITRITE UR QL STRIP: NEGATIVE
NON-SQ EPI CELLS URNS QL MICRO: ABNORMAL /HPF
NONHDLC SERPL-MCNC: 179 MG/DL
NRBC BLD AUTO-RTO: 0 /100 WBCS
PH UR STRIP.AUTO: 6 [PH]
PHOSPHATE SERPL-MCNC: 2.7 MG/DL (ref 2.3–4.1)
PLATELET # BLD AUTO: 246 THOUSANDS/UL (ref 149–390)
PMV BLD AUTO: 8.4 FL (ref 8.9–12.7)
POTASSIUM SERPL-SCNC: 3.6 MMOL/L (ref 3.5–5.3)
PROT UR STRIP-MCNC: ABNORMAL MG/DL
PROT UR-MCNC: 6.9 MG/DL
PROT/CREAT UR: 0.1 MG/G{CREAT} (ref 0–0.1)
PTH-INTACT SERPL-MCNC: 49.7 PG/ML (ref 12–88)
RBC # BLD AUTO: 5.28 MILLION/UL (ref 3.81–5.12)
RBC #/AREA URNS AUTO: ABNORMAL /HPF
SODIUM SERPL-SCNC: 139 MMOL/L (ref 135–147)
SP GR UR STRIP.AUTO: 1.02 (ref 1–1.03)
TRIGL SERPL-MCNC: 186 MG/DL (ref ?–150)
UROBILINOGEN UR STRIP-ACNC: <2 MG/DL
WBC # BLD AUTO: 5.86 THOUSAND/UL (ref 4.31–10.16)
WBC #/AREA URNS AUTO: ABNORMAL /HPF

## 2025-01-06 PROCEDURE — 81001 URINALYSIS AUTO W/SCOPE: CPT

## 2025-01-06 PROCEDURE — 84156 ASSAY OF PROTEIN URINE: CPT

## 2025-01-06 PROCEDURE — 82306 VITAMIN D 25 HYDROXY: CPT

## 2025-01-06 PROCEDURE — 83970 ASSAY OF PARATHORMONE: CPT

## 2025-01-06 PROCEDURE — 80048 BASIC METABOLIC PNL TOTAL CA: CPT

## 2025-01-06 PROCEDURE — 80061 LIPID PANEL: CPT

## 2025-01-06 PROCEDURE — 84100 ASSAY OF PHOSPHORUS: CPT

## 2025-01-06 PROCEDURE — 36415 COLL VENOUS BLD VENIPUNCTURE: CPT

## 2025-01-06 PROCEDURE — 82570 ASSAY OF URINE CREATININE: CPT

## 2025-01-06 PROCEDURE — 85025 COMPLETE CBC W/AUTO DIFF WBC: CPT

## 2025-01-06 RX ORDER — CYCLOBENZAPRINE HCL 10 MG
TABLET ORAL
Qty: 30 TABLET | Refills: 0 | Status: SHIPPED | OUTPATIENT
Start: 2025-01-06

## 2025-01-06 NOTE — TELEPHONE ENCOUNTER
Patient called Rx refill line asking to cancel any cyclobenzaprine orders as she is not taking this medication any longer. Please contact patient with any questions.

## 2025-01-13 ENCOUNTER — RESULTS FOLLOW-UP (OUTPATIENT)
Dept: FAMILY MEDICINE CLINIC | Facility: CLINIC | Age: 85
End: 2025-01-13

## 2025-01-13 NOTE — TELEPHONE ENCOUNTER
Patient called again requesting to cancel any cyclobenzaprine refills that come in from the pharmacy.  She hasn't needed to take this medication and she has a lot left.

## 2025-01-15 ENCOUNTER — OFFICE VISIT (OUTPATIENT)
Dept: URGENT CARE | Facility: CLINIC | Age: 85
End: 2025-01-15
Payer: COMMERCIAL

## 2025-01-15 VITALS
TEMPERATURE: 97.1 F | RESPIRATION RATE: 12 BRPM | HEART RATE: 76 BPM | OXYGEN SATURATION: 97 % | SYSTOLIC BLOOD PRESSURE: 138 MMHG | DIASTOLIC BLOOD PRESSURE: 90 MMHG

## 2025-01-15 DIAGNOSIS — S46.911A SHOULDER STRAIN, RIGHT, INITIAL ENCOUNTER: Primary | ICD-10-CM

## 2025-01-15 PROBLEM — L72.0 MILIA: Status: ACTIVE | Noted: 2017-03-23

## 2025-01-15 PROCEDURE — 99213 OFFICE O/P EST LOW 20 MIN: CPT | Performed by: NURSE PRACTITIONER

## 2025-01-15 RX ORDER — PREDNISONE 20 MG/1
40 TABLET ORAL DAILY
Qty: 10 TABLET | Refills: 0 | Status: SHIPPED | OUTPATIENT
Start: 2025-01-15 | End: 2025-01-20

## 2025-01-15 NOTE — PROGRESS NOTES
[facility]        NAME: Krystyna Menard is a 84 y.o. female  : 1940    MRN: [id number]  DATE: January 15, 2025  TIME: 11:50 AM    Assessment and Plan   Shoulder strain, right, initial encounter [S46.911A]  1. Shoulder strain, right, initial encounter  predniSONE 20 mg tablet        Advised can start prednisone daily take with food. Continue tylenol, can use muscle relaxer she has at home hs prn. Heating pain or ice. Discussed small exercises to be done at home after pain improves. If no changes advised follow up with PT or ortho. There was not significant injury so xray was not completed today. Patient had similar occurrence two years ago and xray showed mild arthritis which improved after steroid injection.     Patient Instructions       Follow up with PCP in 3-5 days.  Proceed to  ER if symptoms worsen.    If tests are performed, our office will contact you with results only if changes need to made to the care plan discussed with you at the visit. You can review your full results on [facility].    Chief Complaint     Chief Complaint   Patient presents with    Arm Pain     Right arm, woke from nap Monday and arm hurts. Unknown how it happened.          History of Present Illness       Has used lidocaine patch, tylenol and muscle relaxer without relief. Pain is mostly in the right shoulder, unable to lift up the arm unassisted.     Arm Pain   The incident occurred 3 to 5 days ago. The incident occurred at home. There was no injury mechanism. The pain is present in the right shoulder. The quality of the pain is described as aching. The pain does not radiate. The pain is moderate. The pain has been Fluctuating since the incident. Pertinent negatives include no chest pain, muscle weakness, numbness or tingling. The symptoms are aggravated by movement and lifting. She has tried acetaminophen and immobilization for the symptoms. The treatment provided no relief.       Review of Systems   Review  of Systems   Constitutional:  Negative for chills, fatigue and fever.   Cardiovascular:  Negative for chest pain.   Musculoskeletal:  Positive for arthralgias and myalgias. Negative for back pain, joint swelling, neck pain and neck stiffness.   Skin:  Negative for color change, pallor, rash and wound.   Neurological:  Negative for tingling, weakness and numbness.   Psychiatric/Behavioral:  Negative for sleep disturbance.          Current Medications       Current Outpatient Medications:     Ascorbic Acid (Vitamin C) 500 MG CAPS, Take 1 capsule by mouth daily, Disp: , Rfl:     Calcium-Magnesium-Vitamin D (CALCIUM 500 PO), Take by mouth PT CURRENTLY NOT TAKING, Disp: , Rfl:     Cyanocobalamin (VITAMIN B-12 PO), Take by mouth daily, Disp: , Rfl:     cyclobenzaprine (FLEXERIL) 10 mg tablet, take 1 tablet by mouth once daily if needed for muscle spasm, Disp: 30 tablet, Rfl: 0    Glucos-Chondroit-Hyaluron-MSM (GLUCOSAMINE CHONDROITIN JOINT) TABS, Take by mouth daily  , Disp: , Rfl:     metoprolol succinate (TOPROL-XL) 50 mg 24 hr tablet, take 1 tablet by mouth once daily, Disp: 90 tablet, Rfl: 1    Multiple Vitamins-Minerals (PreserVision AREDS 2+Multi Vit) CAPS, , Disp: , Rfl:     Omega-3 Fatty Acids (FISH OIL) 1000 MG CPDR, Take by mouth, Disp: , Rfl:     predniSONE 20 mg tablet, Take 2 tablets (40 mg total) by mouth daily for 5 days, Disp: 10 tablet, Rfl: 0    triamterene-hydrochlorothiazide (MAXZIDE-25) 37.5-25 mg per tablet, take 1 tablet by mouth once daily, Disp: 100 tablet, Rfl: 1    neomycin-polymyxin-hydrocortisone (CORTISPORIN) 0.35%-10,000 units/mL-1% otic suspension, Administer 4 drops into the left ear every 8 (eight) hours (Patient not taking: Reported on 1/15/2025), Disp: 10 mL, Rfl: 0    Current Allergies     Allergies as of 01/15/2025 - Reviewed 01/15/2025   Allergen Reaction Noted    Azithromycin Vomiting and Dizziness 10/11/2018    Amlodipine Swelling 11/04/2022    Lisinopril Cough 11/04/2022     Morphine Vomiting 10/28/2014            The following portions of the patient's history were reviewed and updated as appropriate: allergies, current medications, past family history, past medical history, past social history, past surgical history and problem list.     Past Medical History:   Diagnosis Date    Allergic see chart    Arthritis     Arthritis     Cancer (HCC) skin    Chronic kidney disease     Diverticulitis of colon     Fall     H/O nonmelanoma skin cancer     last assessed 9/28/17    Hypertension     Kidney problem     Kidney stone     Pneumonia 1961    Squamous cell skin cancer     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       Past Surgical History:   Procedure Laterality Date    APPENDECTOMY  2010    BLADDER SURGERY  1997    BREAST EXCISIONAL BIOPSY Left 1995    benign    no visible scar    BREAST SURGERY      CERVIX SURGERY      dilation and curettage of cervical stump 2005, 1998, 1994    CHOLECYSTECTOMY  1997    COLECTOMY      partial - last assessed 10/28/14    COLONOSCOPY      last assessed 8/9/17    FL RETROGRADE PYELOGRAM  03/29/2021    FL RETROGRADE PYELOGRAM  04/29/2021    HERNIA REPAIR  2011    NE CYSTO/URETERO W/LITHOTRIPSY &INDWELL STENT INSRT Left 03/29/2021    Procedure: CYSTOSCOPY URETEROSCOPY, RETROGRADE PYELOGRAM AND INSERTION STENT URETERAL;  Surgeon: Marv Nunn MD;  Location:  MAIN OR;  Service: Urology    NE CYSTO/URETERO W/LITHOTRIPSY &INDWELL STENT INSRT Left 04/29/2021    Procedure: CYSTOSCOPY URETEROSCOPY WITH LITHOTRIPSY HOLMIUM LASER, RETROGRADE PYELOGRAM AND INSERTION STENT URETERAL;  Surgeon: Marcell Yoon MD;  Location: [facility];  Service: Urology    SKIN CANCER EXCISION      TONSILLECTOMY  1971    TUBAL LIGATION         Family History   Problem Relation Age of Onset    Arthritis Mother     Osteoporosis Mother     Stroke Mother     Heart disease Father     Osteoporosis Father     Breast cancer Family     Osteoporosis Family     Breast cancer  additional onset Family     No Known Problems Sister     No Known Problems Daughter     Breast cancer Maternal Grandmother         typo - she is paternal    No Known Problems Maternal Grandfather     Breast cancer Paternal Grandmother     No Known Problems Paternal Grandfather     No Known Problems Sister          Medications have been verified.        Objective   /90   Pulse 76   Temp (!) 97.1 °F (36.2 °C)   Resp 12   SpO2 97%        Physical Exam     Physical Exam  Vitals and nursing note reviewed.   Constitutional:       General: She is not in acute distress.     Appearance: Normal appearance. She is not ill-appearing.   HENT:      Head: Normocephalic and atraumatic.   Cardiovascular:      Rate and Rhythm: Normal rate and regular rhythm.      Heart sounds: Normal heart sounds, S1 normal and S2 normal.   Pulmonary:      Effort: Pulmonary effort is normal. No accessory muscle usage.      Breath sounds: Normal breath sounds. No wheezing.   Musculoskeletal:      Right shoulder: No swelling, deformity, effusion, laceration, tenderness, bony tenderness or crepitus. Decreased range of motion. Normal strength. Normal pulse.   Skin:     General: Skin is warm and dry.      Capillary Refill: Capillary refill takes less than 2 seconds.      Findings: No rash.   Neurological:      General: No focal deficit present.      Mental Status: She is alert and oriented to person, place, and time.      Motor: Motor function is intact.   Psychiatric:         Attention and Perception: Attention and perception normal.         Mood and Affect: Mood and affect normal.         Speech: Speech normal.         Behavior: Behavior normal. Behavior is cooperative.         Thought Content: Thought content normal.

## 2025-01-16 ENCOUNTER — TELEPHONE (OUTPATIENT)
Dept: ADMINISTRATIVE | Facility: OTHER | Age: 85
End: 2025-01-16

## 2025-01-16 NOTE — TELEPHONE ENCOUNTER
Blood pressure elevated  Appointment department: [facility]  Appointment provider: RYAN Liao  Blood pressure   01/15/25 1141 138/90   01/15/25 1131 138/90   01/16/25 11:52 AM    Patient was called after the Urgent Care visit ; a message was left for the patient to return the call    Thank you.  Marisol Kim MA  [facility]

## 2025-01-17 ENCOUNTER — OFFICE VISIT (OUTPATIENT)
Dept: NEPHROLOGY | Facility: CLINIC | Age: 85
End: 2025-01-17
Payer: COMMERCIAL

## 2025-01-17 VITALS
SYSTOLIC BLOOD PRESSURE: 140 MMHG | WEIGHT: 206.2 LBS | HEART RATE: 68 BPM | DIASTOLIC BLOOD PRESSURE: 80 MMHG | RESPIRATION RATE: 18 BRPM | HEIGHT: 63 IN | OXYGEN SATURATION: 98 % | BODY MASS INDEX: 36.54 KG/M2 | TEMPERATURE: 97.5 F

## 2025-01-17 DIAGNOSIS — E66.01 OBESITY, MORBID (HCC): ICD-10-CM

## 2025-01-17 DIAGNOSIS — E83.9 CHRONIC KIDNEY DISEASE-MINERAL AND BONE DISORDER: ICD-10-CM

## 2025-01-17 DIAGNOSIS — N18.32 STAGE 3B CHRONIC KIDNEY DISEASE (HCC): Primary | ICD-10-CM

## 2025-01-17 DIAGNOSIS — M89.9 CHRONIC KIDNEY DISEASE-MINERAL AND BONE DISORDER: ICD-10-CM

## 2025-01-17 DIAGNOSIS — I10 ESSENTIAL HYPERTENSION: ICD-10-CM

## 2025-01-17 DIAGNOSIS — N18.9 CHRONIC KIDNEY DISEASE-MINERAL AND BONE DISORDER: ICD-10-CM

## 2025-01-17 PROCEDURE — 99214 OFFICE O/P EST MOD 30 MIN: CPT | Performed by: INTERNAL MEDICINE

## 2025-01-17 NOTE — PROGRESS NOTES
Name: Krystyna Menard      : 1940      MRN: [id number]  Encounter Provider: Omi Akins MD  Encounter Date: 2025   Encounter department: [facility]  :  Assessment & Plan  Stage 3b chronic kidney disease (HCC)  Lab Results   Component Value Date    EGFR 37 2025    EGFR 39 2024    EGFR 43 2023    CREATININE 1.30 2025    CREATININE 1.27 2024    CREATININE 1.17 2023     Renal function is quite stable.  Advise hydration and avoiding nephrotoxic medication       Chronic kidney disease-mineral and bone disorder  Lab Results   Component Value Date    EGFR 37 2025    EGFR 39 2024    EGFR 43 2023    CREATININE 1.30 2025    CREATININE 1.27 2024    CREATININE 1.17 2023   PTH and phosphorus along with vitamin D are within acceptable range and will continue to monitor         Essential hypertension  Well-controlled       Obesity, morbid (HCC)  Need to lose weight and she is well aware of it       Everything discussed with patient at length I will see her back in 6 months.  Will get blood and urine test before that visit    Patient is complaining of shortness of exertion    Advised to discuss with primary doctor for possible cardiac follow-up    History of Present Illness   HPI  Krystyna Menard is a 84 y.o. female who presents CKD follow-up    Overall doing well    Complaining of some shortness on exertion for few months    No chest pain no palpitation    Denies any urinary complaint    No nausea no vomit      Review of Systems   Constitutional:  Negative for fatigue.   HENT:  Negative for congestion.    Eyes:  Negative for photophobia and pain.   Respiratory:  Negative for chest tightness and shortness of breath.    Cardiovascular:  Negative for chest pain and palpitations.   Gastrointestinal:  Negative for abdominal distention, abdominal pain and blood in stool.   Endocrine: Negative for polydipsia.  "  Genitourinary:  Negative for difficulty urinating, dysuria, flank pain, hematuria and urgency.   Musculoskeletal:  Positive for arthralgias. Negative for back pain.   Skin:  Negative for rash.   Neurological:  Negative for dizziness, light-headedness and headaches.   Hematological:  Does not bruise/bleed easily.   Psychiatric/Behavioral:  Negative for behavioral problems. The patient is not nervous/anxious.           Objective   Pulse 68   Temp 97.5 °F (36.4 °C) (Temporal)   Resp 18   Ht 5' 3\" (1.6 m)   Wt 93.5 kg (206 lb 3.2 oz)   SpO2 98%   BMI 36.53 kg/m²      Physical Exam  Constitutional:       General: She is not in acute distress.     Appearance: She is well-developed. She is obese.   HENT:      Head: Normocephalic.      Mouth/Throat:      Mouth: Mucous membranes are moist.   Eyes:      General: No scleral icterus.     Conjunctiva/sclera: Conjunctivae normal.   Neck:      Vascular: No JVD.   Cardiovascular:      Rate and Rhythm: Normal rate.      Heart sounds: Normal heart sounds.   Pulmonary:      Effort: Pulmonary effort is normal.      Breath sounds: No wheezing.   Abdominal:      Palpations: Abdomen is soft.      Tenderness: There is no abdominal tenderness.   Musculoskeletal:         General: Normal range of motion.      Cervical back: Neck supple.   Skin:     General: Skin is warm.      Findings: No rash.   Neurological:      Mental Status: She is alert and oriented to person, place, and time.   Psychiatric:         Behavior: Behavior normal.         "

## 2025-01-17 NOTE — ASSESSMENT & PLAN NOTE
Lab Results   Component Value Date    EGFR 37 01/06/2025    EGFR 39 06/21/2024    EGFR 43 11/27/2023    CREATININE 1.30 01/06/2025    CREATININE 1.27 06/21/2024    CREATININE 1.17 11/27/2023   PTH and phosphorus along with vitamin D are within acceptable range and will continue to monitor

## 2025-01-17 NOTE — ASSESSMENT & PLAN NOTE
Lab Results   Component Value Date    EGFR 37 01/06/2025    EGFR 39 06/21/2024    EGFR 43 11/27/2023    CREATININE 1.30 01/06/2025    CREATININE 1.27 06/21/2024    CREATININE 1.17 11/27/2023     Renal function is quite stable.  Advise hydration and avoiding nephrotoxic medication

## 2025-01-31 DIAGNOSIS — I10 ESSENTIAL HYPERTENSION: ICD-10-CM

## 2025-01-31 RX ORDER — TRIAMTERENE AND HYDROCHLOROTHIAZIDE 37.5; 25 MG/1; MG/1
1 TABLET ORAL DAILY
Qty: 90 TABLET | Refills: 1 | Status: SHIPPED | OUTPATIENT
Start: 2025-01-31

## 2025-02-05 DIAGNOSIS — I10 ESSENTIAL HYPERTENSION: ICD-10-CM

## 2025-02-06 RX ORDER — METOPROLOL SUCCINATE 50 MG/1
50 TABLET, EXTENDED RELEASE ORAL DAILY
Qty: 90 TABLET | Refills: 1 | Status: SHIPPED | OUTPATIENT
Start: 2025-02-06

## 2025-02-10 ENCOUNTER — OFFICE VISIT (OUTPATIENT)
Dept: FAMILY MEDICINE CLINIC | Facility: CLINIC | Age: 85
End: 2025-02-10
Payer: COMMERCIAL

## 2025-02-10 ENCOUNTER — APPOINTMENT (OUTPATIENT)
Dept: RADIOLOGY | Facility: CLINIC | Age: 85
End: 2025-02-10
Payer: COMMERCIAL

## 2025-02-10 VITALS
HEART RATE: 70 BPM | OXYGEN SATURATION: 96 % | TEMPERATURE: 97.8 F | SYSTOLIC BLOOD PRESSURE: 134 MMHG | BODY MASS INDEX: 36.86 KG/M2 | WEIGHT: 208 LBS | DIASTOLIC BLOOD PRESSURE: 88 MMHG | HEIGHT: 63 IN

## 2025-02-10 DIAGNOSIS — G89.29 CHRONIC LEFT SHOULDER PAIN: ICD-10-CM

## 2025-02-10 DIAGNOSIS — M25.511 ACUTE PAIN OF RIGHT SHOULDER: Primary | ICD-10-CM

## 2025-02-10 DIAGNOSIS — M25.512 CHRONIC LEFT SHOULDER PAIN: ICD-10-CM

## 2025-02-10 DIAGNOSIS — G58.9 PINCHED NERVE: ICD-10-CM

## 2025-02-10 DIAGNOSIS — H18.509 CORNEAL DYSTROPHY: ICD-10-CM

## 2025-02-10 DIAGNOSIS — M25.511 ACUTE PAIN OF RIGHT SHOULDER: ICD-10-CM

## 2025-02-10 DIAGNOSIS — R06.02 SOB (SHORTNESS OF BREATH): ICD-10-CM

## 2025-02-10 PROCEDURE — 99214 OFFICE O/P EST MOD 30 MIN: CPT | Performed by: STUDENT IN AN ORGANIZED HEALTH CARE EDUCATION/TRAINING PROGRAM

## 2025-02-10 PROCEDURE — G2211 COMPLEX E/M VISIT ADD ON: HCPCS | Performed by: STUDENT IN AN ORGANIZED HEALTH CARE EDUCATION/TRAINING PROGRAM

## 2025-02-10 PROCEDURE — 73030 X-RAY EXAM OF SHOULDER: CPT

## 2025-02-10 NOTE — ASSESSMENT & PLAN NOTE
Patient reports sudden onset right shoulder pain the day after getting a massage, States that she is a side sleeper and sleeps on both sides.     Will check right shoulder xray    Orders:  •  XR shoulder 2+ vw right; Future

## 2025-02-10 NOTE — ASSESSMENT & PLAN NOTE
Patient presents with intermittent left lower extremity pain sometimes radiating up to the back and relieved with flexing at the hip.  I do believe the patient does have a pinched nerve and I have discussed treatment options with patient including conservative management like using Tylenol, attending physical therapy or seeing pain management for epidural/trigger point injections.  At this time patient would like to remain conservative with management and has decided to monitor with intermittent Tylenol use.

## 2025-02-10 NOTE — PROGRESS NOTES
Name: Krystyna Menard      : 1940      MRN: [id number]  Encounter Provider: Margot Barron DO  Encounter Date: 2/10/2025   Encounter department: [facility] PRIMARY CARE  :  Assessment & Plan  Acute pain of right shoulder  Patient reports sudden onset right shoulder pain the day after getting a massage, States that she is a side sleeper and sleeps on both sides.     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 Reviewed recent stress test from  which was unrevealing.   Will check ECHO and f/u pending results  Orders:  •  Echo complete w/ contrast if indicated; Future    Chronic left shoulder pain  Patient reports some restrictions with lifting arms back and over head. No recent injury  I suspect osteoarthritis, will check Shoulder xray   Orders:  •  XR shoulder 2+ vw left; Future    Pinched nerve  Patient presents with intermittent left lower extremity pain sometimes radiating up to the back and relieved with flexing at the hip.  I do believe the patient does have a pinched nerve and I have discussed treatment options with patient including conservative management like using Tylenol, attending physical therapy or seeing pain management for epidural/trigger point injections.  At this time patient would like to remain conservative with management and has decided to monitor with intermittent Tylenol use.              History of Present Illness   Shoulder Pain   The pain is present in the right shoulder. This is a new problem. The current episode started 1 to 4 weeks ago. There has been no history of extremity trauma. The problem occurs constantly. The problem has been unchanged. The quality of the pain is described as aching. Associated symptoms include a limited range of motion. Pertinent  "negatives include no inability to bear weight, joint locking, joint swelling, numbness or stiffness. Exacerbated by: Movement. She has tried nothing for the symptoms. Her past medical history is significant for osteoarthritis.   Shortness of Breath  The current episode started 1 to 4 weeks ago. The problem occurs intermittently. The problem is unchanged. The problem is mild. Pertinent negatives include no chest pain, chest pressure, coughing, leg swelling, palpitations or wheezing. Exacerbated by: Movement. There was no intake of a foreign body. Past treatments include nothing.   Leg Pain   The incident occurred more than 1 week ago. The incident occurred at home. There was no injury mechanism. The pain is present in the left leg. The quality of the pain is described as aching. The pain is mild. The pain has been Intermittent since onset. Pertinent negatives include no inability to bear weight or numbness. The symptoms are aggravated by movement. She has tried nothing for the symptoms.     Review of Systems   Respiratory:  Positive for shortness of breath. Negative for cough and wheezing.    Cardiovascular:  Negative for chest pain, palpitations and leg swelling.   Musculoskeletal:  Negative for stiffness.   Neurological:  Negative for numbness.       Objective   /88 (BP Location: Left arm, Patient Position: Sitting, Cuff Size: Large)   Pulse 70   Temp 97.8 °F (36.6 °C) (Temporal)   Ht 5' 3\" (1.6 m)   Wt 94.3 kg (208 lb)   SpO2 96%   BMI 36.85 kg/m²      Physical Exam    "

## 2025-02-10 NOTE — ASSESSMENT & PLAN NOTE
Patient reports some restrictions with lifting arms back and over head. No recent injury  I suspect osteoarthritis, will check Shoulder xray   Orders:  •  XR shoulder 2+ vw left; Future

## 2025-02-10 NOTE — ASSESSMENT & PLAN NOTE
Patient reports sob on exertion. Reviewed recent stress test from 2022 which was unrevealing.   Will check ECHO and f/u pending results  Orders:  •  Echo complete w/ contrast if indicated; Future

## 2025-02-11 ENCOUNTER — RESULTS FOLLOW-UP (OUTPATIENT)
Dept: FAMILY MEDICINE CLINIC | Facility: CLINIC | Age: 85
End: 2025-02-11

## 2025-02-11 DIAGNOSIS — M12.812 ROTATOR CUFF ARTHROPATHY OF BOTH SHOULDERS: Primary | ICD-10-CM

## 2025-02-11 DIAGNOSIS — M12.811 ROTATOR CUFF ARTHROPATHY OF BOTH SHOULDERS: Primary | ICD-10-CM

## 2025-02-14 ENCOUNTER — OFFICE VISIT (OUTPATIENT)
Dept: OBGYN CLINIC | Facility: CLINIC | Age: 85
End: 2025-02-14
Payer: COMMERCIAL

## 2025-02-14 VITALS — BODY MASS INDEX: 36.5 KG/M2 | WEIGHT: 206 LBS | HEIGHT: 63 IN

## 2025-02-14 DIAGNOSIS — M12.812 ROTATOR CUFF ARTHROPATHY OF BOTH SHOULDERS: Primary | ICD-10-CM

## 2025-02-14 DIAGNOSIS — M12.811 ROTATOR CUFF ARTHROPATHY OF BOTH SHOULDERS: Primary | ICD-10-CM

## 2025-02-14 PROCEDURE — 99214 OFFICE O/P EST MOD 30 MIN: CPT | Performed by: FAMILY MEDICINE

## 2025-02-14 PROCEDURE — 20610 DRAIN/INJ JOINT/BURSA W/O US: CPT | Performed by: FAMILY MEDICINE

## 2025-02-14 RX ORDER — BUPIVACAINE HYDROCHLORIDE 2.5 MG/ML
4 INJECTION, SOLUTION INFILTRATION; PERINEURAL
Status: COMPLETED | OUTPATIENT
Start: 2025-02-14 | End: 2025-02-14

## 2025-02-14 RX ORDER — TRIAMCINOLONE ACETONIDE 40 MG/ML
40 INJECTION, SUSPENSION INTRA-ARTICULAR; INTRAMUSCULAR
Status: COMPLETED | OUTPATIENT
Start: 2025-02-14 | End: 2025-02-14

## 2025-02-14 RX ADMIN — TRIAMCINOLONE ACETONIDE 40 MG: 40 INJECTION, SUSPENSION INTRA-ARTICULAR; INTRAMUSCULAR at 11:30

## 2025-02-14 RX ADMIN — BUPIVACAINE HYDROCHLORIDE 4 ML: 2.5 INJECTION, SOLUTION INFILTRATION; PERINEURAL at 11:30

## 2025-02-14 NOTE — PROGRESS NOTES
"Referred by Margot Barron DO     Subjective:  Chief Complaint   Patient presents with    Left Shoulder - Follow-up, Pain     Limited ROM    Right Shoulder - Follow-up, Pain     Limited ROM       Krystyna Menard is a 84 y.o. female complains of bilateral shoulder pain. Onset of the symptoms was several months ago.  Mechanism of injury:  none . Aggravating factors:  raising overhead  . Treatment to date: corticosteroid injection which was effective. Symptoms have progressed to a point and plateaued.    The following portions of the patient's history were reviewed and updated as appropriate:   Medical history  Family history  Medication   PSH  Allergies      Occupation:         Objective:  Ht 5' 3\" (1.6 m)   Wt 93.4 kg (206 lb)   BMI 36.49 kg/m²     Skin: no rashes, lesions, skin discolorations, lacerations  Vasculature: normal radial and ulnar pulse,  normal skin color, normal capillary refill in extremity, no upper extremity edema  Neurologic: Neurologic exam is normal throughout upper extremities, Awake, alert, and oriented x3, no apparent distress.   Musculoskeletal:   bilateral SHOULDER EXAM    General: no gross deformity, no skin changes redness or warmth noted, no sign of infection    ROM:   FF (180): 180  ER (90): 90  IR : t spine      Grind test: negative    Supraspinatus strength testin/5  Infraspinatus strength testin/5  Subscapularis    Belly press: negative      Empty can: positive  Barrios: +  Neers +  Speed --  Biceps TTP: positive      Eva's test:negative  Scapular posturing: good    Tenderness to palpation of AC joint: negative  Pain with cross-body adduction: negative    Anterior glide: negative  Posterior load and shift: negative  Sulcus sign: negative        Imaging:    XR shoulder 2+ vw left  Result Date: 2025  Narrative: LEFT SHOULDER INDICATION:   Pain in left shoulder. Other chronic pain. COMPARISON: 2022 x-rays. VIEWS:  XR SHOULDER 2+ VW LEFT FINDINGS: There is no " acute displaced fracture or dislocation. Degenerative arthritis left acromioclavicular and glenohumeral joints. Slight narrowing of the subacromial space, compatible with rotator cuff deficiency and/or tearing. No lytic or blastic osseous lesion. Soft tissues are unremarkable.     Impression: Degenerative arthritis left acromioclavicular and glenohumeral joints. Slight narrowing of the subacromial space, compatible with rotator cuff deficiency and/or tearing. Electronically signed: 02/11/2025 12:56 AM Matthew Mccarthy MD    XR shoulder 2+ vw right  Result Date: 2/10/2025  Narrative: RIGHT SHOULDER INDICATION:   Pain in right shoulder. COMPARISON:  None. VIEWS:  XR SHOULDER 2+ VW RIGHT FINDINGS: There is no acute fracture or dislocation. Glenohumeral space appears slightly widened. Shoulder appears minimally high riding. Small osteophytes at the margin of the humeral head and neck. Acromioclavicular joint is intact for age. No lytic or blastic osseous lesion. Soft tissues are unremarkable.     Impression: Very mildly high riding shoulder consistent with minimal superior subluxation. Widening of the glenohumeral joint with marginal osteophyte formation of the humeral head. No fracture or gene dislocation Electronically signed: 02/10/2025 03:18 PM Yves Mccarthy MD       Assessment/Plan:  1. Rotator cuff arthropathy of both shoulders (Primary)    > 45 min devoted to review of previous, pertinent medical records, imaging, discussion of treatment options, counseling and documentation  Imaging independently reviewed and discussed with patient.  Degenerative changes noted, no acute fractures appreciated.  Follow-up official reading.  We discussed the nature of RTC arthopathy at length and detailed the treatment approach.    Recommending formal PT- declined  We discussed a HEP and literature was provided for reference. It was explained that this does not supplement formal PT but should serve to bridge the gap, while they wait  to get into PT. Advised to avoid any exercises which exacerbate their pain.  Follow up as beeded . Should sx's worsen or any concerns arise, they were advised to follow up sooner or seek more immediate medical attention.  All of the patient's concerns were addressed and questions answered. They verbalized agreement with and understanding of the treatment plan.        Large joint arthrocentesis: bilateral subacromial bursa  Universal Protocol:  Consent: Verbal consent obtained.  Risks and benefits: risks, benefits and alternatives were discussed  Consent given by: patient  Patient identity confirmed: verbally with patient  Supporting Documentation  Indications: pain   Procedure Details  Location: shoulder - bilateral subacromial bursa  Needle size: 22 G  Ultrasound guidance: no  Approach: posterior    Medications (Right): 4 mL bupivacaine 0.25 %; 40 mg triamcinolone acetonide 40 mg/mLMedications (Left): 4 mL bupivacaine 0.25 %; 40 mg triamcinolone acetonide 40 mg/mL   Patient tolerance: patient tolerated the procedure well with no immediate complications    Risks and benefits of CSI were discussed with patient extensively. Risks were highlighted which included but were not limited to infection, pain, local site swelling, and chance that injection may not be effective. Patient was also counseled regarding glucose elevation days after receiving CSI and to be mindful of diet and check sugars daily. Patient agreeable to proceed with CSI after counseling.             - Ambulatory Referral to Orthopedic Surgery

## 2025-02-26 ENCOUNTER — HOSPITAL ENCOUNTER (OUTPATIENT)
Dept: NON INVASIVE DIAGNOSTICS | Facility: CLINIC | Age: 85
Discharge: HOME | End: 2025-02-26
Payer: COMMERCIAL

## 2025-02-26 VITALS
SYSTOLIC BLOOD PRESSURE: 134 MMHG | DIASTOLIC BLOOD PRESSURE: 88 MMHG | WEIGHT: 206 LBS | BODY MASS INDEX: 36.5 KG/M2 | HEIGHT: 63 IN | HEART RATE: 70 BPM

## 2025-02-26 DIAGNOSIS — R06.02 SOB (SHORTNESS OF BREATH): ICD-10-CM

## 2025-02-26 LAB
AORTIC ROOT: 3.1 CM
ASCENDING AORTA: 3.3 CM
BSA FOR ECHO PROCEDURE: 1.96 M2
E WAVE DECELERATION TIME: 321 MS
E/A RATIO: 0.55
FRACTIONAL SHORTENING: 29 (ref 28–44)
INTERVENTRICULAR SEPTUM IN DIASTOLE (PARASTERNAL SHORT AXIS VIEW): 0.9 CM
INTERVENTRICULAR SEPTUM: 0.9 CM (ref 0.6–1.1)
LAAS-AP2: 13.2 CM2
LAAS-AP4: 13.5 CM2
LEFT ATRIUM SIZE: 3.5 CM
LEFT ATRIUM VOLUME (MOD BIPLANE): 29 ML
LEFT ATRIUM VOLUME INDEX (MOD BIPLANE): 14.8 ML/M2
LEFT INTERNAL DIMENSION IN SYSTOLE: 2.4 CM (ref 2.1–4)
LEFT VENTRICULAR INTERNAL DIMENSION IN DIASTOLE: 3.4 CM (ref 3.5–6)
LEFT VENTRICULAR POSTERIOR WALL IN END DIASTOLE: 1 CM
LEFT VENTRICULAR STROKE VOLUME: 27 ML
LV EF US.2D.A4C+ESTIMATED: 69 %
LVSV (TEICH): 27 ML
MV E'TISSUE VEL-SEP: 9 CM/S
MV PEAK A VEL: 1.11 M/S
MV PEAK E VEL: 61 CM/S
MV STENOSIS PRESSURE HALF TIME: 93 MS
MV VALVE AREA P 1/2 METHOD: 2.37
RIGHT ATRIUM AREA SYSTOLE A4C: 10.5 CM2
RIGHT VENTRICLE ID DIMENSION: 2.9 CM
SL CV LEFT ATRIUM LENGTH A2C: 4.9 CM
SL CV LV EF: 60
SL CV PED ECHO LEFT VENTRICLE DIASTOLIC VOLUME (MOD BIPLANE) 2D: 48 ML
SL CV PED ECHO LEFT VENTRICLE SYSTOLIC VOLUME (MOD BIPLANE) 2D: 21 ML
TRICUSPID ANNULAR PLANE SYSTOLIC EXCURSION: 2 CM

## 2025-02-26 PROCEDURE — 93306 TTE W/DOPPLER COMPLETE: CPT

## 2025-02-26 PROCEDURE — 93306 TTE W/DOPPLER COMPLETE: CPT | Performed by: INTERNAL MEDICINE

## 2025-03-06 ENCOUNTER — DOCUMENTATION (OUTPATIENT)
Dept: PAIN MEDICINE | Facility: CLINIC | Age: 85
End: 2025-03-06

## 2025-03-16 NOTE — TELEPHONE ENCOUNTER
I called [place] regarding her message  For the allergies she is requesting we have Morphine and the Zithromax  She mentioned 3 but after reviewing her chart these are the two that are pertinent  As far as her blood type, that is not tested with standard lab work  We can offer her a lab order to have that determined but we do not have documentation of her blood type  If she donated blood in the past they keep the blood type on file, we can ask patient  The patient is a 29y Male complaining of chest pain.

## 2025-03-17 ENCOUNTER — OFFICE VISIT (OUTPATIENT)
Dept: FAMILY MEDICINE CLINIC | Facility: CLINIC | Age: 85
End: 2025-03-17
Payer: COMMERCIAL

## 2025-03-17 ENCOUNTER — APPOINTMENT (OUTPATIENT)
Dept: LAB | Facility: CLINIC | Age: 85
End: 2025-03-17
Payer: COMMERCIAL

## 2025-03-17 VITALS
WEIGHT: 204.5 LBS | TEMPERATURE: 97.8 F | BODY MASS INDEX: 36.23 KG/M2 | DIASTOLIC BLOOD PRESSURE: 78 MMHG | OXYGEN SATURATION: 97 % | HEART RATE: 69 BPM | SYSTOLIC BLOOD PRESSURE: 136 MMHG | HEIGHT: 63 IN

## 2025-03-17 DIAGNOSIS — Z01.818 PREOPERATIVE CLEARANCE: ICD-10-CM

## 2025-03-17 DIAGNOSIS — H18.509 CORNEAL DYSTROPHY: Primary | ICD-10-CM

## 2025-03-17 DIAGNOSIS — E78.5 HYPERLIPIDEMIA, UNSPECIFIED HYPERLIPIDEMIA TYPE: ICD-10-CM

## 2025-03-17 DIAGNOSIS — H18.509 CORNEAL DYSTROPHY: ICD-10-CM

## 2025-03-17 PROCEDURE — 36415 COLL VENOUS BLD VENIPUNCTURE: CPT

## 2025-03-17 PROCEDURE — 80053 COMPREHEN METABOLIC PANEL: CPT

## 2025-03-17 PROCEDURE — 85025 COMPLETE CBC W/AUTO DIFF WBC: CPT

## 2025-03-17 PROCEDURE — 99214 OFFICE O/P EST MOD 30 MIN: CPT | Performed by: STUDENT IN AN ORGANIZED HEALTH CARE EDUCATION/TRAINING PROGRAM

## 2025-03-17 RX ORDER — PREDNISOLONE ACETATE 10 MG/ML
1 SUSPENSION/ DROPS OPHTHALMIC 4 TIMES DAILY
COMMUNITY
Start: 2025-02-28

## 2025-03-17 RX ORDER — ROSUVASTATIN CALCIUM 5 MG/1
5 TABLET, COATED ORAL DAILY
Qty: 30 TABLET | Refills: 5 | Status: SHIPPED | OUTPATIENT
Start: 2025-03-17

## 2025-03-17 RX ORDER — OFLOXACIN 3 MG/ML
1 SOLUTION/ DROPS OPHTHALMIC 4 TIMES DAILY
COMMUNITY
Start: 2025-02-28

## 2025-03-17 NOTE — PROGRESS NOTES
"Subjective:     Krystyna Menard is a 84 y.o. female who presents to the office today for a preoperative consultation at the request of surgeon Dr Stanley Whitmore who plans on performing Left Corneal Dystrophy Surgery on March 31, 2025. This consultation is requested for the specific conditions prompting preoperative evaluation (i.e. because of potential affect on operative risk). Planned anesthesia: IV sedation. The patient has the following known anesthesia issues:  None . Patients bleeding risk: no recent abnormal bleeding.     The following portions of the patient's history were reviewed and updated as appropriate: allergies, current medications, past family history, past medical history, past social history, past surgical history, and problem list.    Review of Systems  Pertinent items are noted in HPI.     Objective:     /78 (BP Location: Left arm, Patient Position: Sitting, Cuff Size: Large)   Pulse 69   Temp 97.8 °F (36.6 °C) (Temporal)   Ht 5' 3\" (1.6 m)   Wt 92.8 kg (204 lb 8 oz)   SpO2 97%   BMI 36.23 kg/m²     General appearance: alert and oriented, in no acute distress  Throat: lips, mucosa, and tongue normal; teeth and gums normal  Lungs: clear to auscultation bilaterally  Heart: regular rate and rhythm, S1, S2 normal, no murmur, click, rub or gallop  Extremities: extremities normal, warm and well-perfused; no cyanosis, clubbing, or edema  Neurologic: Grossly normal    Predictors of intubation difficulty:   Morbid obesity? no   Anatomically abnormal facies? no   Prominent incisors? no   Receding mandible? no    Cardiographics  ECG:  Not completed, recent EKG done  Echocardiogram: normal and reviewed by myself    Imaging  Chest x-ray:  None      Lab Review   Hospital Outpatient Visit on 02/26/2025   Component Date Value    RAA A4C 02/26/2025 10.5     LA Volume Index (BP) 02/26/2025 14.8     MV Peak A Bill 02/26/2025 1.11     MV stenosis pressure 1/2* 02/26/2025 93     MV Peak E Bill 02/26/2025 61  "    E wave deceleration time 02/26/2025 321     E/A ratio 02/26/2025 0.55     MV valve area p 1/2 meth* 02/26/2025 2.37     RVID d 02/26/2025 2.9     A4C EF 02/26/2025 69     Left ventricular stroke * 02/26/2025 27.00     IVSd 02/26/2025 0.90     Tricuspid annular plane * 02/26/2025 2.00     Ao root 02/26/2025 3.10     LVPWd 02/26/2025 1.00     LA size 02/26/2025 3.5     Asc Ao 02/26/2025 3.3     LA volume (BP) 02/26/2025 29     FS 02/26/2025 29     LVIDS 02/26/2025 2.40     IVS 02/26/2025 0.9     LVIDd 02/26/2025 3.40     LA length (A2C) 02/26/2025 4.90     LEFT VENTRICLE SYSTOLIC * 02/26/2025 21     LV DIASTOLIC VOLUME (MOD* 02/26/2025 48     Left Atrium Area-systoli* 02/26/2025 13.5     Left Atrium Area-systoli* 02/26/2025 13.2     MV E' Tissue Velocity Se* 02/26/2025 9     LVSV, 2D 02/26/2025 27     BSA 02/26/2025 1.96     LV EF 02/26/2025 60         Assessment:     84 y.o. female with planned surgery as above.    Known risk factors for perioperative complications: Renal dysfunction    Difficulty with intubation is not anticipated.    Cardiac Risk Estimation: 2        Plan:    Corneal dystrophy  Blood work including cbc and cmp wnl and display stability in CKD. ECHO reviewed from 2/26/2025 and is within normal limits. Patient is medically optimized for surgical procedure.    Hyperlipidemia  Reviewed recent Lipid Panel which shows elevated total and LDL Cholesterol    After discussion with patient will start Crestor 5mg daily    Margot Barron DO

## 2025-03-17 NOTE — ASSESSMENT & PLAN NOTE
Blood work including cbc and cmp wnl and display stability in CKD. ECHO reviewed from 2/26/2025 and is within normal limits. Patient is medically optimized for surgical procedure.

## 2025-03-17 NOTE — ASSESSMENT & PLAN NOTE
Reviewed recent Lipid Panel which shows elevated total and LDL Cholesterol    After discussion with patient will start Crestor 5mg daily

## 2025-03-18 ENCOUNTER — RESULTS FOLLOW-UP (OUTPATIENT)
Dept: FAMILY MEDICINE CLINIC | Facility: CLINIC | Age: 85
End: 2025-03-18

## 2025-03-18 LAB
ALBUMIN SERPL BCG-MCNC: 4.2 G/DL (ref 3.5–5)
ALP SERPL-CCNC: 54 U/L (ref 34–104)
ALT SERPL W P-5'-P-CCNC: 16 U/L (ref 7–52)
ANION GAP SERPL CALCULATED.3IONS-SCNC: 10 MMOL/L (ref 4–13)
AST SERPL W P-5'-P-CCNC: 17 U/L (ref 13–39)
BASOPHILS # BLD AUTO: 0.04 THOUSANDS/ÂΜL (ref 0–0.1)
BASOPHILS NFR BLD AUTO: 1 % (ref 0–1)
BILIRUB SERPL-MCNC: 0.57 MG/DL (ref 0.2–1)
BUN SERPL-MCNC: 24 MG/DL (ref 5–25)
CALCIUM SERPL-MCNC: 9.9 MG/DL (ref 8.4–10.2)
CHLORIDE SERPL-SCNC: 103 MMOL/L (ref 96–108)
CO2 SERPL-SCNC: 27 MMOL/L (ref 21–32)
CREAT SERPL-MCNC: 1.35 MG/DL (ref 0.6–1.3)
EOSINOPHIL # BLD AUTO: 0.14 THOUSAND/ÂΜL (ref 0–0.61)
EOSINOPHIL NFR BLD AUTO: 2 % (ref 0–6)
ERYTHROCYTE [DISTWIDTH] IN BLOOD BY AUTOMATED COUNT: 12.9 % (ref 11.6–15.1)
GFR SERPL CREATININE-BSD FRML MDRD: 36 ML/MIN/1.73SQ M
GLUCOSE SERPL-MCNC: 92 MG/DL (ref 65–140)
HCT VFR BLD AUTO: 47.7 % (ref 34.8–46.1)
HGB BLD-MCNC: 16 G/DL (ref 11.5–15.4)
IMM GRANULOCYTES # BLD AUTO: 0.03 THOUSAND/UL (ref 0–0.2)
IMM GRANULOCYTES NFR BLD AUTO: 0 % (ref 0–2)
LYMPHOCYTES # BLD AUTO: 1.32 THOUSANDS/ÂΜL (ref 0.6–4.47)
LYMPHOCYTES NFR BLD AUTO: 20 % (ref 14–44)
MCH RBC QN AUTO: 31.6 PG (ref 26.8–34.3)
MCHC RBC AUTO-ENTMCNC: 33.5 G/DL (ref 31.4–37.4)
MCV RBC AUTO: 94 FL (ref 82–98)
MONOCYTES # BLD AUTO: 0.67 THOUSAND/ÂΜL (ref 0.17–1.22)
MONOCYTES NFR BLD AUTO: 10 % (ref 4–12)
NEUTROPHILS # BLD AUTO: 4.51 THOUSANDS/ÂΜL (ref 1.85–7.62)
NEUTS SEG NFR BLD AUTO: 67 % (ref 43–75)
NRBC BLD AUTO-RTO: 0 /100 WBCS
PLATELET # BLD AUTO: 275 THOUSANDS/UL (ref 149–390)
PMV BLD AUTO: 9.3 FL (ref 8.9–12.7)
POTASSIUM SERPL-SCNC: 3.9 MMOL/L (ref 3.5–5.3)
PROT SERPL-MCNC: 6.5 G/DL (ref 6.4–8.4)
RBC # BLD AUTO: 5.07 MILLION/UL (ref 3.81–5.12)
SODIUM SERPL-SCNC: 140 MMOL/L (ref 135–147)
WBC # BLD AUTO: 6.71 THOUSAND/UL (ref 4.31–10.16)

## 2025-03-24 ENCOUNTER — OFFICE VISIT (OUTPATIENT)
Dept: URGENT CARE | Facility: CLINIC | Age: 85
End: 2025-03-24
Payer: COMMERCIAL

## 2025-03-24 ENCOUNTER — APPOINTMENT (OUTPATIENT)
Dept: RADIOLOGY | Facility: CLINIC | Age: 85
End: 2025-03-24
Payer: COMMERCIAL

## 2025-03-24 VITALS
BODY MASS INDEX: 36.14 KG/M2 | HEART RATE: 64 BPM | DIASTOLIC BLOOD PRESSURE: 84 MMHG | TEMPERATURE: 98.5 F | RESPIRATION RATE: 18 BRPM | SYSTOLIC BLOOD PRESSURE: 130 MMHG | OXYGEN SATURATION: 98 % | WEIGHT: 204 LBS

## 2025-03-24 DIAGNOSIS — S80.02XA CONTUSION OF LEFT KNEE, INITIAL ENCOUNTER: Primary | ICD-10-CM

## 2025-03-24 DIAGNOSIS — M25.462 EFFUSION OF LEFT KNEE: ICD-10-CM
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
## 2025-03-24 DIAGNOSIS — M25.562 ACUTE PAIN OF LEFT KNEE: ICD-10-CM

## 2025-03-24 PROCEDURE — 99214 OFFICE O/P EST MOD 30 MIN: CPT | Performed by: PHYSICIAN ASSISTANT

## 2025-03-24 PROCEDURE — 73564 X-RAY EXAM KNEE 4 OR MORE: CPT

## 2025-03-24 NOTE — PROGRESS NOTES
[facility]        NAME: Krystyna Menard is a 84 y.o. female  : 1940    MRN: [id number]  DATE: 2025  TIME: 11:25 AM    Assessment and Plan   Contusion of left knee, initial encounter [S80.02XA]  1. Contusion of left knee, initial encounter        2. Effusion of left knee        3. Acute pain of left knee  XR knee 4+ vw left injury      4. Fall, initial encounter              Patient Instructions     Patient Instructions   Xray provider read- no acute findings identified. Arthritic changes and soft tissue swelling noted.     Apply a compressive ACE bandage. Rest and elevate the affected painful area.  Apply cold compresses intermittently as needed.  As pain recedes, begin normal activities slowly as tolerated.  Use over the counter pain medication as needed.   Call if symptoms persist.        Follow up with PCP in 3-5 days.  Proceed to  ER if symptoms worsen.    If tests are performed, our office will contact you with results only if changes need to made to the care plan discussed with you at the visit. [facility]        Chief Complaint     Chief Complaint   Patient presents with    Fall     Pt fell in house and tripped on single step and landed on left knee that happened 4 days ago, very bruised          History of Present Illness       Patient presents for evaluation of left knee pain and swelling after she fell about 4 days ago.  She states that she was in her house and she tripped on the single step, landing on her left knee.  Since then it has been very bruised and swollen.  It hurts to put weight on her leg and to walk on it.  She is able to walk and get around but she it hurts and she is doing so with a slight limp.  denies any numbness or tingling in the knee.        Review of Systems   Review of Systems   Musculoskeletal:  Positive for arthralgias, gait problem and myalgias.   All other systems reviewed and are negative.        Current  Medications       Current Outpatient Medications:     Ascorbic Acid (Vitamin C) 500 MG CAPS, Take 1 capsule by mouth daily, Disp: , Rfl:     Calcium-Magnesium-Vitamin D (CALCIUM 500 PO), Take by mouth PT CURRENTLY NOT TAKING, Disp: , Rfl:     Cyanocobalamin (VITAMIN B-12 PO), Take by mouth daily, Disp: , Rfl:     cyclobenzaprine (FLEXERIL) 10 mg tablet, take 1 tablet by mouth once daily if needed for muscle spasm, Disp: 30 tablet, Rfl: 0    Glucos-Chondroit-Hyaluron-MSM (GLUCOSAMINE CHONDROITIN JOINT) TABS, Take by mouth daily  , Disp: , Rfl:     metoprolol succinate (TOPROL-XL) 50 mg 24 hr tablet, take 1 tablet by mouth once daily, Disp: 90 tablet, Rfl: 1    Multiple Vitamins-Minerals (PreserVision AREDS 2+Multi Vit) CAPS, , Disp: , Rfl:     neomycin-polymyxin-hydrocortisone (CORTISPORIN) 0.35%-10,000 units/mL-1% otic suspension, Administer 4 drops into the left ear every 8 (eight) hours, Disp: 10 mL, Rfl: 0    ofloxacin (OCUFLOX) 0.3 % ophthalmic solution, Administer 1 drop into the left eye 4 (four) times a day, Disp: , Rfl:     Omega-3 Fatty Acids (FISH OIL) 1000 MG CPDR, Take by mouth, Disp: , Rfl:     prednisoLONE acetate (PRED FORTE) 1 % ophthalmic suspension, Administer 1 drop into the left eye 4 (four) times a day, Disp: , Rfl:     rosuvastatin (CRESTOR) 5 mg tablet, Take 1 tablet (5 mg total) by mouth daily, Disp: 30 tablet, Rfl: 5    triamterene-hydrochlorothiazide (MAXZIDE-25) 37.5-25 mg per tablet, take 1 tablet by mouth once daily, Disp: 90 tablet, Rfl: 1    Current Allergies     Allergies as of 03/24/2025 - Reviewed 03/24/2025   Allergen Reaction Noted    Azithromycin Vomiting and Dizziness 10/11/2018    Amlodipine Swelling 11/04/2022    Lisinopril Cough 11/04/2022    Morphine Vomiting 10/28/2014            The following portions of the patient's history were reviewed and updated as appropriate: allergies, current medications, past family history, past medical history, past social history, past  surgical history and problem list.     Past Medical History:   Diagnosis Date    Allergic see chart    Arthritis     Arthritis     Cancer (HCC) skin    Chronic kidney disease     Diverticulitis of colon     Fall     Fibrocystic breast     H/O nonmelanoma skin cancer     last assessed 9/28/17    Hypertension     Kidney problem     Kidney stone     Osteoarthritis     Pneumonia 1961    Squamous cell skin cancer     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       Past Surgical History:   Procedure Laterality Date    APPENDECTOMY  2010    BLADDER SURGERY  1997    BREAST EXCISIONAL BIOPSY Left 1995    benign    no visible scar    BREAST SURGERY      CERVIX SURGERY      dilation and curettage of cervical stump 2005, 1998, 1994    CHOLECYSTECTOMY  1997    COLECTOMY      partial - last assessed 10/28/14    COLONOSCOPY      last assessed 8/9/17    FL RETROGRADE PYELOGRAM  03/29/2021    FL RETROGRADE PYELOGRAM  04/29/2021    HERNIA REPAIR  2011    KIDNEY SURGERY  kidney stone 2021    HI CYSTO/URETERO W/LITHOTRIPSY &INDWELL STENT INSRT Left 03/29/2021    Procedure: CYSTOSCOPY URETEROSCOPY, RETROGRADE PYELOGRAM AND INSERTION STENT URETERAL;  Surgeon: Marv Nunn MD;  Location: [facility];  Service: Urology    HI CYSTO/URETERO W/LITHOTRIPSY &INDWELL STENT INSRT Left 04/29/2021    Procedure: CYSTOSCOPY URETEROSCOPY WITH LITHOTRIPSY HOLMIUM LASER, RETROGRADE PYELOGRAM AND INSERTION STENT URETERAL;  Surgeon: Marcell Yoon MD;  Location: [facility];  Service: Urology    SKIN CANCER EXCISION      TONSILLECTOMY  1971    TUBAL LIGATION         Family History   Problem Relation Age of Onset    Arthritis Mother     Osteoporosis Mother         stroke (88)    Stroke Mother     Heart disease Father     Osteoporosis Father         cardiac arrest (73)    Breast cancer Family     Osteoporosis Family     Breast cancer additional onset Family     Osteoporosis Sister         old age (88)    No Known Problems Daughter      Breast cancer Maternal Grandmother         typo - she is paternal    Osteoporosis Maternal Grandmother         breast cancer    No Known Problems Maternal Grandfather     Breast cancer Paternal Grandmother     No Known Problems Paternal Grandfather     Osteoporosis Sister         old age (92)         Medications have been verified.        Objective   /84   Pulse 64   Temp 98.5 °F (36.9 °C) (Tympanic)   Resp 18   Wt 92.5 kg (204 lb)   SpO2 98%   BMI 36.14 kg/m²        Physical Exam     Physical Exam  Vitals and nursing note reviewed.   Constitutional:       Appearance: Normal appearance.   Musculoskeletal:      Left knee: Swelling, effusion, ecchymosis and bony tenderness present. Normal range of motion.      Instability Tests: Anterior drawer test negative.      Comments: Able to walk and bear weight on left leg.    Skin:     General: Skin is warm and dry.   Neurological:      General: No focal deficit present.      Mental Status: She is alert and oriented to person, place, and time.   Psychiatric:         Mood and Affect: Mood normal.         Behavior: Behavior normal.

## 2025-03-24 NOTE — PATIENT INSTRUCTIONS
Xray provider read- no acute findings identified. Arthritic changes and soft tissue swelling noted.     Apply a compressive ACE bandage. Rest and elevate the affected painful area.  Apply cold compresses intermittently as needed.  As pain recedes, begin normal activities slowly as tolerated.  Use over the counter pain medication as needed.   Call if symptoms persist.        Follow up with PCP in 3-5 days.  Proceed to  ER if symptoms worsen.    If tests are performed, our office will contact you with results only if changes need to made to the care plan discussed with you at the visit. You can review your full results on [facility].

## 2025-03-27 ENCOUNTER — OFFICE VISIT (OUTPATIENT)
Dept: FAMILY MEDICINE CLINIC | Facility: CLINIC | Age: 85
End: 2025-03-27
Payer: COMMERCIAL

## 2025-03-27 VITALS
OXYGEN SATURATION: 97 % | HEART RATE: 69 BPM | BODY MASS INDEX: 36.14 KG/M2 | DIASTOLIC BLOOD PRESSURE: 82 MMHG | HEIGHT: 63 IN | WEIGHT: 204 LBS | SYSTOLIC BLOOD PRESSURE: 134 MMHG | TEMPERATURE: 97.6 F

## 2025-03-27 DIAGNOSIS — M25.562 ACUTE PAIN OF LEFT KNEE: ICD-10-CM
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
## 2025-03-27 PROCEDURE — 99213 OFFICE O/P EST LOW 20 MIN: CPT | Performed by: STUDENT IN AN ORGANIZED HEALTH CARE EDUCATION/TRAINING PROGRAM

## 2025-03-27 NOTE — ASSESSMENT & PLAN NOTE
Status post mechanical fall at home.  Fall onto her left knee.  Seen in urgent care, some superficial bruising which is now resolving.  Reviewed x-rays which did not show any acute fracture or dislocation.  On exam today some slight tenderness, patient does report bruising and swelling has decreased.  Patient able to ambulate without much change previous to fall.  She does ambulate with a cane and is stable in gait.    Patient to continue Ace bandage and lidocaine patches as needed.  Recommend elevating legs while at home can apply ice as needed.

## 2025-03-27 NOTE — PROGRESS NOTES
"Name: Krystyna Menard      : 1940      MRN: [id number]  Encounter Provider: Margot Barron DO  Encounter Date: 3/27/2025   Encounter department: [facility] PRIMARY CARE  :  Assessment & Plan  Fall, initial encounter         Acute pain of left knee  Status post mechanical fall at home.  Fall onto her left knee.  Seen in urgent care, some superficial bruising which is now resolving.  Reviewed x-rays which did not show any acute fracture or dislocation.  On exam today some slight tenderness, patient does report bruising and swelling has decreased.  Patient able to ambulate without much change previous to fall.  She does ambulate with a cane and is stable in gait.    Patient to continue Ace bandage and lidocaine patches as needed.  Recommend elevating legs while at home can apply ice as needed.              History of Present Illness   Presents today for urgent care follow-up.  Seen in urgent care for left knee pain after sustaining a fall at home, fall appears to be mechanical patient fell on 1 stair hitting left knee.      Review of Systems   Constitutional:  Negative for chills and fever.   Respiratory:  Negative for cough and shortness of breath.    Cardiovascular:  Negative for chest pain and palpitations.   Musculoskeletal:         Left knee pain   Neurological:  Negative for dizziness and headaches.       Objective   /82 (BP Location: Left arm, Patient Position: Sitting, Cuff Size: Adult)   Pulse 69   Temp 97.6 °F (36.4 °C) (Temporal)   Ht 5' 3\" (1.6 m)   Wt 92.5 kg (204 lb)   SpO2 97%   BMI 36.14 kg/m²      Physical Exam  Vitals reviewed.   Constitutional:       Appearance: She is obese.   HENT:      Head: Normocephalic and atraumatic.   Eyes:      Extraocular Movements: Extraocular movements intact.   Cardiovascular:      Rate and Rhythm: Normal rate and regular rhythm.      Heart sounds: Normal heart sounds.   Pulmonary:      Effort: Pulmonary effort is normal.      " Breath sounds: Normal breath sounds.   Musculoskeletal:         General: Swelling, tenderness and signs of injury present. No deformity.      Right lower leg: No edema.      Left lower leg: No edema.      Comments: Slight edema at medial aspect of the left knee with purple superficial bruising from knee down to mid shin.   Neurological:      General: No focal deficit present.      Mental Status: She is alert and oriented to person, place, and time.   Psychiatric:         Mood and Affect: Mood normal.         Behavior: Behavior normal.

## 2025-04-04 ENCOUNTER — TELEPHONE (OUTPATIENT)
Age: 85
End: 2025-04-04

## 2025-04-04 NOTE — TELEPHONE ENCOUNTER
Pt called in stating she is scheduled for: DXA BONE DENSE SPINE HIP/PELV on 04/11, but does not see the order in her MYC.     Confirmed with pt order for: DXA BONE DENSE SPINE HIP/PELV is indeed listed in chart. Does not need physical paper to attend appt, as the order is listed in her chart & scheduled.     Pt understood & will be coming 04/11 for appt.

## 2025-04-06 DIAGNOSIS — E78.5 HYPERLIPIDEMIA, UNSPECIFIED HYPERLIPIDEMIA TYPE: ICD-10-CM

## 2025-04-07 ENCOUNTER — TELEPHONE (OUTPATIENT)
Age: 85
End: 2025-04-07

## 2025-04-07 NOTE — TELEPHONE ENCOUNTER
Patient form for eye surgery will  two days before her second eye surgery on . Dr. Whitmore office is requesting if the date on the form could be changed to cover patients second eye?She is asking if this could be faxed back to them as well. She also wanted to let the provider know her leg is improving. Please advise.

## 2025-04-08 RX ORDER — ROSUVASTATIN CALCIUM 5 MG/1
5 TABLET, COATED ORAL DAILY
Qty: 100 TABLET | Refills: 1 | Status: SHIPPED | OUTPATIENT
Start: 2025-04-08

## 2025-04-11 ENCOUNTER — HOSPITAL ENCOUNTER (OUTPATIENT)
Dept: RADIOLOGY | Age: 85
Discharge: HOME | End: 2025-04-11
Payer: COMMERCIAL

## 2025-04-11 VITALS — BODY MASS INDEX: 36.5 KG/M2 | WEIGHT: 206 LBS | HEIGHT: 63 IN

## 2025-04-11 DIAGNOSIS — Z78.0 POST-MENOPAUSAL: ICD-10-CM

## 2025-04-11 PROCEDURE — 77080 DXA BONE DENSITY AXIAL: CPT

## 2025-04-12 ENCOUNTER — RA CDI HCC (OUTPATIENT)
Dept: OTHER | Facility: HOSPITAL | Age: 85
End: 2025-04-12

## 2025-04-14 ENCOUNTER — RESULTS FOLLOW-UP (OUTPATIENT)
Dept: FAMILY MEDICINE CLINIC | Facility: CLINIC | Age: 85
End: 2025-04-14

## 2025-04-17 ENCOUNTER — OFFICE VISIT (OUTPATIENT)
Dept: FAMILY MEDICINE CLINIC | Facility: CLINIC | Age: 85
End: 2025-04-17
Payer: COMMERCIAL

## 2025-04-17 ENCOUNTER — TELEPHONE (OUTPATIENT)
Age: 85
End: 2025-04-17

## 2025-04-17 VITALS
TEMPERATURE: 98.1 F | SYSTOLIC BLOOD PRESSURE: 129 MMHG | BODY MASS INDEX: 36.32 KG/M2 | HEIGHT: 63 IN | HEART RATE: 84 BPM | DIASTOLIC BLOOD PRESSURE: 81 MMHG | WEIGHT: 205 LBS | OXYGEN SATURATION: 94 %

## 2025-04-17 DIAGNOSIS — J40 BRONCHITIS: ICD-10-CM

## 2025-04-17 DIAGNOSIS — H18.509 CORNEAL DYSTROPHY: Primary | ICD-10-CM

## 2025-04-17 LAB
SARS-COV-2 AG UPPER RESP QL IA: NEGATIVE
SL AMB POCT RAPID FLU A: NEGATIVE
SL AMB POCT RAPID FLU B: NEGATIVE
VALID CONTROL: NORMAL

## 2025-04-17 PROCEDURE — 87811 SARS-COV-2 COVID19 W/OPTIC: CPT | Performed by: STUDENT IN AN ORGANIZED HEALTH CARE EDUCATION/TRAINING PROGRAM

## 2025-04-17 PROCEDURE — 87804 INFLUENZA ASSAY W/OPTIC: CPT | Performed by: STUDENT IN AN ORGANIZED HEALTH CARE EDUCATION/TRAINING PROGRAM

## 2025-04-17 PROCEDURE — 99214 OFFICE O/P EST MOD 30 MIN: CPT | Performed by: STUDENT IN AN ORGANIZED HEALTH CARE EDUCATION/TRAINING PROGRAM

## 2025-04-17 NOTE — ASSESSMENT & PLAN NOTE
Patient is medically optimized for surgical procedure. Recent Blood work completed on 3/17 showing stable kidney and liver function . No further workup needed at this time.     She does currently have bronchitis and is being treated with antibiotics.  I have indicated this on her clearance form that she will still be able to have her surgery only under topical anesthetic and IV sedation.  Return precautions have been discussed with patient.  If not improved by Monday she should reschedule surgery.

## 2025-04-17 NOTE — PROGRESS NOTES
"Subjective:    Krystyna Menard is a 84 y.o. female who presents to the office today for a preoperative consultation at the request of surgeon Dr Stanley Whitmore who plans on performing Right Corneal Dystrophy Surgery on April 21, 2025. This consultation is requested for the specific conditions prompting preoperative evaluation (i.e. because of potential affect on operative risk). Planned anesthesia: IV sedation. The patient has the following known anesthesia issues:  None . Patients bleeding risk: no recent abnormal bleeding.     The following portions of the patient's history were reviewed and updated as appropriate: allergies, current medications, past family history, past medical history, past social history, past surgical history, and problem list.    Review of Systems  Pertinent items are noted in HPI.     Objective:     /80 (BP Location: Left arm, Patient Position: Sitting, Cuff Size: Large)   Pulse 84   Temp 98.1 °F (36.7 °C) (Temporal)   Ht 5' 3\" (1.6 m)   Wt 93 kg (205 lb)   SpO2 94%   BMI 36.31 kg/m²     General Appearance:    Alert, cooperative, no distress, appears stated age   Head:    Normocephalic, without obvious abnormality, atraumatic   Eyes:    PERRL, conjunctiva/corneas clear, EOM's intact, fundi     benign, both eyes   Ears:    Normal TM's and external ear canals, both ears   Nose: Boggy turbinates, nasal congestion and visible rhinorrhea.   Throat:   Lips, mucosa, and tongue normal; teeth and gums normal   Neck:   Supple, symmetrical, trachea midline, no adenopathy;     thyroid:  no enlargement/tenderness/nodules; no carotid    bruit or JVD   Back:     Symmetric, no curvature, ROM normal, no CVA tenderness   Lungs:     Clear to auscultation bilaterally, respirations unlabored   Chest Wall:    No tenderness or deformity    Heart:    Regular rate and rhythm, S1 and S2 normal, no murmur, rub   or gallop       Abdomen:     Soft, non-tender, bowel sounds active all four quadrants,     no " masses, no organomegaly           Extremities:   Extremities normal, atraumatic, no cyanosis or edema   Pulses:   2+ and symmetric all extremities   Skin:   Skin color, texture, turgor normal, no rashes or lesions   Lymph nodes:   Cervical, supraclavicular, and axillary nodes normal   Neurologic:   CNII-XII intact, normal strength, sensation and reflexes     throughout       Predictors of intubation difficulty:   Morbid obesity? no   Anatomically abnormal facies? no   Prominent incisors? no   Receding mandible? no   Short, thick neck? no    Cardiographics  ECG:  Not done  Echocardiogram: not done    Imaging  Chest x-ray:  not done      Lab Review   Appointment on 03/17/2025   Component Date Value   • Sodium 03/17/2025 140    • Potassium 03/17/2025 3.9    • Chloride 03/17/2025 103    • CO2 03/17/2025 27    • ANION GAP 03/17/2025 10    • BUN 03/17/2025 24    • Creatinine 03/17/2025 1.35 (H)    • Glucose 03/17/2025 92    • Calcium 03/17/2025 9.9    • AST 03/17/2025 17    • ALT 03/17/2025 16    • Alkaline Phosphatase 03/17/2025 54    • Total Protein 03/17/2025 6.5    • Albumin 03/17/2025 4.2    • Total Bilirubin 03/17/2025 0.57    • eGFR 03/17/2025 36    • WBC 03/17/2025 6.71    • RBC 03/17/2025 5.07    • Hemoglobin 03/17/2025 16.0 (H)    • Hematocrit 03/17/2025 47.7 (H)    • MCV 03/17/2025 94    • MCH 03/17/2025 31.6    • MCHC 03/17/2025 33.5    • RDW 03/17/2025 12.9    • MPV 03/17/2025 9.3    • Platelets 03/17/2025 275    • nRBC 03/17/2025 0    • Segmented % 03/17/2025 67    • Immature Grans % 03/17/2025 0    • Lymphocytes % 03/17/2025 20    • Monocytes % 03/17/2025 10    • Eosinophils Relative 03/17/2025 2    • Basophils Relative 03/17/2025 1    • Absolute Neutrophils 03/17/2025 4.51    • Absolute Immature Grans 03/17/2025 0.03    • Absolute Lymphocytes 03/17/2025 1.32    • Absolute Monocytes 03/17/2025 0.67    • Eosinophils Absolute 03/17/2025 0.14    • Basophils Absolute 03/17/2025 0.04    Hospital Outpatient Visit  on 02/26/2025   Component Date Value   • RAA A4C 02/26/2025 10.5    • LA Volume Index (BP) 02/26/2025 14.8    • MV Peak A Bill 02/26/2025 1.11    • MV stenosis pressure 1/2* 02/26/2025 93    • MV Peak E Bill 02/26/2025 61    • E wave deceleration time 02/26/2025 321    • E/A ratio 02/26/2025 0.55    • MV valve area p 1/2 meth* 02/26/2025 2.37    • RVID d 02/26/2025 2.9    • A4C EF 02/26/2025 69    • Left ventricular stroke * 02/26/2025 27.00    • IVSd 02/26/2025 0.90    • Tricuspid annular plane * 02/26/2025 2.00    • Ao root 02/26/2025 3.10    • LVPWd 02/26/2025 1.00    • LA size 02/26/2025 3.5    • Asc Ao 02/26/2025 3.3    • LA volume (BP) 02/26/2025 29    • FS 02/26/2025 29    • LVIDS 02/26/2025 2.40    • IVS 02/26/2025 0.9    • LVIDd 02/26/2025 3.40    • LA length (A2C) 02/26/2025 4.90    • LEFT VENTRICLE SYSTOLIC * 02/26/2025 21    • LV DIASTOLIC VOLUME (MOD* 02/26/2025 48    • Left Atrium Area-systoli* 02/26/2025 13.5    • Left Atrium Area-systoli* 02/26/2025 13.2    • MV E' Tissue Velocity Se* 02/26/2025 9    • LVSV, 2D 02/26/2025 27    • BSA 02/26/2025 1.96    • LV EF 02/26/2025 60         Assessment:     84 y.o. female with planned surgery as above.    Known risk factors for perioperative complications: None    Difficulty with intubation is not anticipated.    Cardiac Risk Estimation: 2    Plan:    Corneal dystrophy   Patient is medically optimized for surgical procedure. Recent Blood work completed on 3/17 showing stable kidney and liver function . No further workup needed at this time.     She does currently have bronchitis and is being treated with antibiotics.  I have indicated this on her clearance form that she will still be able to have her surgery only under topical anesthetic and IV sedation.  Return precautions have been discussed with patient.  If not improved by Monday she should reschedule surgery.

## 2025-04-17 NOTE — ASSESSMENT & PLAN NOTE
Rapid COVID and flu negative.  Symptoms ongoing for 1 week not improved with OTC supplements.  Will treat with Augmentin twice daily x 7 days.  Over-the-counter cough suppressant can be continued.  Orders:  •  POCT rapid flu A and B  •  POCT Rapid Covid Ag  •  amoxicillin-clavulanate (AUGMENTIN) 875-125 mg per tablet; Take 1 tablet by mouth every 12 (twelve) hours for 7 days

## 2025-04-17 NOTE — PROGRESS NOTES
Name: Krystyna Menard      : 1940      MRN: [id number]  Encounter Provider: Margot Barron DO  Encounter Date: 2025   Encounter department: [facility] PRIMARY CARE  :  Assessment & Plan  Corneal dystrophy   Patient is medically optimized for surgical procedure. Recent Blood work completed on 3/17 showing stable kidney and liver function . No further workup needed at this time.     She does currently have bronchitis and is being treated with antibiotics.  I have indicated this on her clearance form that she will still be able to have her surgery only under topical anesthetic and IV sedation.  Return precautions have been discussed with patient.  If not improved by Monday she should reschedule surgery.     Bronchitis  Rapid COVID and flu negative.  Symptoms ongoing for 1 week not improved with OTC supplements.  Will treat with Augmentin twice daily x 7 days.  Over-the-counter cough suppressant can be continued.  Orders:  •  POCT rapid flu A and B  •  POCT Rapid Covid Ag  •  amoxicillin-clavulanate (AUGMENTIN) 875-125 mg per tablet; Take 1 tablet by mouth every 12 (twelve) hours for 7 days           History of Present Illness   URI   This is a new problem. The current episode started in the past 7 days. The problem has been gradually worsening. There has been no fever. Associated symptoms include congestion, coughing and rhinorrhea. Pertinent negatives include no abdominal pain, chest pain, diarrhea, dysuria, headaches, joint pain, joint swelling, nausea, plugged ear sensation, sinus pain, sneezing, sore throat, swollen glands, vomiting or wheezing. Treatments tried: OTC cold and flu supplement. The treatment provided no relief.     Review of Systems   HENT:  Positive for congestion and rhinorrhea. Negative for sinus pain, sneezing and sore throat.    Respiratory:  Positive for cough. Negative for wheezing.    Cardiovascular:  Negative for chest pain.   Gastrointestinal:  Negative for  "abdominal pain, diarrhea, nausea and vomiting.   Genitourinary:  Negative for dysuria.   Musculoskeletal:  Negative for joint pain.   Neurological:  Negative for headaches.       Objective   /80 (BP Location: Left arm, Patient Position: Sitting, Cuff Size: Large)   Pulse 84   Temp 98.1 °F (36.7 °C) (Temporal)   Ht 5' 3\" (1.6 m)   Wt 93 kg (205 lb)   SpO2 94%   BMI 36.31 kg/m²      Physical Exam  Vitals reviewed.   Constitutional:       Appearance: She is ill-appearing.   HENT:      Head: Normocephalic and atraumatic.      Nose: Nose normal. No congestion or rhinorrhea.      Mouth/Throat:      Mouth: Mucous membranes are moist.      Pharynx: No oropharyngeal exudate or posterior oropharyngeal erythema.   Eyes:      Extraocular Movements: Extraocular movements intact.   Cardiovascular:      Rate and Rhythm: Normal rate and regular rhythm.      Heart sounds: Normal heart sounds.   Pulmonary:      Effort: Pulmonary effort is normal.      Breath sounds: Normal breath sounds. No wheezing or rales.   Musculoskeletal:         General: Normal range of motion.      Cervical back: Neck supple. No tenderness.   Neurological:      General: No focal deficit present.      Mental Status: She is alert and oriented to person, place, and time.   Psychiatric:         Mood and Affect: Mood normal.         Behavior: Behavior normal.         "

## 2025-04-17 NOTE — TELEPHONE ENCOUNTER
Patient called with an updated B/P for Caitlin.     She is going to take a nap now.    129/81 at 12 noon

## 2025-04-23 ENCOUNTER — TELEPHONE (OUTPATIENT)
Age: 85
End: 2025-04-23

## 2025-04-23 NOTE — TELEPHONE ENCOUNTER
Patient was scheduled for a new Pre Op clearance date. Patient would like to know what time of day should she take the statin due to any possible interactions with her other medications.

## 2025-04-23 NOTE — TELEPHONE ENCOUNTER
Patient called with a few questions.    She had to cancel the surgery she had scheduled on 4/21 due to the bronchitis - although Augmentin is helping and she is feeling better.  However, she will now need a new Pre-Op Clearance, as the surgery is now scheduled for 5/19.  New Pre-OP is scheduled for 5/15 with Thelma.  She is asking if she should still come in for her follow up appointment on 5/1.    She was supposed to start the statin after surgery, but since it was cancelled, should she still start it or wait?  If she is starting, when would be the best time to take it - AM or PM?

## 2025-05-15 ENCOUNTER — CONSULT (OUTPATIENT)
Dept: FAMILY MEDICINE CLINIC | Facility: CLINIC | Age: 85
End: 2025-05-15
Payer: COMMERCIAL

## 2025-05-15 VITALS
TEMPERATURE: 97.6 F | HEIGHT: 63 IN | WEIGHT: 207.25 LBS | BODY MASS INDEX: 36.72 KG/M2 | HEART RATE: 76 BPM | OXYGEN SATURATION: 98 % | DIASTOLIC BLOOD PRESSURE: 80 MMHG | SYSTOLIC BLOOD PRESSURE: 116 MMHG

## 2025-05-15 DIAGNOSIS — H18.509 CORNEAL DYSTROPHY: Primary | ICD-10-CM

## 2025-05-15 DIAGNOSIS — Z01.818 PREOPERATIVE CLEARANCE: ICD-10-CM

## 2025-05-15 PROCEDURE — 99214 OFFICE O/P EST MOD 30 MIN: CPT | Performed by: STUDENT IN AN ORGANIZED HEALTH CARE EDUCATION/TRAINING PROGRAM

## 2025-05-15 NOTE — PROGRESS NOTES
"Subjective:    Krystyna Menard is a 84 y.o. female who presents to the office today for a preoperative consultation at the request of surgeon Dr Stanley Whitmore who plans on performing Right Corneal Dystrophy Surgery on May 19, 2025. This consultation is requested for the specific conditions prompting preoperative evaluation (i.e. because of potential affect on operative risk). Planned anesthesia: IV sedation. The patient has the following known anesthesia issues: None. Patients bleeding risk: no recent abnormal bleeding.        The following portions of the patient's history were reviewed and updated as appropriate: allergies, current medications, past family history, past medical history, past social history, past surgical history, and problem list.    Review of Systems  Pertinent items are noted in HPI.     Objective:     /80 (BP Location: Left arm, Patient Position: Sitting, Cuff Size: Large)   Pulse 76   Temp 97.6 °F (36.4 °C) (Temporal)   Ht 5' 3\" (1.6 m)   Wt 94 kg (207 lb 4 oz)   SpO2 98%   BMI 36.71 kg/m²     General appearance: alert and oriented, in no acute distress  Throat: lips, mucosa, and tongue normal; teeth and gums normal  Lungs: clear to auscultation bilaterally  Heart: regular rate and rhythm, S1, S2 normal, no murmur, click, rub or gallop  Extremities: extremities normal, warm and well-perfused; no cyanosis, clubbing, or edema  Neurologic: Grossly normal    Predictors of intubation difficulty:   Morbid obesity? no   Anatomically abnormal facies? no   Prominent incisors? no   Receding mandible? no   Short, thick neck? no       Lab Review   Office Visit on 04/17/2025   Component Date Value   • RAPID FLU A 04/17/2025 Negative    • RAPID FLU B 04/17/2025 Negative    • POCT SARS-CoV-2 Ag 04/17/2025 Negative    • VALID CONTROL 04/17/2025 Valid    Appointment on 03/17/2025   Component Date Value   • Sodium 03/17/2025 140    • Potassium 03/17/2025 3.9    • Chloride 03/17/2025 103    • CO2 " 03/17/2025 27    • ANION GAP 03/17/2025 10    • BUN 03/17/2025 24    • Creatinine 03/17/2025 1.35 (H)    • Glucose 03/17/2025 92    • Calcium 03/17/2025 9.9    • AST 03/17/2025 17    • ALT 03/17/2025 16    • Alkaline Phosphatase 03/17/2025 54    • Total Protein 03/17/2025 6.5    • Albumin 03/17/2025 4.2    • Total Bilirubin 03/17/2025 0.57    • eGFR 03/17/2025 36    • WBC 03/17/2025 6.71    • RBC 03/17/2025 5.07    • Hemoglobin 03/17/2025 16.0 (H)    • Hematocrit 03/17/2025 47.7 (H)    • MCV 03/17/2025 94    • MCH 03/17/2025 31.6    • MCHC 03/17/2025 33.5    • RDW 03/17/2025 12.9    • MPV 03/17/2025 9.3    • Platelets 03/17/2025 275    • nRBC 03/17/2025 0    • Segmented % 03/17/2025 67    • Immature Grans % 03/17/2025 0    • Lymphocytes % 03/17/2025 20    • Monocytes % 03/17/2025 10    • Eosinophils Relative 03/17/2025 2    • Basophils Relative 03/17/2025 1    • Absolute Neutrophils 03/17/2025 4.51    • Absolute Immature Grans 03/17/2025 0.03    • Absolute Lymphocytes 03/17/2025 1.32    • Absolute Monocytes 03/17/2025 0.67    • Eosinophils Absolute 03/17/2025 0.14    • Basophils Absolute 03/17/2025 0.04         Assessment:     84 y.o. female with planned surgery as above.    Known risk factors for perioperative complications: None      Cardiac Risk Estimation: 2      Plan:    Preoperative clearance  Patient is medically optimized for low risk procedure involving topical with IV sedation. Most recent GFR 36 which is stable, CBC within normal limits.  No additional workup necessary at this time

## 2025-05-15 NOTE — ASSESSMENT & PLAN NOTE
Patient is medically optimized for low risk procedure involving topical with IV sedation. Most recent GFR 36 which is stable, CBC within normal limits.  No additional workup necessary at this time

## 2025-05-19 ENCOUNTER — DOCUMENTATION (OUTPATIENT)
Dept: ADMINISTRATIVE | Facility: OTHER | Age: 85
End: 2025-05-19

## 2025-05-19 NOTE — PROGRESS NOTES
05/19/25 11:13 AM    CRC outreach is not required,   Patient aged out of measure.       Thank you.  Leah Oseguera MA  [facility]

## 2025-06-11 ENCOUNTER — OFFICE VISIT (OUTPATIENT)
Age: 85
End: 2025-06-11
Payer: COMMERCIAL

## 2025-06-11 VITALS
TEMPERATURE: 98.3 F | HEART RATE: 70 BPM | OXYGEN SATURATION: 97 % | BODY MASS INDEX: 36.32 KG/M2 | HEIGHT: 63 IN | WEIGHT: 205 LBS

## 2025-06-11 DIAGNOSIS — D22.9 MULTIPLE NEVI: ICD-10-CM

## 2025-06-11 DIAGNOSIS — Z85.828 HISTORY OF SKIN CANCER: ICD-10-CM

## 2025-06-11 DIAGNOSIS — L82.1 SEBORRHEIC KERATOSIS: ICD-10-CM

## 2025-06-11 DIAGNOSIS — D18.01 CHERRY ANGIOMA: ICD-10-CM

## 2025-06-11 DIAGNOSIS — Z13.89 SCREENING FOR SKIN CONDITION: Primary | ICD-10-CM

## 2025-06-11 DIAGNOSIS — L57.0 KERATOSIS, ACTINIC: ICD-10-CM

## 2025-06-11 PROCEDURE — 17000 DESTRUCT PREMALG LESION: CPT | Performed by: STUDENT IN AN ORGANIZED HEALTH CARE EDUCATION/TRAINING PROGRAM

## 2025-06-11 PROCEDURE — 17003 DESTRUCT PREMALG LES 2-14: CPT | Performed by: STUDENT IN AN ORGANIZED HEALTH CARE EDUCATION/TRAINING PROGRAM

## 2025-06-11 PROCEDURE — 99214 OFFICE O/P EST MOD 30 MIN: CPT | Performed by: STUDENT IN AN ORGANIZED HEALTH CARE EDUCATION/TRAINING PROGRAM

## 2025-06-11 NOTE — PATIENT INSTRUCTIONS
"Treatment with Cryotherapy    The doctor has treated your skin with nitrogen, which is 320 degrees Fahrenheit below zero.  He has given the treated area \"frostbite.\"    Stinging should subside within a few hours.  You can take Tylenol for pain, if needed.    Over the next few days, it is normal if the area becomes reddened, a blood blister, or swollen with fluid.  If the lesion treated was near the eye - you could get a swollen eye over the next few days.  Do not panic!  This is all temporary, and will resolve with time.    There is no special treatment - just keep the area clean.  Makeup and BandAids can be used, if preferred.    When the area starts to dry up and peel off, using Vaseline can help healing.    It usually takes up to a month for it to heal.  Some lesions are recurrent and may require repeat treatments.  If a lesion has not healed in one month, please don't hesitate to contact us.      If you have any further questions that are not answered here, please call us.  [phone].    Thank you for allowing us to care for you.    "

## 2025-06-11 NOTE — PROGRESS NOTES
"[facility] Dermatology Clinic Note     Patient Name: Krystyna Menard  Encounter Date: 06/11/25       Have you been cared for by a [facility] Dermatologist in the last 3 years and, if so, which description applies to you? Yes. I have been here within the last 3 years, and my medical history has NOT changed since that time. I am of child-bearing potential.     REVIEW OF SYSTEMS:  Have you recently had or currently have any of the following? No changes in my recent health.   PAST MEDICAL HISTORY:  Have you personally ever had or currently have any of the following?  If \"YES,\" then please provide more detail. No changes in my medical history.   HISTORY OF IMMUNOSUPPRESSION: Do you have a history of any of the following:  Systemic Immunosuppression such as Diabetes, Biologic or Immunotherapy, Chemotherapy, Organ Transplantation, Bone Marrow Transplantation or Prednisone?  No     Answering \"YES\" requires the addition of the dotphrase \"IMMUNOSUPPRESSED\" as the first diagnosis of the patient's visit.   FAMILY HISTORY:  Any \"first degree relatives\" (parent, brother, sister, or child) with the following?    No changes in my family's known health.   PATIENT EXPERIENCE:    Do you want the Dermatologist to perform a COMPLETE skin exam today including a clinical examination under the \"bra and underwear\" areas?  Yes  If necessary, do we have your permission to call and leave a detailed message on your Preferred Phone number that includes your specific medical information?  Yes      Allergies[1] Current Medications[2]              Whom besides the patient is providing clinical information about today's encounter?   NO ADDITIONAL HISTORIAN (patient alone provided history)    Physical Exam and Assessment/Plan by Diagnosis:    HISTORY OF BASAL CELL CARCINOMA     Physical Exam:  Anatomic Location Affected:  Left Upper Arm - 2015  Morphological Description of scar:  well healed  Suspected Recurrence: No  Pertinent Positives:  Pertinent " Negatives:     Additional History of Present Condition:  History of basal cell carcinoma with no sign of recurrence     Assessment and Plan:  Based on a thorough discussion of this condition and the management approach to it (including a comprehensive discussion of the known risks, side effects and potential benefits of treatment), the patient (family) agrees to implement the following specific plan:  Continue scheduled skin exams      HISTORY OF SQUAMOUS CELL CARCINOMA      Physical Exam:  Anatomic Location Affected:  Left Cheek - 08/2023  Mid Upper Back - 2022  Right Wrist, Right Chest - 2019  Left Elbow - 2015  Morphological Description of Scar:  well healed  Suspected Recurrence: no  Regional adenopathy: no     Additional History of Present Condition:  History of Squamous Cell Carcinoma treated     Assessment and Plan:  Based on a thorough discussion of this condition and the management approach to it (including a comprehensive discussion of the known risks, side effects and potential benefits of treatment), the patient (family) agrees to implement the following specific plan:  Continue scheduled skin exams     ACTINIC KERATOSIS    Physical Exam:  Anatomic Location Affected:  Left Shin, Left Cheek  Morphological Description:  Scaly pink papules  Pertinent Positives:  Pertinent Negatives:      Assessment and Plan:  Based on a thorough discussion of this condition and the management approach to it (including a comprehensive discussion of the known risks, side effects and potential benefits of treatment), the patient (family) agrees to implement the following specific plan:  When outside we recommend using a wide brim hat, sunglasses, long sleeve and pants, sunscreen with SPF 30+ with reapplication every 2 hours, or SPF specific clothing   liquid nitrogen to treat areas. Consent obtained.   Follow up in 3 months to recheck                                       PROCEDURE:  DESTRUCTION OF PRE-MALIGNANT LESIONS  After  "a thorough discussion of treatment options and risk/benefits/alternatives (including but not limited to local pain, scarring, dyspigmentation, blistering, and possible superinfection), verbal and written consent were obtained and the aforementioned lesions were treated on with cryotherapy using liquid nitrogen x 1 cycle for 5-10 seconds.    TOTAL NUMBER of 2 pre-malignant lesions were treated today on the ANATOMIC LOCATION: Left Leigh and Right Cheek.     The patient tolerated the procedure well, and after-care instructions were provided.       MELANOCYTIC NEVI (\"Moles\")    Physical Exam:  Anatomic Location Affected: Mostly on sun-exposed areas of the trunk and extremities  Morphological Description:  Scattered, 1-4mm round to ovoid, symmetrical-appearing, even bordered, skin colored to dark brown macules/papules, mostly in sun-exposed areas  Pertinent Positives:  Pertinent Negatives:    Additional History of Present Condition:  present on exam    Assessment and Plan:  Based on a thorough discussion of this condition and the management approach to it (including a comprehensive discussion of the known risks, side effects and potential benefits of treatment), the patient (family) agrees to implement the following specific plan:  Provided handout with information regarding the ABCDE's of moles   Recommend routine skin exams every year   Sun avoidance, protective clothing (known as UPF clothing), and the use of at least SPF 30 sunscreens is advised. Sunscreen should be reapplied every two hours when outside.     SEBORRHEIC KERATOSIS; NON-INFLAMED    Physical Exam:  Anatomic Location Affected:  scattered across trunk, extremities  Morphological Description:  Flat and raised, waxy, smooth to warty textured, yellow to brownish-grey to dark brown to blackish, discrete, \"stuck-on\" appearing papules.  Pertinent Positives:  Pertinent Negatives:    Additional History of Present Condition:  Patient reports new bumps on the skin.  " "Denies itch, burn, pain, bleeding or ulceration.  Present constantly; nothing seems to make it worse or better.  No prior treatment.      Assessment and Plan:  Based on a thorough discussion of this condition and the management approach to it (including a comprehensive discussion of the known risks, side effects and potential benefits of treatment), the patient (family) agrees to implement the following specific plan:  Reassured benign    ANGIOMA (\"CHERRY ANGIOMA\")    Physical Exam:  Anatomic Location: scattered across sun exposed areas of the trunk and extremities   Morphologic Description: Firm red to reddish-blue discrete papules  Pertinent Positives:  Pertinent Negatives:    Additional History of Present Condition:  Present on exam.     Assessment and Plan:  Reassured benign    Scribe Attestation      I,:  Lauren Bean MA am acting as a scribe while in the presence of the attending physician.:       I,:  Sav Funez DO personally performed the services described in this documentation    as scribed in my presence.:                  [1]   Allergies  Allergen Reactions    Azithromycin Vomiting and Dizziness    Amlodipine Swelling    Lisinopril Cough    Morphine Vomiting   [2]   Current Outpatient Medications:     Ascorbic Acid (Vitamin C) 500 MG CAPS, Take 1 capsule by mouth in the morning., Disp: , Rfl:     Calcium-Magnesium-Vitamin D (CALCIUM 500 PO), Take by mouth PT CURRENTLY NOT TAKING, Disp: , Rfl:     Cyanocobalamin (VITAMIN B-12 PO), Take by mouth in the morning., Disp: , Rfl:     cyclobenzaprine (FLEXERIL) 10 mg tablet, take 1 tablet by mouth once daily if needed for muscle spasm, Disp: 30 tablet, Rfl: 0    Glucos-Chondroit-Hyaluron-MSM (GLUCOSAMINE CHONDROITIN JOINT) TABS, Take by mouth in the morning., Disp: , Rfl:     metoprolol succinate (TOPROL-XL) 50 mg 24 hr tablet, take 1 tablet by mouth once daily, Disp: 90 tablet, Rfl: 1    Multiple Vitamins-Minerals (PreserVision AREDS 2+Multi Vit) CAPS, , " Disp: , Rfl:     Omega-3 Fatty Acids (FISH OIL) 1000 MG CPDR, Take by mouth, Disp: , Rfl:     rosuvastatin (CRESTOR) 5 mg tablet, take 1 tablet by mouth once daily, Disp: 100 tablet, Rfl: 1    triamterene-hydrochlorothiazide (MAXZIDE-25) 37.5-25 mg per tablet, take 1 tablet by mouth once daily, Disp: 90 tablet, Rfl: 1

## 2025-07-09 ENCOUNTER — CONSULT (OUTPATIENT)
Dept: FAMILY MEDICINE CLINIC | Facility: CLINIC | Age: 85
End: 2025-07-09
Payer: COMMERCIAL

## 2025-07-09 VITALS
SYSTOLIC BLOOD PRESSURE: 120 MMHG | HEART RATE: 78 BPM | HEIGHT: 63 IN | OXYGEN SATURATION: 98 % | BODY MASS INDEX: 36.72 KG/M2 | DIASTOLIC BLOOD PRESSURE: 84 MMHG | TEMPERATURE: 98.2 F | WEIGHT: 207.25 LBS

## 2025-07-09 DIAGNOSIS — Z01.818 PREOPERATIVE CLEARANCE: ICD-10-CM

## 2025-07-09 DIAGNOSIS — H26.9 CATARACT OF BOTH EYES, UNSPECIFIED CATARACT TYPE: Primary | ICD-10-CM

## 2025-07-09 PROCEDURE — 99214 OFFICE O/P EST MOD 30 MIN: CPT | Performed by: STUDENT IN AN ORGANIZED HEALTH CARE EDUCATION/TRAINING PROGRAM

## 2025-07-09 NOTE — PROGRESS NOTES
"Subjective:     Krystyna Menard is a 84 y.o. female who presents to the office today for a preoperative consultation at the request of surgeon Dr. Whitmore who plans on performing Left Catract Surgery on July 16, 2025 and Right Cataract Surgery on July 30, 2025. This consultation is requested for the specific conditions prompting preoperative evaluation (i.e. because of potential affect on operative risk). Planned anesthesia: local and IV sedation. The patient has the following known anesthesia issues: None. Patients bleeding risk: no recent abnormal bleeding.     The following portions of the patient's history were reviewed and updated as appropriate: allergies, current medications, past family history, past medical history, past social history, past surgical history, and problem list.    Review of Systems  Pertinent items are noted in HPI.     Objective:     /84 (BP Location: Left arm, Patient Position: Sitting, Cuff Size: Large)   Pulse 78   Temp 98.2 °F (36.8 °C) (Temporal)   Ht 5' 3\" (1.6 m)   Wt 94 kg (207 lb 4 oz)   SpO2 98%   BMI 36.71 kg/m²     General appearance: alert and oriented, in no acute distress  Head: Normocephalic, without obvious abnormality, atraumatic  Throat: lips, mucosa, and tongue normal; teeth and gums normal  Lungs: clear to auscultation bilaterally  Heart: regular rate and rhythm, S1, S2 normal, no murmur, click, rub or gallop  Abdomen: soft, non-tender; bowel sounds normal; no masses,  no organomegaly  Extremities: extremities normal, warm and well-perfused; no cyanosis, clubbing, or edema    Predictors of intubation difficulty:   Morbid obesity? no   Anatomically abnormal facies? no   Prominent incisors? no   Receding mandible? no   Short, thick neck? no    Cardiographics  ECG: Not done, ECHO completed 02/2025      Lab Review   No visits with results within 2 Month(s) from this visit.   Latest known visit with results is:   Office Visit on 04/17/2025   Component Date Value   • " RAPID FLU A 04/17/2025 Negative    • RAPID FLU B 04/17/2025 Negative    • POCT SARS-CoV-2 Ag 04/17/2025 Negative    • VALID CONTROL 04/17/2025 Valid         Assessment:     84 y.o. female with planned surgery as above.        Cardiac Risk Estimation: 2      Plan:    Cataract of both eyes  Patient is medically optimized for low risk procedure.  No additional preoperative testing is necessary at this time.  She does have a history of CKD and does currently have a BMP pending to evaluate for GFR.     Margot Barron, DO

## 2025-07-09 NOTE — ASSESSMENT & PLAN NOTE
Patient is medically optimized for low risk procedure.  No additional preoperative testing is necessary at this time.  She does have a history of CKD and does currently have a BMP pending to evaluate for GFR.

## 2025-07-10 ENCOUNTER — TELEPHONE (OUTPATIENT)
Dept: NEPHROLOGY | Facility: CLINIC | Age: 85
End: 2025-07-10

## 2025-07-14 ENCOUNTER — OFFICE VISIT (OUTPATIENT)
Dept: OBGYN CLINIC | Facility: CLINIC | Age: 85
End: 2025-07-14
Payer: COMMERCIAL

## 2025-07-14 ENCOUNTER — APPOINTMENT (OUTPATIENT)
Dept: LAB | Facility: CLINIC | Age: 85
End: 2025-07-14
Payer: COMMERCIAL

## 2025-07-14 VITALS — HEIGHT: 63 IN | WEIGHT: 210 LBS | BODY MASS INDEX: 37.21 KG/M2

## 2025-07-14 DIAGNOSIS — M12.812 ROTATOR CUFF ARTHROPATHY OF BOTH SHOULDERS: Primary | ICD-10-CM

## 2025-07-14 DIAGNOSIS — N18.9 CHRONIC KIDNEY DISEASE-MINERAL AND BONE DISORDER: ICD-10-CM

## 2025-07-14 DIAGNOSIS — M89.9 CHRONIC KIDNEY DISEASE-MINERAL AND BONE DISORDER: ICD-10-CM

## 2025-07-14 DIAGNOSIS — E83.9 CHRONIC KIDNEY DISEASE-MINERAL AND BONE DISORDER: ICD-10-CM

## 2025-07-14 DIAGNOSIS — N18.32 STAGE 3B CHRONIC KIDNEY DISEASE (HCC): ICD-10-CM

## 2025-07-14 DIAGNOSIS — M12.811 ROTATOR CUFF ARTHROPATHY OF BOTH SHOULDERS: Primary | ICD-10-CM

## 2025-07-14 LAB
25(OH)D3 SERPL-MCNC: 41.5 NG/ML (ref 30–100)
ANION GAP SERPL CALCULATED.3IONS-SCNC: 8 MMOL/L (ref 4–13)
BACTERIA UR QL AUTO: ABNORMAL /HPF
BASOPHILS # BLD AUTO: 0.06 THOUSANDS/ÂΜL (ref 0–0.1)
BASOPHILS NFR BLD AUTO: 1 % (ref 0–1)
BILIRUB UR QL STRIP: NEGATIVE
BUN SERPL-MCNC: 23 MG/DL (ref 5–25)
CALCIUM SERPL-MCNC: 9.5 MG/DL (ref 8.4–10.2)
CHLORIDE SERPL-SCNC: 103 MMOL/L (ref 96–108)
CLARITY UR: CLEAR
CO2 SERPL-SCNC: 27 MMOL/L (ref 21–32)
COLOR UR: ABNORMAL
CREAT SERPL-MCNC: 1.32 MG/DL (ref 0.6–1.3)
CREAT UR-MCNC: 111.7 MG/DL
EOSINOPHIL # BLD AUTO: 0.12 THOUSAND/ÂΜL (ref 0–0.61)
EOSINOPHIL NFR BLD AUTO: 2 % (ref 0–6)
ERYTHROCYTE [DISTWIDTH] IN BLOOD BY AUTOMATED COUNT: 12.8 % (ref 11.6–15.1)
GFR SERPL CREATININE-BSD FRML MDRD: 37 ML/MIN/1.73SQ M
GLUCOSE P FAST SERPL-MCNC: 103 MG/DL (ref 65–99)
GLUCOSE UR STRIP-MCNC: NEGATIVE MG/DL
HCT VFR BLD AUTO: 46.1 % (ref 34.8–46.1)
HGB BLD-MCNC: 15.6 G/DL (ref 11.5–15.4)
HGB UR QL STRIP.AUTO: NEGATIVE
IMM GRANULOCYTES # BLD AUTO: 0.02 THOUSAND/UL (ref 0–0.2)
IMM GRANULOCYTES NFR BLD AUTO: 0 % (ref 0–2)
KETONES UR STRIP-MCNC: NEGATIVE MG/DL
LEUKOCYTE ESTERASE UR QL STRIP: ABNORMAL
LYMPHOCYTES # BLD AUTO: 0.96 THOUSANDS/ÂΜL (ref 0.6–4.47)
LYMPHOCYTES NFR BLD AUTO: 17 % (ref 14–44)
MCH RBC QN AUTO: 31.2 PG (ref 26.8–34.3)
MCHC RBC AUTO-ENTMCNC: 33.8 G/DL (ref 31.4–37.4)
MCV RBC AUTO: 92 FL (ref 82–98)
MONOCYTES # BLD AUTO: 0.48 THOUSAND/ÂΜL (ref 0.17–1.22)
MONOCYTES NFR BLD AUTO: 9 % (ref 4–12)
MUCOUS THREADS UR QL AUTO: ABNORMAL
NEUTROPHILS # BLD AUTO: 3.97 THOUSANDS/ÂΜL (ref 1.85–7.62)
NEUTS SEG NFR BLD AUTO: 71 % (ref 43–75)
NITRITE UR QL STRIP: NEGATIVE
NON-SQ EPI CELLS URNS QL MICRO: ABNORMAL /HPF
NRBC BLD AUTO-RTO: 0 /100 WBCS
PH UR STRIP.AUTO: 6 [PH]
PHOSPHATE SERPL-MCNC: 2.9 MG/DL (ref 2.3–4.1)
PLATELET # BLD AUTO: 229 THOUSANDS/UL (ref 149–390)
PMV BLD AUTO: 9.4 FL (ref 8.9–12.7)
POTASSIUM SERPL-SCNC: 3.5 MMOL/L (ref 3.5–5.3)
PROT UR STRIP-MCNC: NEGATIVE MG/DL
PROT UR-MCNC: 5.6 MG/DL
PROT/CREAT UR: 0.1 MG/G{CREAT}
PTH-INTACT SERPL-MCNC: 60.8 PG/ML (ref 12–88)
RBC # BLD AUTO: 5 MILLION/UL (ref 3.81–5.12)
RBC #/AREA URNS AUTO: ABNORMAL /HPF
SODIUM SERPL-SCNC: 138 MMOL/L (ref 135–147)
SP GR UR STRIP.AUTO: 1.01 (ref 1–1.03)
UROBILINOGEN UR STRIP-ACNC: <2 MG/DL
WBC # BLD AUTO: 5.61 THOUSAND/UL (ref 4.31–10.16)
WBC #/AREA URNS AUTO: ABNORMAL /HPF

## 2025-07-14 PROCEDURE — 20610 DRAIN/INJ JOINT/BURSA W/O US: CPT | Performed by: FAMILY MEDICINE

## 2025-07-14 PROCEDURE — 36415 COLL VENOUS BLD VENIPUNCTURE: CPT

## 2025-07-14 PROCEDURE — 99213 OFFICE O/P EST LOW 20 MIN: CPT | Performed by: FAMILY MEDICINE

## 2025-07-14 PROCEDURE — 82306 VITAMIN D 25 HYDROXY: CPT

## 2025-07-14 PROCEDURE — 84156 ASSAY OF PROTEIN URINE: CPT

## 2025-07-14 PROCEDURE — 82570 ASSAY OF URINE CREATININE: CPT

## 2025-07-14 PROCEDURE — 85025 COMPLETE CBC W/AUTO DIFF WBC: CPT

## 2025-07-14 PROCEDURE — 80048 BASIC METABOLIC PNL TOTAL CA: CPT

## 2025-07-14 PROCEDURE — 83970 ASSAY OF PARATHORMONE: CPT

## 2025-07-14 PROCEDURE — 84100 ASSAY OF PHOSPHORUS: CPT

## 2025-07-14 RX ORDER — PREDNISOLONE ACETATE 10 MG/ML
1 SUSPENSION/ DROPS OPHTHALMIC 4 TIMES DAILY
COMMUNITY
Start: 2025-07-11

## 2025-07-14 RX ORDER — BUPIVACAINE HYDROCHLORIDE 2.5 MG/ML
4 INJECTION, SOLUTION INFILTRATION; PERINEURAL
Status: COMPLETED | OUTPATIENT
Start: 2025-07-14 | End: 2025-07-14

## 2025-07-14 RX ORDER — TRIAMCINOLONE ACETONIDE 40 MG/ML
40 INJECTION, SUSPENSION INTRA-ARTICULAR; INTRAMUSCULAR
Status: COMPLETED | OUTPATIENT
Start: 2025-07-14 | End: 2025-07-14

## 2025-07-14 RX ORDER — KETOROLAC TROMETHAMINE 5 MG/ML
1 SOLUTION OPHTHALMIC 4 TIMES DAILY
COMMUNITY
Start: 2025-07-11

## 2025-07-14 RX ORDER — OFLOXACIN 3 MG/ML
1 SOLUTION/ DROPS OPHTHALMIC 4 TIMES DAILY
COMMUNITY
Start: 2025-07-11

## 2025-07-14 RX ADMIN — TRIAMCINOLONE ACETONIDE 40 MG: 40 INJECTION, SUSPENSION INTRA-ARTICULAR; INTRAMUSCULAR at 13:15

## 2025-07-14 RX ADMIN — BUPIVACAINE HYDROCHLORIDE 4 ML: 2.5 INJECTION, SOLUTION INFILTRATION; PERINEURAL at 13:15

## 2025-07-14 NOTE — PROGRESS NOTES
Name: Krystyna Menard      : 1940      MRN: [id number]  Encounter Provider: Polo Kaur DO  Encounter Date: 2025   Encounter department: [facility] ORTHOPEDIC CARE SPECIALIST [facility]  :  Assessment & Plan  Rotator cuff arthropathy of both shoulders  Repeat cortisone injections were provided for patient's bilateral shoulder subacromial bursa           Large joint arthrocentesis: bilateral subacromial bursa    Performed by: Polo Kaur DO  Authorized by: Polo Kaur DO    Universal Protocol:  Consent: Verbal consent obtained  Risks and benefits: risks, benefits and alternatives were discussed  Consent given by: patient  Patient identity confirmed: verbally with patient  Supporting Documentation  Indications: pain     Is this a Visco injection? NoProcedure Details  Location: shoulder - bilateral subacromial bursa  Preparation: Patient was prepped and draped in the usual sterile fashion  Needle size: 22 G  Approach: posterior    Medications (Right): 4 mL bupivacaine 0.25 %; 40 mg triamcinolone acetonide 40 mg/mLMedications (Left): 4 mL bupivacaine 0.25 %; 40 mg triamcinolone acetonide 40 mg/mL   Patient tolerance: patient tolerated the procedure well with no immediate complications    Risks and benefits of CSI were discussed with patient extensively. Risks were highlighted which included but were not limited to infection, pain, local site swelling, and chance that injection may not be effective. Patient was also counseled regarding glucose elevation days after receiving CSI and to be mindful of diet and check sugars daily. Patient agreeable to proceed with CSI after counseling.               History of Present Illness   HPI  Krystyna Menard is a 84 y.o. female who presents for bilateral shoulder pain.  Patient was diagnosed with rotator cuff arthropathy at the last visit and provided a cortisone injection for bilateral subacromial space.  The patient reports that the injections provided significant  "relief up until a few weeks ago.  Now has been noticing some limitations with motion when she is completing chair yoga.  Would like repeat injections.  She does have an upcoming surgery for cataract left eye.    History obtained from: patient    Review of Systems  Medical History Reviewed by provider this encounter:     .     Objective   Ht 5' 3\" (1.6 m)   Wt 95.3 kg (210 lb)   BMI 37.20 kg/m²      Physical Exam  bilateral SHOULDER EXAM     General: no gross deformity, no skin changes redness or warmth noted, no sign of infection     ROM:   FF (180): 180  ER (90): 50  IR : t spine        Grind test: negative     Supraspinatus strength testin/5  Infraspinatus strength testin/5  Subscapularis     Belly press: negative        Empty can: positive  Sophie: +  Neers +  Speed --  Biceps TTP: positive    "

## 2025-07-23 ENCOUNTER — OFFICE VISIT (OUTPATIENT)
Dept: NEPHROLOGY | Facility: CLINIC | Age: 85
End: 2025-07-23
Payer: COMMERCIAL

## 2025-07-23 VITALS
HEIGHT: 63 IN | SYSTOLIC BLOOD PRESSURE: 140 MMHG | TEMPERATURE: 97.6 F | HEART RATE: 61 BPM | DIASTOLIC BLOOD PRESSURE: 80 MMHG | WEIGHT: 204 LBS | BODY MASS INDEX: 36.14 KG/M2 | RESPIRATION RATE: 18 BRPM | OXYGEN SATURATION: 97 %

## 2025-07-23 DIAGNOSIS — I10 ESSENTIAL HYPERTENSION: ICD-10-CM

## 2025-07-23 DIAGNOSIS — E83.9 CHRONIC KIDNEY DISEASE-MINERAL AND BONE DISORDER: ICD-10-CM

## 2025-07-23 DIAGNOSIS — N18.9 CHRONIC KIDNEY DISEASE-MINERAL AND BONE DISORDER: ICD-10-CM

## 2025-07-23 DIAGNOSIS — M89.9 CHRONIC KIDNEY DISEASE-MINERAL AND BONE DISORDER: ICD-10-CM

## 2025-07-23 DIAGNOSIS — N18.32 STAGE 3B CHRONIC KIDNEY DISEASE (HCC): Primary | ICD-10-CM

## 2025-07-23 PROCEDURE — 99214 OFFICE O/P EST MOD 30 MIN: CPT | Performed by: INTERNAL MEDICINE

## 2025-07-23 NOTE — ASSESSMENT & PLAN NOTE
Lab Results   Component Value Date    EGFR 37 07/14/2025    EGFR 36 03/17/2025    EGFR 37 01/06/2025    CREATININE 1.32 (H) 07/14/2025    CREATININE 1.35 (H) 03/17/2025    CREATININE 1.30 01/06/2025   Patient kidney function is stable overall.  Advise hydration and avoiding nephrotoxic medicine

## 2025-07-23 NOTE — PROGRESS NOTES
NEPHROLOGY OFFICE FOLLOW UP  Krystyna Menard 84 y.o.female YOB: 1940 MRN: [id number]    Encounter: [id number] DATE: 7/23/2025    REASON FOR VISIT: Krystyna Menard is a 84 y.o. female who is here on 7/23/2025 for Chronic Kidney Disease and Follow-up      ASSESSMENT and PLAN:  Minna was seen today for chronic kidney disease and follow-up.    Diagnoses and all orders for this visit:    Stage 3b chronic kidney disease (HCC)  -     Basic metabolic panel; Future  -     CBC and differential; Future  -     Phosphorus; Future  -     Protein / creatinine ratio, urine; Future  -     PTH, intact; Future  -     UA (URINE) with reflex to Scope; Future  -     Vitamin D 25 hydroxy; Future    Chronic kidney disease-mineral and bone disorder  -     Basic metabolic panel; Future  -     CBC and differential; Future  -     Phosphorus; Future  -     Protein / creatinine ratio, urine; Future  -     PTH, intact; Future  -     UA (URINE) with reflex to Scope; Future  -     Vitamin D 25 hydroxy; Future    Essential hypertension      Assessment & Plan  Stage 3b chronic kidney disease (HCC)  Lab Results   Component Value Date    EGFR 37 07/14/2025    EGFR 36 03/17/2025    EGFR 37 01/06/2025    CREATININE 1.32 (H) 07/14/2025    CREATININE 1.35 (H) 03/17/2025    CREATININE 1.30 01/06/2025   Patient kidney function is stable overall.  Advise hydration and avoiding nephrotoxic medicine  Chronic kidney disease-mineral and bone disorder  Lab Results   Component Value Date    EGFR 37 07/14/2025    EGFR 36 03/17/2025    EGFR 37 01/06/2025    CREATININE 1.32 (H) 07/14/2025    CREATININE 1.35 (H) 03/17/2025    CREATININE 1.30 01/06/2025   PTH and phosphorus along with vitamin D are within acceptable range  Essential hypertension  Very well-controlled with present medication      Everything discussed with the patient.  I will see her back in 6 months.  Will get blood and urine test before that visit    HPI:    Patient came for follow-up of  "CKD    Overall doing well    Has some joint pain but no other acute complaint    Will be getting cataract surgery for which she seems to be stable        REVIEW OF SYSTEMS:    Review of Systems   Constitutional:  Negative for fatigue.   HENT:  Negative for congestion.    Eyes:  Negative for photophobia and pain.   Respiratory:  Negative for chest tightness and shortness of breath.    Cardiovascular:  Negative for chest pain and palpitations.   Gastrointestinal:  Negative for abdominal distention, abdominal pain and blood in stool.   Endocrine: Negative for polydipsia.   Genitourinary:  Negative for difficulty urinating, dysuria, flank pain, hematuria and urgency.   Musculoskeletal:  Negative for arthralgias and back pain.   Skin:  Negative for rash.   Neurological:  Negative for dizziness, light-headedness and headaches.   Hematological:  Does not bruise/bleed easily.   Psychiatric/Behavioral:  Negative for behavioral problems. The patient is not nervous/anxious.          PAST MEDICAL HISTORY:  Past Medical History[1]    PAST SURGICAL HISTORY:  Past Surgical History[2]    SOCIAL HISTORY:  Social History     Substance and Sexual Activity   Alcohol Use Yes    Alcohol/week: 4.0 standard drinks of alcohol    Types: 4 Glasses of wine per week    Comment: Occasionally I have a bourbon & ginger ale     Social History     Substance and Sexual Activity   Drug Use No     Tobacco Use History[3]    FAMILY HISTORY:  Family History[4]    ALLERGY:  Allergies[5]    MEDICATIONS:  Current Medications[6]    PHYSICAL EXAM:  Vitals:    07/23/25 1108   BP: 140/80   BP Location: Right arm   Patient Position: Sitting   Cuff Size: Large   Pulse: 61   Resp: 18   Temp: 97.6 °F (36.4 °C)   TempSrc: Temporal   SpO2: 97%   Weight: 92.5 kg (204 lb)   Height: 5' 3\" (1.6 m)     Body mass index is 36.14 kg/m².    Physical Exam  Constitutional:       General: She is not in acute distress.     Appearance: She is well-developed.   HENT:      Head: " Normocephalic.      Mouth/Throat:      Mouth: Mucous membranes are moist.     Eyes:      General: No scleral icterus.     Conjunctiva/sclera: Conjunctivae normal.     Neck:      Vascular: No JVD.     Cardiovascular:      Rate and Rhythm: Normal rate.      Heart sounds: Normal heart sounds.   Pulmonary:      Effort: Pulmonary effort is normal.      Breath sounds: No wheezing.   Abdominal:      Palpations: Abdomen is soft.      Tenderness: There is no abdominal tenderness.     Musculoskeletal:         General: Normal range of motion.      Cervical back: Neck supple.     Skin:     General: Skin is warm.      Findings: No rash.     Neurological:      Mental Status: She is alert and oriented to person, place, and time.     Psychiatric:         Behavior: Behavior normal.         LAB RESULTS:  Results for orders placed or performed in visit on 07/14/25   Basic metabolic panel   Result Value Ref Range    Sodium 138 135 - 147 mmol/L    Potassium 3.5 3.5 - 5.3 mmol/L    Chloride 103 96 - 108 mmol/L    CO2 27 21 - 32 mmol/L    ANION GAP 8 4 - 13 mmol/L    BUN 23 5 - 25 mg/dL    Creatinine 1.32 (H) 0.60 - 1.30 mg/dL    Glucose, Fasting 103 (H) 65 - 99 mg/dL    Calcium 9.5 8.4 - 10.2 mg/dL    eGFR 37 ml/min/1.73sq m   CBC and differential   Result Value Ref Range    WBC 5.61 4.31 - 10.16 Thousand/uL    RBC 5.00 3.81 - 5.12 Million/uL    Hemoglobin 15.6 (H) 11.5 - 15.4 g/dL    Hematocrit 46.1 34.8 - 46.1 %    MCV 92 82 - 98 fL    MCH 31.2 26.8 - 34.3 pg    MCHC 33.8 31.4 - 37.4 g/dL    RDW 12.8 11.6 - 15.1 %    MPV 9.4 8.9 - 12.7 fL    Platelets 229 149 - 390 Thousands/uL    nRBC 0 /100 WBCs    Segmented % 71 43 - 75 %    Immature Grans % 0 0 - 2 %    Lymphocytes % 17 14 - 44 %    Monocytes % 9 4 - 12 %    Eosinophils Relative 2 0 - 6 %    Basophils Relative 1 0 - 1 %    Absolute Neutrophils 3.97 1.85 - 7.62 Thousands/µL    Absolute Immature Grans 0.02 0.00 - 0.20 Thousand/uL    Absolute Lymphocytes 0.96 0.60 - 4.47 Thousands/µL  "   Absolute Monocytes 0.48 0.17 - 1.22 Thousand/µL    Eosinophils Absolute 0.12 0.00 - 0.61 Thousand/µL    Basophils Absolute 0.06 0.00 - 0.10 Thousands/µL   Phosphorus   Result Value Ref Range    Phosphorus 2.9 2.3 - 4.1 mg/dL   Protein / creatinine ratio, urine   Result Value Ref Range    Creatinine, Ur 111.7 Reference range not established. mg/dL    Protein Urine Random 5.6 Reference range not established. mg/dL    Prot/Creat Ratio, Ur 0.1 <=0.2   PTH, intact   Result Value Ref Range    PTH 60.8 12.0 - 88.0 pg/mL   Vitamin D 25 hydroxy   Result Value Ref Range    Vit D, 25-Hydroxy 41.5 30.0 - 100.0 ng/mL     *Note: Due to a large number of results and/or encounters for the requested time period, some results have not been displayed. A complete set of results can be found in Results Review.         Portions of the record may have been created with voice recognition software. Occasional wrong word or \"sound a like\" substitutions may have occurred due to the inherent limitations of voice recognition software. Read the chart carefully and recognize, using context, where substitutions have occurred. If you have any questions, please contact the dictating provider.        [1]   Past Medical History:  Diagnosis Date    Allergic see chart    Arthritis     Arthritis     Basal cell carcinoma     Cancer (HCC) skin    Chronic kidney disease     Diverticulitis of colon     Fall     Fibrocystic breast     H/O nonmelanoma skin cancer     last assessed 9/28/17    Hypertension     Kidney problem     Kidney stone     Osteoarthritis     Peripheral neuropathy     Pneumonia 1961    Squamous cell skin cancer     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   [2]   Past Surgical History:  Procedure Laterality Date    APPENDECTOMY  2010    BLADDER SURGERY  1997    BREAST EXCISIONAL BIOPSY Left 1995    benign    no visible scar    BREAST SURGERY      CERVIX SURGERY      dilation and curettage of cervical stump 2005, 1998, 1994    " CHOLECYSTECTOMY      COLECTOMY      partial - last assessed 10/28/14    COLONOSCOPY      last assessed 17    FL RETROGRADE PYELOGRAM  2021    FL RETROGRADE PYELOGRAM  2021    HERNIA REPAIR      KIDNEY SURGERY  kidney stone     NH CYSTO/URETERO W/LITHOTRIPSY &INDWELL STENT INSRT Left 2021    Procedure: CYSTOSCOPY URETEROSCOPY, RETROGRADE PYELOGRAM AND INSERTION STENT URETERAL;  Surgeon: Marv Nunn MD;  Location: [facility];  Service: Urology    NH CYSTO/URETERO W/LITHOTRIPSY &INDWELL STENT INSRT Left 2021    Procedure: CYSTOSCOPY URETEROSCOPY WITH LITHOTRIPSY HOLMIUM LASER, RETROGRADE PYELOGRAM AND INSERTION STENT URETERAL;  Surgeon: Marcell Yoon MD;  Location: [facility];  Service: Urology    SKIN BIOPSY      SKIN CANCER EXCISION      TONSILLECTOMY      TUBAL LIGATION     [3]   Social History  Tobacco Use   Smoking Status Former    Current packs/day: 0.00    Average packs/day: 1 pack/day for 20.8 years (20.8 ttl pk-yrs)    Types: Cigarettes    Start date: 1958    Quit date: 1979    Years since quittin.1    Passive exposure: Past   Smokeless Tobacco Never   Tobacco Comments    Parents and sisters all smoked, as well as friends. I enjoyed it. And I quit willingly .   [4]   Family History  Problem Relation Name Age of Onset    Arthritis Mother Dalia Barker     Osteoporosis Mother Dalia Barker         stroke (88)    Stroke Mother Dalia Barker     Heart disease Father Khai Fijackietz Mj     Osteoporosis Father Khai Cali Mj         cardiac arrest (73)    Breast cancer Family Grandmother(paternal)     Osteoporosis Family Grandmother(paternal)     Breast cancer additional onset Family Grandmother(paternal)     Osteoporosis Sister Kat Luciano         old age (88)    No Known Problems Daughter      Breast cancer Maternal Grandmother Daisy Gallegoschavo Barker         typo - she is paternal    Osteoporosis Maternal Grandmother Daisy Mckenzie  Mj         breast cancer    No Known Problems Maternal Grandfather      Breast cancer Paternal Grandmother Daisy Barker     No Known Problems Paternal Grandfather      Osteoporosis Sister Kenia Colunga         old age (92)   [5]   Allergies  Allergen Reactions    Azithromycin Vomiting and Dizziness    Amlodipine Swelling    Lisinopril Cough    Morphine Vomiting   [6]   Current Outpatient Medications:     Ascorbic Acid (Vitamin C) 500 MG CAPS, Take 1 capsule by mouth in the morning., Disp: , Rfl:     Calcium-Magnesium-Vitamin D (CALCIUM 500 PO), Take by mouth PT CURRENTLY NOT TAKING, Disp: , Rfl:     Cyanocobalamin (VITAMIN B-12 PO), Take by mouth in the morning., Disp: , Rfl:     cyclobenzaprine (FLEXERIL) 10 mg tablet, take 1 tablet by mouth once daily if needed for muscle spasm, Disp: 30 tablet, Rfl: 0    Glucos-Chondroit-Hyaluron-MSM (GLUCOSAMINE CHONDROITIN JOINT) TABS, Take by mouth in the morning., Disp: , Rfl:     ketorolac (ACULAR) 0.5 % ophthalmic solution, Administer 1 drop into the left eye 4 (four) times a day, Disp: , Rfl:     metoprolol succinate (TOPROL-XL) 50 mg 24 hr tablet, take 1 tablet by mouth once daily, Disp: 90 tablet, Rfl: 1    Multiple Vitamins-Minerals (PreserVision AREDS 2+Multi Vit) CAPS, , Disp: , Rfl:     ofloxacin (OCUFLOX) 0.3 % ophthalmic solution, Administer 1 drop into the left eye 4 (four) times a day, Disp: , Rfl:     Omega-3 Fatty Acids (FISH OIL) 1000 MG CPDR, Take by mouth, Disp: , Rfl:     prednisoLONE acetate (PRED FORTE) 1 % ophthalmic suspension, Administer 1 drop into the left eye 4 (four) times a day, Disp: , Rfl:     rosuvastatin (CRESTOR) 5 mg tablet, take 1 tablet by mouth once daily, Disp: 100 tablet, Rfl: 1    triamterene-hydrochlorothiazide (MAXZIDE-25) 37.5-25 mg per tablet, take 1 tablet by mouth once daily, Disp: 90 tablet, Rfl: 1

## 2025-07-23 NOTE — ASSESSMENT & PLAN NOTE
Lab Results   Component Value Date    EGFR 37 07/14/2025    EGFR 36 03/17/2025    EGFR 37 01/06/2025    CREATININE 1.32 (H) 07/14/2025    CREATININE 1.35 (H) 03/17/2025    CREATININE 1.30 01/06/2025   PTH and phosphorus along with vitamin D are within acceptable range

## (undated) DEVICE — SHEATH URETERAL ACCESS 12/14FR 35CM PROXIS

## (undated) DEVICE — CATH URETERAL 5FR X 70 CM FLEX TIP POLYUR BARD

## (undated) DEVICE — GUIDEWIRE STRGHT TIP 0.035 IN  SOLO PLUS

## (undated) DEVICE — PACK TUR

## (undated) DEVICE — GLOVE INDICATOR PI UNDERGLOVE SZ 8 BLUE

## (undated) DEVICE — BASKET SPECIMEN RETRIVAL 1.9FR 120CM

## (undated) DEVICE — RAZOR STERILE

## (undated) DEVICE — CHLORHEXIDINE 4PCT 4 OZ

## (undated) DEVICE — SPECIMEN CONTAINER STERILE PEEL PACK

## (undated) DEVICE — 3M™ TEGADERM™ TRANSPARENT FILM DRESSING FRAME STYLE, 1624W, 2-3/8 IN X 2-3/4 IN (6 CM X 7 CM), 100/CT 4CT/CASE: Brand: 3M™ TEGADERM™

## (undated) DEVICE — SCD SEQUENTIAL COMPRESSION COMFORT SLEEVE MEDIUM KNEE LENGTH: Brand: KENDALL SCD

## (undated) DEVICE — SPONGE 4 X 4 XRAY 16 PLY STRL LF RFD

## (undated) DEVICE — LUBRICANT SURGILUBE TUBE 4 OZ  FLIP TOP

## (undated) DEVICE — GLOVE SRG BIOGEL ECLIPSE 7.5

## (undated) DEVICE — UROCATCH BAG

## (undated) DEVICE — INVIEW CLEAR LEGGINGS: Brand: CONVERTORS

## (undated) DEVICE — LASER FIBER HOLMIUM 272MICRON

## (undated) DEVICE — GLOVE SRG BIOGEL 7

## (undated) DEVICE — SYRINGE 10ML LL

## (undated) DEVICE — SYRINGE 50ML LL